# Patient Record
Sex: FEMALE | Race: BLACK OR AFRICAN AMERICAN | Employment: OTHER | URBAN - METROPOLITAN AREA
[De-identification: names, ages, dates, MRNs, and addresses within clinical notes are randomized per-mention and may not be internally consistent; named-entity substitution may affect disease eponyms.]

---

## 2018-09-18 ENCOUNTER — HOSPITAL ENCOUNTER (INPATIENT)
Age: 83
LOS: 8 days | Discharge: NURSING, REHAB OR LONG-TERM CARE | DRG: 064 | End: 2018-09-26
Attending: INTERNAL MEDICINE | Admitting: INTERNAL MEDICINE
Payer: MEDICARE

## 2018-09-18 ENCOUNTER — APPOINTMENT (OUTPATIENT)
Dept: CT IMAGING | Age: 83
DRG: 064 | End: 2018-09-18
Attending: INTERNAL MEDICINE
Payer: MEDICARE

## 2018-09-18 PROBLEM — I61.9 HEMORRHAGIC STROKE (HCC): Status: ACTIVE | Noted: 2018-09-18

## 2018-09-18 PROCEDURE — 2000000000 HC ICU R&B

## 2018-09-18 PROCEDURE — 2060000000 HC ICU INTERMEDIATE R&B

## 2018-09-18 PROCEDURE — 70450 CT HEAD/BRAIN W/O DYE: CPT

## 2018-09-18 PROCEDURE — 2500000003 HC RX 250 WO HCPCS: Performed by: STUDENT IN AN ORGANIZED HEALTH CARE EDUCATION/TRAINING PROGRAM

## 2018-09-18 RX ADMIN — NICARDIPINE HYDROCHLORIDE 5 MG/HR: 0.1 INJECTION, SOLUTION INTRAVENOUS at 22:10

## 2018-09-18 ASSESSMENT — PAIN SCALES - GENERAL
PAINLEVEL_OUTOF10: 0
PAINLEVEL_OUTOF10: 0

## 2018-09-19 ENCOUNTER — APPOINTMENT (OUTPATIENT)
Dept: MRI IMAGING | Age: 83
DRG: 064 | End: 2018-09-19
Attending: INTERNAL MEDICINE
Payer: MEDICARE

## 2018-09-19 LAB
AMMONIA: 73 UMOL/L (ref 11–51)
ANION GAP SERPL CALCULATED.3IONS-SCNC: 12 MMOL/L (ref 3–16)
BASOPHILS ABSOLUTE: 0.1 K/UL (ref 0–0.2)
BASOPHILS RELATIVE PERCENT: 1.3 %
BUN BLDV-MCNC: 9 MG/DL (ref 7–20)
CALCIUM SERPL-MCNC: 8.4 MG/DL (ref 8.3–10.6)
CHLORIDE BLD-SCNC: 101 MMOL/L (ref 99–110)
CO2: 24 MMOL/L (ref 21–32)
CREAT SERPL-MCNC: 0.7 MG/DL (ref 0.6–1.2)
EOSINOPHILS ABSOLUTE: 0.1 K/UL (ref 0–0.6)
EOSINOPHILS RELATIVE PERCENT: 0.9 %
GFR AFRICAN AMERICAN: >60
GFR NON-AFRICAN AMERICAN: >60
GLUCOSE BLD-MCNC: 104 MG/DL (ref 70–99)
GLUCOSE BLD-MCNC: 105 MG/DL (ref 70–99)
HCT VFR BLD CALC: 43.7 % (ref 36–48)
HEMOGLOBIN: 14.8 G/DL (ref 12–16)
INR BLD: 1.32 (ref 0.86–1.14)
LV EF: 58 %
LVEF MODALITY: NORMAL
LYMPHOCYTES ABSOLUTE: 1.1 K/UL (ref 1–5.1)
LYMPHOCYTES RELATIVE PERCENT: 10.3 %
MCH RBC QN AUTO: 31.4 PG (ref 26–34)
MCHC RBC AUTO-ENTMCNC: 33.8 G/DL (ref 31–36)
MCV RBC AUTO: 93.1 FL (ref 80–100)
MONOCYTES ABSOLUTE: 0.4 K/UL (ref 0–1.3)
MONOCYTES RELATIVE PERCENT: 3.6 %
NEUTROPHILS ABSOLUTE: 8.7 K/UL (ref 1.7–7.7)
NEUTROPHILS RELATIVE PERCENT: 83.9 %
PDW BLD-RTO: 13 % (ref 12.4–15.4)
PERFORMED ON: ABNORMAL
PLATELET # BLD: 192 K/UL (ref 135–450)
PMV BLD AUTO: 7.3 FL (ref 5–10.5)
POTASSIUM SERPL-SCNC: 3.5 MMOL/L (ref 3.5–5.1)
PROTHROMBIN TIME: 15.1 SEC (ref 9.8–13)
RBC # BLD: 4.69 M/UL (ref 4–5.2)
REASON FOR REJECTION: NORMAL
REJECTED TEST: NORMAL
SODIUM BLD-SCNC: 137 MMOL/L (ref 136–145)
WBC # BLD: 10.4 K/UL (ref 4–11)

## 2018-09-19 PROCEDURE — 2060000000 HC ICU INTERMEDIATE R&B

## 2018-09-19 PROCEDURE — 2500000003 HC RX 250 WO HCPCS: Performed by: STUDENT IN AN ORGANIZED HEALTH CARE EDUCATION/TRAINING PROGRAM

## 2018-09-19 PROCEDURE — 36415 COLL VENOUS BLD VENIPUNCTURE: CPT

## 2018-09-19 PROCEDURE — 85025 COMPLETE CBC W/AUTO DIFF WBC: CPT

## 2018-09-19 PROCEDURE — 70553 MRI BRAIN STEM W/O & W/DYE: CPT

## 2018-09-19 PROCEDURE — 1200000000 HC SEMI PRIVATE

## 2018-09-19 PROCEDURE — 94761 N-INVAS EAR/PLS OXIMETRY MLT: CPT

## 2018-09-19 PROCEDURE — 2580000003 HC RX 258: Performed by: STUDENT IN AN ORGANIZED HEALTH CARE EDUCATION/TRAINING PROGRAM

## 2018-09-19 PROCEDURE — 2700000000 HC OXYGEN THERAPY PER DAY

## 2018-09-19 PROCEDURE — 92610 EVALUATE SWALLOWING FUNCTION: CPT

## 2018-09-19 PROCEDURE — 82140 ASSAY OF AMMONIA: CPT

## 2018-09-19 PROCEDURE — 6370000000 HC RX 637 (ALT 250 FOR IP): Performed by: STUDENT IN AN ORGANIZED HEALTH CARE EDUCATION/TRAINING PROGRAM

## 2018-09-19 PROCEDURE — 6360000002 HC RX W HCPCS: Performed by: STUDENT IN AN ORGANIZED HEALTH CARE EDUCATION/TRAINING PROGRAM

## 2018-09-19 PROCEDURE — 85610 PROTHROMBIN TIME: CPT

## 2018-09-19 PROCEDURE — C8929 TTE W OR WO FOL WCON,DOPPLER: HCPCS

## 2018-09-19 PROCEDURE — 93880 EXTRACRANIAL BILAT STUDY: CPT

## 2018-09-19 PROCEDURE — 80048 BASIC METABOLIC PNL TOTAL CA: CPT

## 2018-09-19 PROCEDURE — S0028 INJECTION, FAMOTIDINE, 20 MG: HCPCS | Performed by: STUDENT IN AN ORGANIZED HEALTH CARE EDUCATION/TRAINING PROGRAM

## 2018-09-19 PROCEDURE — G8997 SWALLOW GOAL STATUS: HCPCS

## 2018-09-19 PROCEDURE — G8996 SWALLOW CURRENT STATUS: HCPCS

## 2018-09-19 RX ORDER — SODIUM CHLORIDE 0.9 % (FLUSH) 0.9 %
10 SYRINGE (ML) INJECTION PRN
Status: DISCONTINUED | OUTPATIENT
Start: 2018-09-19 | End: 2018-09-26 | Stop reason: HOSPADM

## 2018-09-19 RX ORDER — ONDANSETRON 2 MG/ML
4 INJECTION INTRAMUSCULAR; INTRAVENOUS EVERY 6 HOURS PRN
Status: DISCONTINUED | OUTPATIENT
Start: 2018-09-19 | End: 2018-09-26 | Stop reason: HOSPADM

## 2018-09-19 RX ORDER — ACETAMINOPHEN 650 MG/1
650 SUPPOSITORY RECTAL EVERY 4 HOURS PRN
Status: DISCONTINUED | OUTPATIENT
Start: 2018-09-19 | End: 2018-09-26 | Stop reason: HOSPADM

## 2018-09-19 RX ORDER — DEXAMETHASONE SODIUM PHOSPHATE 4 MG/ML
4 INJECTION, SOLUTION INTRA-ARTICULAR; INTRALESIONAL; INTRAMUSCULAR; INTRAVENOUS; SOFT TISSUE EVERY 6 HOURS
Status: DISCONTINUED | OUTPATIENT
Start: 2018-09-19 | End: 2018-09-19

## 2018-09-19 RX ORDER — SODIUM CHLORIDE 0.9 % (FLUSH) 0.9 %
10 SYRINGE (ML) INJECTION EVERY 12 HOURS SCHEDULED
Status: DISCONTINUED | OUTPATIENT
Start: 2018-09-19 | End: 2018-09-26 | Stop reason: HOSPADM

## 2018-09-19 RX ORDER — SODIUM CHLORIDE 9 MG/ML
INJECTION, SOLUTION INTRAVENOUS CONTINUOUS
Status: DISCONTINUED | OUTPATIENT
Start: 2018-09-19 | End: 2018-09-26 | Stop reason: HOSPADM

## 2018-09-19 RX ADMIN — FAMOTIDINE 20 MG: 10 INJECTION, SOLUTION INTRAVENOUS at 08:02

## 2018-09-19 RX ADMIN — FAMOTIDINE 20 MG: 10 INJECTION, SOLUTION INTRAVENOUS at 01:43

## 2018-09-19 RX ADMIN — Medication 10 ML: at 22:05

## 2018-09-19 RX ADMIN — NICARDIPINE HYDROCHLORIDE 5 MG/HR: 0.1 INJECTION, SOLUTION INTRAVENOUS at 05:46

## 2018-09-19 RX ADMIN — ACETAMINOPHEN 650 MG: 650 SUPPOSITORY RECTAL at 06:52

## 2018-09-19 RX ADMIN — PIPERACILLIN SODIUM AND TAZOBACTAM SODIUM 3.38 G: 3; .375 INJECTION, POWDER, LYOPHILIZED, FOR SOLUTION INTRAVENOUS at 08:03

## 2018-09-19 RX ADMIN — PIPERACILLIN SODIUM AND TAZOBACTAM SODIUM 3.38 G: 3; .375 INJECTION, POWDER, LYOPHILIZED, FOR SOLUTION INTRAVENOUS at 01:29

## 2018-09-19 RX ADMIN — LEVETIRACETAM 500 MG: 100 INJECTION, SOLUTION INTRAVENOUS at 12:34

## 2018-09-19 RX ADMIN — LEVETIRACETAM 500 MG: 100 INJECTION, SOLUTION INTRAVENOUS at 01:28

## 2018-09-19 RX ADMIN — Medication 10 ML: at 08:02

## 2018-09-19 RX ADMIN — SODIUM CHLORIDE: 9 INJECTION, SOLUTION INTRAVENOUS at 01:29

## 2018-09-19 RX ADMIN — FAMOTIDINE 20 MG: 10 INJECTION, SOLUTION INTRAVENOUS at 22:04

## 2018-09-19 ASSESSMENT — PAIN SCALES - PAIN ASSESSMENT IN ADVANCED DEMENTIA (PAINAD)
CONSOLABILITY: 0
FACIALEXPRESSION: 0
CONSOLABILITY: 0
BODYLANGUAGE: 0
BREATHING: 0
BREATHING: 0
NEGVOCALIZATION: 0
FACIALEXPRESSION: 0
TOTALSCORE: 0
BODYLANGUAGE: 0
TOTALSCORE: 0
NEGVOCALIZATION: 0

## 2018-09-19 ASSESSMENT — PAIN SCALES - GENERAL
PAINLEVEL_OUTOF10: 0
PAINLEVEL_OUTOF10: 0

## 2018-09-19 NOTE — PROGRESS NOTES
Speech Language Pathology    Order received for BSE. Will need neurosurgery clearance prior to PO. Called and spoke with RN, Lucía Georges, who reports patient not appropriate for swallow eval right now due to lethargy. Please page SLP if pt appropriate for BSE.      Rosa Perkins M.A., Yair  Speech-Language Pathologist  Pager [phone]

## 2018-09-19 NOTE — PROGRESS NOTES
mild right sided facial droop. Decreased strength 4/5 of RUE, 5/5 in LUE. Strength grossly intact of lower extremities. Unable to follow instructions for further neuro exam.   · Psychiatric:  Alert and oriented x 1  · Capillary Refill: Brisk,< 3 seconds   · Peripheral Pulses: +2 palpable, equal bilaterally       LABS    CBC: Recent Labs      09/18/18 1645 09/19/18   0648   WBC  7.9  10.4   HGB  15.2  14.8   HCT  45.0  43.7   PLT  242  192   MCV  95.3  93.1     Renal: Recent Labs      09/18/18 1645   NA  142   K  5.0   CL  105   CO2  24   BUN  10   CREATININE  0.8   GLUCOSE  125*   CALCIUM  9.4   ANIONGAP  13     Hepatic: Recent Labs      09/18/18 1645   AST  49*   ALT  33   BILITOT  1.1*   PROT  7.9   LABALBU  4.2   ALKPHOS  92     Troponin:   Recent Labs      09/18/18 1645   TROPONINI  <0.01     BNP: No results for input(s): BNP in the last 72 hours. Lipids: No results for input(s): CHOL, HDL in the last 72 hours. Invalid input(s): LDLCALCU, TRIGLYCERIDE  ABGs:  No results for input(s): PHART, TIT4QZI, PO2ART, WCY6MOH, BEART, THGBART, D3HTWURM, SCX3OHV in the last 72 hours. INR:   Recent Labs      09/18/18 1645   INR  1.40*     Lactate: No results for input(s): LACTATE in the last 72 hours. Cultures:  none  -----------------------------------------------------------------  Imaging:  CT Head WO Contrast   Final Result   Addendum 1 of 1   CR   Patient: MILLA WADSWORTH  Time Out: 02:09   Exam(s): CT HEAD Without Contrast        ADDENDUM - Added by Shu Yoder M.D. on 9/19/2018 2:09 AM (-04:00)   Comparison from outside institution on nonenhanced head CT from 09/18/18    1650 hour and CTA from 1708 hour now available after second request.       Amount of hemorrhage in left caudate and basal ganglia and associated    mass effect and midline shift are not substantially changed. Surrounding vasogenic edema is slightly increased. ----------------------------------------------------------------------       EXAM:     CT Head Without Intravenous Contrast       CLINICAL HISTORY:      Reason for exam: re-assess hemorrhage. Has a \"code stroke\" or \"stroke    alert\" been called?->No.       TECHNIQUE:     Axial computed tomography images of the head/brain without intravenous    contrast.  CTDI is 67.82 mGy and DLP is 1279.2 mGy-cm. All CT scans at    this facility use dose modulation, iterative reconstruction, and/or    weight based dosing when appropriate to reduce radiation dose to as low    as reasonably achievable. Coronal and sagittal reconstructions are    performed       COMPARISON:     No relevant prior studies available. FINDINGS:     Brain:  2 foci of adjacent small intraparenchymal hemorrhage centered    in the region of left caudate and basal ganglia, surrounded by moderate    amount of vasogenic edema causing mass effect, effacement of left lateral    ventricle and 2 mm midline shift to the right. Small hypodensities of    left frontal lobe may suggest subacute versus chronic infarct. Ventricles:  Unremarkable. No ventriculomegaly. Bones/joints:  Unremarkable. No acute fracture. Soft tissues:  Unremarkable. Sinuses:  Unremarkable as visualized. No acute sinusitis. Mastoid air cells:  Unremarkable as visualized. No mastoid effusion. IMPRESSION:        1.  2 foci of adjacent small intraparenchymal hemorrhage centered in the    region of left caudate and basal ganglia, surrounded by moderate amount    of vasogenic edema causing mass effect, effacement of left lateral    ventricle and 2 mm midline shift to the right. 2.  Small hypodensities of left frontal lobe may suggest subacute versus    chronic infarct. Please correlate with focal neurological symptoms.   If    clinically indicated, MRI may help to further evaluate               [employer]       09/19/18 01:08 Call Doctor

## 2018-09-19 NOTE — PROGRESS NOTES
likely require instrumental evaluation of swallow prior to initiation of diet consistency given high risk of dysphagia. Dysphagia Outcome Severity Scale: Level 1: Severe dysphagia- NPO. Unable to tolerate any PO safely     Treatment Plan  Requires SLP Intervention: Yes  Duration/Frequency of Treatment: 3-5x/wk  D/C Recommendations: To be determined  Referral To: Speech Evaluation (when more alert)     Recommended Diet and Intervention  Diet Solids Recommendation: NPO  Liquid Consistency Recommendation: NPO (ice chips for comfort when alert)  Recommended Form of Meds: Via alternative means of nutrition  Therapeutic Interventions: Patient/Family education; Therapeutic PO trials with SLP    Compensatory Swallowing Strategies  Compensatory Swallowing Strategies: Upright as possible for all oral intake (aggressive oral care)    Treatment/Goals  Dysphagia Goals: The patient will tolerate repeat BSE when able. 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 Pt reported to have a fall per family 9/13/18 but no significant change in mental status per family (who spoke with pt over phone) until yesterday. No neurosurgical intervention warranted at this time - pt cleared to participate in swallow evaluation. PMHx significant for CHF and HTN. Subjective  Subjective: Grandson-in-law present for session. RN present at beginning of session and reported pt still lethargic but more alert than previous this date. Behavior/Cognition: Confused; Lethargic;Cooperative  Temperature Spikes Noted: Yes  Respiratory Status: O2 via nasual cannula  Breath Sounds: Clear;Diminished (per RN documentation)  O2 Device: Nasal cannula  Communication Observation: Aphasia; Dysarthria (speech incomprehensible at times; poor comprehension of questions)  Follows Directions: Simple (inconsistent; verbal and visual cues SLP Total Treatment Time  Timed Code Treatment Minutes: 0 Minutes  Total Treatment Time: EMILY Murillo CCC-SLP; [id number]  Speech-Language Pathologist  Pager # [phone]    If patient is discharged prior to next session, this note will serve as discharge summary.

## 2018-09-19 NOTE — PROGRESS NOTES
Attempted calling grand daughter to complete MRI safety questionnaire.  No answer, voicemail left to return this RN call

## 2018-09-19 NOTE — H&P
I certify that Sophy Worthy is expected to be hospitalized for 2 midnights based on the following assessment and plan:      Patient seen and examined, plan of care discussed with residents. Please see full H&P for details by Dr. Henry Garza. Agree with their assessment and plan with following addendum:  81 y/o female , with h/o a fib, on coumadin, presented as a transfer with hemorrhagic CVA. She was given FFP and vit K before transfer. Admit to ICU, neurochecks every hour, SBP below 160-150, repeating head CT and INR. NPO. Zosyn for possible aspiration PNA.        Yohan Finely

## 2018-09-19 NOTE — H&P
facility-administered medications on file prior to encounter. Current Outpatient Prescriptions on File Prior to Encounter   Medication Sig Dispense Refill    digoxin (LANOXIN) 125 MCG tablet Take 125 mcg by mouth daily      potassium chloride (KLOR-CON M) 20 MEQ extended release tablet Take 20 mEq by mouth 2 times daily      warfarin (COUMADIN) 5 MG tablet Take 5 mg by mouth           Scheduled Meds:   sodium chloride flush  10 mL Intravenous 2 times per day    famotidine (PEPCID) injection  20 mg Intravenous BID    piperacillin-tazobactam  3.375 g Intravenous Q8H    levetiracetam  500 mg Intravenous Q12H      Continuous Infusions:   sodium chloride      niCARdipine 5 mg/hr (09/18/18 2210)     PRN Meds:sodium chloride flush, magnesium hydroxide, ondansetron    Allergies: No Known Allergies    REVIEW OF SYSTEMS:         ROS    PHYSICAL EXAM:       Vitals: BP (!) 118/98   Pulse 86   Temp 100.8 °F (38.2 °C) (Oral)   Resp 21     I/O:  No intake or output data in the 24 hours ending 09/19/18 0021  No intake/output data recorded. No intake/output data recorded. Physical Examination:     Physical Exam   Constitutional: She is oriented to person, place, and time. She appears well-nourished. No distress. She is not intubated. HENT:   Head: Normocephalic and atraumatic. Eyes: Pupils are equal, round, and reactive to light. Conjunctivae and EOM are normal.   Neck: Neck supple. No JVD present. No tracheal deviation present. Cardiovascular: Normal rate and intact distal pulses. Irregular   Pulmonary/Chest: Effort normal. No accessory muscle usage. Tachypnea noted. She is not intubated. No respiratory distress. She has decreased breath sounds. Abdominal: Soft. Normal appearance and bowel sounds are normal.   Neurological: She is alert and oriented to person, place, and time. She displays atrophy. She displays no tremor. A sensory deficit is present. GCS eye subscore is 4. GCS verbal subscore is 5. disease and pulmonary vascular congestion with concerns for consolidation and possible Pneumonia   - Follow up x-ray  - Zosyn for anaerobic coverage    History of AFIB  - Rate controlled with diltiazem   - Hold AC    Code Status:Full Code  FEN: NPO  PPX:  SCD  DISPO: ICU    This patient has been staffed and discussed with Dr. Yesica Franklin  -----------------------------  Froilan Sweet, PGY-1  Pager; [phone]  12:21 AM

## 2018-09-19 NOTE — PLAN OF CARE
Problem: Nutrition  Intervention: Swallowing evaluation  SLP evaluation completed. Please refer to EMR.

## 2018-09-19 NOTE — CONSULTS
Clinical Pharmacy Progress Note  Medication History     Admit Date: 9/18/2018    List of of current medications patient is taking is pending. Home Medication list in EPIC updated to reflect changes noted below. Source of information: Care Everywhere encounter review    Patient's home pharmacy:     Home Medication List:  Warfarin 5 mg daily  Digoxin 125 mcg daily  KCL 20 mEq BID    Other notes: Patient has no surescripts history and is currently not awake. Will follow-up with patient/family to clarify warfarin dose. Meds have been verified from cardiology notes from 8/2018 at [facility]    Please call with any questions.   Ruma Zhang, PharmD, MPH  PGY-1 Pharmacy Resident  Office: [phone]  Wireless: [phone]  9/19/2018 11:39 AM

## 2018-09-19 NOTE — CONSULTS
Basic Metabolic Profile     Recent Labs      09/18/18   1645   NA  142   CL  105   CO2  24   BUN  10   CREATININE  0.8   GLUCOSE  125*       Complete Blood Count      Recent Labs      09/18/18   1645  09/19/18   0648   WBC  7.9  10.4   RBC  4.73  4.69       Coagulation Studies     Recent Labs      09/18/18   1645   INR  1.40*          MEDICATIONS   Inpatient Medications     sodium chloride flush, 10 mL, Intravenous, 2 times per day    famotidine (PEPCID) injection, 20 mg, Intravenous, BID    piperacillin-tazobactam, 3.375 g, Intravenous, Q8H    levetiracetam, 500 mg, Intravenous, Q12H    [START ON 9/20/2018] influenza virus vaccine, 0.5 mL, Intramuscular, Once   Infusions    sodium chloride 50 mL/hr at 09/19/18 0129    niCARdipine 4 mg/hr (09/19/18 0749)       PRN      sodium chloride flush 10 mL Intravenous PRN   magnesium hydroxide 30 mL Oral Daily PRN   ondansetron 4 mg Intravenous Q6H PRN   acetaminophen 650 mg Rectal Q4H PRN       Antibiotics       Recent Abx Admin                      piperacillin-tazobactam (ZOSYN) 3.375 g in dextrose 5 % 100 mL IVPB extended infusion (mini-bag) (g) 3.375 g New Bag 09/19/18 0803       3.375 g New Bag   0129                        PHYSICAL EXAMINATION      Patient seen and examined   General: Well developed. sleepy, confused and cooperative in no acute distress.     HENT: atraumatic, neck supple  Eyes: Optic discs: Not tested  Pulmonary: unlabored respiratory effort  Cardiovascular:  Warm well perfused. No peripheral edema  Gastrointestinal: abdomen soft, ND     Neurologic Exam:  Neurological:  Mental Status: Sleepy, have to re-orient to stay awake, confused to date & location, knows first name. Garbled speech at times incomprehensible.   Attention: Have to reorient during exam  Language: some expressive aphasia   Sensation: Intact to all extremities to light touch  DTRs:     Right  Left    Patella   0 1   ankle clonus  neg neg      Cranial Nerves:  Cranial

## 2018-09-20 ENCOUNTER — APPOINTMENT (OUTPATIENT)
Dept: GENERAL RADIOLOGY | Age: 83
DRG: 064 | End: 2018-09-20
Attending: INTERNAL MEDICINE
Payer: MEDICARE

## 2018-09-20 LAB
ANION GAP SERPL CALCULATED.3IONS-SCNC: 15 MMOL/L (ref 3–16)
BUN BLDV-MCNC: 10 MG/DL (ref 7–20)
CALCIUM SERPL-MCNC: 8.9 MG/DL (ref 8.3–10.6)
CHLORIDE BLD-SCNC: 99 MMOL/L (ref 99–110)
CO2: 22 MMOL/L (ref 21–32)
CREAT SERPL-MCNC: 0.7 MG/DL (ref 0.6–1.2)
GFR AFRICAN AMERICAN: >60
GFR NON-AFRICAN AMERICAN: >60
GLUCOSE BLD-MCNC: 87 MG/DL (ref 70–99)
POTASSIUM SERPL-SCNC: 3.7 MMOL/L (ref 3.5–5.1)
SODIUM BLD-SCNC: 136 MMOL/L (ref 136–145)

## 2018-09-20 PROCEDURE — 97163 PT EVAL HIGH COMPLEX 45 MIN: CPT

## 2018-09-20 PROCEDURE — 97167 OT EVAL HIGH COMPLEX 60 MIN: CPT

## 2018-09-20 PROCEDURE — G8979 MOBILITY GOAL STATUS: HCPCS

## 2018-09-20 PROCEDURE — 1200000000 HC SEMI PRIVATE

## 2018-09-20 PROCEDURE — G8987 SELF CARE CURRENT STATUS: HCPCS

## 2018-09-20 PROCEDURE — 92526 ORAL FUNCTION THERAPY: CPT

## 2018-09-20 PROCEDURE — 36415 COLL VENOUS BLD VENIPUNCTURE: CPT

## 2018-09-20 PROCEDURE — 80048 BASIC METABOLIC PNL TOTAL CA: CPT

## 2018-09-20 PROCEDURE — 2580000003 HC RX 258: Performed by: STUDENT IN AN ORGANIZED HEALTH CARE EDUCATION/TRAINING PROGRAM

## 2018-09-20 PROCEDURE — G8978 MOBILITY CURRENT STATUS: HCPCS

## 2018-09-20 PROCEDURE — 2500000003 HC RX 250 WO HCPCS: Performed by: STUDENT IN AN ORGANIZED HEALTH CARE EDUCATION/TRAINING PROGRAM

## 2018-09-20 PROCEDURE — 6360000002 HC RX W HCPCS: Performed by: INTERNAL MEDICINE

## 2018-09-20 PROCEDURE — 97530 THERAPEUTIC ACTIVITIES: CPT

## 2018-09-20 PROCEDURE — 6360000002 HC RX W HCPCS: Performed by: STUDENT IN AN ORGANIZED HEALTH CARE EDUCATION/TRAINING PROGRAM

## 2018-09-20 PROCEDURE — G8988 SELF CARE GOAL STATUS: HCPCS

## 2018-09-20 PROCEDURE — 97535 SELF CARE MNGMENT TRAINING: CPT

## 2018-09-20 PROCEDURE — 94760 N-INVAS EAR/PLS OXIMETRY 1: CPT

## 2018-09-20 PROCEDURE — S0028 INJECTION, FAMOTIDINE, 20 MG: HCPCS | Performed by: STUDENT IN AN ORGANIZED HEALTH CARE EDUCATION/TRAINING PROGRAM

## 2018-09-20 RX ORDER — HYDRALAZINE HYDROCHLORIDE 20 MG/ML
10 INJECTION INTRAMUSCULAR; INTRAVENOUS EVERY 6 HOURS PRN
Status: DISCONTINUED | OUTPATIENT
Start: 2018-09-20 | End: 2018-09-24

## 2018-09-20 RX ORDER — LABETALOL HYDROCHLORIDE 5 MG/ML
10 INJECTION, SOLUTION INTRAVENOUS ONCE
Status: COMPLETED | OUTPATIENT
Start: 2018-09-20 | End: 2018-09-20

## 2018-09-20 RX ORDER — AMLODIPINE BESYLATE 10 MG/1
10 TABLET ORAL DAILY
Status: DISCONTINUED | OUTPATIENT
Start: 2018-09-20 | End: 2018-09-20

## 2018-09-20 RX ORDER — LABETALOL HYDROCHLORIDE 5 MG/ML
10 INJECTION, SOLUTION INTRAVENOUS EVERY 6 HOURS PRN
Status: DISCONTINUED | OUTPATIENT
Start: 2018-09-20 | End: 2018-09-24

## 2018-09-20 RX ORDER — HYDRALAZINE HYDROCHLORIDE 20 MG/ML
5 INJECTION INTRAMUSCULAR; INTRAVENOUS EVERY 6 HOURS
Status: DISCONTINUED | OUTPATIENT
Start: 2018-09-20 | End: 2018-09-20

## 2018-09-20 RX ORDER — HYDRALAZINE HYDROCHLORIDE 20 MG/ML
5 INJECTION INTRAMUSCULAR; INTRAVENOUS EVERY 6 HOURS PRN
Status: DISCONTINUED | OUTPATIENT
Start: 2018-09-20 | End: 2018-09-20

## 2018-09-20 RX ORDER — HYDRALAZINE HYDROCHLORIDE 20 MG/ML
10 INJECTION INTRAMUSCULAR; INTRAVENOUS ONCE
Status: DISCONTINUED | OUTPATIENT
Start: 2018-09-20 | End: 2018-09-24

## 2018-09-20 RX ADMIN — SODIUM CHLORIDE: 9 INJECTION, SOLUTION INTRAVENOUS at 04:09

## 2018-09-20 RX ADMIN — Medication 10 ML: at 20:43

## 2018-09-20 RX ADMIN — FAMOTIDINE 20 MG: 10 INJECTION, SOLUTION INTRAVENOUS at 10:19

## 2018-09-20 RX ADMIN — LEVETIRACETAM 500 MG: 100 INJECTION, SOLUTION INTRAVENOUS at 00:04

## 2018-09-20 RX ADMIN — LABETALOL HYDROCHLORIDE 10 MG: 5 INJECTION, SOLUTION INTRAVENOUS at 15:05

## 2018-09-20 RX ADMIN — HYDRALAZINE HYDROCHLORIDE 5 MG: 20 INJECTION INTRAMUSCULAR; INTRAVENOUS at 13:06

## 2018-09-20 RX ADMIN — Medication 10 ML: at 10:23

## 2018-09-20 RX ADMIN — HYDRALAZINE HYDROCHLORIDE 10 MG: 20 INJECTION INTRAMUSCULAR; INTRAVENOUS at 11:48

## 2018-09-20 RX ADMIN — HYDRALAZINE HYDROCHLORIDE 10 MG: 20 INJECTION INTRAMUSCULAR; INTRAVENOUS at 17:39

## 2018-09-20 RX ADMIN — FAMOTIDINE 20 MG: 10 INJECTION, SOLUTION INTRAVENOUS at 20:42

## 2018-09-20 RX ADMIN — LEVETIRACETAM 500 MG: 100 INJECTION, SOLUTION INTRAVENOUS at 11:35

## 2018-09-20 ASSESSMENT — PAIN SCALES - PAIN ASSESSMENT IN ADVANCED DEMENTIA (PAINAD)
BREATHING: 0
CONSOLABILITY: 0
FACIALEXPRESSION: 0
CONSOLABILITY: 0
CONSOLABILITY: 0
BODYLANGUAGE: 0
BODYLANGUAGE: 0
CONSOLABILITY: 0
TOTALSCORE: 0
TOTALSCORE: 0
FACIALEXPRESSION: 0
FACIALEXPRESSION: 0
BODYLANGUAGE: 0
NEGVOCALIZATION: 0
FACIALEXPRESSION: 0
BODYLANGUAGE: 0
BREATHING: 0
TOTALSCORE: 0
NEGVOCALIZATION: 0
CONSOLABILITY: 0
CONSOLABILITY: 0
BODYLANGUAGE: 0
BREATHING: 0
NEGVOCALIZATION: 0
NEGVOCALIZATION: 0
BREATHING: 0
TOTALSCORE: 0
FACIALEXPRESSION: 0
BREATHING: 0
BREATHING: 0
NEGVOCALIZATION: 0
NEGVOCALIZATION: 0
BODYLANGUAGE: 0
FACIALEXPRESSION: 0
BODYLANGUAGE: 0
TOTALSCORE: 0
TOTALSCORE: 0
BODYLANGUAGE: 0
FACIALEXPRESSION: 0
CONSOLABILITY: 0
TOTALSCORE: 0
FACIALEXPRESSION: 0
BODYLANGUAGE: 0
FACIALEXPRESSION: 0
CONSOLABILITY: 0
TOTALSCORE: 0
BREATHING: 0
FACIALEXPRESSION: 0
CONSOLABILITY: 0
NEGVOCALIZATION: 0
TOTALSCORE: 0
NEGVOCALIZATION: 0
CONSOLABILITY: 0
BREATHING: 0
BREATHING: 0
BODYLANGUAGE: 0
TOTALSCORE: 0
BREATHING: 0

## 2018-09-20 NOTE — PROGRESS NOTES
PROGRESS NOTE    9/20/2018 1:22 PM                               Madelin Scott                      LOS: 2 days               Subjective:  No acute events overnight. Patient has no specific complaints this am.         Physical Exam:    Vitals:    09/20/18 1303   BP: (!) 182/144   Pulse: 92   Resp: 22   Temp:    SpO2:      Patient seen and examined   General: Well developed. sleepy, confused and cooperative in no acute distress.     HENT: atraumatic, neck supple  Eyes: Optic discs: Not tested  Pulmonary: unlabored respiratory effort  Cardiovascular:  Warm well perfused. No peripheral edema  Gastrointestinal: abdomen soft, ND     Neurological:  GCS:  4 - Opens eyes on own  4 - Seems confused, disoriented  6 - Follows simple motor commands    Mental Status: Sleepy, have to re-orient to stay awake, confused to date & location, knows first name. Garbled speech at times incomprehensible. Attention: Have to reorient during exam  Language: some expressive aphasia   Sensation: Intact to all extremities to light touch  DTRs:     Right  Left    Patella   0 1   ankle clonus  neg neg      Cranial Nerves:  Cranial Nerves:  II: Visual acuity unable to be tested  III, IV, VI: PERRL, 2 mm bilaterally, does not follow EOM exam, no nystagmus noted, no forced gaze with head turn  V: Facial sensation intact bilaterally to touch  VII: Face symmetric  VIII: Hearing intact bilaterally to spoken voice  IX: Palate movement equal bilaterally  XI: Shoulder shrug equal bilaterally  XII: Tongue midline     Musculoskeletal:   Gait: Not tested   Assist devices: None   Tone: normal  Motor strength:     Right  Left      Right  Left    Deltoid  2 3   Hip Flex   1  3   Biceps  2 3   Knee Extensors   1   3    Triceps  2 3   Knee Flexors   1  3   Wrist Ext  2 4   Ankle Dorsiflex. 1  3   Wrist Flex  2 4   Ankle Plantarflex.    1  3   Handgrip  1 4   Ext Uziel Longus        Thumb Ext  3 4              Radiological Findings:  Ct Head Wo

## 2018-09-20 NOTE — PROGRESS NOTES
currently requiring assist of 2 for all mobility and Max A for ADLs. Pt with decreased speech ability and decreased safety awareness. Recommend continued inpt OT at d/c. Will follow as inpt. Treatment Diagnosis: Impaired functional mobility, ADLs, cognition, coordination, and ROM 2/2 hemorrhagic CVA  Prognosis: Fair  Decision Making: High Complexity  Patient Education: Role of OT - no evidence of learning, needs reinforcement  REQUIRES OT FOLLOW UP: Yes  Activity Tolerance  Activity Tolerance: Patient Tolerated treatment well;Treatment limited secondary to decreased cognition  Activity Tolerance: Decreased ability to follow instructions, difficulties with speech  Safety Devices  Safety Devices in place: Yes  Type of devices: Left in chair;Nurse notified;Call light within reach; Chair alarm in place         Plan  This note will serve as a discharge summary if patient is discharged from hospital before next treatment session. Plan  Times per week: 5-7x  Times per day: Daily  Current Treatment Recommendations: Strengthening, Patient/Caregiver Education & Training, Functional Mobility Training, Cognitive Reorientation, Endurance Training, Safety Education & Training, Self-Care / ADL    G-Code  OT G-codes  Functional Limitation: Self care  Self Care Current Status (): At least 80 percent but less than 100 percent impaired, limited or restricted  Self Care Goal Status ():  At least 60 percent but less than 80 percent impaired, limited or restricted    AM-PAC Score  AM-PAC Inpatient Daily Activity Raw Score: 7  AM-PAC Inpatient ADL T-Scale Score : 20.13  ADL Inpatient CMS 0-100% Score: 92.44  ADL Inpatient CMS G-Code Modifier : CM    Goals  Short term goals  Time Frame for Short term goals: Discharge  Short term goal 1: Follow 1-step command on 3/4 attempts  Short term goal 2: Functional transfers with Mod A x1  Short term goal 3: Complete simple grooming task with set up and min verbal cues  Short term goal 4:

## 2018-09-20 NOTE — PROGRESS NOTES
a \"code stroke\" or \"stroke    alert\" been called?->No.       TECHNIQUE:     Axial computed tomography images of the head/brain without intravenous    contrast.  CTDI is 67.82 mGy and DLP is 1279.2 mGy-cm. All CT scans at    this facility use dose modulation, iterative reconstruction, and/or    weight based dosing when appropriate to reduce radiation dose to as low    as reasonably achievable. Coronal and sagittal reconstructions are    performed       COMPARISON:     No relevant prior studies available. FINDINGS:     Brain:  2 foci of adjacent small intraparenchymal hemorrhage centered    in the region of left caudate and basal ganglia, surrounded by moderate    amount of vasogenic edema causing mass effect, effacement of left lateral    ventricle and 2 mm midline shift to the right. Small hypodensities of    left frontal lobe may suggest subacute versus chronic infarct. Ventricles:  Unremarkable. No ventriculomegaly. Bones/joints:  Unremarkable. No acute fracture. Soft tissues:  Unremarkable. Sinuses:  Unremarkable as visualized. No acute sinusitis. Mastoid air cells:  Unremarkable as visualized. No mastoid effusion. IMPRESSION:        1.  2 foci of adjacent small intraparenchymal hemorrhage centered in the    region of left caudate and basal ganglia, surrounded by moderate amount    of vasogenic edema causing mass effect, effacement of left lateral    ventricle and 2 mm midline shift to the right. 2.  Small hypodensities of left frontal lobe may suggest subacute versus    chronic infarct. Please correlate with focal neurological symptoms.   If    clinically indicated, MRI may help to further evaluate               [employer]       09/19/18 01:08 Call Doctor Regarding Stroke, called Dr. Casie Pandey on 09/19    01:08 (-04:00)       09/19/18 01:09 Call Doctor Regarding Intracranial Hemorrhage, called Dr. Casie Pandey on 09/19 01:08 (-04:00)      Final   1.  2

## 2018-09-20 NOTE — PROGRESS NOTES
side  Distance: 3 steps to chair    Balance  Sitting - Dynamic: Poor (posterior loss of balance)  Standing - Static: Poor (Mod A at walker with pt pushing to R side)  Standing - Dynamic: Poor (Max A x2 for transfers to chair with walker)    Treatment included gait and transfer training with patient education     Assessment   Assessment: Pt admitted from home with hemorrhagic stroke. Pt lethargic and confused. Pt needing 2 person assist for all mobility transfers. Pt unable to advance LLE for ambulation. Noted decreased coordination with right UE. Pt unable to provide PLOF, however feel pt will need ongoing skilled PT at d/c. Treatment Diagnosis: Decreased mobility associated with hemorrhagic stroke. Decision Making: High Complexity  Patient Education: Role of PT. Pt will need re-education.    REQUIRES PT FOLLOW UP: Yes         Plan   Times per week: 5-7  Current Treatment Recommendations: Functional Mobility Training, Gait Training, Strengthening    Safety Devices  Type of devices: Left in chair, Chair alarm in place, Call light within reach, Nurse notified      AM-PAC Score  AM-PAC Inpatient Mobility Raw Score : 6  AM-PAC Inpatient T-Scale Score : 23.55  Mobility Inpatient CMS 0-100% Score: 100  Mobility Inpatient CMS G-Code Modifier : CN    Goals  Short term goals  Time Frame for Short term goals: Discharge  Short term goal 1: bed mobility mod A   Short term goal 2: sit <> stand mod A   Short term goal 3: bed <> chair mod A x2  Short term goal 4: ambulate 10ft with walker mod A x2  Patient Goals   Patient goals : Unable to state       Therapy Time   Individual Concurrent Group Co-treatment   Time In 0830         Time Out 0924         Minutes 54         Timed Code Treatment Minutes:  40  Total Treatment Minutes:  DOLORES Lowe

## 2018-09-20 NOTE — CARE COORDINATION
2018  [facility])  Clinical Case Management Department    Patient: Clarissa Hernández  MRN: [id number] / : 1935  ACCT: [de-identified]    Emergency Contacts  Contact 1: Name: Tyrone Berger   Contact 1: Number: [phone]  Contact 1: Relationship: [facility]    Admission Documentation  Attending Provider: Neftali Chowdhury MD  Admit date/time: 2018 10:03 PM  Status: Inpatient  Diagnosis: Hemorrhagic stroke ([address]     Readmission within last 30 days:  no     Living Situation   Patient is from home with spouse. Service Assessment   Case Management following patient for discharge planning and needs, spoke with patients Grand Daughter- Rockney Cowden this am at bedside. Per bhupinder Carranzaden, states prior to admission patient lived in home alone, no stairs to enter, and elevator to get to apartment. Rockney Cowden reports that patient was able to perform all ADL's independently, and was still cooking and Cleaning her own home. [address] Daughter did state that the patient uses a Walker, sometimes, and has COA services with a C 2 times per week to assist with cleaning and cooking. Case Management explained to granddaughter PT/OT evaluation and meaning. Granddaughter agreeable, okay with having a referral sent to [facility], and plans to visit facility today around 4/5PM.  Patient may be able to be placed without Precert, as this facility does have a Floor to SNF program that can bypass the precert and accept patients before it has been authorized. Case Management will follow up with patients granddaughter later today, please contact Case Management with any questions or concerns.     Values / Beliefs  Do you have any ethnic, cultural, sacramental, or spiritual Faith needs you would like us to be aware of while you are in the hospital?: No    Advance Directives (For Healthcare)  Pre-existing DNR Comfort Care/DNR Arrest/DNI Order: No  Healthcare Directive: No, patient does not have an advance directive for healthcare treatment  Information on Healthcare Directives Requested: No  Patient Requests Assistance: No  Advance Directives: Pt. not interested at this time    Destination  Patient needing placement for short rehab stay. Durable Medical Equipment   Walker and [address] [place] [address]/Skilled Nursing  Home care at home? No     Therapy Consults  PT evaluation needed?: No  OT Evalulation Needed?: No  SLP evaluation needed?: No    Home Medical Care  None prior to admission     Pharmacy: [facility]  Potential Assistance Purchasing Medications:     Does patient want to participate in local refill/meds to beds program?:      Goals of Care    Patient plans for SNF: Possibly [facility]    Mode of transport from hospital: Patient will need Transportation arranged once medically stable to discharge.       Barriers to discharge: None     Ava Dubin, RN  [facility]  Case Management Department  Ph: [phone]  Fax: [phone]

## 2018-09-21 ENCOUNTER — APPOINTMENT (OUTPATIENT)
Dept: GENERAL RADIOLOGY | Age: 83
DRG: 064 | End: 2018-09-21
Attending: INTERNAL MEDICINE
Payer: MEDICARE

## 2018-09-21 LAB
ALBUMIN SERPL-MCNC: 4 G/DL (ref 3.4–5)
ANION GAP SERPL CALCULATED.3IONS-SCNC: 15 MMOL/L (ref 3–16)
BASOPHILS ABSOLUTE: 0 K/UL (ref 0–0.2)
BASOPHILS RELATIVE PERCENT: 0.5 %
BUN BLDV-MCNC: 11 MG/DL (ref 7–20)
CALCIUM SERPL-MCNC: 9.1 MG/DL (ref 8.3–10.6)
CHLORIDE BLD-SCNC: 99 MMOL/L (ref 99–110)
CO2: 21 MMOL/L (ref 21–32)
CREAT SERPL-MCNC: 0.6 MG/DL (ref 0.6–1.2)
EOSINOPHILS ABSOLUTE: 0.1 K/UL (ref 0–0.6)
EOSINOPHILS RELATIVE PERCENT: 0.9 %
GFR AFRICAN AMERICAN: >60
GFR NON-AFRICAN AMERICAN: >60
GLUCOSE BLD-MCNC: 107 MG/DL (ref 70–99)
HCT VFR BLD CALC: 40.8 % (ref 36–48)
HEMOGLOBIN: 13.9 G/DL (ref 12–16)
LYMPHOCYTES ABSOLUTE: 1.4 K/UL (ref 1–5.1)
LYMPHOCYTES RELATIVE PERCENT: 13.8 %
MCH RBC QN AUTO: 31.7 PG (ref 26–34)
MCHC RBC AUTO-ENTMCNC: 34 G/DL (ref 31–36)
MCV RBC AUTO: 93.3 FL (ref 80–100)
MONOCYTES ABSOLUTE: 0.8 K/UL (ref 0–1.3)
MONOCYTES RELATIVE PERCENT: 8.5 %
NEUTROPHILS ABSOLUTE: 7.5 K/UL (ref 1.7–7.7)
NEUTROPHILS RELATIVE PERCENT: 76.3 %
PDW BLD-RTO: 12.6 % (ref 12.4–15.4)
PHOSPHORUS: 2 MG/DL (ref 2.5–4.9)
PLATELET # BLD: 215 K/UL (ref 135–450)
PMV BLD AUTO: 7.6 FL (ref 5–10.5)
POTASSIUM SERPL-SCNC: 3.6 MMOL/L (ref 3.5–5.1)
RBC # BLD: 4.38 M/UL (ref 4–5.2)
SODIUM BLD-SCNC: 135 MMOL/L (ref 136–145)
WBC # BLD: 9.9 K/UL (ref 4–11)

## 2018-09-21 PROCEDURE — 1200000000 HC SEMI PRIVATE

## 2018-09-21 PROCEDURE — 2500000003 HC RX 250 WO HCPCS: Performed by: STUDENT IN AN ORGANIZED HEALTH CARE EDUCATION/TRAINING PROGRAM

## 2018-09-21 PROCEDURE — 74230 X-RAY XM SWLNG FUNCJ C+: CPT

## 2018-09-21 PROCEDURE — 6360000002 HC RX W HCPCS: Performed by: STUDENT IN AN ORGANIZED HEALTH CARE EDUCATION/TRAINING PROGRAM

## 2018-09-21 PROCEDURE — 90686 IIV4 VACC NO PRSV 0.5 ML IM: CPT | Performed by: INTERNAL MEDICINE

## 2018-09-21 PROCEDURE — 36415 COLL VENOUS BLD VENIPUNCTURE: CPT

## 2018-09-21 PROCEDURE — S0028 INJECTION, FAMOTIDINE, 20 MG: HCPCS | Performed by: STUDENT IN AN ORGANIZED HEALTH CARE EDUCATION/TRAINING PROGRAM

## 2018-09-21 PROCEDURE — G9165 ATTEN CURRENT STATUS: HCPCS

## 2018-09-21 PROCEDURE — G0008 ADMIN INFLUENZA VIRUS VAC: HCPCS | Performed by: INTERNAL MEDICINE

## 2018-09-21 PROCEDURE — 6370000000 HC RX 637 (ALT 250 FOR IP): Performed by: INTERNAL MEDICINE

## 2018-09-21 PROCEDURE — 85025 COMPLETE CBC W/AUTO DIFF WBC: CPT

## 2018-09-21 PROCEDURE — 92611 MOTION FLUOROSCOPY/SWALLOW: CPT

## 2018-09-21 PROCEDURE — G9166 ATTEN GOAL STATUS: HCPCS

## 2018-09-21 PROCEDURE — 80069 RENAL FUNCTION PANEL: CPT

## 2018-09-21 PROCEDURE — G8996 SWALLOW CURRENT STATUS: HCPCS

## 2018-09-21 PROCEDURE — 6370000000 HC RX 637 (ALT 250 FOR IP): Performed by: STUDENT IN AN ORGANIZED HEALTH CARE EDUCATION/TRAINING PROGRAM

## 2018-09-21 PROCEDURE — G8997 SWALLOW GOAL STATUS: HCPCS

## 2018-09-21 PROCEDURE — 2580000003 HC RX 258: Performed by: STUDENT IN AN ORGANIZED HEALTH CARE EDUCATION/TRAINING PROGRAM

## 2018-09-21 PROCEDURE — 92523 SPEECH SOUND LANG COMPREHEN: CPT

## 2018-09-21 PROCEDURE — 6360000002 HC RX W HCPCS: Performed by: INTERNAL MEDICINE

## 2018-09-21 RX ORDER — DIGOXIN 125 MCG
125 TABLET ORAL DAILY
Status: DISCONTINUED | OUTPATIENT
Start: 2018-09-21 | End: 2018-09-26 | Stop reason: HOSPADM

## 2018-09-21 RX ORDER — ACETAMINOPHEN 325 MG/1
650 TABLET ORAL EVERY 4 HOURS PRN
Status: DISCONTINUED | OUTPATIENT
Start: 2018-09-21 | End: 2018-09-26 | Stop reason: HOSPADM

## 2018-09-21 RX ORDER — LEVETIRACETAM 100 MG/ML
500 SOLUTION ORAL 2 TIMES DAILY
Status: DISCONTINUED | OUTPATIENT
Start: 2018-09-21 | End: 2018-09-26 | Stop reason: HOSPADM

## 2018-09-21 RX ORDER — AMLODIPINE BESYLATE 10 MG/1
10 TABLET ORAL DAILY
Status: DISCONTINUED | OUTPATIENT
Start: 2018-09-21 | End: 2018-09-24

## 2018-09-21 RX ADMIN — INFLUENZA A VIRUS A/MICHIGAN/45/2015 X-275 (H1N1) ANTIGEN (FORMALDEHYDE INACTIVATED), INFLUENZA A VIRUS A/SINGAPORE/INFIMH-16-0019/2016 IVR-186 (H3N2) ANTIGEN (FORMALDEHYDE INACTIVATED), INFLUENZA B VIRUS B/PHUKET/3073/2013 ANTIGEN (FORMALDEHYDE INACTIVATED), AND INFLUENZA B VIRUS B/MARYLAND/15/2016 BX-69A ANTIGEN (FORMALDEHYDE INACTIVATED) 0.5 ML: 15; 15; 15; 15 INJECTION, SUSPENSION INTRAMUSCULAR at 10:05

## 2018-09-21 RX ADMIN — AMLODIPINE BESYLATE 10 MG: 10 TABLET ORAL at 10:40

## 2018-09-21 RX ADMIN — Medication 10 ML: at 20:19

## 2018-09-21 RX ADMIN — LEVETIRACETAM 500 MG: 100 SOLUTION ORAL at 20:19

## 2018-09-21 RX ADMIN — LABETALOL HYDROCHLORIDE 10 MG: 5 INJECTION, SOLUTION INTRAVENOUS at 00:18

## 2018-09-21 RX ADMIN — LEVETIRACETAM 500 MG: 100 INJECTION, SOLUTION INTRAVENOUS at 00:19

## 2018-09-21 RX ADMIN — DIGOXIN 125 MCG: 125 TABLET ORAL at 14:00

## 2018-09-21 RX ADMIN — LEVETIRACETAM 500 MG: 100 SOLUTION ORAL at 14:37

## 2018-09-21 RX ADMIN — ACETAMINOPHEN 650 MG: 325 TABLET ORAL at 23:58

## 2018-09-21 RX ADMIN — FAMOTIDINE 20 MG: 10 INJECTION, SOLUTION INTRAVENOUS at 10:02

## 2018-09-21 RX ADMIN — FAMOTIDINE 20 MG: 10 INJECTION, SOLUTION INTRAVENOUS at 20:19

## 2018-09-21 ASSESSMENT — PAIN SCALES - PAIN ASSESSMENT IN ADVANCED DEMENTIA (PAINAD)
FACIALEXPRESSION: 0
CONSOLABILITY: 0
BREATHING: 0
NEGVOCALIZATION: 0
TOTALSCORE: 0
BODYLANGUAGE: 0

## 2018-09-21 ASSESSMENT — PAIN SCALES - GENERAL
PAINLEVEL_OUTOF10: 1
PAINLEVEL_OUTOF10: 0

## 2018-09-21 NOTE — PLAN OF CARE
Problem: Risk for Impaired Skin Integrity  Goal: Tissue integrity - skin and mucous membranes  Structural intactness and normal physiological function of skin and  mucous membranes. Pt at risk for skin break down d/t decreased mobility, pressure, and moisture. Pt has a pure wick external catheter to prevent recurrent moisture. Pt skin was checked and cleansed upon arrival to floor. Protective mepilex put in place. Redness noted on bilateral heels. Heels elevated with pillows    Problem: Falls - Risk of:  Goal: Will remain free from falls  Will remain free from falls   Pt at risk for falls d/t weakness in lower and upper extremities. Pt not getting out of bed. Bed alarm on, call light within reach, bed in lowest locked position. Will cont to monitor.

## 2018-09-21 NOTE — CARE COORDINATION
Patient is approved to got to [facility] Saturday 9/22/18 if discharge orders are written on Saturday for beds to snf. Patient's daughter is going this Friday evening to see/tour SNF and make final approval for [facility] and will let CM on weekend know their decision. If agreeable to daughter, patient can go to [facility] SNF if doctor approves.     Electronically signed by Immanuel Taylor, BATOOL, JENY on 9/21/2018 at 3:58 PM

## 2018-09-21 NOTE — PLAN OF CARE
Problem: Risk for Impaired Skin Integrity  Goal: Tissue integrity - skin and mucous membranes  Structural intactness and normal physiological function of skin and  mucous membranes. Outcome: Met This Shift  No evidence of redness/ skin breakdown. Will continue to monitor. Problem: Falls - Risk of:  Goal: Will remain free from falls  Will remain free from falls   Outcome: Met This Shift  Patient free of falls/injury during duration of shift. Patient assessed as a high fall risk. Bed in lowest position, wheels locked, call light w/in reach, 2/4 side rails up, bed alarm on, fall risk visual posted outside door & fall risk ID on. Patient instructed to call for assistance prior to getting out of bed. Will continue to monitor.

## 2018-09-21 NOTE — PLAN OF CARE
Problem: Neurological  Intervention: Speech Evaluation/treatment  SLP evaluation completed. Please refer to EMR.

## 2018-09-21 NOTE — PROGRESS NOTES
Internal Medicine PGY-1 Resident Progress Note        PCP: No primary care provider on file. Date of Admission: 9/18/2018    Chief Complaint: AMS    Subjective: No adverse events overnight. Patient was alert and oriented x3. Following commands and able to respond appropriately to questions. Denies any headache, chest pain, SOB or abdominal pain. Decreased strength and motor function in R arm and R leg. Has placement at SNF. Medications:  Reviewed    Infusion Medications    sodium chloride Stopped (09/21/18 1000)     Scheduled Medications    amLODIPine  10 mg Oral Daily    levETIRAcetam  500 mg Oral BID    digoxin  125 mcg Oral Daily    hydrALAZINE  10 mg Intravenous Once    sodium chloride flush  10 mL Intravenous 2 times per day    famotidine (PEPCID) injection  20 mg Intravenous BID     PRN Meds: hydrALAZINE, labetalol, sodium chloride flush, magnesium hydroxide, ondansetron, acetaminophen      Intake/Output Summary (Last 24 hours) at 09/21/18 1753  Last data filed at 09/21/18 1442   Gross per 24 hour   Intake                0 ml   Output              700 ml   Net             -700 ml       Physical Exam Performed:    BP (!) 158/89   Pulse 91   Temp 99.3 °F (37.4 °C)   Resp 18   Ht 5' 4.96\" (1.65 m)   Wt 235 lb 14.3 oz (107 kg)   SpO2 92%   BMI 39.30 kg/m²     Physical Exam   Constitutional: She is oriented to person. No distress. HENT:   Head: Normocephalic and atraumatic. Eyes: Pupils are equal, round, and reactive to light. Conjunctivae and EOM are normal.   Neck: Neck supple. No JVD present. No tracheal deviation present. Cardiovascular: Normal rate and intact distal pulses.    Irregular   Pulmonary/Chest: Effort normal. No accessory muscle usage. She is not intubated. No respiratory distress. She has decreased breath sounds. Abdominal: Soft. Normal appearance and bowel sounds are normal.   Neurological: She is alert and oriented to person, place, and time. She displays atrophy. Head Without Intravenous Contrast       CLINICAL HISTORY:      Reason for exam: re-assess hemorrhage. Has a \"code stroke\" or \"stroke    alert\" been called?->No.       TECHNIQUE:     Axial computed tomography images of the head/brain without intravenous    contrast.  CTDI is 67.82 mGy and DLP is 1279.2 mGy-cm. All CT scans at    this facility use dose modulation, iterative reconstruction, and/or    weight based dosing when appropriate to reduce radiation dose to as low    as reasonably achievable. Coronal and sagittal reconstructions are    performed       COMPARISON:     No relevant prior studies available. FINDINGS:     Brain:  2 foci of adjacent small intraparenchymal hemorrhage centered    in the region of left caudate and basal ganglia, surrounded by moderate    amount of vasogenic edema causing mass effect, effacement of left lateral    ventricle and 2 mm midline shift to the right. Small hypodensities of    left frontal lobe may suggest subacute versus chronic infarct. Ventricles:  Unremarkable. No ventriculomegaly. Bones/joints:  Unremarkable. No acute fracture. Soft tissues:  Unremarkable. Sinuses:  Unremarkable as visualized. No acute sinusitis. Mastoid air cells:  Unremarkable as visualized. No mastoid effusion. IMPRESSION:        1.  2 foci of adjacent small intraparenchymal hemorrhage centered in the    region of left caudate and basal ganglia, surrounded by moderate amount    of vasogenic edema causing mass effect, effacement of left lateral    ventricle and 2 mm midline shift to the right. 2.  Small hypodensities of left frontal lobe may suggest subacute versus    chronic infarct. Please correlate with focal neurological symptoms.   If    clinically indicated, MRI may help to further evaluate               Critical Value Communications       09/19/18 01:08 Call Doctor Regarding Stroke, called Dr. Ligia Buitrago on 09/19    01:08 (-04:00)       09/19/18 01:09 Call Doctor Regarding Intracranial Hemorrhage, called Dr. Ti Lin on 09/19 01:08 (-04:00)      Final   1.  2 foci of adjacent small intraparenchymal hemorrhage centered in the    region of left caudate and basal ganglia, surrounded by moderate amount    of vasogenic edema causing mass effect, effacement of left lateral    ventricle and 2 mm midline shift to the right. 2.  Small hypodensities of left frontal lobe may suggest subacute versus    chronic infarct. Please correlate with focal neurological symptoms. If    clinically indicated, MRI may help to further evaluate                  Assessment/Plan:    Twyla Dhaliwal, 82 y.o. female w/ PMH of A-fib on warfarin, HTN, HFpEF (diastolic heart failure) who presented with confusion and right sided facial droop, right sided weakness. 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 Active Hospital Problems    Diagnosis Date Noted    Hemorrhagic stroke ([facility] [address] [I61.9] 09/18/2018     Hemorrhagic stroke    CT scan on 9/17 showed hemorrhage within the left caudate and putamen, likely subacute. Repeat CT head on 9/18 showed no gross changes. MRI on 9/19 showed L basal ganglia infarct with 2 areas of acute hemorrhagic transformation similar to prior CT.    - Neurosurgery consulted - no intervention at this time  - Head of Bed Elevated  - Barium swallow study - within functional limits  - Speech consulted - diet advanced  - Keppra 500 mg BID for seizure prophylaxis  - VL duplex carotic bilateral - R carotid artery normal; L carotid artery <50% patent  - Goal BP <160 - started amlodipine 10 mg  - Hold anti-coagulation and antiplatelets   - neuro checks q4h      Acute neurogenic pulmonary edema   Previous suspicion for aspiration pneumonia due to low grade fever, chance of aspiration following stroke and CXR showing bilateral airspace disease and pulmonary vascular congestion with concerns for consolidation and possible pneumonia.   - No peripherial edema  - Likely neurogenic pulmonary edema with hemorrhagic CVA  - D/c zosyn     AFIB - rate controlled  CHADSVASC of 5  - restarted home digoxin   - Hold AC  - ECHO - EF 55-60%, severe tricuspid regurgitation, pulmonary HTN    DVT Prophylaxis: SCD  Diet: DIET DYSPHAGIA I PUREED;  Code Status: Full Code    PT/OT Eval Status: 7/24 Chris Bautista - [facility]    Anjel Buckner MD    I will discuss the patient with the senior resident and MD Anjel Syed MD  PGY-1 Internal Medicine  [phone]

## 2018-09-21 NOTE — CARE COORDINATION
Karlie from [address] is calling to inform us the pt has services with them, she also wanted to know what the update is on the pt. She can be reached at [phone].

## 2018-09-21 NOTE — PROGRESS NOTES
Pt a/o only to self. NIH score 3. VSS. No reports of nausea/ vomiting. Tolerating dysphagia diet w/ thin liquids. No complaints of pain. Urinary output adequate. Will continue to monitor.

## 2018-09-21 NOTE — PLAN OF CARE
Problem: Falls - Risk of: Intervention: Assess risk factors for falls  Patient is at high risk for falls. See BEJARANO score. Bed in lowest position, side rails up x 4, bed alarm on. Patient remains free from injury. Will continue fall prevention strategies.

## 2018-09-21 NOTE — PROCEDURES
INSTRUMENTAL SWALLOW REPORT  MODIFIED BARIUM SWALLOW - Late Entry    NAME: Gissell Hernandez   : 1935  MRN: [id number]       Date of Eval: 2018     Ordering Physician: Vicki Fu  Radiologist: Damien Hamm     Referring Diagnosis(es): Referring Diagnosis: oropharyngeal dysphagia s/p CVA    Past Medical History:  has a past medical history of CHF (congestive heart failure) ([address] Hyperlipidemia; and Hypertension. Past Surgical History:  has a past surgical history that includes hip surgery. Current Diet Solid Consistency: NPO  Current Diet Liquid Consistency: NPO    Date of Prior Study: None  Type of Study: Initial MBS  Results of Prior Study: N/A    Recent CXR (18)  Patchy bilateral airspace disease and pulmonary vascular congestion. Pulmonary edema would be considered.  Multifocal pneumonia or atelectasis is   also possible.  These changes should be followed to resolution. Patient Complaints/Reason for Referral:  Gissell Hernandez was referred for a MBS to assess the efficiency of her swallow function, assess for aspiration, and to make recommendations regarding safe dietary consistencies, effective compensatory strategies, and safe eating environment. Patient complaints: \"I need to eat now, don't I?\"    Onset of problem:   Date of Onset: 18    General Comment  Comments: Pt admitted 18 s/p ICH. Pt with significant lethargy and limited intake at bedside - MBS recommended for full assessment of swallow given acute CVA and pt mental status. No h/o dysphagia per chart review; pt unreliable historian. Behavior/Cognition/Vision/Hearing:  Behavior/Cognition: Alert; Cooperative;Pleasant mood;Confused  Vision: Impaired  Vision Exceptions:  (wears glasses)  Hearing: Within functional limits    Impressions:  Oral Phase:  Moderate-severe oral impairments characterized by significant lingual pumping / rocking, bolus holding, slowed A-P transit, posterior spillage of liquids over base of tongue, anterior spillage of liquids on R side of oral cavity, slowed / disorganized mastication of soft solid, and lingual residue with all consistencies. Pt also noted to take large liquid boluses when self-feeding although only minimal posterior spillage occurred. Pharyngeal: Mild pharyngeal deficits characterized by delay in swallow initiation, reduced base of tongue retraction, and reduced epiglottic inversion resulting in trace, flash penetration with thin and nectar thick liquids. Trace-mild pharyngeal residue noted in valleculae and pyriforms with all consistencies but this cleared with additional trials. No aspiration observed with any consistency although pt would be at increased risk with large amounts at a time given delayed swallow initiation. Dysphagia Outcome Severity Scale: Level 3: Moderate dysphagia- Total assisstance, supervision or strategies. Two or more diet consistencies restricted  Penetration-Aspiration Scale (PAS): 2 - Material enters the airway, remains above the vocal folds, and is ejected from the airway    Treatment Dx and ICD 10: oropharyngeal dysphagia R13.12   Patient Position: Lateral and Patient Degrees: 90  Consistencies Administered: Soft solid;Puree; Thin straw; Thin teaspoon; Thin cup;Nectar  teaspoon;Nectar cup      Recommended Diet:  Solid consistency: Dysphagia I Pureed  Liquid consistency: Thin  Liquid administration via: Cup (no straws)    Medication administration: Meds in puree (crushed)    Safe Swallow Protocol:  Supervision: 1:1  Compensatory Swallowing Strategies: Upright as possible for all oral intake; Alternate solids and liquids; No straws;Small bites/sips; Check for pocketing of food on the Right (cues for single, small sips of liquids)      Recommendations/Treatment  Requires SLP Intervention: Yes  Recommendations: F/U MBS  D/C Recommendations: 24 hour supervision/assistance  Postural Changes and/or Swallow Maneuvers: Upright      Recommended Exercises:    Therapeutic

## 2018-09-21 NOTE — PROGRESS NOTES
will maintain attention to graded cognitive tasks with mod cues. Goal 5: Patient will tolerate ongoing cognitive-linguistic assessment. Patient/family involved in developing goals and treatment plan: Yes - attempted to involve pt but pt involvement limited by cognitive status    Pt goal: unable to state d/t cognitive / language deficits    Subjective:   Previous level of function and limitations: unknown  General  Chart Reviewed: Yes  Patient assessed for rehabilitation services?: Yes  Additional Pertinent Hx: Pt admitted 9/18/18 s/p hemorrhagic CVA. Pt reported to have a fall per family 9/13/18 but no significant change in mental status per family (who spoke with pt over phone) until 9/18/18. No neurosurgical intervention warranted at this time as pt neurologically stable. Pt has been unable to participate in full speech-language evaluation until this date d/t extreme lethargy. Family / Caregiver Present: No  Subjective  Subjective: Evaluation completed in radiology dept prior to MBS procedure. Pt alert and cooperative throughout. Social/Functional History  Additional Comments: Unable to obtain full, reliable hx at this time d/t pt mental status and expressive language deficits. Vision  Vision: Impaired  Vision Exceptions:  (wears glasses)  Hearing  Hearing: Within functional limits           Objective:     Oral/Motor  Oral Motor: Exceptions to [facility] (R facial droop)  Labial ROM: Reduced right  Labial Symmetry: Abnormal symmetry right  Lingual ROM: Reduced left; Reduced right  Lingual Coordination: Reduced    Auditory Comprehension  Comprehension: Exceptions  Yes/No Questions: Mild (80% accuracy; increased difficulty with temporal and complex)  Basic Questions:  (WFL for basic Y/N)  One Step Basic Commands:  [facility])  Two Step Basic Commands: Moderate (50% accuracy)  Conversation: Moderate  Interfering Components: Attention - sustained; Working memory  Effective Techniques: Repetition;Stressing words    Reading

## 2018-09-21 NOTE — PROGRESS NOTES
Pt transferred into   from ICU. Pt alert to self and disoriented x3. Blood pressure elevated, medical resident at bedside and agreed for RN to received PRN labetalol. No complaints of pain at this time. No complaints of nausea. Pt using pure wick external catheter to prevent moisture associated problems. Will cont to monitor.

## 2018-09-22 ENCOUNTER — APPOINTMENT (OUTPATIENT)
Dept: CT IMAGING | Age: 83
DRG: 064 | End: 2018-09-22
Attending: INTERNAL MEDICINE
Payer: MEDICARE

## 2018-09-22 ENCOUNTER — APPOINTMENT (OUTPATIENT)
Dept: GENERAL RADIOLOGY | Age: 83
DRG: 064 | End: 2018-09-22
Attending: INTERNAL MEDICINE
Payer: MEDICARE

## 2018-09-22 LAB
ALBUMIN SERPL-MCNC: 3.6 G/DL (ref 3.4–5)
ALBUMIN SERPL-MCNC: 4.1 G/DL (ref 3.4–5)
ALP BLD-CCNC: 80 U/L (ref 40–129)
ALT SERPL-CCNC: 30 U/L (ref 10–40)
AMORPHOUS: ABNORMAL /HPF
ANION GAP SERPL CALCULATED.3IONS-SCNC: 13 MMOL/L (ref 3–16)
AST SERPL-CCNC: 25 U/L (ref 15–37)
BACTERIA: ABNORMAL /HPF
BASE EXCESS ARTERIAL: 0.4 MMOL/L (ref -3–3)
BASOPHILS ABSOLUTE: 0.1 K/UL (ref 0–0.2)
BASOPHILS RELATIVE PERCENT: 0.8 %
BILIRUB SERPL-MCNC: 1.6 MG/DL (ref 0–1)
BILIRUBIN DIRECT: 0.6 MG/DL (ref 0–0.3)
BILIRUBIN URINE: ABNORMAL
BILIRUBIN, INDIRECT: 1 MG/DL (ref 0–1)
BLOOD, URINE: ABNORMAL
BUN BLDV-MCNC: 14 MG/DL (ref 7–20)
CALCIUM SERPL-MCNC: 8.7 MG/DL (ref 8.3–10.6)
CARBOXYHEMOGLOBIN ARTERIAL: 3.2 % (ref 0–1.5)
CHLORIDE BLD-SCNC: 97 MMOL/L (ref 99–110)
CLARITY: CLEAR
CO2: 22 MMOL/L (ref 21–32)
COLOR: YELLOW
CREAT SERPL-MCNC: 0.6 MG/DL (ref 0.6–1.2)
EOSINOPHILS ABSOLUTE: 0.1 K/UL (ref 0–0.6)
EOSINOPHILS RELATIVE PERCENT: 1.3 %
EPITHELIAL CELLS, UA: ABNORMAL /HPF
GFR AFRICAN AMERICAN: >60
GFR NON-AFRICAN AMERICAN: >60
GLUCOSE BLD-MCNC: 105 MG/DL (ref 70–99)
GLUCOSE BLD-MCNC: 121 MG/DL (ref 70–99)
GLUCOSE URINE: NEGATIVE MG/DL
HCO3 ARTERIAL: 23 MMOL/L (ref 21–29)
HCT VFR BLD CALC: 39.8 % (ref 36–48)
HEMOGLOBIN, ART, EXTENDED: 14 G/DL
HEMOGLOBIN: 13.5 G/DL (ref 12–16)
KETONES, URINE: ABNORMAL MG/DL
LEUKOCYTE ESTERASE, URINE: NEGATIVE
LYMPHOCYTES ABSOLUTE: 1.2 K/UL (ref 1–5.1)
LYMPHOCYTES RELATIVE PERCENT: 13 %
MAGNESIUM: 2.1 MG/DL (ref 1.8–2.4)
MCH RBC QN AUTO: 31.4 PG (ref 26–34)
MCHC RBC AUTO-ENTMCNC: 33.9 G/DL (ref 31–36)
MCV RBC AUTO: 92.7 FL (ref 80–100)
METHEMOGLOBIN ARTERIAL: 0 % (ref 0–1.4)
MICROSCOPIC EXAMINATION: YES
MONOCYTES ABSOLUTE: 1 K/UL (ref 0–1.3)
MONOCYTES RELATIVE PERCENT: 9.9 %
MUCUS: ABNORMAL /LPF
NEUTROPHILS ABSOLUTE: 7.2 K/UL (ref 1.7–7.7)
NEUTROPHILS RELATIVE PERCENT: 75 %
NITRITE, URINE: NEGATIVE
O2 SAT, ARTERIAL: 96 % (ref 93–100)
PCO2 ARTERIAL: 33.5 MMHG (ref 35–45)
PDW BLD-RTO: 12.9 % (ref 12.4–15.4)
PERFORMED ON: ABNORMAL
PH ARTERIAL: 7.46 (ref 7.35–7.45)
PH UA: 6
PHOSPHORUS: 2.2 MG/DL (ref 2.5–4.9)
PLATELET # BLD: 190 K/UL (ref 135–450)
PMV BLD AUTO: 8 FL (ref 5–10.5)
PO2 ARTERIAL: 72 MMHG (ref 75–108)
POTASSIUM SERPL-SCNC: 3.4 MMOL/L (ref 3.5–5.1)
PROTEIN UA: 30 MG/DL
RBC # BLD: 4.29 M/UL (ref 4–5.2)
RBC UA: ABNORMAL /HPF (ref 0–2)
SODIUM BLD-SCNC: 132 MMOL/L (ref 136–145)
SPECIFIC GRAVITY UA: 1.02
TCO2 ARTERIAL: 24 MMOL/L
TOTAL PROTEIN: 7.7 G/DL (ref 6.4–8.2)
URINE REFLEX TO CULTURE: ABNORMAL
URINE TYPE: ABNORMAL
UROBILINOGEN, URINE: >=8 E.U./DL
WBC # BLD: 9.6 K/UL (ref 4–11)
WBC UA: ABNORMAL /HPF (ref 0–5)

## 2018-09-22 PROCEDURE — 93005 ELECTROCARDIOGRAM TRACING: CPT | Performed by: STUDENT IN AN ORGANIZED HEALTH CARE EDUCATION/TRAINING PROGRAM

## 2018-09-22 PROCEDURE — 97530 THERAPEUTIC ACTIVITIES: CPT

## 2018-09-22 PROCEDURE — 97127 HC SP THER IVNTJ W/FOCUS COG FUNCJ: CPT

## 2018-09-22 PROCEDURE — 83735 ASSAY OF MAGNESIUM: CPT

## 2018-09-22 PROCEDURE — 36600 WITHDRAWAL OF ARTERIAL BLOOD: CPT

## 2018-09-22 PROCEDURE — 70450 CT HEAD/BRAIN W/O DYE: CPT

## 2018-09-22 PROCEDURE — 71045 X-RAY EXAM CHEST 1 VIEW: CPT

## 2018-09-22 PROCEDURE — 1200000000 HC SEMI PRIVATE

## 2018-09-22 PROCEDURE — 6370000000 HC RX 637 (ALT 250 FOR IP): Performed by: STUDENT IN AN ORGANIZED HEALTH CARE EDUCATION/TRAINING PROGRAM

## 2018-09-22 PROCEDURE — 80069 RENAL FUNCTION PANEL: CPT

## 2018-09-22 PROCEDURE — 92526 ORAL FUNCTION THERAPY: CPT

## 2018-09-22 PROCEDURE — 92507 TX SP LANG VOICE COMM INDIV: CPT

## 2018-09-22 PROCEDURE — 36415 COLL VENOUS BLD VENIPUNCTURE: CPT

## 2018-09-22 PROCEDURE — 82803 BLOOD GASES ANY COMBINATION: CPT

## 2018-09-22 PROCEDURE — 85025 COMPLETE CBC W/AUTO DIFF WBC: CPT

## 2018-09-22 PROCEDURE — 2500000003 HC RX 250 WO HCPCS: Performed by: STUDENT IN AN ORGANIZED HEALTH CARE EDUCATION/TRAINING PROGRAM

## 2018-09-22 PROCEDURE — 87040 BLOOD CULTURE FOR BACTERIA: CPT

## 2018-09-22 PROCEDURE — 97535 SELF CARE MNGMENT TRAINING: CPT

## 2018-09-22 PROCEDURE — 80076 HEPATIC FUNCTION PANEL: CPT

## 2018-09-22 PROCEDURE — 6370000000 HC RX 637 (ALT 250 FOR IP): Performed by: INTERNAL MEDICINE

## 2018-09-22 PROCEDURE — 81001 URINALYSIS AUTO W/SCOPE: CPT

## 2018-09-22 RX ORDER — FAMOTIDINE 20 MG/1
20 TABLET, FILM COATED ORAL 2 TIMES DAILY
Status: DISCONTINUED | OUTPATIENT
Start: 2018-09-22 | End: 2018-09-26 | Stop reason: HOSPADM

## 2018-09-22 RX ORDER — POTASSIUM CHLORIDE 20 MEQ/1
40 TABLET, EXTENDED RELEASE ORAL ONCE
Status: COMPLETED | OUTPATIENT
Start: 2018-09-22 | End: 2018-09-22

## 2018-09-22 RX ADMIN — METOPROLOL TARTRATE 25 MG: 25 TABLET ORAL at 00:00

## 2018-09-22 RX ADMIN — DIGOXIN 125 MCG: 125 TABLET ORAL at 09:17

## 2018-09-22 RX ADMIN — LEVETIRACETAM 500 MG: 100 SOLUTION ORAL at 09:18

## 2018-09-22 RX ADMIN — FAMOTIDINE 20 MG: 20 TABLET ORAL at 09:32

## 2018-09-22 RX ADMIN — AMLODIPINE BESYLATE 10 MG: 10 TABLET ORAL at 08:28

## 2018-09-22 RX ADMIN — LABETALOL HYDROCHLORIDE 10 MG: 5 INJECTION, SOLUTION INTRAVENOUS at 23:55

## 2018-09-22 RX ADMIN — LEVETIRACETAM 500 MG: 100 SOLUTION ORAL at 21:49

## 2018-09-22 RX ADMIN — FAMOTIDINE 20 MG: 20 TABLET ORAL at 21:44

## 2018-09-22 RX ADMIN — POTASSIUM CHLORIDE 40 MEQ: 20 TABLET, EXTENDED RELEASE ORAL at 06:23

## 2018-09-22 NOTE — PROGRESS NOTES
Resident Progress Note    Admit Date: 2018    PCP: No primary care provider on file. : 1935  MRN: [id number]    Subjective:   CC: AMS    Interval History: Patient is conversational. Denies chest pain, SOB, cough, abdominal pain, joint pain, or dysuria. Data:   Scheduled Meds:   famotidine  20 mg Oral BID    amLODIPine  10 mg Oral Daily    levETIRAcetam  500 mg Oral BID    digoxin  125 mcg Oral Daily    hydrALAZINE  10 mg Intravenous Once    sodium chloride flush  10 mL Intravenous 2 times per day     Continuous Infusions:   sodium chloride Stopped (18 1000)     PRN Meds:acetaminophen, hydrALAZINE, labetalol, sodium chloride flush, magnesium hydroxide, ondansetron, acetaminophen  I/O last 3 completed shifts: In: 230 [P.O.:230]  Out: -   No intake/output data recorded. Intake/Output Summary (Last 24 hours) at 18 1607  Last data filed at 18 1246   Gross per 24 hour   Intake              230 ml   Output                0 ml   Net              230 ml           Objective:     Vitals: /78   Pulse 83   Temp 97.4 °F (36.3 °C) (Oral)   Resp 18   Ht 5' 4.96\" (1.65 m)   Wt 243 lb 2.7 oz (110.3 kg)   SpO2 94%   BMI 40.51 kg/m²     Physical Exam  Constitutional: No distress. HENT:   Head: Normocephalic and atraumatic. Eyes: Pupils are equal, round, and reactive to light. Conjunctivae and EOM are normal.   Neck: Neck supple. No JVD present. No tracheal deviation present. Cardiovascular: Normal rate and intact distal pulses.    Irregular   Pulmonary/Chest: Effort normal. No accessory muscle usage. She is not intubated. No respiratory distress. Abdominal: Soft. Normal appearance and bowel sounds are normal.   Neurological: She is alert and oriented to person, and place. She displays no tremor. Decreased strength and motor function in R arm and R leg compared to left. Mild R facial droop. Skin: She is not diaphoretic.       LABS:    CBC:   Recent Labs      09/21/18   0530  09/22/18   0243   WBC  9.9  9.6   HGB  13.9  13.5   HCT  40.8  39.8   MCV  93.3  92.7   PLT  215  190                                                                BMP:  Recent Labs      09/20/18   0424  09/21/18   0530  09/22/18   0242   NA  136  135*  132*   K  3.7  3.6  3.4*   CL  99  99  97*   CO2  22  21  22   BUN  10  11  14   CREATININE  0.7  0.6  0.6   GLUCOSE  87  107*  121*       LFT's: Recent Labs      09/22/18   0703   AST  25   ALT  30   BILITOT  1.6*   ALKPHOS  80       Troponin: No results for input(s): TROPONINI in the last 72 hours. BNP: No results for input(s): BNP in the last 72 hours. Lipids: No results for input(s): CHOL, HDL in the last 72 hours. Invalid input(s): LDLCALCU    ABGs: No results found for: PHART, MET2BMH, PO2ART    INR:   Recent Labs      09/19/18   1648   INR  1.32*       U/A:  Recent Labs      09/22/18   0200   COLORU  Yellow   PHUR  6.0   WBCUA  0-2   RBCUA  3-5*   MUCUS  1+*   BACTERIA  1+*   CLARITYU  Clear   SPECGRAV  1.025   LEUKOCYTESUR  Negative   UROBILINOGEN  >=8.0*   BILIRUBINUR  MODERATE*   BLOODU  TRACE-INTACT*   GLUCOSEU  Negative   AMORPHOUS  1+*      -----------------------------------------------------------------  RAD:   XR CHEST PORTABLE   Final Result      No acute pulmonary pathology                  FL MODIFIED BARIUM SWALLOW W VIDEO   Final Result   1. Tongue pumping   2. Intermittent penetration. 3. Otherwise within functional limits      VL DUP CAROTID BILATERAL   Final Result      MRI BRAIN W WO CONTRAST   Final Result      1. Left basal ganglia infarct with 2 areas of acute hemorrhagic transformation, similar in appearance compared to prior CT.       CT Head WO Contrast   Final Result   Addendum 1 of 1   CR   Patient: MILLA WADSWORTH  Time Out: 02:09   Exam(s): CT HEAD Without Contrast        ADDENDUM - Added by Giuseppe Howard M.D. on 9/19/2018 2:09 AM (-04:00)   Comparison from outside institution on nonenhanced head CT

## 2018-09-22 NOTE — SIGNIFICANT EVENT
Pt w/ residual R sided weakness and R sided facial droop after recent stroke. CODE STROKE called on pt by nurse taking care of her. ICU team and Floor team along w/ nursing and additional auxiliary staff present. Nurse who called the code stated pt was somnolent and difficult to wake. Previous episodes only required up to 2min to wake pt, however during this episode pt required significantly longer time to awaken. On awakening on this episode resident physicians did not appreciate any new deficits other than mild confusion. She was moving all extremities to command. She could not remember her granddaughter and was not responding appropriately. Pt improved with additional time, but pt remained sleepy. Head CT w/o contrast was ordered to r/o any progression or worsening of her previous hemorrhagic stroke. ABG and 12lead EKG also ordered. Per nurse pt was oriented to person and place. Blood glucose was 105. Vitals remained stable throughout w/ BP in the 786F systolic, HR in the 20R-06Q. On transport to CT, pt was alert and would follow commands and respond appropriately to questions. Awaiting CT results at this time.     Diego Weston M.D.  PGY-1  [phone]

## 2018-09-22 NOTE — PROGRESS NOTES
Occupational Therapy  Facility/Department: [facility] 5T ORTHO/NEURO  Daily Treatment Note  NAME: Ina Phan  : 1935  MRN: [id number]    Date of Service: 2018    Discharge Recommendations:       Ina Phan scored a 6/24 on the AM-PAC ADL Inpatient form. Current research shows that an AM-PAC score of 17 or less is typically not associated with a discharge to the patient's home setting. Based on the patients AM-PAC score and their current ADL deficits, it is recommended that the patient have 3-5 sessions per week of Occupational Therapy at d/c to increase the patients independence. Patient Diagnosis(es): There were no encounter diagnoses. has a past medical history of CHF (congestive heart failure) ([facility] [address] Hyperlipidemia; and Hypertension. has a past surgical history that includes hip surgery. Restrictions  Position Activity Restriction  Other position/activity restrictions: Up with assist  Subjective   General  Chart Reviewed: Yes  Patient assessed for rehabilitation services?: Yes  Additional Pertinent Hx: Admit  to [facility] ED with hemorrhagic stroke, transferred to [facility] on ; present with R side weakness and R facial droop;      CT head - L side hemorrhage;    CXR - bilateral airspace disease;    MRI brain - left basal ganglia infarct with 2 areas of acute hemorrhage;     Carotid doppler - L side stenosis;    PMHx - CHF, hyperlipidemia, HTN, hip surgery  Response to previous treatment: Patient with no complaints from previous session  Family / Caregiver Present: No  Diagnosis: hemorrhagic CVA  Subjective  Subjective: Pt supine in bed upon entry. Pt very pleasant and agreeable to therapy session. Pt O x 1 (person). Pt requesting BSC transfer for toileting. General Comment  Comments: Nursing present at beginning of session and pt BP taken 182/117, 67 Pulse, O2 96%. Nursing stated, \"Put the pt back in bed after she toilets. Pt supien to sit Dependent (Mod x 2). Pt sit EOB SBA.

## 2018-09-22 NOTE — PROGRESS NOTES
Speech Language Pathology  Facility/Department: [facility] 5T ORTHO/NEURO  Daily Treatment Note  NAME: Van Good  : 1935  MRN: [id number]    Date of Eval: 2018  Evaluating Therapist: Brad Dey    Patient Diagnosis(es): has Hemorrhagic stroke [facility]) on her problem list.         RECENT RESULTS  MRI BRAIN 18  1. Left basal ganglia infarct with 2 areas of acute hemorrhagic transformation, similar in appearance compared to prior CT. Speech/language/cognitive assessment 18  Assessment:  Cognitive Diagnosis: moderate-severe cognitive-linguistic impairment characterized by disorientation, poor attention, recall deficits, and limited insight / understanding of current situation and deficits  Aphasia Diagnosis: moderate receptive / expressive aphasia characterized by jargon and neologisms, difficulty with comprehension of complex / multistep items, and simplistic and vague verbal responses  Speech Diagnosis: mild-moderate dysarthria characterized by imprecise articulation and reduced volume of voice; pt breakdowns noted to be irregular at times   Diagnosis: Cognitive-linguistic impairment; aphasia; dysarthria. Pt ability to demonstrate comprehension of items also appeared negatively impacted by working memory / attention. Difficult to know pt's baseline as pt unreliable historian and no family present during assessment. Chart reviewed. Pain: denied pain     Medical diagnosis:hemorrhagic CVA  Treatment diagnosis:cognitive linguistic impairment     Treatment:  Pt seen to address the following goals:  Goal 1: Patient will answer complex Y/N questions with 90% accuracy or min cues. -  Not formally addressed this session as pt became easily fatigued- pt had difficulty responding appropriately to basic questions. con't goal    Goal 2: Patient will complete graded verbal description tasks with min-mod cues. -  Pt unable to correctly name common objects.  Once told pt the name of the object then asked pt what to do with the object, response was not appropriate. For instance when asked pt what you do with a straw, pt responded \"you sleep with it\" with had no error awareness when questioned about her response. con't goal    Goal 3: Patient will be oriented to location and situation with mod cues. 9/22-  Pt with no recall of place or situation. When asked where we were, pt was able to [facility], but not \"hospital\". When told pt she was in the hospital and asked if she knew which one, pt stated \"[facility] hospital\" pt con't to perseverate on cereal.  Pt not oriented to reason for hospitalization- so educated pt to place and situation several times through out the session, but each time questioned pt, she was unable to provide the correct information whether it be immediate or delayed recall. con't goal    Goal 4: Patient will maintain attention to graded cognitive tasks with mod cues. 9/22-  Pt was very easily distracted though out the session and required max verbal, tactile and visual cues to maintain attention. con't goal     Goal 5: Patient will tolerate ongoing cognitive-linguistic assessment. 9/22-  Not addressed this session due to pt becoming fatigued, con't goal    Education:  Pt and family (2 granddaughters) were educated to the purpose of the session. Pt with questionable comprehension. Family stated comprehension and had no questions. Plan:  Continue speech/language therapy to address above goals, 3-5 x/week x LOS  DC recommendations:ongoing Speech Therapy after discharged from acute care   D/W nursingJfefry  Needs met prior to leaving room, call button in reach.   Treatment time:10 speech tx, 11 cog tx        Hayden Bottom, [address], [facility] [id number]  Speech-Language Pathologist  Pager [phone]    If patient is discharged prior to next treatment, this note will serve as the discharge summary

## 2018-09-22 NOTE — PLAN OF CARE
Problem: Risk for Impaired Skin Integrity  Goal: Tissue integrity - skin and mucous membranes  Structural intactness and normal physiological function of skin and  mucous membranes. Outcome: Ongoing  Patient turned and repositioned every two hours. Barrier cream used on bottom. Sacral heart on buttocks. Heels elevated off of bed. Skin thoroughly assessed. Will continue to monitor. Problem: Falls - Risk of:  Goal: Will remain free from falls  Will remain free from falls   Outcome: Ongoing  Patient has remained free of falls. 2/4 bed rails up, bed locked and in lowest position, call light within reach. Patient instructed on use of call light and uses appropriately. Bed alarm on. Non-skid footwear and fall band on. Will continue to monitor.

## 2018-09-22 NOTE — PROGRESS NOTES
Functions, Activity limitations: Decreased functional mobility ; Decreased ROM; Decreased strength;Decreased safe awareness;Decreased cognition;Decreased endurance;Decreased balance  Assessment: Pt requiring Max Ax2 for all functional mobility this session. Pt heavily limited by cognition and exhibiting high BP (182/117 and 230/150) during session; RN aware and provided BP meds. Pt will benefit from continued skilled PT at d/c. Will continue to follow. Treatment Diagnosis: Decreased mobility associated with hemorrhagic stroke. Patient Education: Role of PT. Pt will need re-education. REQUIRES PT FOLLOW UP: Yes  Activity Tolerance  Activity Tolerance: Patient limited by cognitive status;Treatment limited secondary to medical complications (free text)  Activity Tolerance: RN in room upon arrival checking vitals: BP: 182/117- RN ok with PT/OT  transferring pt to/from [facility] and re-check BP. BP rechecked on BSC: 230/150.  RN present providing BP meds and contacting MD.      G-Code     OutComes Score    AM-PAC Score  AM-PAC Inpatient Mobility Raw Score : 6  AM-PAC Inpatient T-Scale Score : 23.55  Mobility Inpatient CMS 0-100% Score: 100  Mobility Inpatient CMS G-Code Modifier : CN          Goals  Short term goals  Time Frame for Short term goals: Discharge  Short term goal 1: bed mobility mod A ONGOING  Short term goal 2: sit <> stand mod A ONGOING  Short term goal 3: bed <> chair mod A x2 ONGOING  Short term goal 4: ambulate 10ft with walker mod A x2 ONGOING  Patient Goals   Patient goals : Unable to state    Plan    Plan  Times per week: 5-7  Current Treatment Recommendations: Functional Mobility Training, Gait Training, Strengthening  Safety Devices  Type of devices: Bed alarm in place, Call light within reach, Nurse notified, Gait belt, Left in bed     Therapy Time   Individual Concurrent Group Co-treatment   Time In 0805         Time Out 0843         Minutes 38           Timed Code Treatment Minutes: 38      Total

## 2018-09-22 NOTE — PROGRESS NOTES
Speech Language Pathology  Facility/Department: [facility]  Dysphagia Daily Treatment Note    NAME: Yas Kenny  : 1935  MRN: [id number]    Patient Diagnosis(es):   Patient Active Problem List    Diagnosis Date Noted    Hemorrhagic stroke ([facility] [address] 2018     Allergies: No Known Allergies        CXR (18)  Patchy bilateral airspace disease and pulmonary vascular congestion. Pulmonary edema would be considered.  Multifocal pneumonia or atelectasis is   also possible.  These changes should be followed to resolution.           Previous MBS  none    Chart reviewed. Medical Diagnosis: ICH  Treatment Diagnosis: dysphagia    BSE Impression (18)  Pt admitted s/p hemorrhagic CVA; pt with lethargy, confusion, and difficulty consistently following directions during exam. Assessment limited d/t mental status. Pt accepted trials of ice chips, thins via cup edge, thins via straw, and puree. Pt noted to have bolus holding for all and inconsistent initiation of swallow - at times, pt required max cues to complete A-P transit and initiate swallow. No overt signs of aspiration exhibited with any consistency although exam limited - pt unable to follow directions for 3 oz water test. As session progressed, pt with increasing fatigue and declining initiation to take PO - trials d/c'd out of concern for pt safety at that time. Recommend NPO with exception of ice chips ONLY when pt fully alert. Will re-assess bedside tomorrow; pt will most likely require instrumental evaluation of swallow prior to initiation of diet consistency given high risk of dysphagia. MBS results  18  Oral Phase:  Moderate-severe oral impairments characterized by significant lingual pumping / rocking, bolus holding, slowed A-P transit, posterior spillage of liquids over base of tongue, anterior spillage of liquids on R side of oral cavity, slowed / disorganized mastication of soft solid, and lingual residue with all

## 2018-09-22 NOTE — PROGRESS NOTES
VSS, patient oriented to self and place but not time or situation. Negrito checks WNL, no change in neuro status. On call MD was contacted over patients BP being high and running a fever. Lopressor and Tylenol given. BP returned to normal and fever was reduced shortly after. Patient was also straight cathed per order to obtain urine specimen for culture. Tolerated procedure well and specimen was sent to lab. Patient turned every two hours and fall precautions in place. Will continue to monitor.

## 2018-09-23 LAB
ALBUMIN SERPL-MCNC: 3.6 G/DL (ref 3.4–5)
ANION GAP SERPL CALCULATED.3IONS-SCNC: 17 MMOL/L (ref 3–16)
BASOPHILS ABSOLUTE: 0 K/UL (ref 0–0.2)
BASOPHILS RELATIVE PERCENT: 0.4 %
BUN BLDV-MCNC: 15 MG/DL (ref 7–20)
CALCIUM SERPL-MCNC: 9.1 MG/DL (ref 8.3–10.6)
CHLORIDE BLD-SCNC: 101 MMOL/L (ref 99–110)
CO2: 17 MMOL/L (ref 21–32)
CREAT SERPL-MCNC: 0.6 MG/DL (ref 0.6–1.2)
EOSINOPHILS ABSOLUTE: 0.2 K/UL (ref 0–0.6)
EOSINOPHILS RELATIVE PERCENT: 2.1 %
GFR AFRICAN AMERICAN: >60
GFR NON-AFRICAN AMERICAN: >60
GLUCOSE BLD-MCNC: 115 MG/DL (ref 70–99)
HCT VFR BLD CALC: 42.9 % (ref 36–48)
HEMOGLOBIN: 14.5 G/DL (ref 12–16)
LYMPHOCYTES ABSOLUTE: 1.2 K/UL (ref 1–5.1)
LYMPHOCYTES RELATIVE PERCENT: 13.9 %
MAGNESIUM: 2.1 MG/DL (ref 1.8–2.4)
MCH RBC QN AUTO: 31.4 PG (ref 26–34)
MCHC RBC AUTO-ENTMCNC: 33.8 G/DL (ref 31–36)
MCV RBC AUTO: 93.1 FL (ref 80–100)
MONOCYTES ABSOLUTE: 0.7 K/UL (ref 0–1.3)
MONOCYTES RELATIVE PERCENT: 8 %
NEUTROPHILS ABSOLUTE: 6.8 K/UL (ref 1.7–7.7)
NEUTROPHILS RELATIVE PERCENT: 75.6 %
PDW BLD-RTO: 13 % (ref 12.4–15.4)
PHOSPHORUS: 2.7 MG/DL (ref 2.5–4.9)
PLATELET # BLD: 207 K/UL (ref 135–450)
PMV BLD AUTO: 7.7 FL (ref 5–10.5)
POTASSIUM SERPL-SCNC: 4.5 MMOL/L (ref 3.5–5.1)
RBC # BLD: 4.61 M/UL (ref 4–5.2)
SODIUM BLD-SCNC: 135 MMOL/L (ref 136–145)
WBC # BLD: 9 K/UL (ref 4–11)

## 2018-09-23 PROCEDURE — 94760 N-INVAS EAR/PLS OXIMETRY 1: CPT

## 2018-09-23 PROCEDURE — 80069 RENAL FUNCTION PANEL: CPT

## 2018-09-23 PROCEDURE — 85025 COMPLETE CBC W/AUTO DIFF WBC: CPT

## 2018-09-23 PROCEDURE — 36415 COLL VENOUS BLD VENIPUNCTURE: CPT

## 2018-09-23 PROCEDURE — 1200000000 HC SEMI PRIVATE

## 2018-09-23 PROCEDURE — 6370000000 HC RX 637 (ALT 250 FOR IP): Performed by: STUDENT IN AN ORGANIZED HEALTH CARE EDUCATION/TRAINING PROGRAM

## 2018-09-23 PROCEDURE — 6370000000 HC RX 637 (ALT 250 FOR IP): Performed by: INTERNAL MEDICINE

## 2018-09-23 PROCEDURE — 93010 ELECTROCARDIOGRAM REPORT: CPT | Performed by: INTERNAL MEDICINE

## 2018-09-23 PROCEDURE — 83735 ASSAY OF MAGNESIUM: CPT

## 2018-09-23 PROCEDURE — 2580000003 HC RX 258: Performed by: STUDENT IN AN ORGANIZED HEALTH CARE EDUCATION/TRAINING PROGRAM

## 2018-09-23 RX ADMIN — Medication 10 ML: at 20:54

## 2018-09-23 RX ADMIN — FAMOTIDINE 20 MG: 20 TABLET ORAL at 08:30

## 2018-09-23 RX ADMIN — AMLODIPINE BESYLATE 10 MG: 10 TABLET ORAL at 08:30

## 2018-09-23 RX ADMIN — LEVETIRACETAM 500 MG: 100 SOLUTION ORAL at 08:30

## 2018-09-23 RX ADMIN — DIGOXIN 125 MCG: 125 TABLET ORAL at 08:30

## 2018-09-23 RX ADMIN — SODIUM CHLORIDE: 9 INJECTION, SOLUTION INTRAVENOUS at 06:25

## 2018-09-23 NOTE — PROGRESS NOTES
Hospitalist Progress Note      PCP: No primary care provider on file. Date of Admission: 9/18/2018    Chief Complaint: Altered mental status    Hospital Course:  Admitted for management of left basal ganglia hemorrhagic infarction. Code stroke called on 09/22 as patient with some confusion yesterday afternoon, and CT head was repeated which was negative for acute changes. Subjective:   Patient seen and examined in am 09/23/18   She is alert, oriented  Denies any new complains      Medications:  Reviewed    Infusion Medications    sodium chloride 50 mL/hr at 09/23/18 7209     Scheduled Medications    famotidine  20 mg Oral BID    amLODIPine  10 mg Oral Daily    levETIRAcetam  500 mg Oral BID    digoxin  125 mcg Oral Daily    hydrALAZINE  10 mg Intravenous Once    sodium chloride flush  10 mL Intravenous 2 times per day     PRN Meds: acetaminophen, hydrALAZINE, labetalol, sodium chloride flush, magnesium hydroxide, ondansetron, acetaminophen      Intake/Output Summary (Last 24 hours) at 09/23/18 1800  Last data filed at 09/23/18 1425   Gross per 24 hour   Intake             1070 ml   Output                1 ml   Net             1069 ml       Physical Exam Performed:    /86   Pulse 86   Temp 99.2 °F (37.3 °C) (Oral)   Resp 18   Ht 5' 4.96\" (1.65 m)   Wt 243 lb 2.7 oz (110.3 kg)   SpO2 94%   BMI 40.51 kg/m²     General appearance: No apparent distress, appears stated age and cooperative. HEENT: Pupils equal, round, and reactive to light. Conjunctivae/corneas clear. Neck: Supple, with full range of motion. No jugular venous distention. Trachea midline. Respiratory:  Normal respiratory effort. Clear to auscultation, bilaterally without Rales/Wheezes/Rhonchi. Cardiovascular: Regular rate and rhythm with normal S1/S2 without murmurs, rubs or gallops. Abdomen: Soft, non-tender, non-distended with normal bowel sounds. Musculoskeletal: No clubbing, cyanosis or edema bilaterally.   Full range of motion without deformity. Skin: Skin color, texture, turgor normal.  No rashes or lesions. Neurologic:  Neurovascularly stable. Right upper extremity with 3/5 strength. Left 5/5. Lisy Osmani Cranial nerves: II-XII intact, grossly non-focal.  Psychiatric: Alert and oriented x 3  Capillary Refill: Brisk,< 3 seconds   Peripheral Pulses: +2 palpable, equal bilaterally       Labs:   Recent Labs      09/21/18   0530  09/22/18   0243  09/23/18   0525   WBC  9.9  9.6  9.0   HGB  13.9  13.5  14.5   HCT  40.8  39.8  42.9   PLT  215  190  207     Recent Labs      09/21/18   0530  09/22/18   0242  09/23/18   0525   NA  135*  132*  135*   K  3.6  3.4*  4.5   CL  99  97*  101   CO2  21  22  17*   BUN  11  14  15   CREATININE  0.6  0.6  0.6   CALCIUM  9.1  8.7  9.1   PHOS  2.0*  2.2*  2.7     Recent Labs      09/22/18   0703   AST  25   ALT  30   BILIDIR  0.6*   BILITOT  1.6*   ALKPHOS  80     No results for input(s): INR in the last 72 hours. No results for input(s): Ariadne Bibber in the last 72 hours. Urinalysis:      Lab Results   Component Value Date    NITRU Negative 09/22/2018    WBCUA 0-2 09/22/2018    BACTERIA 1+ 09/22/2018    RBCUA 3-5 09/22/2018    BLOODU TRACE-INTACT 09/22/2018    SPECGRAV 1.025 09/22/2018    GLUCOSEU Negative 09/22/2018       Radiology:  CT HEAD WO CONTRAST   Final Result      1. Evolving hemorrhagic infarct in the left basal ganglia. Unchanged 2 mm rightward midline shift. XR CHEST PORTABLE   Final Result      No acute pulmonary pathology                  FL MODIFIED BARIUM SWALLOW W VIDEO   Final Result   1. Tongue pumping   2. Intermittent penetration. 3. Otherwise within functional limits      VL DUP CAROTID BILATERAL   Final Result      MRI BRAIN W WO CONTRAST   Final Result      1. Left basal ganglia infarct with 2 areas of acute hemorrhagic transformation, similar in appearance compared to prior CT.       CT Head WO Contrast   Final Result   Addendum 1 of 1   CR   Patient: MILLA WADSWORTH  Time Out: 02:09   Exam(s): CT HEAD Without Contrast        ADDENDUM - Added by Jonathon Yang M.D. on 9/19/2018 2:09 AM (-04:00)   Comparison from outside institution on nonenhanced head CT from 09/18/18    1650 hour and CTA from 1708 hour now available after second request.       Amount of hemorrhage in left caudate and basal ganglia and associated    mass effect and midline shift are not substantially changed. Surrounding vasogenic edema is slightly increased. ----------------------------------------------------------------------       EXAM:     CT Head Without Intravenous Contrast       CLINICAL HISTORY:      Reason for exam: re-assess hemorrhage. Has a \"code stroke\" or \"stroke    alert\" been called?->No.       TECHNIQUE:     Axial computed tomography images of the head/brain without intravenous    contrast.  CTDI is 67.82 mGy and DLP is 1279.2 mGy-cm. All CT scans at    this facility use dose modulation, iterative reconstruction, and/or    weight based dosing when appropriate to reduce radiation dose to as low    as reasonably achievable. Coronal and sagittal reconstructions are    performed       COMPARISON:     No relevant prior studies available. FINDINGS:     Brain:  2 foci of adjacent small intraparenchymal hemorrhage centered    in the region of left caudate and basal ganglia, surrounded by moderate    amount of vasogenic edema causing mass effect, effacement of left lateral    ventricle and 2 mm midline shift to the right. Small hypodensities of    left frontal lobe may suggest subacute versus chronic infarct. Ventricles:  Unremarkable. No ventriculomegaly. Bones/joints:  Unremarkable. No acute fracture. Soft tissues:  Unremarkable. Sinuses:  Unremarkable as visualized. No acute sinusitis. Mastoid air cells:  Unremarkable as visualized. No mastoid effusion.        IMPRESSION:        1.  2 foci of adjacent small intraparenchymal hemorrhage centered in

## 2018-09-23 NOTE — PROGRESS NOTES
Internal Medicine PGY-1 Resident Progress Note        PCP: No primary care provider on file. Date of Admission: 9/18/2018    Chief Complaint: AMS    Subjective: No adverse events overnight. Patient was alert and oriented only to self today. Patient was able to respond appropriately to questions and denied any SOB, headache, chest pain or abdominal pain. Paged by nurse in the afternoon who said patient may have aspirated PO diet so patient was made NPO. Will re-evaluated PO status tomorrow. Medications:  Reviewed    Infusion Medications    sodium chloride 50 mL/hr at 09/23/18 6941     Scheduled Medications    famotidine  20 mg Oral BID    amLODIPine  10 mg Oral Daily    levETIRAcetam  500 mg Oral BID    digoxin  125 mcg Oral Daily    hydrALAZINE  10 mg Intravenous Once    sodium chloride flush  10 mL Intravenous 2 times per day     PRN Meds: acetaminophen, hydrALAZINE, labetalol, sodium chloride flush, magnesium hydroxide, ondansetron, acetaminophen      Intake/Output Summary (Last 24 hours) at 09/23/18 1822  Last data filed at 09/23/18 1425   Gross per 24 hour   Intake             1070 ml   Output                1 ml   Net             1069 ml       Physical Exam Performed:    /86   Pulse 86   Temp 99.2 °F (37.3 °C) (Oral)   Resp 18   Ht 5' 4.96\" (1.65 m)   Wt 243 lb 2.7 oz (110.3 kg)   SpO2 94%   BMI 40.51 kg/m²     Physical Exam  Constitutional: No distress. HENT:   Head: Normocephalic and atraumatic. Eyes: Pupils are equal, round, and reactive to light. Conjunctivae and EOM are normal.   Neck: Neck supple. No JVD present. No tracheal deviation present. Cardiovascular: Normal rate and intact distal pulses.    Irregular   Pulmonary/Chest: Effort normal. No accessory muscle usage. She is not intubated. No respiratory distress. Abdominal: Soft. Normal appearance and bowel sounds are normal.   Neurological: She is alert and oriented to person, and place. She displays no tremor. lobe may suggest subacute versus    chronic infarct. Please correlate with focal neurological symptoms. If    clinically indicated, MRI may help to further evaluate               Critical Value Communications       09/19/18 01:08 Call Doctor Regarding Stroke, called Dr. Salbador Adams on 09/19    01:08 (-04:00)       09/19/18 01:09 Call Doctor Regarding Intracranial Hemorrhage, called Dr. Salbador Adams on 09/19 01:08 (-04:00)      Final   1.  2 foci of adjacent small intraparenchymal hemorrhage centered in the    region of left caudate and basal ganglia, surrounded by moderate amount    of vasogenic edema causing mass effect, effacement of left lateral    ventricle and 2 mm midline shift to the right. 2.  Small hypodensities of left frontal lobe may suggest subacute versus    chronic infarct. Please correlate with focal neurological symptoms. If    clinically indicated, MRI may help to further evaluate                  Assessment/Plan:    Yas Kenny, 82 y.o. female w/  PMH of A-fib on warfarin, HTN, HFpEF (diastolic heart failure) who presented with confusion and right sided facial droop, right sided weakness. 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      Active Hospital Problems    Diagnosis Date Noted    Hemorrhagic stroke ([facility] [address] [I61.9] 09/18/2018     Hemorrhagic stroke    CT scan on 9/17 showed hemorrhage within the left caudate and putamen, likely subacute. Repeat CT head on 9/18 showed no gross changes. MRI on 9/19 showed L basal ganglia infarct with 2 areas of acute hemorrhagic transformation similar to prior CT.    - Neurosurgery consulted - no intervention at this time  - Head of Bed Elevated  - Barium swallow study - within functional limits  - Speech consulted - diet advanced   - Patient made NPO overnight and will reassess in AM   - Keppra 500 mg BID for seizure prophylaxis  - VL duplex carotic bilateral - R carotid artery normal; L carotid artery <50% patent  - Goal BP <160   - amlodipine 10 mg   - clonidine patch    - hydralazine 10 mg PRN  - Hold anti-coagulation and antiplatelets   - neuro checks q4h      Acute neurogenic pulmonary edema   Previous suspicion for aspiration pneumonia due to low grade fever, chance of aspiration following stroke and CXR showing bilateral airspace disease and pulmonary vascular congestion with concerns for consolidation and possible pneumonia.   - No peripherial edema  - Likely neurogenic pulmonary edema with hemorrhagic CVA  - repeat CXR negative for acute abnormalities     AFIB - rate controlled  CHADSVASC of 5  - restarted home digoxin   - Hold AC  - ECHO - EF 55-60%, severe tricuspid regurgitation, pulmonary HTN    DVT Prophylaxis: SCD  Diet: Diet NPO Effective Now  Code Status: Full Code    PT/OT Eval Status: 6/24 Yann Mews - [facility]    Antolin Ren MD    I will discuss the patient with the senior resident and MD Antolin Lisa MD  PGY-1 Internal Medicine  [phone]

## 2018-09-23 NOTE — PROGRESS NOTES
According to Dr. Nathaniel Fleming patient is stable after repeat CT to work with therapy. Patient was alert and awake this morning when assessing patient. Knew she was in hospital and name. Hand grasp was moderate and pedal push/pull moderate.

## 2018-09-23 NOTE — CARE COORDINATION
Pt's daughter was here yesterday but a code stroke was called on the patient. Her daughter does not want the patient to go to [facility] upon discharge and wanted other options.      Electronically signed by Rebecca Rayo RN on 9/23/2018 at 5:44 PM

## 2018-09-24 LAB
ALBUMIN SERPL-MCNC: 3.6 G/DL (ref 3.4–5)
ANION GAP SERPL CALCULATED.3IONS-SCNC: 10 MMOL/L (ref 3–16)
BASOPHILS ABSOLUTE: 0.1 K/UL (ref 0–0.2)
BASOPHILS RELATIVE PERCENT: 0.6 %
BUN BLDV-MCNC: 16 MG/DL (ref 7–20)
CALCIUM SERPL-MCNC: 9.1 MG/DL (ref 8.3–10.6)
CHLORIDE BLD-SCNC: 100 MMOL/L (ref 99–110)
CO2: 25 MMOL/L (ref 21–32)
CREAT SERPL-MCNC: 0.6 MG/DL (ref 0.6–1.2)
EOSINOPHILS ABSOLUTE: 0.2 K/UL (ref 0–0.6)
EOSINOPHILS RELATIVE PERCENT: 2.1 %
GFR AFRICAN AMERICAN: >60
GFR NON-AFRICAN AMERICAN: >60
GLUCOSE BLD-MCNC: 113 MG/DL (ref 70–99)
HCT VFR BLD CALC: 40.6 % (ref 36–48)
HEMOGLOBIN: 13.8 G/DL (ref 12–16)
LYMPHOCYTES ABSOLUTE: 1.2 K/UL (ref 1–5.1)
LYMPHOCYTES RELATIVE PERCENT: 15.2 %
MAGNESIUM: 2.1 MG/DL (ref 1.8–2.4)
MCH RBC QN AUTO: 31.7 PG (ref 26–34)
MCHC RBC AUTO-ENTMCNC: 34.1 G/DL (ref 31–36)
MCV RBC AUTO: 93.1 FL (ref 80–100)
MONOCYTES ABSOLUTE: 0.7 K/UL (ref 0–1.3)
MONOCYTES RELATIVE PERCENT: 8.5 %
NEUTROPHILS ABSOLUTE: 6 K/UL (ref 1.7–7.7)
NEUTROPHILS RELATIVE PERCENT: 73.6 %
PDW BLD-RTO: 12.8 % (ref 12.4–15.4)
PHOSPHORUS: 2.8 MG/DL (ref 2.5–4.9)
PLATELET # BLD: 240 K/UL (ref 135–450)
PMV BLD AUTO: 7.7 FL (ref 5–10.5)
POTASSIUM SERPL-SCNC: 4.4 MMOL/L (ref 3.5–5.1)
RBC # BLD: 4.36 M/UL (ref 4–5.2)
SODIUM BLD-SCNC: 135 MMOL/L (ref 136–145)
WBC # BLD: 8.2 K/UL (ref 4–11)

## 2018-09-24 PROCEDURE — 92507 TX SP LANG VOICE COMM INDIV: CPT

## 2018-09-24 PROCEDURE — 92526 ORAL FUNCTION THERAPY: CPT

## 2018-09-24 PROCEDURE — 80069 RENAL FUNCTION PANEL: CPT

## 2018-09-24 PROCEDURE — 6360000002 HC RX W HCPCS: Performed by: INTERNAL MEDICINE

## 2018-09-24 PROCEDURE — 6370000000 HC RX 637 (ALT 250 FOR IP): Performed by: STUDENT IN AN ORGANIZED HEALTH CARE EDUCATION/TRAINING PROGRAM

## 2018-09-24 PROCEDURE — 97127 HC SP THER IVNTJ W/FOCUS COG FUNCJ: CPT

## 2018-09-24 PROCEDURE — 97535 SELF CARE MNGMENT TRAINING: CPT

## 2018-09-24 PROCEDURE — 6370000000 HC RX 637 (ALT 250 FOR IP): Performed by: INTERNAL MEDICINE

## 2018-09-24 PROCEDURE — 2580000003 HC RX 258: Performed by: STUDENT IN AN ORGANIZED HEALTH CARE EDUCATION/TRAINING PROGRAM

## 2018-09-24 PROCEDURE — 97530 THERAPEUTIC ACTIVITIES: CPT

## 2018-09-24 PROCEDURE — 1200000000 HC SEMI PRIVATE

## 2018-09-24 PROCEDURE — 85025 COMPLETE CBC W/AUTO DIFF WBC: CPT

## 2018-09-24 PROCEDURE — 83735 ASSAY OF MAGNESIUM: CPT

## 2018-09-24 PROCEDURE — 36415 COLL VENOUS BLD VENIPUNCTURE: CPT

## 2018-09-24 RX ORDER — HYDRALAZINE HYDROCHLORIDE 10 MG/1
10 TABLET, FILM COATED ORAL EVERY 8 HOURS SCHEDULED
Status: DISCONTINUED | OUTPATIENT
Start: 2018-09-24 | End: 2018-09-26 | Stop reason: HOSPADM

## 2018-09-24 RX ORDER — AMLODIPINE BESYLATE 5 MG/1
5 TABLET ORAL DAILY
Status: DISCONTINUED | OUTPATIENT
Start: 2018-09-25 | End: 2018-09-26 | Stop reason: HOSPADM

## 2018-09-24 RX ADMIN — DIGOXIN 125 MCG: 125 TABLET ORAL at 09:17

## 2018-09-24 RX ADMIN — AMLODIPINE BESYLATE 10 MG: 10 TABLET ORAL at 09:16

## 2018-09-24 RX ADMIN — HYDRALAZINE HYDROCHLORIDE 10 MG: 10 TABLET, FILM COATED ORAL at 13:50

## 2018-09-24 RX ADMIN — SODIUM CHLORIDE: 9 INJECTION, SOLUTION INTRAVENOUS at 01:48

## 2018-09-24 RX ADMIN — LEVETIRACETAM 500 MG: 100 SOLUTION ORAL at 09:17

## 2018-09-24 RX ADMIN — FAMOTIDINE 20 MG: 20 TABLET ORAL at 21:23

## 2018-09-24 RX ADMIN — LEVETIRACETAM 500 MG: 100 SOLUTION ORAL at 21:24

## 2018-09-24 RX ADMIN — FAMOTIDINE 20 MG: 20 TABLET ORAL at 09:17

## 2018-09-24 RX ADMIN — SODIUM CHLORIDE: 9 INJECTION, SOLUTION INTRAVENOUS at 21:30

## 2018-09-24 RX ADMIN — HYDRALAZINE HYDROCHLORIDE 10 MG: 10 TABLET, FILM COATED ORAL at 21:23

## 2018-09-24 RX ADMIN — Medication 10 ML: at 09:17

## 2018-09-24 RX ADMIN — HYDRALAZINE HYDROCHLORIDE 10 MG: 20 INJECTION INTRAMUSCULAR; INTRAVENOUS at 08:07

## 2018-09-24 ASSESSMENT — PAIN SCALES - PAIN ASSESSMENT IN ADVANCED DEMENTIA (PAINAD)
BREATHING: 0
NEGVOCALIZATION: 0
TOTALSCORE: 0
FACIALEXPRESSION: 0
CONSOLABILITY: 0
FACIALEXPRESSION: 0
TOTALSCORE: 0
NEGVOCALIZATION: 0
CONSOLABILITY: 0
BODYLANGUAGE: 0
BREATHING: 0
BODYLANGUAGE: 0

## 2018-09-24 ASSESSMENT — PAIN SCALES - GENERAL: PAINLEVEL_OUTOF10: 0

## 2018-09-24 NOTE — PROGRESS NOTES
Internal Medicine PGY-1 Resident Progress Note        PCP: No primary care provider on file. Date of Admission: 9/18/2018    Chief Complaint: AMS    Subjective: Patient was made NPO overnight to due concerns for aspiration. This morning patient is doing well and diet was re-started. Patient was alert and oriented to self. Patient was able to respond to questions and denied she was in any pain and stated that she felt well. Awaiting placement. Medications:  Reviewed    Infusion Medications    sodium chloride 50 mL/hr at 09/24/18 0148     Scheduled Medications    hydrALAZINE  10 mg Oral 3 times per day    famotidine  20 mg Oral BID    amLODIPine  10 mg Oral Daily    levETIRAcetam  500 mg Oral BID    digoxin  125 mcg Oral Daily    sodium chloride flush  10 mL Intravenous 2 times per day     PRN Meds: acetaminophen, sodium chloride flush, magnesium hydroxide, ondansetron, acetaminophen      Intake/Output Summary (Last 24 hours) at 09/24/18 1331  Last data filed at 09/24/18 1306   Gross per 24 hour   Intake             1300 ml   Output                0 ml   Net             1300 ml       Physical Exam Performed:    /82   Pulse 88   Temp 99 °F (37.2 °C) (Oral)   Resp 16   Ht 5' 4.96\" (1.65 m)   Wt 253 lb 12 oz (115.1 kg)   SpO2 92%   BMI 42.28 kg/m²     Physical Exam   Constitutional: She is oriented to person, place, and time. No distress. Eyes: Conjunctivae and EOM are normal.   Neck: Normal range of motion. Neck supple. Cardiovascular: Normal rate and normal heart sounds. Pulmonary/Chest: Effort normal and breath sounds normal.   Abdominal: Soft. She exhibits no distension. There is no tenderness. Musculoskeletal: She exhibits no edema or deformity. Decreased strength and motor function in RUE and RLE   Neurological: She is alert and oriented to person, place, and time. Skin: Skin is warm and dry. She is not diaphoretic.            Labs:   Recent Labs      09/22/18   0243 09/23/18   0525  09/24/18   0518   WBC  9.6  9.0  8.2   HGB  13.5  14.5  13.8   HCT  39.8  42.9  40.6   PLT  190  207  240     Recent Labs      09/22/18   0242  09/23/18   0525  09/24/18   0519   NA  132*  135*  135*   K  3.4*  4.5  4.4   CL  97*  101  100   CO2  22  17*  25   BUN  14  15  16   CREATININE  0.6  0.6  0.6   CALCIUM  8.7  9.1  9.1   PHOS  2.2*  2.7  2.8     Recent Labs      09/22/18   0703   AST  25   ALT  30   BILIDIR  0.6*   BILITOT  1.6*   ALKPHOS  80     No results for input(s): INR in the last 72 hours. No results for input(s): Ariadne Bibber in the last 72 hours. Urinalysis:    Lab Results   Component Value Date    NITRU Negative 09/22/2018    WBCUA 0-2 09/22/2018    BACTERIA 1+ 09/22/2018    RBCUA 3-5 09/22/2018    BLOODU TRACE-INTACT 09/22/2018    SPECGRAV 1.025 09/22/2018    GLUCOSEU Negative 09/22/2018       Radiology:  CT HEAD WO CONTRAST   Final Result      1. Evolving hemorrhagic infarct in the left basal ganglia. Unchanged 2 mm rightward midline shift. XR CHEST PORTABLE   Final Result      No acute pulmonary pathology                  FL MODIFIED BARIUM SWALLOW W VIDEO   Final Result   1. Tongue pumping   2. Intermittent penetration. 3. Otherwise within functional limits      VL DUP CAROTID BILATERAL   Final Result      MRI BRAIN W WO CONTRAST   Final Result      1. Left basal ganglia infarct with 2 areas of acute hemorrhagic transformation, similar in appearance compared to prior CT.       CT Head WO Contrast   Final Result   Addendum 1 of 1   CR   Patient: MILLA WADSWORTH  Time Out: 02:09   Exam(s): CT HEAD Without Contrast        ADDENDUM - Added by Bernadine Pierce M.D. on 9/19/2018 2:09 AM (-04:00)   Comparison from outside institution on nonenhanced head CT from 09/18/18    1650 hour and CTA from 1708 hour now available after second request.       Amount of hemorrhage in left caudate and basal ganglia and associated    mass effect and midline shift are not substantially Critical Value Communications       09/19/18 01:08 Call Doctor Regarding Stroke, called Dr. Belkys Lira on 09/19    01:08 (-04:00)       09/19/18 01:09 Call Doctor Regarding Intracranial Hemorrhage, called Dr. Belkys Lira on 09/19 01:08 (-04:00)      Final   1.  2 foci of adjacent small intraparenchymal hemorrhage centered in the    region of left caudate and basal ganglia, surrounded by moderate amount    of vasogenic edema causing mass effect, effacement of left lateral    ventricle and 2 mm midline shift to the right. 2.  Small hypodensities of left frontal lobe may suggest subacute versus    chronic infarct. Please correlate with focal neurological symptoms. If    clinically indicated, MRI may help to further evaluate                  Assessment/Plan:    Alexys Grajeda, 82 y.o. female w/ PMH of A-fib on warfarin, HTN, HFpEF (diastolic heart failure) who presented with confusion and right sided facial droop, right sided weakness. 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 Active Hospital Problems    Diagnosis Date Noted    Hemorrhagic stroke ([facility] [address] [I61.9] 09/18/2018     Hemorrhagic stroke    CT scan on 9/17 showed hemorrhage within the left caudate and putamen, likely subacute. Repeat CT head on 9/18 showed no gross changes. MRI on 9/19 showed L basal ganglia infarct with 2 areas of acute hemorrhagic transformation similar to prior CT.    - Neurosurgery consulted - no intervention at this time  - Head of Bed Elevated  - Barium swallow study - within functional limits  - Speech consulted - diet advanced  - Keppra 500 mg BID for seizure prophylaxis  - VL duplex carotic bilateral - R carotid artery normal; L carotid artery <50% patent  - Goal BP <160            - amlodipine 10 mg            - hydralazine 10 mg TID  - Hold anti-coagulation and antiplatelets   - neuro checks q4h      Acute neurogenic pulmonary edema   Previous suspicion for aspiration pneumonia due to low grade fever,

## 2018-09-24 NOTE — PROGRESS NOTES
to recall name. Pt inconsistently oriented to situation but after spaced retrieval technique implemented, pt recalled CVA after 10 minute delay. Continue goal.  9/24 (second session): Disoriented to location and situation via yes/no questions and multiple choice options. Patient able to recall location following short delay. Continue goal.     Goal 4: Patient will maintain attention to graded cognitive tasks with mod cues. 9/22-  Pt was very easily distracted though out the session and required max verbal, tactile and visual cues to maintain attention. con't goal   9/24: Pt with declining attention to task as session progressed - pt initially required min cues to sustain attention, increasing to mod cues to respond to prompts / directions. Continue goal.  9/24 (second session): Required TV to be turned off in order to facilitate sustained / selective attention. No cues required following this. Patient did not even appear distracted when MD came in to assess the patient. Continue goal.     Goal 5: Patient will tolerate ongoing cognitive-linguistic assessment. 9/22-  Not addressed this session due to pt becoming fatigued, con't goal  9/24: Goal not directly targeted. Continue goal.  9/24 (second session): Command Following (object manipulation-field of 2): Moderately impaired to mildly complex 1 step commands. Assessed for ability to utilize call light functionally - pt dependent. Completed after short delay, patient without recall or unable to demonstrate comprehension/recall. Assessed single word reading - note paraphasic errors. Continue goal.      Education:  Education provided regarding aphasia and nature of impairments. No evidence of learning by patient. Susan Dewey, present and indicated understanding.        Plan:  Continue speech/language therapy to address above goals, 3-5 x/week x LOS  DC recommendations:ongoing Speech Therapy after discharged from acute care   Needs met prior to leaving room, call button in reach.   Treatment time: 15 speech, 8 cog (23 total treatment minutes / no cog unit charged)    Ange Smart M.A., Yair  Speech-Language Pathologist  If patient is discharged prior to next treatment, this note will serve as the discharge summary

## 2018-09-24 NOTE — PROGRESS NOTES
Speech Language Pathology  Facility/Department: [facility]  Dysphagia Daily Treatment Note    NAME: Jeremi Valiente  : 1935  MRN: [id number]    Patient Diagnosis(es):   Patient Active Problem List    Diagnosis Date Noted    Hemorrhagic stroke ([facility] [address] 2018     Allergies: No Known Allergies        CXR (18)  Patchy bilateral airspace disease and pulmonary vascular congestion. Pulmonary edema would be considered.  Multifocal pneumonia or atelectasis is   also possible.  These changes should be followed to resolution.           Previous MBS  none    Chart reviewed. MD notes indicate concern for aspiration event on PO diet. Pt also with code stroke called over weekend. Repeat CT indicated ICH stable on comparison to previous imaging. Medical Diagnosis: ICH  Treatment Diagnosis: dysphagia    BSE Impression (18)  Pt admitted s/p hemorrhagic CVA; pt with lethargy, confusion, and difficulty consistently following directions during exam. Assessment limited d/t mental status. Pt accepted trials of ice chips, thins via cup edge, thins via straw, and puree. Pt noted to have bolus holding for all and inconsistent initiation of swallow - at times, pt required max cues to complete A-P transit and initiate swallow. No overt signs of aspiration exhibited with any consistency although exam limited - pt unable to follow directions for 3 oz water test. As session progressed, pt with increasing fatigue and declining initiation to take PO - trials d/c'd out of concern for pt safety at that time. Recommend NPO with exception of ice chips ONLY when pt fully alert. Will re-assess bedside tomorrow; pt will most likely require instrumental evaluation of swallow prior to initiation of diet consistency given high risk of dysphagia. MBS results  18  Oral Phase:  Moderate-severe oral impairments characterized by significant lingual pumping / rocking, bolus holding, slowed A-P transit, posterior spillage of liquids

## 2018-09-24 NOTE — PLAN OF CARE
Problem: Risk for Impaired Skin Integrity  Goal: Tissue integrity - skin and mucous membranes  Structural intactness and normal physiological function of skin and  mucous membranes. Intervention: SKIN ASSESSMENT  Patient incont. Frequent checks. Patient turned and repositioned with pillow support. Skin clean, dry and intact. Problem: Falls - Risk of:  Goal: Will remain free from falls  Will remain free from falls   Outcome: Ongoing  Fall risk precautions in place. Bed is locked and in lowest position. 2/4 side rails up. Call light within reach. Fall risk Bracelet in place. Frequent checks on patient. Free from falls at this time. Will continue to monitor.

## 2018-09-24 NOTE — PROGRESS NOTES
Level: Oriented to person;Disoriented to situation;Disoriented to time;Disoriented to place (able to state birth month with heavy cues but unable to state date or year)  Objective    ADL  LE Dressing: Dependent/Total (don clean brief in bed; don socks)  Toileting: Dependent/Total (noted incontinent of urine in brief- during pericare, pt again incontinent and unaware; hygiene completed in bed)        Balance  Sitting Balance: Stand by assistance (seated eob x5 min)  Standing Balance: Dependent/Total (max A of 2 at SW)  Standing Balance  Time: 45 sec  Activity: functional transfers    Functional Mobility  Functional - Mobility Device: Standard Walker  Activity: Other (transfer bed>chair)  Assist Level: Dependent/Total (max A of 2)  Functional Mobility Comments: maximal cues to sequence steps; difficulty initiating steps of R LE with weightshifting assist provided; pt SAENZ and fatigued quickly; O2 sat 97% on RA following transfer     Bed mobility  Rolling to Left: Maximum assistance  Rolling to Right: Maximum assistance  Supine to Sit: Moderate assistance (HOB elevated)  Scooting: Moderate assistance (to EOB)    Transfers  Sit to stand: Dependent/Total (mod of 2 from eob; cues for safe hand placement)  Stand to sit: Dependent/Total (max of 2 to chair, poor control of descent, limited by fatigue)        Cognition  Overall Cognitive Status: Exceptions  Following Commands: Follows one step commands with repetition; Follows one step commands with increased time  Attention Span: Attends with cues to redirect  Memory: Decreased recall of biographical Information;Decreased recall of recent events;Decreased short term memory;Decreased long term memory  Safety Judgement: Decreased awareness of need for safety;Decreased awareness of need for assistance  Problem Solving: Decreased awareness of errors  Insights: Not aware of deficits  Initiation: Requires cues for all  Sequencing: Requires cues for all        Type of ROM/Therapeutic met  Short term goal 3: Complete simple grooming task with set up and min verbal cues - not met  Short term goal 4: Stance x3 mins with Mod A x1 in prep for ADLs not met  Patient Goals   Patient goals : Not stated       Therapy Time   Individual Concurrent Group Co-treatment   Time In 1048         Time Out 1130         Minutes 42         Timed Code Treatment Minutes:   42  Total Treatment Minutes:  43     If patient is d/c prior to next treatment session, this note will serve as the discharge summary    Charanjit Kee OTR/L, [id number]

## 2018-09-24 NOTE — PROGRESS NOTES
Patient alert and oriented to self. Moderate neuro checks. Patient tolerated diet, meds crushed in apple sauce. Patient incontinent of bowel and bladder. IV fluids infusing through left AC. Patient sat in chair for a few hours today. Will continue to monitor.

## 2018-09-24 NOTE — PROGRESS NOTES
Physical Therapy  Facility/Department: [facility] 5T ORTHO/NEURO  Daily Treatment Note  NAME: Martha Flores  : 1935  MRN: [id number]    Date of Service: 2018    Discharge Recommendations:    Martha Flores scored a  on the AM-PAC short mobility form. Current research shows that an AM-PAC score of 17 or less is typically not associated with a discharge to the patient's home setting. Based on the patients AM-PAC score and their current functional mobility deficits, it is recommended that the patient have 3-5 sessions per week of Physical Therapy at d/c to increase the patients independence. PT Equipment Recommendations  Other: defer to next level of care    Patient Diagnosis(es): There were no encounter diagnoses. has a past medical history of CHF (congestive heart failure) ([facility] [address] Hyperlipidemia; and Hypertension. has a past surgical history that includes hip surgery. Restrictions  Position Activity Restriction  Other position/activity restrictions: Up with assist  Subjective   General  Chart Reviewed: Yes  Additional Pertinent Hx: Pt admitted  from [facility] with AMS and Head CT (+) for Hemorrhage within the left caudate and putamen. Neurosurgery consult. PMH:  CHF, HTN, Hip fx  Family / Caregiver Present: No  Subjective  Subjective: Patient supine in bed, agreeable to PT. Pleasantly confused  Pain Screening  Patient Currently in Pain: Denies  Vital Signs  Patient Currently in Pain: Denies       Orientation  Orientation  Overall Orientation Status: Impaired (Oriented to name, cues for birthdate, RN aware)  Objective   Bed mobility  Rolling to Left: Maximum assistance  Rolling to Right: Maximum assistance  Supine to Sit: Moderate assistance (HOB elevated)  Scooting:  Moderate assistance (to EOB)  Comment: effortful, rolling for pericare, verbal cues for sequencing  Transfers  Sit to Stand: Dependent/Total (modA x 2 persons)  Stand to sit: Dependent/Total (maxA x 2 persons)  Bed to Chair: Dependent/Total (maxA x2 persons stand step transfer)  Comment: Effortful, slow to rise, verbal cues for sequencing and safety. Fatigues quickly with increased respiration rate noted, patient's SpO2 >95% t/o session on room air. Required multiple rest breaks. Ambulation  Ambulation?: Yes  Ambulation 1  Surface: level tile  Device: Standard Walker  Assistance: Dependent/Total (maxA x 2 persons)  Quality of Gait: 3 very short shuffled steps, unsteady, fatigues quickly  Distance: 3 steps to chair  Comments: Verbal cues for safety, required assist to move walker  Stairs/Curb  Stairs?: No     Balance  Sitting - Static: Fair  Sitting - Dynamic: Fair  Standing - Static: Poor  Standing - Dynamic: Poor  Exercises  Ankle Pumps: x10 reps BLE                        Assessment   Body structures, Functions, Activity limitations: Decreased functional mobility ; Decreased ROM; Decreased strength;Decreased safe awareness;Decreased cognition;Decreased endurance;Decreased balance  Assessment: Patient continues to require assista x2 persons for OOB mobility, tolerated 3 very short shuffled steps to chair. Patient faitgues very quickly during session and required multiple rest breaks. Would benefit from continued inpatient therapy at discharge. Will continue to assess and follow inpatient. Treatment Diagnosis: Decreased mobility associated with hemorrhagic stroke. Patient Education: Role of PT. Pt will need re-education. REQUIRES PT FOLLOW UP: Yes  Activity Tolerance  Activity Tolerance: Patient limited by cognitive status; Patient limited by fatigue;Patient limited by endurance; Patient Tolerated treatment well                AM-PAC Score  AM-PAC Inpatient Mobility Raw Score : 8  AM-PAC Inpatient T-Scale Score : 28.52  Mobility Inpatient CMS 0-100% Score: 86.62  Mobility Inpatient CMS G-Code Modifier : CM          Goals  Short term goals  Time Frame for Short term goals: Discharge  Short term goal 1: bed mobility mod A ONGOING  Short term goal 2: sit <> stand mod A ONGOING  Short term goal 3: bed <> chair mod A x2 ONGOING  Short term goal 4: ambulate 10ft with walker mod A x2 ONGOING  Patient Goals   Patient goals : Unable to state    Plan    Plan  Times per week: 5-7  Current Treatment Recommendations: Functional Mobility Training, Gait Training, Strengthening, ADL/Self-care Training, ROM, IADL Training, Balance Training, Transfer Training, Endurance Training, Stair training, Safety Education & Training, Equipment Evaluation, Education, & procurement, Patient/Caregiver Education & Training  Safety Devices  Type of devices: Call light within reach, Nurse notified, Gait belt, Chair alarm in place, Left in chair (RN notified of patient's mobility level, may require use of lift or smiley stedy to return to bed)     Therapy Time   Individual Concurrent Group Co-treatment   Time In 1049         Time Out 1128         Minutes 39            Timed Code Treatment Minutes:  39  Total Treatment Minutes:  39    This note will serve as a discharge summary if patient is discharged prior to next treatment session.   Davian Sullivan PT, DPT, [id number]

## 2018-09-24 NOTE — PROGRESS NOTES
Speech Language Pathology  Facility/Department: [facility]  Dysphagia Daily Treatment Note    NAME: Vanessa Degroot  : 1935  MRN: [id number]    Patient Diagnosis(es):   Patient Active Problem List    Diagnosis Date Noted    Hemorrhagic stroke ([facility] [address] 2018     Allergies: No Known Allergies        CXR (18)  Patchy bilateral airspace disease and pulmonary vascular congestion. Pulmonary edema would be considered.  Multifocal pneumonia or atelectasis is   also possible.  These changes should be followed to resolution.           Previous MBS  none    Chart reviewed. Medical Diagnosis: ICH  Treatment Diagnosis: dysphagia    BSE Impression (18)  Pt admitted s/p hemorrhagic CVA; pt with lethargy, confusion, and difficulty consistently following directions during exam. Assessment limited d/t mental status. Pt accepted trials of ice chips, thins via cup edge, thins via straw, and puree. Pt noted to have bolus holding for all and inconsistent initiation of swallow - at times, pt required max cues to complete A-P transit and initiate swallow. No overt signs of aspiration exhibited with any consistency although exam limited - pt unable to follow directions for 3 oz water test. As session progressed, pt with increasing fatigue and declining initiation to take PO - trials d/c'd out of concern for pt safety at that time. Recommend NPO with exception of ice chips ONLY when pt fully alert. Will re-assess bedside tomorrow; pt will most likely require instrumental evaluation of swallow prior to initiation of diet consistency given high risk of dysphagia. MBS results  18  Oral Phase: Moderate-severe oral impairments characterized by significant lingual pumping / rocking, bolus holding, slowed A-P transit, posterior spillage of liquids over base of tongue, anterior spillage of liquids on R side of oral cavity, slowed / disorganized mastication of soft solid, and lingual residue with all consistencies.  Pt note 9/21-Pulmonary/Chest: Effort normal. No accessory muscle usage. She is not intubated. No respiratory distress. She has decreased breath sounds. \"  Attempted to place partial denture in place, but pt moving tongue about- unable to secure it to her lower gums so removed it due to concern for safety. Pt analyzed with sips of water by cup and 2 very small bites of a cracker. Pt with no coughing with water. With cracker, mastication was unorganized- pt distracted- stopped mastication to take a breath in then proceeded to cough. Did not attempt further trials with solids due to concern for pt safety. con't goal   9/24: MD notes indicate concern for aspiration event yesterday; pt currently NPO. Pt alert and cooperative with tx session. Pt noted to be dyspneic with conversation and after swallowing (even dry swallows) - concern for pt's ability to coordinate breath / swallow. Analyzed pt with trials of ice chips, thin liquids via cup edge, puree, and soft solid cracker. No overt signs of aspiration exhibited - no cough or throat clear, no wet vocal quality - although pt continued with dyspnea. Pt required mod-max cues to take only single, small sips of thins via cup edge. A-P transit of puree slowed but pt consistent with initiation of swallow. Pt continues to be increased aspiration risk d/t cognitive status, SOB, and delay in swallow initiation - recommend restart dysphagia I pureed / thin liquid diet with CLOSE 1:1 supervision during ALL PO intake. Pt will require cues for slow rate and small, single sips. Continue goal.  9/24 (second session) - Patient's grandson, Janna Huang, present for session. Reported that she did not appear to have any difficulty with lunch this date and he was able to help her eat. Educated on recommended diet consistency, compensatory strategies, risk of pneumonia. Oral care was provided at start of session. Patient tolerated thins via cup, puree and jello without overt reactive s/s of aspiration. Education:  Patient's grandson present, Keiry Abebe. Education regarding dysphagia therapy, recommendations, diet level and as documented above. Compensatory Strategies:  Supervision 1:1 with all PO intake  Upright as possible for all oral intake; Alternate solids and liquids; No straws;  Small bites/sips; Check for pocketing of food on the Right   Cues for single, small sips of liquids  Rest breaks during meals if pt becomes fatigued / SOB    Plan:  Continued daily Dysphagia treatment with goals per  plan of care. Diet recommendations: dysphagia I pureed with thin liquids - NO straws with compensatory strategies outlined above  Recommend full feed assist d/t cognitive status  DC recommendation: ongoing tx warranted at this time  Treatment: 15  D/W nursing prior to session only  Needs met prior to leaving room, call button in reach. Ange Smart M.A., [id number] [facility].[id number]  Speech-Language Pathologist  Pg.  # [phone]    If patient is discharged prior to next treatment, this note will serve as the discharge summary

## 2018-09-24 NOTE — PROGRESS NOTES
Speech Language Pathology  Facility/Department: [facility] 5T ORTHO/NEURO  Speech / Language / Cognitive Daily Treatment Note  NAME: Derrek Rodriguez  : 1935  MRN: [id number]    Date of Eval: 2018  Evaluating Therapist: Filemon Lopez    Patient Diagnosis(es): has Hemorrhagic stroke [facility]) on her problem list.         RECENT RESULTS  MRI BRAIN 18  1. Left basal ganglia infarct with 2 areas of acute hemorrhagic transformation, similar in appearance compared to prior CT. Speech/language/cognitive assessment 18  Assessment:  Cognitive Diagnosis: moderate-severe cognitive-linguistic impairment characterized by disorientation, poor attention, recall deficits, and limited insight / understanding of current situation and deficits  Aphasia Diagnosis: moderate receptive / expressive aphasia characterized by jargon and neologisms, difficulty with comprehension of complex / multistep items, and simplistic and vague verbal responses  Speech Diagnosis: mild-moderate dysarthria characterized by imprecise articulation and reduced volume of voice; pt breakdowns noted to be irregular at times   Diagnosis: Cognitive-linguistic impairment; aphasia; dysarthria. Pt ability to demonstrate comprehension of items also appeared negatively impacted by working memory / attention. Difficult to know pt's baseline as pt unreliable historian and no family present during assessment. Chart reviewed. Pain: denied pain     Medical diagnosis: hemorrhagic CVA  Treatment diagnosis: cognitive linguistic impairment, aphasia    Treatment:  Pt seen to address the following goals:  Goal 1: Patient will answer complex Y/N questions with 90% accuracy or min cues. -  Not formally addressed this session as pt became easily fatigued- pt had difficulty responding appropriately to basic questions. con't goal  : 60% accuracy independently.  Continue goal.    Goal 2: Patient will complete graded verbal description tasks with min-mod cues. 9/22-  Pt unable to correctly name common objects. Once told pt the name of the object then asked pt what to do with the object, response was not appropriate. For instance when asked pt what you do with a straw, pt responded \"you sleep with it\" with had no error awareness when questioned about her response. con't goal  9/24: Confrontation naming - 89% accuracy with min cues. Pt with neologisms / jargon at time and demonstrated no awareness of errors despite cues. Pt named object function with 100% accuracy independently. Naming items to function via \"wh\" questions - 50% accuracy independently, increasing to 80% accuracy with mod cues. Intermittent perseverations noted. Continue goal.    Goal 3: Patient will be oriented to location and situation with mod cues. 9/22-  Pt with no recall of place or situation. When asked where we were, pt was able to [place] [facility], but not \"hospital\". When told pt she was in the hospital and asked if she knew which one, pt stated \"[facility]\" pt con't to perseverate on cereal.  Pt not oriented to reason for hospitalization- so educated pt to place and situation several times through out the session, but each time questioned pt, she was unable to provide the correct information whether it be immediate or delayed recall. con't goal  9/24: Pt oriented to self; disoriented to location, situation, and time independently. Once reoriented and spaced retrieval technique implemented, pt consistently oriented to location as \"hospital\" (max delay of 10 minutes) but mod-max cues to recall name. Pt inconsistently oriented to situation but after spaced retrieval technique implemented, pt recalled CVA after 10 minute delay. Continue goal.    Goal 4: Patient will maintain attention to graded cognitive tasks with mod cues. 9/22-  Pt was very easily distracted though out the session and required max verbal, tactile and visual cues to maintain attention.  con't goal   9/24: Pt with declining attention to task as session progressed - pt initially required min cues to sustain attention, increasing to mod cues to respond to prompts / directions. Continue goal.    Goal 5: Patient will tolerate ongoing cognitive-linguistic assessment. 9/22-  Not addressed this session due to pt becoming fatigued, con't goal  9/24: Goal not directly targeted. Continue goal.    Education:  Attempted to educate pt to role of SLP, purpose of visit, impact CVA can have on cognition / language, and POC. Pt demonstrates no comprehension of education provided. Plan:  Continue speech/language therapy to address above goals, 3-5 x/week x LOS  DC recommendations:ongoing Speech Therapy after discharged from acute care   D/W nursing, [place]en  Needs met prior to leaving room, call button in reach.   Treatment time: 15 speech, 10 cog (25 min total tx time)    Geeta Gonsalez MA CCC-SLP; [id number]  Speech-Language Pathologist    Pager [phone]    If patient is discharged prior to next treatment, this note will serve as the discharge summary

## 2018-09-25 LAB
ALBUMIN SERPL-MCNC: 3.4 G/DL (ref 3.4–5)
ANION GAP SERPL CALCULATED.3IONS-SCNC: 12 MMOL/L (ref 3–16)
BASOPHILS ABSOLUTE: 0.1 K/UL (ref 0–0.2)
BASOPHILS RELATIVE PERCENT: 0.8 %
BUN BLDV-MCNC: 13 MG/DL (ref 7–20)
CALCIUM SERPL-MCNC: 9.1 MG/DL (ref 8.3–10.6)
CHLORIDE BLD-SCNC: 100 MMOL/L (ref 99–110)
CO2: 21 MMOL/L (ref 21–32)
CREAT SERPL-MCNC: 0.5 MG/DL (ref 0.6–1.2)
EOSINOPHILS ABSOLUTE: 0.3 K/UL (ref 0–0.6)
EOSINOPHILS RELATIVE PERCENT: 2.8 %
GFR AFRICAN AMERICAN: >60
GFR NON-AFRICAN AMERICAN: >60
GLUCOSE BLD-MCNC: 102 MG/DL (ref 70–99)
HCT VFR BLD CALC: 40.9 % (ref 36–48)
HEMOGLOBIN: 13.8 G/DL (ref 12–16)
LYMPHOCYTES ABSOLUTE: 1.3 K/UL (ref 1–5.1)
LYMPHOCYTES RELATIVE PERCENT: 13.7 %
MAGNESIUM: 1.9 MG/DL (ref 1.8–2.4)
MCH RBC QN AUTO: 31.4 PG (ref 26–34)
MCHC RBC AUTO-ENTMCNC: 33.7 G/DL (ref 31–36)
MCV RBC AUTO: 93.3 FL (ref 80–100)
MONOCYTES ABSOLUTE: 0.8 K/UL (ref 0–1.3)
MONOCYTES RELATIVE PERCENT: 7.8 %
NEUTROPHILS ABSOLUTE: 7.3 K/UL (ref 1.7–7.7)
NEUTROPHILS RELATIVE PERCENT: 74.9 %
PDW BLD-RTO: 13 % (ref 12.4–15.4)
PHOSPHORUS: 2.5 MG/DL (ref 2.5–4.9)
PLATELET # BLD: 195 K/UL (ref 135–450)
PMV BLD AUTO: 8.6 FL (ref 5–10.5)
POTASSIUM SERPL-SCNC: 4 MMOL/L (ref 3.5–5.1)
RBC # BLD: 4.39 M/UL (ref 4–5.2)
SODIUM BLD-SCNC: 133 MMOL/L (ref 136–145)
WBC # BLD: 9.7 K/UL (ref 4–11)

## 2018-09-25 PROCEDURE — 97127 HC SP THER IVNTJ W/FOCUS COG FUNCJ: CPT

## 2018-09-25 PROCEDURE — 83735 ASSAY OF MAGNESIUM: CPT

## 2018-09-25 PROCEDURE — 6370000000 HC RX 637 (ALT 250 FOR IP): Performed by: STUDENT IN AN ORGANIZED HEALTH CARE EDUCATION/TRAINING PROGRAM

## 2018-09-25 PROCEDURE — 36415 COLL VENOUS BLD VENIPUNCTURE: CPT

## 2018-09-25 PROCEDURE — 97535 SELF CARE MNGMENT TRAINING: CPT

## 2018-09-25 PROCEDURE — 97530 THERAPEUTIC ACTIVITIES: CPT

## 2018-09-25 PROCEDURE — 92507 TX SP LANG VOICE COMM INDIV: CPT

## 2018-09-25 PROCEDURE — 80069 RENAL FUNCTION PANEL: CPT

## 2018-09-25 PROCEDURE — 6370000000 HC RX 637 (ALT 250 FOR IP): Performed by: INTERNAL MEDICINE

## 2018-09-25 PROCEDURE — 92526 ORAL FUNCTION THERAPY: CPT

## 2018-09-25 PROCEDURE — 85025 COMPLETE CBC W/AUTO DIFF WBC: CPT

## 2018-09-25 PROCEDURE — 2580000003 HC RX 258: Performed by: STUDENT IN AN ORGANIZED HEALTH CARE EDUCATION/TRAINING PROGRAM

## 2018-09-25 PROCEDURE — 1200000000 HC SEMI PRIVATE

## 2018-09-25 RX ADMIN — LEVETIRACETAM 500 MG: 100 SOLUTION ORAL at 10:25

## 2018-09-25 RX ADMIN — SODIUM CHLORIDE: 9 INJECTION, SOLUTION INTRAVENOUS at 17:53

## 2018-09-25 RX ADMIN — HYDRALAZINE HYDROCHLORIDE 10 MG: 10 TABLET, FILM COATED ORAL at 14:44

## 2018-09-25 RX ADMIN — DIGOXIN 125 MCG: 125 TABLET ORAL at 10:12

## 2018-09-25 RX ADMIN — AMLODIPINE BESYLATE 5 MG: 5 TABLET ORAL at 10:11

## 2018-09-25 RX ADMIN — FAMOTIDINE 20 MG: 20 TABLET ORAL at 20:47

## 2018-09-25 RX ADMIN — HYDRALAZINE HYDROCHLORIDE 10 MG: 10 TABLET, FILM COATED ORAL at 06:06

## 2018-09-25 RX ADMIN — HYDRALAZINE HYDROCHLORIDE 10 MG: 10 TABLET, FILM COATED ORAL at 22:38

## 2018-09-25 RX ADMIN — LEVETIRACETAM 500 MG: 100 SOLUTION ORAL at 20:47

## 2018-09-25 RX ADMIN — FAMOTIDINE 20 MG: 20 TABLET ORAL at 10:12

## 2018-09-25 ASSESSMENT — PAIN SCALES - GENERAL: PAINLEVEL_OUTOF10: 0

## 2018-09-25 ASSESSMENT — PAIN SCALES - PAIN ASSESSMENT IN ADVANCED DEMENTIA (PAINAD)
CONSOLABILITY: 0
NEGVOCALIZATION: 0
FACIALEXPRESSION: 0
FACIALEXPRESSION: 0
NEGVOCALIZATION: 0
NEGVOCALIZATION: 0
BREATHING: 0
CONSOLABILITY: 0
BREATHING: 0
TOTALSCORE: 0
BODYLANGUAGE: 0
BODYLANGUAGE: 0
CONSOLABILITY: 0
BREATHING: 0
CONSOLABILITY: 0
TOTALSCORE: 0
BODYLANGUAGE: 0
BREATHING: 0
FACIALEXPRESSION: 0
TOTALSCORE: 0
FACIALEXPRESSION: 0
BODYLANGUAGE: 0
TOTALSCORE: 0
NEGVOCALIZATION: 0

## 2018-09-25 NOTE — PLAN OF CARE
Problem: Risk for Impaired Skin Integrity  Goal: Tissue integrity - skin and mucous membranes  Structural intactness and normal physiological function of skin and  mucous membranes. Outcome: Ongoing  Turn patient q2h. Hourly rounding to check for bathroom needs. Heels floated. Will continue to monitor/assess. Problem: Falls - Risk of:  Goal: Will remain free from falls  Will remain free from falls   Outcome: Ongoing  Hourly rounding on patient for needs. Non-skid socks on, bed in lowest position and locked. Bedside table and personal belongs within reach. Nurse call light within reach. Instructed patient to use call light for assistance. Floor clear of clutter. Bed/chair alarm in use.

## 2018-09-25 NOTE — PROGRESS NOTES
Internal Medicine PGY-1 Resident Progress Note        PCP: No primary care provider on file. Date of Admission: 9/18/2018    Chief Complaint: AMS    Subjective: No adverse events overnight. Patient was alert and oriented to self only. Patient denied any chest pain, SOB, abdominal pain. Diet will be changed to dysphagia II (no straw). Awaiting placement to SNF. Medications:  Reviewed    Infusion Medications    sodium chloride 50 mL/hr at 09/24/18 2130     Scheduled Medications    hydrALAZINE  10 mg Oral 3 times per day    amLODIPine  5 mg Oral Daily    famotidine  20 mg Oral BID    levETIRAcetam  500 mg Oral BID    digoxin  125 mcg Oral Daily    sodium chloride flush  10 mL Intravenous 2 times per day     PRN Meds: acetaminophen, sodium chloride flush, magnesium hydroxide, ondansetron, acetaminophen      Intake/Output Summary (Last 24 hours) at 09/25/18 1230  Last data filed at 09/24/18 1306   Gross per 24 hour   Intake              240 ml   Output                0 ml   Net              240 ml       Physical Exam Performed:    /72   Pulse 64   Temp 99.1 °F (37.3 °C) (Oral)   Resp 18   Ht 5' 4.96\" (1.65 m)   Wt 253 lb 12 oz (115.1 kg)   SpO2 94%   BMI 42.28 kg/m²     Physical Exam   Constitutional: She is oriented to person, place, and time. No distress. Eyes: Conjunctivae and EOM are normal.   Neck: Normal range of motion. Neck supple. Cardiovascular: Normal rate and normal heart sounds. Pulmonary/Chest: Effort normal and breath sounds normal.   Abdominal: Soft. She exhibits no distension. There is no tenderness. Musculoskeletal: She exhibits no edema or deformity. Decreased strength and motor function in RUE and RLE   Neurological: She is alert and oriented to person, place, and time. Skin: Skin is warm and dry. She is not diaphoretic.        Labs:   Recent Labs      09/23/18   0525  09/24/18   0518  09/25/18   0704   WBC  9.0  8.2  9.7   HGB  14.5  13.8  13.8   HCT  42.9 ----------------------------------------------------------------------       EXAM:     CT Head Without Intravenous Contrast       CLINICAL HISTORY:      Reason for exam: re-assess hemorrhage. Has a \"code stroke\" or \"stroke    alert\" been called?->No.       TECHNIQUE:     Axial computed tomography images of the head/brain without intravenous    contrast.  CTDI is 67.82 mGy and DLP is 1279.2 mGy-cm. All CT scans at    this facility use dose modulation, iterative reconstruction, and/or    weight based dosing when appropriate to reduce radiation dose to as low    as reasonably achievable. Coronal and sagittal reconstructions are    performed       COMPARISON:     No relevant prior studies available. FINDINGS:     Brain:  2 foci of adjacent small intraparenchymal hemorrhage centered    in the region of left caudate and basal ganglia, surrounded by moderate    amount of vasogenic edema causing mass effect, effacement of left lateral    ventricle and 2 mm midline shift to the right. Small hypodensities of    left frontal lobe may suggest subacute versus chronic infarct. Ventricles:  Unremarkable. No ventriculomegaly. Bones/joints:  Unremarkable. No acute fracture. Soft tissues:  Unremarkable. Sinuses:  Unremarkable as visualized. No acute sinusitis. Mastoid air cells:  Unremarkable as visualized. No mastoid effusion. IMPRESSION:        1.  2 foci of adjacent small intraparenchymal hemorrhage centered in the    region of left caudate and basal ganglia, surrounded by moderate amount    of vasogenic edema causing mass effect, effacement of left lateral    ventricle and 2 mm midline shift to the right. 2.  Small hypodensities of left frontal lobe may suggest subacute versus    chronic infarct. Please correlate with focal neurological symptoms.   If    clinically indicated, MRI may help to further evaluate               [employer]       09/19/18 01:08 Call Doctor airspace disease and pulmonary vascular congestion with concerns for consolidation and possible pneumonia.   - No peripherial edema  - Likely neurogenic pulmonary edema with hemorrhagic CVA  - repeat CXR negative for acute abnormalities     AFIB - rate controlled  CHADSVASC of 5  - restarted home digoxin   - Hold AC  - ECHO - EF 55-60%, severe tricuspid regurgitation, pulmonary HTN      DVT Prophylaxis: SCD  Diet: DIET DYSPHAGIA I PUREED;  Code Status: Full Code    PT/OT Eval Status: 8/24 [facility] - [facility]    Travis Barrios MD    I will discuss the patient with the senior resident and MD Travis Valdez MD  PGY-1 Internal Medicine  [phone]

## 2018-09-25 NOTE — PROGRESS NOTES
also noted to take large liquid boluses when self-feeding although only minimal posterior spillage occurred. Pharyngeal: Mild pharyngeal deficits characterized by delay in swallow initiation, reduced base of tongue retraction, and reduced epiglottic inversion resulting in trace, flash penetration with thin and nectar thick liquids. Trace-mild pharyngeal residue noted in valleculae and pyriforms with all consistencies but this cleared with additional trials. No aspiration observed with any consistency although pt would be at increased risk with large amounts at a time given delayed swallow initiation. Pain: denied when questioned; none indicated via any means    Current Diet :  Dysphagia I pureed / thin liquids (recommend advance to trial of dysphagia II mechanically altered / thin liquids)    Treatment:  Pt seen bedside to address the following goals:  1) The patient will tolerate repeat BSE when able. 9/20: Pt in bed, eyes closed but awakened readily to verbal stimuli. Pt more alert than previous day. Analyzed pt with ice chips, thins via cup edge, thins via straw, and puree. Pt with slowed A-P transit at times but consistent swallow initiation when cued or when presented with alternating textures. No overt coughing after sequential sips at bedside but pt with increased SOB and throat clearing x 1. Pt still not following directions to complete 3 oz water test, limiting assessment. Given pt's acute CVA and inability to fully assess at bedside d/t pt's mental status, recommend MBS prior to initiation of diet for full assessment of swallow function. As pt consistently initiating swallow this date and following basic commands, recommend proceed with MBS as soon as able to scheduled. GOAL MET. 2) The patient will tolerate instrumental swallowing procedure  9/20: To be completed this date.   9/21- goal met    New goals following MBS-  Goal 1: Patient will tolerate recommended diet consistency w/o overt signs of aspiration or respiratory decline. 9/22Karyn Quintero MD note 9/21-Pulmonary/Chest: Effort normal. No accessory muscle usage. She is not intubated. No respiratory distress. She has decreased breath sounds. \"  Attempted to place partial denture in place, but pt moving tongue about- unable to secure it to her lower gums so removed it due to concern for safety. Pt analyzed with sips of water by cup and 2 very small bites of a cracker. Pt with no coughing with water. With cracker, mastication was unorganized- pt distracted- stopped mastication to take a breath in then proceeded to cough. Did not attempt further trials with solids due to concern for pt safety. con't goal   9/24: MD notes indicate concern for aspiration event yesterday; pt currently NPO. Pt alert and cooperative with tx session. Pt noted to be dyspneic with conversation and after swallowing (even dry swallows) - concern for pt's ability to coordinate breath / swallow. Analyzed pt with trials of ice chips, thin liquids via cup edge, puree, and soft solid cracker. No overt signs of aspiration exhibited - no cough or throat clear, no wet vocal quality - although pt continued with dyspnea. Pt required mod-max cues to take only single, small sips of thins via cup edge. A-P transit of puree slowed but pt consistent with initiation of swallow. Pt continues to be increased aspiration risk d/t cognitive status, SOB, and delay in swallow initiation - recommend restart dysphagia I pureed / thin liquid diet with CLOSE 1:1 supervision during ALL PO intake. Pt will require cues for slow rate and small, single sips. Continue goal.  9/24 (second session) - Patient's grandson, Britney Bernard, present for session. Reported that she did not appear to have any difficulty with lunch this date and he was able to help her eat. Educated on recommended diet consistency, compensatory strategies, risk of pneumonia. Oral care was provided at start of session.  Patient tolerated thins via cup, puree distracted, while chewing, requiring cues to con't with mastication. Pt not appropriate for diet upgrade at this time. con't goal  9/24: Analyzed pt with single trial of soft cracker - pt w/o efforts to masticate cracker and required verbal cues and alternation of consistencies to complete A-P transit. No further solid trials presented during session. Continue goal.  9/24 (second session) -  Patient assessed with leodan this session and did demonstrate adequate mastication and propulsion with no pocketing/residue. Recommend ongoing assessment due to inconsistency. Continue goal.   9/25: Analyzed pt with soft, mechanically altered solid (pieces of francesco cracker softened in yogurt). Improved initiation of mastication noted. Pt with mildly prolonged but functional mastication; A-P transit / clearance mildly slowed and piecemeal swallowing noted but pt aware of remaining bolus in oral cavity and worked to clear. Minimal oral residue noted that cleared with liquid rinse. Recommend trial advanced diet consistency given pt's improved oral manipulation of soft solids this date and continue ongoing assessment. Continue goal.   9/25 (second session): Completed trial of soft regular solid with patient. Patient masticated and then pocketed ~75% of bolus on right side of mouth. Unable to benefit from verbal, tactile cues and liquid rinses to remove. Had dry coughing episode followed by additional water bolus which eventually cleared. Also noted a similar dry coughing episode later in the absence of PO. Would not recommend soft solid advancement at this time. Continue goal.     Goal 3: Patient will demonstrate adequate bolus control as evidenced by no anterior spillage or pocketing during PO trials. 9/22-  Pt with no anterior spillage this date with water by cup and small bites of cracker.   Since only gave pt 2 small trials with cracker, this was not an adequate amount to determine if pt is having issues with  Pocketing.  9/24: Pt with no anterior spillage of thins via cup edge, puree, or 1 trial of soft cracker. Pt with pocketing of cracker trial and required alternation of consistencies to complete A-P transit. Continue goal.  9/24 (second session) -  Anterior spillage of thins via cup edge x1. No pocketing or anterior spillage with puree or solids. Attempted bolus control with use of toothette but patient unable to propel bolus despite verbal cues, physical assist and model. Sustained mastication was noted throughout. Patient closes mouth around toothette when attempting to remove from oral cavity. Continue goal.   9/25: Oral care provided prior to and after PO trials - pt with disorganized lingual movement against toothette and required cues to open oral cavity. Pt exhibited anterior spillage of puree on R side x 1 and pocketing of puree on R side x 1 throughout all trials. Continue goal.  9/25 (second session): Patient with overt pocketing of solid solids as documented above. Maximal strategies for removal. Also noted minimal oral residue throughout oral cavity after soft solid. No anterior spillage of any textures. Continue goal.     Patient/Family/Caregiver Education:  Educated on diet advancement this date, diet level, recommendations and indication for oral care. Needs reinforcement. Compensatory Strategies:  Supervision 1:1 with all PO intake  Upright as possible for all oral intake; Alternate solids and liquids; No straws;  Small bites/sips; Check for pocketing of food on the Right   Cues for single, small sips of liquids  Rest breaks during meals if pt becomes fatigued / SOB    Plan:  Continued daily Dysphagia treatment with goals per  plan of care.   Diet recommendations: Dysphagia II (mechanically altered) / thins via cup  Recommend full feed assist d/t cognitive status  If pt exhibits difficulty with more solid consistencies, recommend downgrade back to puree  DC recommendation: Ongoing dysphagia treatment  Treatment:

## 2018-09-25 NOTE — CARE COORDINATION
[facility] [phone] said that they can take patient if precert for [facility] ARU is denied. [facility] is a floor to SNF. HENS completed.   Electronically signed by BATOOL Mason, SABINEW on 9/25/2018 at 2:43 PM

## 2018-09-25 NOTE — PROGRESS NOTES
delayed recall. con't goal  9/24: Pt oriented to self; disoriented to location, situation, and time independently. Once reoriented and spaced retrieval technique implemented, pt consistently oriented to location as \"hospital\" (max delay of 10 minutes) but mod-max cues to recall name. Pt inconsistently oriented to situation but after spaced retrieval technique implemented, pt recalled CVA after 10 minute delay. Continue goal.  9/24 (second session): Disoriented to location and situation via yes/no questions and multiple choice options. Patient able to recall location following short delay. Continue goal.   9/25: Pt disoriented to location and situation via Y/N questions. When spaced retrieval implemented, pt recalled location but not situation for up to 2 minutes. Given written cue, pt independently implemented to orient to location; min cues required to orient to situation. After 5 minute delay, pt required min cues to refer to written cue to reorient self. Continue goal.    Goal 4: Patient will maintain attention to graded cognitive tasks with mod cues. 9/22-  Pt was very easily distracted though out the session and required max verbal, tactile and visual cues to maintain attention. con't goal   9/24: Pt with declining attention to task as session progressed - pt initially required min cues to sustain attention, increasing to mod cues to respond to prompts / directions. Continue goal.  9/24 (second session): Required TV to be turned off in order to facilitate sustained / selective attention. No cues required following this. Patient did not even appear distracted when MD came in to assess the patient. Continue goal.   9/25: Pt with significant difficulty maintaining attention to task - pt frequently looking out window and not responding to verbal prompts / cues. Pt appeared unaware of when she was not turn taking appropriately.  Pt required mod-max verbal and tactile cues to attend to tasks at hand and conversational partner. Continue goal.    Goal 5: Patient will tolerate ongoing cognitive-linguistic assessment. 9/22-  Not addressed this session due to pt becoming fatigued, con't goal  9/24: Goal not directly targeted. Continue goal.  9/24 (second session): Command Following (object manipulation-field of 2): Moderately impaired to mildly complex 1 step commands. Assessed for ability to utilize call light functionally - pt dependent. Completed after short delay, patient without recall or unable to demonstrate comprehension/recall. Assessed single word reading - note paraphasic errors. Continue goal.    9/25: Pt [facility] and [facility]. Visual scanning of room appropriate to identify larger print items; pt scanned to whiteboard appropriately when directed but reduced visual acuity limited usage. Insight into deficits: pt independently identified reduced finger dexterity as deficit s/p CVA; mod cues to identify cognitive and physical deficits. After delay, pt inconsistent with answering Y/N questions r/t deficits. Continue goal.    Education:  Educated to role of SLP, rationale for tx tasks, impact CVA can have on cognition / language, aphasia, skills targeted with tx, and recommended POC. Pt endorsed understanding but full comprehension questionable given cognitive status. Plan:  Continue speech/language therapy to address above goals, 3-5 x/week x LOS  DC recommendations:ongoing Speech Therapy after discharged from acute care   Needs met prior to leaving room, call button in reach.   Treatment time: 15 language tx; 15 cognitive tx    Taye Baeza MA CCC-SLP; [id number]  Speech-Language Pathologist    If patient is discharged prior to next treatment, this note will serve as the discharge summary

## 2018-09-25 NOTE — PROGRESS NOTES
Physical Therapy  Facility/Department: [facility] 5T ORTHO/NEURO  Daily Treatment Note  NAME: Charmayne Collins  : 1935  MRN: [id number]    Date of Service: 2018    Discharge Recommendations:    Charmayne Collins scored a 8/24 on the AM-PAC short mobility form. Current research shows that an AM-PAC score of 17 or less is typically not associated with a discharge to the patient's home setting. Based on the patients AM-PAC score and their current functional mobility deficits, it is recommended that the patient have 3-5 sessions per week of Physical Therapy at d/c to increase the patients independence. PT Equipment Recommendations  Other: defer to next level of care    Patient Diagnosis(es): There were no encounter diagnoses. has a past medical history of CHF (congestive heart failure) ([address] Hyperlipidemia; and Hypertension. has a past surgical history that includes hip surgery. Restrictions  Position Activity Restriction  Other position/activity restrictions: Up with assist  Subjective   General  Chart Reviewed: Yes  Additional Pertinent Hx: Pt admitted  from [facility] with AMS and Head CT (+) for Hemorrhage within the left caudate and putamen. Neurosurgery consult. PMH:  CHF, HTN, Hip fx  Family / Caregiver Present: No  Subjective  Subjective: Patient supine in bed, agreeable to PT  Pain Screening  Patient Currently in Pain: Denies  Vital Signs  Patient Currently in Pain: Denies       Orientation  Orientation  Overall Orientation Status: Impaired (oriented to self)  Objective   Bed mobility  Rolling to Left: Maximum assistance  Rolling to Right: Maximum assistance  Supine to Sit: Maximum assistance (HOB elevated)  Scooting: Maximal assistance (to EOB)  Comment: Patient noted to be incontinent of urine, rolling for pericare.  RN aware  Transfers  Sit to Stand: Dependent/Total (maxA x 2 persons)  Stand to sit: Dependent/Total (maxA x 2 persons)  Bed to Chair: Dependent/Total (via smiley darnell bed to chair maxA x 2 persons)  Comment: Effortful, slow to rise, patient required Mary for static stand at [address] stedy. Noted to be incontinent of urine upon standing and required 2nd sit<>stand from chair for additional pericare. Required assist x2 for safety. OT assisting with pericare. Ambulation  Ambulation?: No  Stairs/Curb  Stairs?: No     Balance  Sitting - Static: Fair  Sitting - Dynamic: Fair  Standing - Static: Poor  Standing - Dynamic: Poor  Comments: Patient required Mary x1 persons for static standing with BUE holds on smiley stedy, verbal cues for safety and sequencing. Tolerated ~3 min total.                            Assessment   Body structures, Functions, Activity limitations: Decreased functional mobility ; Decreased ROM; Decreased strength;Decreased safe awareness;Decreased cognition;Decreased endurance;Decreased balance  Assessment: Patient required assist x2 therapist for transfers and pericare, noted to be incontinent of urine throughout session. RN aware. Patient fatigues quickly standing and requires seated rest breaks. Patient would benefit from continued skilled therapy to increase functional independence. Will continue to follow inpatient. Treatment Diagnosis: Decreased mobility associated with hemorrhagic stroke. Patient Education: Role of PT. Pt will need re-education. REQUIRES PT FOLLOW UP: Yes  Activity Tolerance  Activity Tolerance: Patient limited by cognitive status; Patient limited by fatigue;Patient limited by endurance; Patient Tolerated treatment well       AM-PAC Score  AM-PAC Inpatient Mobility Raw Score : 8  AM-PAC Inpatient T-Scale Score : 28.52  Mobility Inpatient CMS 0-100% Score: 86.62  Mobility Inpatient CMS G-Code Modifier : CM          Goals  Short term goals  Time Frame for Short term goals: Discharge  Short term goal 1: bed mobility mod A ONGOING  Short term goal 2: sit <> stand mod A ONGOING  Short term goal 3: bed <> chair mod A x2 ONGOING  Short term goal 4:

## 2018-09-25 NOTE — CARE COORDINATION
Patient's daughter called and is more interested in [facility]. Called left message with admissions for bed availability 10:53 am 9/25/18      Spoke with patient's daughter who stated that family is interested in [facility] as first choice and [place]. [address]  as second choice. SW called to check be availability for both SNF's. [facility] network, called granddaughter and informed her.     Electronically signed by BATOOL Hirsch, SABINEW on 9/25/2018 at 9:17 AM

## 2018-09-25 NOTE — PROGRESS NOTES
Speech Language Pathology  Facility/Department: [facility] 5T ORTHO/NEURO  Speech / Language / Cognitive Daily Treatment Note  NAME: Omaira Mcknight  : 1935  MRN: [id number]    Date of Eval: 2018  Evaluating Therapist: Erlin Corado    Patient Diagnosis(es): has Hemorrhagic stroke [facility]) on her problem list.       RECENT RESULTS  MRI BRAIN 18  1. Left basal ganglia infarct with 2 areas of acute hemorrhagic transformation, similar in appearance compared to prior CT. Speech/language/cognitive assessment 18  Assessment:  Cognitive Diagnosis: moderate-severe cognitive-linguistic impairment characterized by disorientation, poor attention, recall deficits, and limited insight / understanding of current situation and deficits  Aphasia Diagnosis: moderate receptive / expressive aphasia characterized by jargon and neologisms, difficulty with comprehension of complex / multistep items, and simplistic and vague verbal responses  Speech Diagnosis: mild-moderate dysarthria characterized by imprecise articulation and reduced volume of voice; pt breakdowns noted to be irregular at times   Diagnosis: Cognitive-linguistic impairment; aphasia; dysarthria. Pt ability to demonstrate comprehension of items also appeared negatively impacted by working memory / attention. Difficult to know pt's baseline as pt unreliable historian and no family present during assessment. Chart reviewed. Pain: None indicated    Medical diagnosis: hemorrhagic CVA  Treatment diagnosis: cognitive linguistic impairment, aphasia    Treatment:  Pt seen to address the following goals:  Goal 1: Patient will answer complex Y/N questions with 90% accuracy or min cues. -  Not formally addressed this session as pt became easily fatigued- pt had difficulty responding appropriately to basic questions. con't goal  : 60% accuracy independently.  Continue goal.   (second session): 80% accuracy with basic yes/no questions; perseverations suspected. Continue goal.   9/25: 82% accuracy with basic and mildly complex Y/N. Continue goal.  9/25 (second session): Basic yes/no questions - 70% accuracy. Complex yes/no questions - 20% accuracy. Continue goal.     Goal 2: Patient will complete graded verbal description tasks with min-mod cues. 9/22-  Pt unable to correctly name common objects. Once told pt the name of the object then asked pt what to do with the object, response was not appropriate. For instance when asked pt what you do with a straw, pt responded \"you sleep with it\" with had no error awareness when questioned about her response. con't goal  9/24: Confrontation naming - 89% accuracy with min cues. Pt with neologisms / jargon at time and demonstrated no awareness of errors despite cues. Pt named object function with 100% accuracy independently. Naming items to function via \"wh\" questions - 50% accuracy independently, increasing to 80% accuracy with mod cues. Intermittent perseverations noted. Continue goal.  9/24 (second session): Confrontational naming - 33% accuracy independently, increased to 78% accuracy with verbal cues including sentence completion and initial phoneme or syllable. Patient very perseverative with this task without error awareness. Multiple paraphasic errors during task. Continue goal.   9/25: Naming items on breakfast tray - 75% accuracy with mod cues. Pt benefited from semantic cues and sentence completion intermittently. Continue goal.  9/25 (second session): Confrontational naming - 50% accuracy; pt did not benefit from semantic cues or sentence completion this date but was able to repeat. Patient with severely impaired basic verbal expression this date with limited content in connected speech; grammatical sentence structures with appropriate prosody and multiple paraphasias (i.e. \"We been working on my standing and on my business. I guess that's my appointment. We got back to what my (unintelligible jargon)\".  Patient without error awareness despite cueing. Continue goal.     Goal 3: Patient will be oriented to location and situation with mod cues. 9/22-  Pt with no recall of place or situation. When asked where we were, pt was able to [facility], but not \"hospital\". When told pt she was in the hospital and asked if she knew which one, pt stated \"[facility] hospital\" pt con't to perseverate on cereal.  Pt not oriented to reason for hospitalization- so educated pt to place and situation several times through out the session, but each time questioned pt, she was unable to provide the correct information whether it be immediate or delayed recall. con't goal  9/24: Pt oriented to self; disoriented to location, situation, and time independently. Once reoriented and spaced retrieval technique implemented, pt consistently oriented to location as \"hospital\" (max delay of 10 minutes) but mod-max cues to recall name. Pt inconsistently oriented to situation but after spaced retrieval technique implemented, pt recalled CVA after 10 minute delay. Continue goal.  9/24 (second session): Disoriented to location and situation via yes/no questions and multiple choice options. Patient able to recall location following short delay. Continue goal.   9/25: Pt disoriented to location and situation via Y/N questions. When spaced retrieval implemented, pt recalled location but not situation for up to 2 minutes. Given written cue, pt independently implemented to orient to location; min cues required to orient to situation. After 5 minute delay, pt required min cues to refer to written cue to reorient self. Continue goal.  9/25 (second session): SLP placed orientation aid in front of patient, no verbal cues and asked orientation questions. The patient was unable to utilize functionally.  Instructed patient to read - Patient now required mod cues for reading (as compared to AM session) and then asked orientation questions - patient answered correctly without cues. Completed repetition of questions with delayed recall x3 additional times, patient required repetition of task described above. Continue goal.     Goal 4: Patient will maintain attention to graded cognitive tasks with mod cues. 9/22-  Pt was very easily distracted though out the session and required max verbal, tactile and visual cues to maintain attention. con't goal   9/24: Pt with declining attention to task as session progressed - pt initially required min cues to sustain attention, increasing to mod cues to respond to prompts / directions. Continue goal.  9/24 (second session): Required TV to be turned off in order to facilitate sustained / selective attention. No cues required following this. Patient did not even appear distracted when MD came in to assess the patient. Continue goal.   9/25: Pt with significant difficulty maintaining attention to task - pt frequently looking out window and not responding to verbal prompts / cues. Pt appeared unaware of when she was not turn taking appropriately. Pt required mod-max verbal and tactile cues to attend to tasks at hand and conversational partner. Continue goal.   9/25 (second session): Mod cues throughout session to maintain attention to task. Television left on and was a distraction throughout. Continue goal.     Goal 5: Patient will tolerate ongoing cognitive-linguistic assessment. 9/22-  Not addressed this session due to pt becoming fatigued, con't goal  9/24: Goal not directly targeted. Continue goal.  9/24 (second session): Command Following (object manipulation-field of 2): Moderately impaired to mildly complex 1 step commands. Assessed for ability to utilize call light functionally - pt dependent. Completed after short delay, patient without recall or unable to demonstrate comprehension/recall. Assessed single word reading - note paraphasic errors.  Continue goal.    9/25: Pt visual scanning and reading [facility] for sentence level material.

## 2018-09-25 NOTE — CARE COORDINATION
Received a call from Eleuterio [address] that they got precert for this patient to go to [facility]. Will discuss with her tomorrow.      Electronically signed by Mike Crisostomo RN on 9/25/2018 at 5:14 PM

## 2018-09-25 NOTE — PROGRESS NOTES
begins talking about '[employer].' Not able to recall place she is in when questioned later on in session     Assessment   Performance deficits / Impairments: Decreased functional mobility ; Decreased endurance;Decreased coordination;Decreased ADL status; Decreased ROM; Decreased strength;Decreased safe awareness;Decreased cognition  Assessment: Pt continues to require heavy assist x2 ppl for functional transfers/mobility via smiley stedy. Follows 1 step commands inconsistently, and will often repeat questions asked back to her. Pt will need ongoing inpt OT at d/c. Treatment Diagnosis: Impaired functional mobility, ADLs, cognition, coordination, and ROM 2/2 hemorrhagic CVA  Patient Education: transfers, orientation, activity promotion- needs reinforcement  REQUIRES OT FOLLOW UP: Yes  Activity Tolerance  Activity Tolerance: Patient limited by fatigue;Treatment limited secondary to decreased cognition  Safety Devices  Safety Devices in place: Yes (whiteboard updated- smiley stedy x2 assist, gait belt- RN aware)  Type of devices: Left in chair;Nurse notified;Call light within reach; Chair alarm in place          Plan   Plan  Times per week: 5-7x  Times per day: Daily  Current Treatment Recommendations: Strengthening, Patient/Caregiver Education & Training, Functional Mobility Training, Cognitive Reorientation, Endurance Training, Safety Education & Training, Self-Care / ADL  If patient discharges prior to next treatment, this note will serve as discharge summary. Will continue per POC if patient does not discharge.     AM-PAC Score        AM-PAC Inpatient Daily Activity Raw Score: 7  AM-PAC Inpatient ADL T-Scale Score : 20.13  ADL Inpatient CMS 0-100% Score: 92.44  ADL Inpatient CMS G-Code Modifier : CM    Goals  Short term goals  Time Frame for Short term goals: Discharge  Short term goal 1: Follow 1-step command on 3/4 attempts - ongoing  Short term goal 2: Functional transfers with Mod A x1 - not met  Short term goal 3: Complete simple grooming task with set up and min verbal cues - not met  Short term goal 4: Stance x3 mins with Mod A x1 in prep for ADLs- goal met.   Updated: stance x5 mins total w/ min A x1 in prep for additional ADLs   Patient Goals   Patient goals : Not stated       Therapy Time   Individual Concurrent Group Co-treatment   Time In 1325         Time Out 1535         Minutes 40         Timed Code Treatment Minutes: [address] Fiordaliza Desai, OT

## 2018-09-25 NOTE — PROGRESS NOTES
Pt is alert and oriented to self only. VSS with exception to BP's being elevated in the 140's. Pt is incontinent of urine and is being checked and changed every 2 hours. All fall precautions in place will continue to monitor.

## 2018-09-25 NOTE — PROGRESS NOTES
aspiration or respiratory decline. 9/22Gavin Patton MD note 9/21-Pulmonary/Chest: Effort normal. No accessory muscle usage. She is not intubated. No respiratory distress. She has decreased breath sounds. \"  Attempted to place partial denture in place, but pt moving tongue about- unable to secure it to her lower gums so removed it due to concern for safety. Pt analyzed with sips of water by cup and 2 very small bites of a cracker. Pt with no coughing with water. With cracker, mastication was unorganized- pt distracted- stopped mastication to take a breath in then proceeded to cough. Did not attempt further trials with solids due to concern for pt safety. con't goal   9/24: MD notes indicate concern for aspiration event yesterday; pt currently NPO. Pt alert and cooperative with tx session. Pt noted to be dyspneic with conversation and after swallowing (even dry swallows) - concern for pt's ability to coordinate breath / swallow. Analyzed pt with trials of ice chips, thin liquids via cup edge, puree, and soft solid cracker. No overt signs of aspiration exhibited - no cough or throat clear, no wet vocal quality - although pt continued with dyspnea. Pt required mod-max cues to take only single, small sips of thins via cup edge. A-P transit of puree slowed but pt consistent with initiation of swallow. Pt continues to be increased aspiration risk d/t cognitive status, SOB, and delay in swallow initiation - recommend restart dysphagia I pureed / thin liquid diet with CLOSE 1:1 supervision during ALL PO intake. Pt will require cues for slow rate and small, single sips. Continue goal.  9/24 (second session) - Patient's grandson, Andrew Alexander, present for session. Reported that she did not appear to have any difficulty with lunch this date and he was able to help her eat. Educated on recommended diet consistency, compensatory strategies, risk of pneumonia. Oral care was provided at start of session.  Patient tolerated thins via cup, puree and jello without overt reactive s/s of aspiration. Patient does demonstrate SOB with PO trials but also with all cognitive-linguistic tasks as well. Recommend continue on current diet level. Continue goal.   9/25: Pt tolerating diet consistency per RN documentation; pt temperature remains stable (pt consistently fluctuates between 97-99* per RN flowsheets) and lungs with expiratory wheezes. Analyzed pt with puree consistency and thins via cup edge - pt still with SOB throughout session but tolerated all w/o overt signs of aspiration at bedside. Trialed thins via straw but pt still with significant impulsivity and required max cues to control bolus amount. Continue to recommend no straws. Based on trials of soft solids detailed below, recommend advance diet to dysphagia II mechanically altered / thin liquids. Goal met - continue with advanced diet consistency. Goal 2: Patient will tolerate advanced texture trials with adequate mastication and timely A-P transit. 9/22- mastication was timely and unorganized with 2 small bites of cracker. Pt became easily distracted, while chewing, requiring cues to con't with mastication. Pt not appropriate for diet upgrade at this time. con't goal  9/24: Analyzed pt with single trial of soft cracker - pt w/o efforts to masticate cracker and required verbal cues and alternation of consistencies to complete A-P transit. No further solid trials presented during session. Continue goal.  9/24 (second session) -  Patient assessed with jello this session and did demonstrate adequate mastication and propulsion with no pocketing/residue. Recommend ongoing assessment due to inconsistency. Continue goal.   9/25: Analyzed pt with soft, mechanically altered solid (pieces of francesco cracker softened in yogurt). Improved initiation of mastication noted.  Pt with mildly prolonged but functional mastication; A-P transit / clearance mildly slowed and piecemeal swallowing noted but pt aware of for pocketing of food on the Right   Cues for single, small sips of liquids  Rest breaks during meals if pt becomes fatigued / SOB    Plan:  Continued daily Dysphagia treatment with goals per  plan of care. Diet recommendations: advance to trial of dysphagia II mechanically altered with thin liquids - NO straws with compensatory strategies outlined above  Recommend full feed assist d/t cognitive status  If pt exhibits difficulty with more solid consistencies, recommend downgrade back to puree  DC recommendation: ongoing tx warranted at this time  Treatment: 15 min  D/W nursing, Guera Dozier  Needs met prior to leaving room, call button in reach. Heather Barclay MA CCC-SLP; [id number]  Speech-Language Pathologist    Pg.  # [phone]    If patient is discharged prior to next treatment, this note will serve as the discharge summary

## 2018-09-26 ENCOUNTER — HOSPITAL ENCOUNTER (INPATIENT)
Age: 83
LOS: 29 days | Discharge: NURSING, REHAB OR LONG-TERM CARE | DRG: 057 | End: 2018-10-25
Attending: PHYSICAL MEDICINE & REHABILITATION | Admitting: PHYSICAL MEDICINE & REHABILITATION
Payer: MEDICARE

## 2018-09-26 VITALS
WEIGHT: 249.12 LBS | SYSTOLIC BLOOD PRESSURE: 134 MMHG | DIASTOLIC BLOOD PRESSURE: 84 MMHG | RESPIRATION RATE: 16 BRPM | OXYGEN SATURATION: 94 % | BODY MASS INDEX: 41.51 KG/M2 | HEART RATE: 67 BPM | HEIGHT: 65 IN | TEMPERATURE: 98.8 F

## 2018-09-26 PROBLEM — I63.9 ACUTE ISCHEMIC STROKE (HCC): Status: ACTIVE | Noted: 2018-09-26

## 2018-09-26 LAB
ALBUMIN SERPL-MCNC: 3.6 G/DL (ref 3.4–5)
ANION GAP SERPL CALCULATED.3IONS-SCNC: 13 MMOL/L (ref 3–16)
BASOPHILS ABSOLUTE: 0 K/UL (ref 0–0.2)
BASOPHILS RELATIVE PERCENT: 0.4 %
BUN BLDV-MCNC: 12 MG/DL (ref 7–20)
CALCIUM SERPL-MCNC: 9.2 MG/DL (ref 8.3–10.6)
CHLORIDE BLD-SCNC: 102 MMOL/L (ref 99–110)
CO2: 22 MMOL/L (ref 21–32)
CREAT SERPL-MCNC: <0.5 MG/DL (ref 0.6–1.2)
EOSINOPHILS ABSOLUTE: 0.3 K/UL (ref 0–0.6)
EOSINOPHILS RELATIVE PERCENT: 3.2 %
GFR AFRICAN AMERICAN: >60
GFR NON-AFRICAN AMERICAN: >60
GLUCOSE BLD-MCNC: 104 MG/DL (ref 70–99)
HCT VFR BLD CALC: 43 % (ref 36–48)
HEMOGLOBIN: 14.2 G/DL (ref 12–16)
LYMPHOCYTES ABSOLUTE: 1.1 K/UL (ref 1–5.1)
LYMPHOCYTES RELATIVE PERCENT: 13.3 %
MAGNESIUM: 1.9 MG/DL (ref 1.8–2.4)
MCH RBC QN AUTO: 31.4 PG (ref 26–34)
MCHC RBC AUTO-ENTMCNC: 33.1 G/DL (ref 31–36)
MCV RBC AUTO: 94.7 FL (ref 80–100)
MONOCYTES ABSOLUTE: 0.6 K/UL (ref 0–1.3)
MONOCYTES RELATIVE PERCENT: 7.4 %
NEUTROPHILS ABSOLUTE: 6.4 K/UL (ref 1.7–7.7)
NEUTROPHILS RELATIVE PERCENT: 75.7 %
PDW BLD-RTO: 12.7 % (ref 12.4–15.4)
PHOSPHORUS: 2.7 MG/DL (ref 2.5–4.9)
PLATELET # BLD: 254 K/UL (ref 135–450)
PMV BLD AUTO: 7.5 FL (ref 5–10.5)
POTASSIUM SERPL-SCNC: 4 MMOL/L (ref 3.5–5.1)
RBC # BLD: 4.54 M/UL (ref 4–5.2)
SODIUM BLD-SCNC: 137 MMOL/L (ref 136–145)
WBC # BLD: 8.5 K/UL (ref 4–11)

## 2018-09-26 PROCEDURE — 6370000000 HC RX 637 (ALT 250 FOR IP): Performed by: STUDENT IN AN ORGANIZED HEALTH CARE EDUCATION/TRAINING PROGRAM

## 2018-09-26 PROCEDURE — 92526 ORAL FUNCTION THERAPY: CPT

## 2018-09-26 PROCEDURE — 6370000000 HC RX 637 (ALT 250 FOR IP): Performed by: PHYSICAL MEDICINE & REHABILITATION

## 2018-09-26 PROCEDURE — 92507 TX SP LANG VOICE COMM INDIV: CPT

## 2018-09-26 PROCEDURE — 83735 ASSAY OF MAGNESIUM: CPT

## 2018-09-26 PROCEDURE — 80069 RENAL FUNCTION PANEL: CPT

## 2018-09-26 PROCEDURE — 1280000000 HC REHAB R&B

## 2018-09-26 PROCEDURE — 85025 COMPLETE CBC W/AUTO DIFF WBC: CPT

## 2018-09-26 PROCEDURE — 36415 COLL VENOUS BLD VENIPUNCTURE: CPT

## 2018-09-26 PROCEDURE — 2580000003 HC RX 258: Performed by: STUDENT IN AN ORGANIZED HEALTH CARE EDUCATION/TRAINING PROGRAM

## 2018-09-26 PROCEDURE — 6370000000 HC RX 637 (ALT 250 FOR IP): Performed by: INTERNAL MEDICINE

## 2018-09-26 RX ORDER — AMLODIPINE BESYLATE 5 MG/1
5 TABLET ORAL DAILY
Status: DISCONTINUED | OUTPATIENT
Start: 2018-09-27 | End: 2018-10-02

## 2018-09-26 RX ORDER — HYDRALAZINE HYDROCHLORIDE 10 MG/1
10 TABLET, FILM COATED ORAL EVERY 8 HOURS SCHEDULED
Status: DISCONTINUED | OUTPATIENT
Start: 2018-09-26 | End: 2018-10-25 | Stop reason: HOSPADM

## 2018-09-26 RX ORDER — LEVETIRACETAM 100 MG/ML
500 SOLUTION ORAL 2 TIMES DAILY
Status: CANCELLED | OUTPATIENT
Start: 2018-09-26

## 2018-09-26 RX ORDER — HYDRALAZINE HYDROCHLORIDE 10 MG/1
10 TABLET, FILM COATED ORAL EVERY 8 HOURS SCHEDULED
Qty: 90 TABLET | Refills: 3 | Status: ON HOLD | OUTPATIENT
Start: 2018-09-26 | End: 2018-10-24 | Stop reason: HOSPADM

## 2018-09-26 RX ORDER — DIPHENHYDRAMINE HCL 25 MG
25 TABLET ORAL EVERY 6 HOURS PRN
Status: DISCONTINUED | OUTPATIENT
Start: 2018-09-26 | End: 2018-10-25 | Stop reason: HOSPADM

## 2018-09-26 RX ORDER — HYDRALAZINE HYDROCHLORIDE 50 MG/1
50 TABLET, FILM COATED ORAL EVERY 8 HOURS PRN
Status: DISCONTINUED | OUTPATIENT
Start: 2018-09-26 | End: 2018-10-25 | Stop reason: HOSPADM

## 2018-09-26 RX ORDER — ACETAMINOPHEN 325 MG/1
650 TABLET ORAL EVERY 4 HOURS PRN
Status: DISCONTINUED | OUTPATIENT
Start: 2018-09-26 | End: 2018-10-25 | Stop reason: HOSPADM

## 2018-09-26 RX ORDER — ONDANSETRON 4 MG/1
4 TABLET, ORALLY DISINTEGRATING ORAL EVERY 8 HOURS PRN
Status: CANCELLED | OUTPATIENT
Start: 2018-09-26

## 2018-09-26 RX ORDER — ACETAMINOPHEN 325 MG/1
650 TABLET ORAL EVERY 4 HOURS PRN
Status: CANCELLED | OUTPATIENT
Start: 2018-09-26

## 2018-09-26 RX ORDER — FAMOTIDINE 20 MG/1
20 TABLET, FILM COATED ORAL 2 TIMES DAILY
Status: CANCELLED | OUTPATIENT
Start: 2018-09-26

## 2018-09-26 RX ORDER — FAMOTIDINE 20 MG/1
20 TABLET, FILM COATED ORAL 2 TIMES DAILY
Status: DISCONTINUED | OUTPATIENT
Start: 2018-09-26 | End: 2018-10-25 | Stop reason: HOSPADM

## 2018-09-26 RX ORDER — LEVETIRACETAM 100 MG/ML
500 SOLUTION ORAL 2 TIMES DAILY
Qty: 1 BOTTLE | Refills: 3 | Status: ON HOLD | OUTPATIENT
Start: 2018-09-26 | End: 2018-10-24

## 2018-09-26 RX ORDER — DIGOXIN 125 MCG
125 TABLET ORAL DAILY
Status: DISCONTINUED | OUTPATIENT
Start: 2018-09-27 | End: 2018-10-25 | Stop reason: HOSPADM

## 2018-09-26 RX ORDER — DIGOXIN 125 MCG
125 TABLET ORAL DAILY
Status: CANCELLED | OUTPATIENT
Start: 2018-09-27

## 2018-09-26 RX ORDER — TRAZODONE HYDROCHLORIDE 50 MG/1
50 TABLET ORAL NIGHTLY PRN
Status: DISCONTINUED | OUTPATIENT
Start: 2018-09-26 | End: 2018-10-25 | Stop reason: HOSPADM

## 2018-09-26 RX ORDER — TRAZODONE HYDROCHLORIDE 50 MG/1
50 TABLET ORAL NIGHTLY PRN
Status: CANCELLED | OUTPATIENT
Start: 2018-09-26

## 2018-09-26 RX ORDER — HYDRALAZINE HYDROCHLORIDE 50 MG/1
50 TABLET, FILM COATED ORAL EVERY 8 HOURS PRN
Status: CANCELLED | OUTPATIENT
Start: 2018-09-26

## 2018-09-26 RX ORDER — DIPHENHYDRAMINE HCL 25 MG
25 TABLET ORAL EVERY 6 HOURS PRN
Status: CANCELLED | OUTPATIENT
Start: 2018-09-26

## 2018-09-26 RX ORDER — HYDRALAZINE HYDROCHLORIDE 10 MG/1
10 TABLET, FILM COATED ORAL EVERY 8 HOURS SCHEDULED
Status: CANCELLED | OUTPATIENT
Start: 2018-09-26

## 2018-09-26 RX ORDER — ONDANSETRON 4 MG/1
4 TABLET, ORALLY DISINTEGRATING ORAL EVERY 8 HOURS PRN
Status: DISCONTINUED | OUTPATIENT
Start: 2018-09-26 | End: 2018-10-25 | Stop reason: HOSPADM

## 2018-09-26 RX ORDER — AMLODIPINE BESYLATE 5 MG/1
5 TABLET ORAL DAILY
Status: CANCELLED | OUTPATIENT
Start: 2018-09-27

## 2018-09-26 RX ORDER — LEVETIRACETAM 100 MG/ML
500 SOLUTION ORAL 2 TIMES DAILY
Status: DISCONTINUED | OUTPATIENT
Start: 2018-09-26 | End: 2018-10-25 | Stop reason: HOSPADM

## 2018-09-26 RX ORDER — AMLODIPINE BESYLATE 5 MG/1
5 TABLET ORAL DAILY
Qty: 30 TABLET | Refills: 3 | Status: ON HOLD | OUTPATIENT
Start: 2018-09-27 | End: 2018-10-24 | Stop reason: HOSPADM

## 2018-09-26 RX ADMIN — LEVETIRACETAM 500 MG: 100 SOLUTION ORAL at 09:53

## 2018-09-26 RX ADMIN — LEVETIRACETAM 500 MG: 100 SOLUTION ORAL at 23:40

## 2018-09-26 RX ADMIN — Medication 10 ML: at 09:56

## 2018-09-26 RX ADMIN — AMLODIPINE BESYLATE 5 MG: 5 TABLET ORAL at 09:53

## 2018-09-26 RX ADMIN — FAMOTIDINE 20 MG: 20 TABLET ORAL at 23:40

## 2018-09-26 RX ADMIN — FAMOTIDINE 20 MG: 20 TABLET ORAL at 09:53

## 2018-09-26 RX ADMIN — HYDRALAZINE HYDROCHLORIDE 10 MG: 10 TABLET, FILM COATED ORAL at 06:07

## 2018-09-26 RX ADMIN — DIGOXIN 125 MCG: 125 TABLET ORAL at 09:53

## 2018-09-26 RX ADMIN — HYDRALAZINE HYDROCHLORIDE 10 MG: 10 TABLET, FILM COATED ORAL at 13:31

## 2018-09-26 ASSESSMENT — PAIN SCALES - PAIN ASSESSMENT IN ADVANCED DEMENTIA (PAINAD)
TOTALSCORE: 0
TOTALSCORE: 0
CONSOLABILITY: 0
FACIALEXPRESSION: 0
FACIALEXPRESSION: 0
BODYLANGUAGE: 0
TOTALSCORE: 0
NEGVOCALIZATION: 0
BODYLANGUAGE: 0
FACIALEXPRESSION: 0
TOTALSCORE: 0
NEGVOCALIZATION: 0
BREATHING: 0
BODYLANGUAGE: 0
BREATHING: 0
FACIALEXPRESSION: 0
CONSOLABILITY: 0
NEGVOCALIZATION: 0
BREATHING: 0
CONSOLABILITY: 0
TOTALSCORE: 0
TOTALSCORE: 0
FACIALEXPRESSION: 0
BODYLANGUAGE: 0
NEGVOCALIZATION: 0
TOTALSCORE: 0
CONSOLABILITY: 0
CONSOLABILITY: 0
FACIALEXPRESSION: 0
CONSOLABILITY: 0
CONSOLABILITY: 0
TOTALSCORE: 0
BODYLANGUAGE: 0
BREATHING: 0
CONSOLABILITY: 0
BODYLANGUAGE: 0
NEGVOCALIZATION: 0
CONSOLABILITY: 0
FACIALEXPRESSION: 0
BREATHING: 0
CONSOLABILITY: 0
NEGVOCALIZATION: 0
BODYLANGUAGE: 0
FACIALEXPRESSION: 0
FACIALEXPRESSION: 0
NEGVOCALIZATION: 0
BODYLANGUAGE: 0
TOTALSCORE: 0
NEGVOCALIZATION: 0
TOTALSCORE: 0
BREATHING: 0
FACIALEXPRESSION: 0
BODYLANGUAGE: 0
BODYLANGUAGE: 0
BREATHING: 0

## 2018-09-26 ASSESSMENT — PAIN SCALES - GENERAL
PAINLEVEL_OUTOF10: 0

## 2018-09-26 NOTE — PROGRESS NOTES
Patient is oriented to self, disoriented to time, situation, and place. Stroke assessment performed, patient scored a 2. Patient is assist with gait belt and smiley stedy. Patient does not exhibit any objective s/s of pain per advanced dementia scale. Will continue to monitor patient.

## 2018-09-26 NOTE — PROGRESS NOTES
MET.    Goal 2: Patient will tolerate advanced texture trials with adequate mastication and timely A-P transit. 9/22- mastication was timely and unorganized with 2 small bites of cracker. Pt became easily distracted, while chewing, requiring cues to con't with mastication. Pt not appropriate for diet upgrade at this time. con't goal  9/24: Analyzed pt with single trial of soft cracker - pt w/o efforts to masticate cracker and required verbal cues and alternation of consistencies to complete A-P transit. No further solid trials presented during session. Continue goal.  9/24 (second session) -  Patient assessed with jello this session and did demonstrate adequate mastication and propulsion with no pocketing/residue. Recommend ongoing assessment due to inconsistency. Continue goal.   9/25: Analyzed pt with soft, mechanically altered solid (pieces of francesco cracker softened in yogurt). Improved initiation of mastication noted. Pt with mildly prolonged but functional mastication; A-P transit / clearance mildly slowed and piecemeal swallowing noted but pt aware of remaining bolus in oral cavity and worked to clear. Minimal oral residue noted that cleared with liquid rinse. Recommend trial advanced diet consistency given pt's improved oral manipulation of soft solids this date and continue ongoing assessment. Continue goal.   9/25 (second session): Completed trial of soft regular solid with patient. Patient masticated and then pocketed ~75% of bolus on right side of mouth. Unable to benefit from verbal, tactile cues and liquid rinses to remove. Had dry coughing episode followed by additional water bolus which eventually cleared. Also noted a similar dry coughing episode later in the absence of PO. Would not recommend soft solid advancement at this time. Continue goal.   9/26: Analyzed pt with soft cracker trials. Pt required cueing and multiple attempts to bite bolus off larger piece.  Mastication prolonged with munching has on swallowing, rationale for tx tasks, recommendations, and POC. Pt endorsed understanding but questionable comprehension d/t cognitive status. Compensatory Strategies:  Supervision 1:1 with all PO intake  Upright as possible for all oral intake; Alternate solids and liquids; No straws;  Small bites/sips; Check for pocketing of food on the Right   Cues for single, small sips of liquids  Rest breaks during meals if pt becomes fatigued / SOB    Plan:  Continued daily Dysphagia treatment with goals per  plan of care. Diet recommendations: Dysphagia II (mechanically altered) / thins via cup (NO STRAWS)  Recommend full feed assist d/t cognitive status  If pt exhibits difficulty with more solid consistencies, recommend downgrade back to puree  DC recommendation: pt with planned d/c to ARU today - recommend ongoing dysphagia tx  Treatment: 14 min  D/W nursing, Ethelene Clamp  Needs met prior to leaving room, call button in reach. Ludmila Salgado MA CCC-SLP; [id number]  Speech-Language Pathologist    Pg.  # [phone]    If patient is discharged prior to next treatment, this note will serve as the discharge summary

## 2018-09-26 NOTE — PLAN OF CARE
Problem: Falls - Risk of:  Goal: Will remain free from falls  Will remain free from falls   Outcome: Ongoing  Remains free from falls and accidental injury. Assessed as high fall risk, appropriate precautions in place, utilizing call light appropriately for needs. Will monitor. For patient safety, Visual Surveillance is in place, reviewed with patient/family, verbalized understanding. Problem: Risk for Impaired Skin Integrity  Goal: Tissue integrity - skin and mucous membranes  Structural intactness and normal physiological function of skin and  mucous membranes. Outcome: Ongoing  No new skin breakdown noted during this shift. Pt ashanti assessed q shift, will continue to monitor skin checks q4 and prn and check bony prominences for breakdown.  Pt turn q2hr, incontinent of bowel and bladder,  skin remains clean and dry, will continue to monitor      Problem: ACTIVITY INTOLERANCE/IMPAIRED MOBILITY  Goal: Mobility/activity is maintained at optimum level for patient  Outcome: Ongoing

## 2018-09-26 NOTE — PROGRESS NOTES
(second session): SLP placed orientation aid in front of patient, no verbal cues and asked orientation questions. The patient was unable to utilize functionally. Instructed patient to read - Patient now required mod cues for reading (as compared to AM session) and then asked orientation questions - patient answered correctly without cues. Completed repetition of questions with delayed recall x3 additional times, patient required repetition of task described above. Continue goal.   9/26: Pt disoriented to location and situation via Y/N questions. When cued to read orientation aid, pt immediately answered orientation questions appropriately but no carryover noted after delay. GOAL NOT MET. Goal 4: Patient will maintain attention to graded cognitive tasks with mod cues. 9/22-  Pt was very easily distracted though out the session and required max verbal, tactile and visual cues to maintain attention. con't goal   9/24: Pt with declining attention to task as session progressed - pt initially required min cues to sustain attention, increasing to mod cues to respond to prompts / directions. Continue goal.  9/24 (second session): Required TV to be turned off in order to facilitate sustained / selective attention. No cues required following this. Patient did not even appear distracted when MD came in to assess the patient. Continue goal.   9/25: Pt with significant difficulty maintaining attention to task - pt frequently looking out window and not responding to verbal prompts / cues. Pt appeared unaware of when she was not turn taking appropriately. Pt required mod-max verbal and tactile cues to attend to tasks at hand and conversational partner. Continue goal.   9/25 (second session): Mod cues throughout session to maintain attention to task. Television left on and was a distraction throughout. Continue goal.   9/26: Goal not directly targeted. GOAL MET x 2 during stay.     Goal 5: Patient will tolerate ongoing cognitive-linguistic assessment. 9/22-  Not addressed this session due to pt becoming fatigued, con't goal  9/24: Goal not directly targeted. Continue goal.  9/24 (second session): Command Following (object manipulation-field of 2): Moderately impaired to mildly complex 1 step commands. Assessed for ability to utilize call light functionally - pt dependent. Completed after short delay, patient without recall or unable to demonstrate comprehension/recall. Assessed single word reading - note paraphasic errors. Continue goal.    9/25: Pt visual scanning and reading [facility] for sentence level material. Visual scanning of room appropriate to identify larger print items; pt scanned to whiteboard appropriately when directed but reduced visual acuity limited usage. Insight into deficits: pt independently identified reduced finger dexterity as deficit s/p CVA; mod cues to identify cognitive and physical deficits. After delay, pt inconsistent with answering Y/N questions r/t deficits. Continue goal.  9/25 (second session): Patient with limited insight into deficits but able to identify inability to stand independently and continues with poor recall (unsure of baseline memory abilities as family not present this date) as described above. Patient did demo how to use call light appropriately at end of session demonstrating some recall. Reduced functional reasoning for use of call light. Continue goal  9/26: Pt with paraphasia during oral reading x 1/3 trials. GOAL MET. Education:  Granddtr present for session. Educated both to role of SLP, purpose of visit, aphasia, fluctuations in performance, and recommended POC. Pt endorsed comprehension but full understanding questionable d/t cognitive status.     Plan:  Continue speech/language therapy to address above goals, 3-5 x/week x LOS  DC recommendations: planned d/c to ARU this date - recommend ongoing ST to address cognition / language   Needs met prior to leaving room, call

## 2018-09-26 NOTE — PROGRESS NOTES
VSS. Pt is alert and oriented to person. Pt is pleasantly confused. Pt was moved from the chair to the bed with the [address] x2 staff assist and a gait belt. Pt tolerated well but needed a lot of direction and constant supervision. Pt has remained free of falls. Pt denies pain at this time. Pt is kept clean and dry and incontinent brief checked often and changed when soiled. Lungs clear, BS active. Pt denies N/V. Bed alarm on, wheels locked, bed in lowest position, side rails up 2/4, nonskid socks on, call light and bedside table in reach. Will continue to monitor.

## 2018-09-26 NOTE — CARE COORDINATION
Spoke with [facility] in [place] [address] and they are able to accept patient today.  Was notified that the patient will be discharged today by MD.     Electronically signed by Shayla De Jesus RN on 9/26/2018 at 12:17 PM

## 2018-09-26 NOTE — H&P
noninjected, no icterus noted; pupils equal, round. HENT: Atraumatic, normocephalic; Oral mucosa moist  Neck: Trachea midline, neck supple. No thyromegaly noted. CV: Regular rate and rhythm, no murmur rub or gallop noted  Resp: Lungs clear to auscultation bilaterally, no rales wheezes or ronchi, no retractions. Respirations unlabored. GI: Soft, nontender, nondistended. Normal bowel sounds. No palpable masses. Neuro: Alert, oriented to person and hospital setting only, appropriate. No cranial nerve deficits appreciated. Sensation intact to light touch. Motor examination reveals: LUE 4+/5 RUE 4/5 RLE 4/5 LLE 4/5. Reflexes normal and symmetric. Skin: Normal temperature and turgor  MSK: No joint abnormalities noted. Ext: No significant edema appreciated. No varicosities. Lab Results   Component Value Date    WBC 8.5 09/26/2018    HGB 14.2 09/26/2018    HCT 43.0 09/26/2018    MCV 94.7 09/26/2018     09/26/2018     Lab Results   Component Value Date    INR 1.32 (H) 09/19/2018    INR 1.40 (H) 09/18/2018    PROTIME 15.1 (H) 09/19/2018    PROTIME 16.0 (H) 09/18/2018     Lab Results   Component Value Date    CREATININE <0.5 (L) 09/26/2018    BUN 12 09/26/2018     09/26/2018    K 4.0 09/26/2018     09/26/2018    CO2 22 09/26/2018     Lab Results   Component Value Date    ALT 30 09/22/2018    AST 25 09/22/2018    ALKPHOS 80 09/22/2018    BILITOT 1.6 (H) 09/22/2018       Most recent echocardiogram revealed   Procedure    Type of Study      TTE procedure:ECHOCARDIOGRAM COMPLETE 2D W DOPPLER W COLOR.     Procedure Date  Date: 09/19/2018 Start: 09:16 AM    Study Location: [address] Echo Lab  Technical Quality: Limited visualization due to patient immobility. Indications:CVA. Patient Status: Routine    Contrast Medium: Bubble Study.  Amount - 10 ml    Height: 65 inches Weight: 250.01 pounds BSA: 2.17 m2 BMI: 41.6 kg/m2    BP: 148/99 mmHg     Conclusions      Summary   Normal left brain was performed without contrast according to standard protocol.       FINDINGS:       There is a 4.0 x 2.3 cm area of diffusion restriction and matching cytotoxic edema representing an acute infarct in the left basal ganglia including two areas of T1 isointense and T2 hypointense acute intraparenchymal hemorrhage representing hemorrhagic    conversion centered in the left caudate head and putamen. There is local mass effect and 2 mm rightward midline shift. There is mild cerebral volume loss with associated ventricular dilatation. A chronic left frontal lobe infarct is noted. Mild    periventricular white matter FLAIR hyperintensities likely represent sequelae of chronic small vessel ischemic disease. The corpus callosum and sella appear normal. The posterior fossa, brainstem, and craniocervical junction appear normal.       There is mild mucosal thickening in the frontal and ethmoid sinuses. The orbits and mastoids appear normal. Normal flow voids are demonstrated in the carotid arteries and basilar artery. The calvarium and visualized cervical spine appear normal.           Impression       1. Left basal ganglia infarct with 2 areas of acute hemorrhagic transformation, similar in appearance compared to prior CT.            The above laboratory data have been reviewed.      The above imaging data have been reviewed.      The above medical testing have been reviewed. There is no height or weight on file to calculate BMI. Barriers to Discharge: decreased cognition, ADLs, safety, hemiparesis    POST ADMISSION PHYSICIAN EVALUATION  The patient has agreed to being admitted to our comprehensive inpatient  rehabilitation facility consisting of at least 180 minutes of therapy a day,  5 out of 7 days a week. The patient/family has a good understanding of our discharge process.  The  patient has potential to make improvement and is in need of at least two of  the following multidisciplinary therapies including

## 2018-09-27 LAB
ANION GAP SERPL CALCULATED.3IONS-SCNC: 12 MMOL/L (ref 3–16)
BLOOD CULTURE, ROUTINE: NORMAL
BUN BLDV-MCNC: 11 MG/DL (ref 7–20)
CALCIUM SERPL-MCNC: 9.1 MG/DL (ref 8.3–10.6)
CHLORIDE BLD-SCNC: 100 MMOL/L (ref 99–110)
CO2: 24 MMOL/L (ref 21–32)
CREAT SERPL-MCNC: 0.6 MG/DL (ref 0.6–1.2)
CULTURE, BLOOD 2: NORMAL
EKG ATRIAL RATE: 78 BPM
EKG DIAGNOSIS: NORMAL
EKG Q-T INTERVAL: 496 MS
EKG QRS DURATION: 100 MS
EKG QTC CALCULATION (BAZETT): 572 MS
EKG R AXIS: -4 DEGREES
EKG T AXIS: 34 DEGREES
EKG VENTRICULAR RATE: 80 BPM
GFR AFRICAN AMERICAN: >60
GFR NON-AFRICAN AMERICAN: >60
GLUCOSE BLD-MCNC: 99 MG/DL (ref 70–99)
HCT VFR BLD CALC: 39.7 % (ref 36–48)
HEMOGLOBIN: 13.6 G/DL (ref 12–16)
MCH RBC QN AUTO: 32.1 PG (ref 26–34)
MCHC RBC AUTO-ENTMCNC: 34.2 G/DL (ref 31–36)
MCV RBC AUTO: 93.7 FL (ref 80–100)
PDW BLD-RTO: 13 % (ref 12.4–15.4)
PLATELET # BLD: 277 K/UL (ref 135–450)
PMV BLD AUTO: 7.6 FL (ref 5–10.5)
POTASSIUM SERPL-SCNC: 4.6 MMOL/L (ref 3.5–5.1)
RBC # BLD: 4.24 M/UL (ref 4–5.2)
SODIUM BLD-SCNC: 136 MMOL/L (ref 136–145)
WBC # BLD: 9.3 K/UL (ref 4–11)

## 2018-09-27 PROCEDURE — 1280000000 HC REHAB R&B

## 2018-09-27 PROCEDURE — 97535 SELF CARE MNGMENT TRAINING: CPT

## 2018-09-27 PROCEDURE — 85027 COMPLETE CBC AUTOMATED: CPT

## 2018-09-27 PROCEDURE — 97166 OT EVAL MOD COMPLEX 45 MIN: CPT

## 2018-09-27 PROCEDURE — 6370000000 HC RX 637 (ALT 250 FOR IP): Performed by: PHYSICAL MEDICINE & REHABILITATION

## 2018-09-27 PROCEDURE — 80048 BASIC METABOLIC PNL TOTAL CA: CPT

## 2018-09-27 PROCEDURE — 92523 SPEECH SOUND LANG COMPREHEN: CPT

## 2018-09-27 PROCEDURE — 97162 PT EVAL MOD COMPLEX 30 MIN: CPT

## 2018-09-27 PROCEDURE — 36415 COLL VENOUS BLD VENIPUNCTURE: CPT

## 2018-09-27 PROCEDURE — 92610 EVALUATE SWALLOWING FUNCTION: CPT

## 2018-09-27 PROCEDURE — 97530 THERAPEUTIC ACTIVITIES: CPT

## 2018-09-27 RX ADMIN — LEVETIRACETAM 500 MG: 100 SOLUTION ORAL at 09:21

## 2018-09-27 RX ADMIN — AMLODIPINE BESYLATE 5 MG: 5 TABLET ORAL at 09:21

## 2018-09-27 RX ADMIN — HYDRALAZINE HYDROCHLORIDE 10 MG: 10 TABLET, FILM COATED ORAL at 14:34

## 2018-09-27 RX ADMIN — LEVETIRACETAM 500 MG: 100 SOLUTION ORAL at 21:23

## 2018-09-27 RX ADMIN — FAMOTIDINE 20 MG: 20 TABLET ORAL at 21:23

## 2018-09-27 RX ADMIN — HYDRALAZINE HYDROCHLORIDE 10 MG: 10 TABLET, FILM COATED ORAL at 23:07

## 2018-09-27 RX ADMIN — FAMOTIDINE 20 MG: 20 TABLET ORAL at 09:22

## 2018-09-27 RX ADMIN — DIGOXIN 125 MCG: 125 TABLET ORAL at 09:21

## 2018-09-27 ASSESSMENT — PAIN SCALES - PAIN ASSESSMENT IN ADVANCED DEMENTIA (PAINAD)
BREATHING: 0
CONSOLABILITY: 0
NEGVOCALIZATION: 0
NEGVOCALIZATION: 0
BODYLANGUAGE: 0
NEGVOCALIZATION: 0
TOTALSCORE: 0
CONSOLABILITY: 0
BREATHING: 0
TOTALSCORE: 0
BODYLANGUAGE: 0
NEGVOCALIZATION: 0
CONSOLABILITY: 0
FACIALEXPRESSION: 0
TOTALSCORE: 0
BODYLANGUAGE: 0
BODYLANGUAGE: 0
TOTALSCORE: 0
FACIALEXPRESSION: 0
BREATHING: 0
CONSOLABILITY: 0
BREATHING: 0
FACIALEXPRESSION: 0
FACIALEXPRESSION: 0

## 2018-09-27 ASSESSMENT — PAIN SCALES - GENERAL
PAINLEVEL_OUTOF10: 0

## 2018-09-27 NOTE — PROGRESS NOTES
level, Bed/Bath upstairs  Home Access:  (\"probably about 12\" with rail)  Bathroom Shower/Tub: Walk-in shower, Shower chair with back  Bathroom Toilet: Standard  Home Equipment:  (denies equipment)  ADL Assistance:  (pt denied receiving assistance; chart review reveals family reported pt \"very independent\" prior to admission)  Homemaking Assistance:  (pt denied receiving assistance; chart review reveals family reported pt \"very independent\" prior to admission)  Homemaking Responsibilities: Yes  Bill Paying/Finance Responsibility:  (denied managing money / paying bills - pt endorsed family completes)  Health Care Management: Primary  Ambulation Assistance: Needs assistance (pt reports previously walking with a walker, although previously denied owning one)  Transfer Assistance: Needs assistance  Active : Yes (pt endorsed driving car at home)  Education: 10th grade  Occupation: Retired  Type of occupation: unable to state - empty, fluent responses despite various methods of questioning  Leisure & Hobbies: \"I don't know\"  Additional Comments: Pt unreliable as historian given cognitive deficits and aphasia. Objective     Observation/Palpation  Observation: Female external catheter in place upon arrival    AROM RLE (degrees)  RLE AROM: WFL  AROM LLE (degrees)  LLE AROM : WFL  Strength RLE  Strength RLE: WFL  Comment: grossly >3+/5 based on functional mobility  Strength LLE  Strength LLE: WFL  Comment: grossly >3+/5 based on functional mobility        Bed mobility  Rolling to Left: Moderate assistance  Rolling to Right: Moderate assistance  Supine to Sit: Moderate assistance;Stand by assistance (1st trial ModA for trunk and RLE placement, 2nd trial SBA)  Sit to Supine: Moderate assistance (BLE placement)  Scooting:  Moderate assistance (to scoot EOB in sitting)  Comment: HOB flat, no use of bedrails, heavy verbal cues for sequencing, occasional HOHA required for UEs, pt wincing and grabbing R shoulder during most bed mobility, however pt denies she is having pain  Transfers  Sit to Stand: Dependent/Total;Minimal Assistance (x1 trial ModAx1 + MinAx1 from EOB to RW, x1 trial Gris from EOB to RW, x3 trials Gris from shower chair to grab bar, verbal cues for hand placement and upright posture once in stance)  Stand to sit: Minimal Assistance (verbal cues for hand placement)  Bed to Chair: Dependent/Total (ModAx2 from EOB to shower chair, heavy verbal cues and tactile cues for sequencing, pt with difficulty placing weight onto LLE in order to move RLE, pt becoming fatigued and requiring ModAx2 to guide hips to surface, ModA to move RLE)  Stand Pivot Transfers: Dependent/Total (ModAx2 from shower chair to w/c, heavy verbal cues and tactile cues for sequencing, pt with difficulty placing weight onto LLE in order to move RLE, pt becoming fatigued and requiring ModAx2 to guide hips to surface, ModA to move RLE)  Comment: pt becoming short of breath after final stand pivot shower chair to w/c, pulse oximeter unable to read SpO2  Ambulation  Ambulation?: No (pt standing from EOB, verbal cues for sequencing provided, pt unable to shift weight to LLE in order to step with RLE, would be totalA for ambulation at this time)  Stairs/Curb  Stairs?: No (unsafe to attempt, pt would be totalA at this time)  Wheelchair Activities  Wheelchair Size: 22\"  Wheelchair Type: Standard  Wheelchair Cushion:  (waffle)  Wheelchair Parts Management: Yes  Left Brakes Level of Assistance: Stand by assistance (tacticle cues to locate)  Right Brakes Level of Assistance: Stand by Assist (tactile cues to locate)  Propulsion: Yes  Propulsion 1  Propulsion: Manual  Level: Level Tile  Method: RUE;LUE  Level of Assistance: Minimal assistance  Description/ Details: pt propelling on straight pathway, Gris for pathway correction as pt drifts to R side of hallway, verbal cues for improved push with RUE, occasional verbal cues to use wheel  instead of pushing on

## 2018-09-27 NOTE — PROGRESS NOTES
level  Long term goal 2: Patient will complete LB dressing MOD I  Long term goal 3: Patient will complete toilet transfer and toileting tasks with MOD I  Long term goal 4: Patient will complete bathing and shower transfer with spvn  Patient Goals   Patient goals : \"I'll know it when it happens. \" - unable to state clear goal       Therapy Time   Individual Concurrent Group Co-treatment   Time In 0730     0740   Time Out 0740     0836   Minutes [address],[address], OTR/L #[id number]

## 2018-09-27 NOTE — PROGRESS NOTES
Patient assisted to bed with stedy x 2. Denies c/o. Fall precautions are in place. Call light and bedside table are within reach.

## 2018-09-28 PROCEDURE — 92526 ORAL FUNCTION THERAPY: CPT

## 2018-09-28 PROCEDURE — 97530 THERAPEUTIC ACTIVITIES: CPT

## 2018-09-28 PROCEDURE — 1280000000 HC REHAB R&B

## 2018-09-28 PROCEDURE — 97127 HC SP THER IVNTJ W/FOCUS COG FUNCJ: CPT

## 2018-09-28 PROCEDURE — 6370000000 HC RX 637 (ALT 250 FOR IP): Performed by: PHYSICAL MEDICINE & REHABILITATION

## 2018-09-28 PROCEDURE — 97116 GAIT TRAINING THERAPY: CPT

## 2018-09-28 PROCEDURE — 97535 SELF CARE MNGMENT TRAINING: CPT

## 2018-09-28 PROCEDURE — 92507 TX SP LANG VOICE COMM INDIV: CPT

## 2018-09-28 RX ADMIN — HYDRALAZINE HYDROCHLORIDE 10 MG: 10 TABLET, FILM COATED ORAL at 14:20

## 2018-09-28 RX ADMIN — LEVETIRACETAM 500 MG: 100 SOLUTION ORAL at 22:05

## 2018-09-28 RX ADMIN — LEVETIRACETAM 500 MG: 100 SOLUTION ORAL at 07:50

## 2018-09-28 RX ADMIN — AMLODIPINE BESYLATE 5 MG: 5 TABLET ORAL at 07:50

## 2018-09-28 RX ADMIN — FAMOTIDINE 20 MG: 20 TABLET ORAL at 22:05

## 2018-09-28 RX ADMIN — HYDRALAZINE HYDROCHLORIDE 10 MG: 10 TABLET, FILM COATED ORAL at 22:07

## 2018-09-28 RX ADMIN — HYDRALAZINE HYDROCHLORIDE 10 MG: 10 TABLET, FILM COATED ORAL at 06:22

## 2018-09-28 RX ADMIN — FAMOTIDINE 20 MG: 20 TABLET ORAL at 07:50

## 2018-09-28 RX ADMIN — DIGOXIN 125 MCG: 125 TABLET ORAL at 07:51

## 2018-09-28 ASSESSMENT — PAIN SCALES - PAIN ASSESSMENT IN ADVANCED DEMENTIA (PAINAD)
FACIALEXPRESSION: 0
BREATHING: 0
BREATHING: 0
CONSOLABILITY: 0
BODYLANGUAGE: 0
CONSOLABILITY: 0
TOTALSCORE: 0
NEGVOCALIZATION: 0
FACIALEXPRESSION: 0
NEGVOCALIZATION: 0
BODYLANGUAGE: 0
TOTALSCORE: 0

## 2018-09-28 ASSESSMENT — PAIN SCALES - GENERAL
PAINLEVEL_OUTOF10: 0

## 2018-09-28 NOTE — PLAN OF CARE
Problem: Falls - Risk of:  Goal: Will remain free from falls  Will remain free from falls   Outcome: Ongoing  Pt with history of falls. Up with max assistance, gait belt and smiley stedy. Fall precautions maintained. Fall risk armband on. Non skid footwear on. Bed in lowest position. Bed alarm in use. Reminded pt to call for assistance before getting up or for any needs. Call light within reach. No falls thus far during shift. Problem: COMMUNICATION IMPAIRMENT  Goal: Ability to express needs and understand communication  Outcome: Ongoing  Pt alert & oriented to person only. Pt at times answers questions appropriately. Will continue to assess pt's ability to communicate needs and understand communication.

## 2018-09-28 NOTE — PROGRESS NOTES
ACUTE REHAB UNIT  SPEECH/LANGUAGE PATHOLOGY      [x] Daily  [] Weekly Care Conference Note  [] Discharge    Patient:Madelin Lizarraga      :1935  TLY:[id number]  Rehab Dx/Hx: Acute ischemic stroke ([facility] [address] [I63.9]    Precautions: [x] Aspiration  [x] Fall risk  [] Sternal  [] Seizure [] Hip  [] Weight Bearing [] Other  Lives With: Alone  ST Dx: [x] Aphasia  [] Dysarthria  [] Apraxia   [x] Oropharyngeal dysphagia [x] Cognitive Impairment  [] Other:   Date of Admit: 2018  Room #: 3107/3107-01  Date: 2018          Current Diet Order:DIET DYSPHAGIA II MECHANICALLY ALTERED;   Recommended Form of Meds: Crushed in puree as able  Compensatory Swallowing Strategies: Upright as possible for all oral intake, Alternate solids and liquids, No straws, Small bites/sips, Check for pocketing of food on the Right (supervision during PO intake with cues for small bites / slow rate)     Dentition: Some missing teeth, Dentures bottom, Dentures top (upper and lower partials)  Vision  Vision: Impaired  Vision Exceptions:  (wears glasses)  Hearing  Hearing: Within functional limits  Barriers toward progress: cognitive status, severity of deficits      Date: 2018      Tx session 1 Tx session 2   Total Timed Code Min 15 15   Total Treatment Minutes 45 30   Individual Treatment Minutes 45 30   Group Treatment Minutes 0 0   Co-Treat Minutes 0 0   Brief Exception: N/A N/A   Pain Denied when questioned Denied when questioned   Pain Intervention: [] RN notified  [] Repositioned  [] Intervention offered and patient declined  [x] N/A  [] Other: [] RN notified  [] Repositioned  [] Intervention offered and patient declined  [x] N/A  [] Other:   Subjective:     Pt up in chair with breakfast tray in front of her. Pt appeared in good spirits and easily engaged in tx tasks. Pt asleep in chair upon entry but roused to verbal stimuli. Pt endorsed fatigue when questioned but participated well in session despite lethargy.      Objective / orientation aid to recall. Max cues required to implement orientation aid to orient to location and situation. Paraphasia during oral reading noted x 1. With implementation of spaced retrieval, pt spontaneously identified name of hospital. Pt remained oriented for up to 5 minutes with spaced retrieval technique. Patient will maintain attention to task and conversational partner with mod cues. Min cues to maintain attention to conversational partner; min-mod cues to sustain attention to self-feeding tasks. Mod cues required to sustain attention throughout session. Patient will tolerate ongoing cognitive linguistic assessment. Basic problem solving: pt required max cues to problem solve functional self feeding tasks (ie opening of packages, appropriate use of items). Pt attempted to place moist towelette in oatmeal despite max cues - pt indicated understanding of appropriate use of each item and endorsed plan to consume oatmeal but demonstrated no comprehension of problem with this scenario despite max cues. Repetition of words and phrases: overall intact; pt with single error on 8 syllable phrase but completed task with >90% accuracy. Mod-max cues to comprehend directions and initiate task. Other areas targeted: Pt completed oral care at end of session with setup and min cues from SLP. Moderate amount of food particles noted in liquid rinse expectorated from oral cavity. Goal not directly targeted. Education:   Educated pt to role of SLP, purpose of visit, aspiration precautions, dysphagia, aphasia, cognitive impairments, importance of oral care, and strategies to assist with clearing pocketed material.    Ongoing re: role of SLP, rationale for tx tasks, deficits noted, and POC.    Safety Devices: [x] Call light within reach  [x] Chair alarm activated and connected to nurse call light system  [] Bed alarm activated   [x] Other: reminded pt of call light [x] Call light within reach  [x] Chair alarm

## 2018-09-28 NOTE — PROGRESS NOTES
Occupational Therapy  Facility/Department: [facility] ACUTE REHAB UNIT  Daily Treatment Note  NAME: Roxy Arreaga  : 1935  MRN: [id number]    Date of Service: 2018    Discharge Recommendations:  Home with assist PRN, Home with Home health OT       Patient Diagnosis(es): There were no encounter diagnoses. has a past medical history of CHF (congestive heart failure) ([address] Hyperlipidemia; and Hypertension. has a past surgical history that includes hip surgery. Restrictions  Position Activity Restriction  Other position/activity restrictions: Up with assist  Subjective   General  Chart Reviewed: Yes  Patient assessed for rehabilitation services?: Yes  Additional Pertinent Hx: 82 y.o. female admit to ARU . Hospital course:  Admit  to [facility] ED with hemorrhagic stroke, present with R side weakness and R facial droop. Transferred to [facility] on . CT head - L side hemorrhage involving caudate, basal ganglia and hyperdensities in L front lobe;  CXR - bilateral airspace disease;  MRI brain - left basal ganglia infarct with 2 areas of acute hemorrhage. Carotid doppler - L side stenosis;    PMHx - CHF, hyperlipidemia, HTN, hip surgery  Family / Caregiver Present: No  Referring Practitioner: Kp  Diagnosis: CVA  Subjective  Subjective: Patient seated in w/c in gym with PT, agreeable to therapy. General Comment  Comments: D/t significant improvements with functional transfers, patient no longer requires skilled cotx and would benefit from seperated therapy times. Pain Assessment  Patient Currently in Pain: No  Vital Signs  Patient Currently in Pain: No   Orientation  Orientation  Orientation Level: Oriented to person;Disoriented to situation;Disoriented to place; Disoriented to time  Objective    ADL  Additional Comments: Able to initiate doffing socks, pulling off over heel but requiring assistance to completely remove from toe. Attempted to don with forward reach but unsuccesful.  Assist session:  Patient seated in recliner upon arrival, agreeable to therapy. Sit<>stand MOD assist. Stand step transfer to w/c with use of RW and min assist. Completed grooming tasks seated at sink with set up assist to open toothpaste. Completed color sorting activity with MILE cards while seated with 70% accuracy. Assist required to identify error, but patient able to identify correct pile upon second attempt on 3/3 trials. Increased difficulty with translation and separation of cards with use of R hand suggesting some fine motor coordination deficits as well. Stand step transfer to bed with min/mod assist. Sit<>sup max assist. Total assist (assist x2) to straighten and realign patient positioning within bed. Left in bed, bed alarm activated, needs met and in reach, RN aware      Assessment   Performance deficits / Impairments: Decreased functional mobility ; Decreased endurance;Decreased coordination;Decreased ADL status; Decreased ROM; Decreased strength;Decreased safe awareness;Decreased cognition;Decreased balance  Assessment: Patient demonstrating improved ability to complete functional transfers and mobility at RW level this date and no longer requires assist x2 to complete. Able to initiate problem solving of dressing tasks such as tying shoe, but unable to successfully complete without assistance. Would continue to benefit from OT services, cont POC. Treatment Diagnosis: Impaired functional mobility, ADLs, cognition, coordination, and ROM d/t CVA  Prognosis: Fair  Patient Education: grooming, donning shoes, safe technique for functional transfers - needs reinforcement  REQUIRES OT FOLLOW UP: Yes  Activity Tolerance  Activity Tolerance: Patient Tolerated treatment well  Safety Devices  Safety Devices in place: Yes  Type of devices: Call light within reach; Chair alarm in place; Left in chair          Plan   Plan  Times per week: 5x/wk x60 min  Times per day: Daily  Current Treatment Recommendations: Strengthening, Patient/Caregiver Education & Training, Functional Mobility Training, Cognitive Reorientation, Endurance Training, Safety Education & Training, Self-Care / ADL, Neuromuscular Re-education, Equipment Evaluation, Education, & procurement, Cognitive/Perceptual Training, Balance Training    Goals  Short term goals  Time Frame for Short term goals: 2 weeks  Short term goal 1: Patient will complete UB dressing with set up - not met, ongoing  Short term goal 2: Patient will complete LB dressing with mod assist and use of AE prn - not met, ongoing  Short term goal 3: Patient will complete functional transfer, including toilet transfer and shower transfer, with mod assist - not met, ongoing  Short term goal 4: Patient will complete bathing with min assist and use of AE prn - not met, ongoing  Long term goals  Time Frame for Long term goals : 3-4 weeks  Long term goal 1: Patient will complete UB dressing at independent level  Long term goal 2: Patient will complete LB dressing MOD I  Long term goal 3: Patient will complete toilet transfer and toileting tasks with MOD I  Long term goal 4: Patient will complete bathing and shower transfer with spvn  Patient Goals   Patient goals : \"I'll know it when it happens. \" - unable to state clear goal       Therapy Time   Individual Concurrent Group Co-treatment   Time In       0930   Time Out       1000   Minutes       30        Second Session Therapy Time:   Individual Concurrent Group Co-treatment   Time In 1245        Time Out 1315        Minutes 30          Timed Code Treatment Minutes:  30+30    Total Treatment Minutes:  Dre Elaine OTR/L #[id number]

## 2018-09-28 NOTE — PROGRESS NOTES
short rest break. Pt able to progress to performing transfers, ambulation, and stairs with assist of one therapist this session due to increased LE strength and motor planning. Continue POC. Treatment Diagnosis: decreased functional mobility due to acute ischemic stroke  Patient Education: Stair and ambulation training  REQUIRES PT FOLLOW UP: Yes  Activity Tolerance  Activity Tolerance: Patient limited by fatigue;Patient limited by endurance     G-Code     OutComes Score                                                    AM-PAC Score             Goals  Short term goals  Time Frame for Short term goals: 2 weeks  Short term goal 1: Pt will perform all bed mobility with CGA -ongoing  Short term goal 2: Pt will perform sit to/from stand with RW and CGA -ongoing  Short term goal 3: Pt will perform stand pivot with or without RW and ModA -met 9/28  Short term goal 4: Pt will ambulate 10' with RW and ModA -ongoing  Short term goal 5: Pt will ascend/descend 4 stairs with BHR and ModA -ongoing  Short term goal 6: Pt will propel w/c 100' with CGA -ongoing  Long term goals  Time Frame for Long term goals : 3-4 weeks -ongoing  Long term goal 1: Pt will perform all bed mobility with Nilda  Long term goal 2: Pt will perform sit to/from stand with RW and Nilda  Long term goal 3: Pt will perform stand pivot with or without RW and Nilda  Long term goal 4: Pt will ambulate 48' with RW and supervision  Long term goal 5: Pt will ascend/descend 12 stairs with BHR and Gris  Long term goal 6: Pt will propel w/c 150' with Nilda  Patient Goals   Patient goals : \"I don't know. I guess I will know when it's right. \"  Pt unable to state a goal.    Plan    Plan  Times per week: 5x/week, 60 minutes a day  Current Treatment Recommendations: Strengthening, ROM, Balance Training, Functional Mobility Training, Transfer Training, Endurance Training, Wheelchair Mobility Training, Gait Training, Stair training, Neuromuscular Re-education, Home Exercise

## 2018-09-29 PROCEDURE — 97535 SELF CARE MNGMENT TRAINING: CPT

## 2018-09-29 PROCEDURE — 1280000000 HC REHAB R&B

## 2018-09-29 PROCEDURE — 6370000000 HC RX 637 (ALT 250 FOR IP): Performed by: PHYSICAL MEDICINE & REHABILITATION

## 2018-09-29 PROCEDURE — 97530 THERAPEUTIC ACTIVITIES: CPT

## 2018-09-29 PROCEDURE — 97127 HC SP THER IVNTJ W/FOCUS COG FUNCJ: CPT

## 2018-09-29 PROCEDURE — 92526 ORAL FUNCTION THERAPY: CPT

## 2018-09-29 PROCEDURE — 97116 GAIT TRAINING THERAPY: CPT

## 2018-09-29 PROCEDURE — 92507 TX SP LANG VOICE COMM INDIV: CPT

## 2018-09-29 RX ADMIN — LEVETIRACETAM 500 MG: 100 SOLUTION ORAL at 09:07

## 2018-09-29 RX ADMIN — FAMOTIDINE 20 MG: 20 TABLET ORAL at 21:49

## 2018-09-29 RX ADMIN — LEVETIRACETAM 500 MG: 100 SOLUTION ORAL at 21:49

## 2018-09-29 RX ADMIN — HYDRALAZINE HYDROCHLORIDE 10 MG: 10 TABLET, FILM COATED ORAL at 05:59

## 2018-09-29 RX ADMIN — HYDRALAZINE HYDROCHLORIDE 10 MG: 10 TABLET, FILM COATED ORAL at 21:49

## 2018-09-29 RX ADMIN — HYDRALAZINE HYDROCHLORIDE 10 MG: 10 TABLET, FILM COATED ORAL at 14:28

## 2018-09-29 RX ADMIN — DIGOXIN 125 MCG: 125 TABLET ORAL at 09:05

## 2018-09-29 RX ADMIN — FAMOTIDINE 20 MG: 20 TABLET ORAL at 09:05

## 2018-09-29 RX ADMIN — AMLODIPINE BESYLATE 5 MG: 5 TABLET ORAL at 09:06

## 2018-09-29 ASSESSMENT — PAIN SCALES - PAIN ASSESSMENT IN ADVANCED DEMENTIA (PAINAD)
BREATHING: 0
FACIALEXPRESSION: 0
NEGVOCALIZATION: 0
FACIALEXPRESSION: 0
BREATHING: 0
TOTALSCORE: 0
BREATHING: 0
NEGVOCALIZATION: 0
NEGVOCALIZATION: 0
FACIALEXPRESSION: 0
CONSOLABILITY: 0
CONSOLABILITY: 0
TOTALSCORE: 0
BREATHING: 0
CONSOLABILITY: 0
FACIALEXPRESSION: 0
CONSOLABILITY: 0
BODYLANGUAGE: 0
BODYLANGUAGE: 0
TOTALSCORE: 0
NEGVOCALIZATION: 0
BODYLANGUAGE: 0
TOTALSCORE: 0
BODYLANGUAGE: 0

## 2018-09-29 ASSESSMENT — PAIN SCALES - GENERAL
PAINLEVEL_OUTOF10: 0

## 2018-09-29 NOTE — PROGRESS NOTES
Physical Therapy  Facility/Department: [facility] ACUTE REHAB UNIT  Daily Treatment Note  NAME: Yennifer Shi  : 1935  MRN: [id number]    Date of Service: 2018    Discharge Recommendations:  Home with assist PRN, Home with Home health PT   PT Equipment Recommendations  Other: continue to assess, pt reports that she has no DME    Patient Diagnosis(es): There were no encounter diagnoses. has a past medical history of CHF (congestive heart failure) ([address] Hyperlipidemia; and Hypertension. has a past surgical history that includes hip surgery. Restrictions  Position Activity Restriction  Other position/activity restrictions: Up with assist  Subjective   General  Chart Reviewed: Yes  Additional Pertinent Hx: Pt is an 82 y.o. female admitted to ARU on 18 from [facility]. Pt initially admitted to [facility] from [facility] with complaints of altered mental status. CT Head: positive for hemorrhage in L caudate and putamen. Conservative management per neurosurgery. PMH: CHF, hyperlipidemia, HTN. Family / Caregiver Present: No  Referring Practitioner: Dr. Holley Mcintyre  Subjective  Subjective: Pt states that she has not been walking much, but states that she would love to walk  General Comment  Comments: Pt found seated in w/c upon PT arrival.  Pt agreeable to therapy session. Pain Screening  Patient Currently in Pain: Denies  Vital Signs  Patient Currently in Pain: Denies       Orientation     Objective      Transfers  Sit to Stand: Moderate Assistance (pt fluctuating between CGA-ModA throughout session, from w/c and recliner chair to RW; Pt completed multiple reps from w/c to practice improved sequencing during transfers. Pt consistently needing cues to slide forward in chair prior to standing to improve positioning. Pt needing increased time to complete each transfer.  VC for hand placement, facilitation for anterior weight shift and improved erect posture once in standing)  Stand to sit: Minimal Assistance (verbal cues for hand placement and eccentric control)  Ambulation  Ambulation?: Yes  Ambulation 1  Surface: level tile  Device: Rolling Walker  Assistance: Minimal assistance  Quality of Gait: step to pattern, forward trunk flexion, decreased B step length and foot clearance. Significantly decreased louise. Poor walker positioning consistently attempting to place too far anterior. Distance: 20', 30', 2 x15'  Comments: VC for upright posture with fwd gaze, positioning of RW and in RW, increased B step length and foot clearance, and safety. Facilitation through the trunk and pelvis for increased upright posture and positioning of RW to promote increased safety. Stairs/Curb  Stairs?: No  Wheelchair Activities  Wheelchair Size: 22\"  Wheelchair Type: Standard  Wheelchair Cushion:  (Waffle)  Wheelchair Parts Management: No  Propulsion: Yes  Propulsion 1  Propulsion: Manual  Level: Level Tile  Method: RUE;LUE;RLE;LLE  Level of Assistance: Moderate assistance  Description/ Details: Pt with difficulty propelling w/c and consistently going to the R when not given assistance from therapist. Pt with very slow louise during. Distance: 61'                  Comment: Pt completed 2 reps of 1 minute of static standing without the use of UEs with CGA-MIN A with facilitation for improved erect posture and to strengthen righting reactions. Assessment   Body structures, Functions, Activity limitations: Decreased functional mobility ; Decreased strength;Decreased cognition;Decreased endurance;Decreased balance;Decreased coordination  Assessment: Pt with significantly improved ability this session with her ambulation and transfers. Pt able to increase her top ambulation distance tenfold and increased the number of trials she was able to complete. Pt still demonstrating decreased activity tolerance. Cont with PT POC.    Treatment Diagnosis: decreased functional mobility due to acute ischemic stroke  Prognosis: Good  Patient Education: Ambulation mechanics  REQUIRES PT FOLLOW UP: Yes  Activity Tolerance  Activity Tolerance: Patient limited by fatigue;Patient limited by endurance     G-Code     OutComes Score                                                    AM-PAC Score             Goals  Short term goals  Time Frame for Short term goals: 2 weeks  Short term goal 1: Pt will perform all bed mobility with CGA -ongoing  Short term goal 2: Pt will perform sit to/from stand with RW and CGA -ongoing  Short term goal 3: Pt will perform stand pivot with or without RW and ModA -met 9/28  Short term goal 4: Pt will ambulate 10' with RW and ModA - Goal met 9/29  Short term goal 5: Pt will ascend/descend 4 stairs with BHR and ModA -ongoing  Short term goal 6: Pt will propel w/c 100' with CGA -ongoing  Long term goals  Time Frame for Long term goals : 3-4 weeks -ongoing  Long term goal 1: Pt will perform all bed mobility with Nilda  Long term goal 2: Pt will perform sit to/from stand with RW and Nilda  Long term goal 3: Pt will perform stand pivot with or without RW and Nilda  Long term goal 4: Pt will ambulate 48' with RW and supervision  Long term goal 5: Pt will ascend/descend 12 stairs with BHR and Gris  Long term goal 6: Pt will propel w/c 150' with Nilda  Patient Goals   Patient goals : \"I don't know. I guess I will know when it's right. \"  Pt unable to state a goal.    Plan    Plan  Times per week: 5x/week, 60 minutes a day  Current Treatment Recommendations: Strengthening, ROM, Balance Training, Functional Mobility Training, Transfer Training, Endurance Training, Wheelchair Mobility Training, Gait Training, Stair training, Neuromuscular Re-education, Home Exercise Program, Safety Education & Training, Patient/Caregiver Education & Training, Equipment Evaluation, Education, & procurement  Safety Devices  Type of devices: Call light within reach, Chair alarm in place, Gait belt, Left in chair     Therapy Time   Individual Concurrent

## 2018-09-30 PROCEDURE — 1280000000 HC REHAB R&B

## 2018-09-30 PROCEDURE — 6370000000 HC RX 637 (ALT 250 FOR IP): Performed by: PHYSICAL MEDICINE & REHABILITATION

## 2018-09-30 RX ADMIN — DIGOXIN 125 MCG: 125 TABLET ORAL at 08:57

## 2018-09-30 RX ADMIN — HYDRALAZINE HYDROCHLORIDE 10 MG: 10 TABLET, FILM COATED ORAL at 14:30

## 2018-09-30 RX ADMIN — HYDRALAZINE HYDROCHLORIDE 10 MG: 10 TABLET, FILM COATED ORAL at 06:27

## 2018-09-30 RX ADMIN — LEVETIRACETAM 500 MG: 100 SOLUTION ORAL at 23:00

## 2018-09-30 RX ADMIN — AMLODIPINE BESYLATE 5 MG: 5 TABLET ORAL at 08:57

## 2018-09-30 RX ADMIN — FAMOTIDINE 20 MG: 20 TABLET ORAL at 08:57

## 2018-09-30 RX ADMIN — LEVETIRACETAM 500 MG: 100 SOLUTION ORAL at 08:57

## 2018-09-30 RX ADMIN — HYDRALAZINE HYDROCHLORIDE 10 MG: 10 TABLET, FILM COATED ORAL at 23:00

## 2018-09-30 RX ADMIN — FAMOTIDINE 20 MG: 20 TABLET ORAL at 23:00

## 2018-09-30 ASSESSMENT — PAIN SCALES - GENERAL
PAINLEVEL_OUTOF10: 0

## 2018-09-30 NOTE — PLAN OF CARE
Problem: Falls - Risk of:  Goal: Will remain free from falls  Will remain free from falls   Outcome: Ongoing  Pt remains free of falls thus far this shift. All fall precautions in place and functioning properly. For patient safety, Visual Surveillance is in place, reviewed with patient/family, verbalized understanding.

## 2018-10-01 LAB
ANION GAP SERPL CALCULATED.3IONS-SCNC: 10 MMOL/L (ref 3–16)
BUN BLDV-MCNC: 9 MG/DL (ref 7–20)
CALCIUM SERPL-MCNC: 9.3 MG/DL (ref 8.3–10.6)
CHLORIDE BLD-SCNC: 103 MMOL/L (ref 99–110)
CO2: 25 MMOL/L (ref 21–32)
CREAT SERPL-MCNC: 0.6 MG/DL (ref 0.6–1.2)
GFR AFRICAN AMERICAN: >60
GFR NON-AFRICAN AMERICAN: >60
GLUCOSE BLD-MCNC: 107 MG/DL (ref 70–99)
HCT VFR BLD CALC: 41.5 % (ref 36–48)
HEMOGLOBIN: 13.6 G/DL (ref 12–16)
MCH RBC QN AUTO: 31.4 PG (ref 26–34)
MCHC RBC AUTO-ENTMCNC: 32.8 G/DL (ref 31–36)
MCV RBC AUTO: 95.7 FL (ref 80–100)
PDW BLD-RTO: 13.3 % (ref 12.4–15.4)
PLATELET # BLD: 321 K/UL (ref 135–450)
PMV BLD AUTO: 6.6 FL (ref 5–10.5)
POTASSIUM SERPL-SCNC: 4.4 MMOL/L (ref 3.5–5.1)
RBC # BLD: 4.33 M/UL (ref 4–5.2)
SODIUM BLD-SCNC: 138 MMOL/L (ref 136–145)
WBC # BLD: 7.1 K/UL (ref 4–11)

## 2018-10-01 PROCEDURE — 97116 GAIT TRAINING THERAPY: CPT

## 2018-10-01 PROCEDURE — 92526 ORAL FUNCTION THERAPY: CPT

## 2018-10-01 PROCEDURE — 97535 SELF CARE MNGMENT TRAINING: CPT

## 2018-10-01 PROCEDURE — 97542 WHEELCHAIR MNGMENT TRAINING: CPT

## 2018-10-01 PROCEDURE — 1280000000 HC REHAB R&B

## 2018-10-01 PROCEDURE — 97530 THERAPEUTIC ACTIVITIES: CPT

## 2018-10-01 PROCEDURE — 97127 HC SP THER IVNTJ W/FOCUS COG FUNCJ: CPT

## 2018-10-01 PROCEDURE — 92507 TX SP LANG VOICE COMM INDIV: CPT

## 2018-10-01 PROCEDURE — 85027 COMPLETE CBC AUTOMATED: CPT

## 2018-10-01 PROCEDURE — 80048 BASIC METABOLIC PNL TOTAL CA: CPT

## 2018-10-01 PROCEDURE — 36415 COLL VENOUS BLD VENIPUNCTURE: CPT

## 2018-10-01 PROCEDURE — 6370000000 HC RX 637 (ALT 250 FOR IP): Performed by: PHYSICAL MEDICINE & REHABILITATION

## 2018-10-01 RX ADMIN — LEVETIRACETAM 500 MG: 100 SOLUTION ORAL at 20:56

## 2018-10-01 RX ADMIN — FAMOTIDINE 20 MG: 20 TABLET ORAL at 20:56

## 2018-10-01 RX ADMIN — HYDRALAZINE HYDROCHLORIDE 10 MG: 10 TABLET, FILM COATED ORAL at 15:16

## 2018-10-01 RX ADMIN — DIGOXIN 125 MCG: 125 TABLET ORAL at 09:16

## 2018-10-01 RX ADMIN — LEVETIRACETAM 500 MG: 100 SOLUTION ORAL at 09:16

## 2018-10-01 RX ADMIN — HYDRALAZINE HYDROCHLORIDE 10 MG: 10 TABLET, FILM COATED ORAL at 06:14

## 2018-10-01 RX ADMIN — FAMOTIDINE 20 MG: 20 TABLET ORAL at 09:16

## 2018-10-01 RX ADMIN — HYDRALAZINE HYDROCHLORIDE 10 MG: 10 TABLET, FILM COATED ORAL at 21:05

## 2018-10-01 RX ADMIN — AMLODIPINE BESYLATE 5 MG: 5 TABLET ORAL at 09:16

## 2018-10-01 ASSESSMENT — PAIN SCALES - GENERAL
PAINLEVEL_OUTOF10: 0

## 2018-10-01 NOTE — PROGRESS NOTES
Assessed at meal time with thins via cup, puree and mechanically altered solids. Patient with x1 delayed coughing episode after solids. No other s/s of aspiration with any textures. Patient poured her coffee on eggs towards end of meal. Suspect this is not baseline. When asked why she stated \"I'm just putting some cream in my coffee\". Unclear if this was motor planning (meant to open and pour cream into coffee) or verbalization was paraphasic and unrelated to difficulty with self feeding. Goal not targeted this session. Patient will tolerate advanced texture trials with adequate mastication and timely A-P transit. Patient continues with min-mod pocketing on right / bolus holding bilaterally. Required maximal cueing to remove pocketed material (verbal, visual + tactile). Decreased functional communication is noted for use of strategy. For example, when asking patient to stop and take a smaller bolus on utensil she stated \"yes\" but made no attempt to modify and then when verbal prompt for a drink of liquid to clear oral cavity, patient stated \"no\" but then completes. Declines solids at this time. Patient will answer Y/N and basic \"wh\" questions with 90% accuracy or min cues. Basic yes/no questions (no context): 60% accuracy. Appeared to be at level of chance as patient stated \"yes\" for all answers. Wh-questions: 40% accuracy with mod cues. Patient very echolalic with this tas,  Goal not targeted this session. Patient will complete graded verbal description tasks with min-mod cues. Confrontational naming of objects:50% accuracy; minimal benefit from mod-max cues. Patient required mod cues for extended verbal expression tasks / question answering. Many utterances were grammatical but consisting of many paraphasic errors which prevented clear delivery of message despite cues (i.e. \"I played Mother's Day\", \"It 'murizes' in your mouth\", \"It's aries roomy this time\".  ) Confrontational naming of

## 2018-10-01 NOTE — PROGRESS NOTES
Physical Therapy  Facility/Department: [facility] ACUTE REHAB UNIT  Daily Treatment Note  NAME: Evonne Amaya  : 1935  MRN: [id number]    Date of Service: 10/1/2018    Discharge Recommendations:  Home with assist PRN, Home with Home health PT   PT Equipment Recommendations  Equipment Needed: No  Other: continue to assess, pt reports that she has no DME    Patient Diagnosis(es): There were no encounter diagnoses. has a past medical history of CHF (congestive heart failure) ([address] Hyperlipidemia; and Hypertension. has a past surgical history that includes hip surgery. Restrictions  Position Activity Restriction  Other position/activity restrictions: Up with assist  Subjective   General  Chart Reviewed: Yes  Additional Pertinent Hx: Pt is an 82 y.o. female admitted to ARU on 18 from [facility]. Pt initially admitted to [facility] from [facility] with complaints of altered mental status. CT Head: positive for hemorrhage in L caudate and putamen. Conservative management per neurosurgery. PMH: CHF, hyperlipidemia, HTN. Family / Caregiver Present: No  Referring Practitioner: Dr. Maye Rosales  Comments: Pt found seated in w/c upon PT arrival.  Pt agreeable to therapy session. Pain Screening  Patient Currently in Pain: Denies  Vital Signs  Patient Currently in Pain: Denies       Orientation  Orientation  Overall Orientation Status: Impaired  Orientation Level: Oriented to person;Disoriented to situation;Disoriented to place; Disoriented to time  Objective   Bed mobility  Supine to Sit: Stand by assistance  Sit to Supine: Moderate assistance (BLE management )  Scooting: Minimal assistance (Assist required to get R hip forward at [facility]. )  Comment: All above mobility performed on mat table requiring increased time/effort/VC's/TC's. Transfers  Sit to Stand: Moderate Assistance;Maximum Assistance (From w/c x32 trials, from armed chair x2 trials;  Max A req from last transfer of session from EOM 2/ to lower will require further skilled PT to maximize independence and safety with functional mobility. Pt continues to exhibit decreased activity tolerance requiring several rest breaks throughout session. Anticipate good progress with further practice. Cont PT POC. Treatment Diagnosis: decreased functional mobility due to acute ischemic stroke  Prognosis: Good  Patient Education: Ambulation mechanics; w/c mobility: rec reinforcement   REQUIRES PT FOLLOW UP: Yes  Activity Tolerance  Activity Tolerance: Patient limited by fatigue;Patient limited by endurance  Activity Tolerance: pt fatigued with each activity requiring multiple rest breaks throughout session      G-Code     OutComes Score    AM-PAC Score       Goals  Short term goals  Time Frame for Short term goals: 2 weeks  Short term goal 1: Pt will perform all bed mobility with CGA -ongoing  Short term goal 2: Pt will perform sit to/from stand with RW and CGA -ongoing  Short term goal 3: Pt will perform stand pivot with or without RW and ModA -met 9/28  Short term goal 4: Pt will ambulate 10' with RW and ModA - Goal met 9/29  Short term goal 5: Pt will ascend/descend 4 stairs with BHR and ModA -ongoing  Short term goal 6: Pt will propel w/c 100' with CGA -ongoing  Long term goals  Time Frame for Long term goals : 3-4 weeks -ongoing  Long term goal 1: Pt will perform all bed mobility with Nilda  Long term goal 2: Pt will perform sit to/from stand with RW and Nilda  Long term goal 3: Pt will perform stand pivot with or without RW and Nilda  Long term goal 4: Pt will ambulate 48' with RW and supervision  Long term goal 5: Pt will ascend/descend 12 stairs with BHR and Gris  Long term goal 6: Pt will propel w/c 150' with Nilda  Patient Goals   Patient goals : \"I don't know. I guess I will know when it's right. \"  Pt unable to state a goal.    Plan    Plan  Times per week: 5x/week, 60 minutes a day  Current Treatment Recommendations: Strengthening, ROM, Balance Training,

## 2018-10-01 NOTE — PROGRESS NOTES
Pt up in chair at this time. Denies any pain. All meds taken without diffiiculty. Call light within reach and chair alarm engaged.

## 2018-10-01 NOTE — PROGRESS NOTES
options, inaccurate with both provision and year, repeating \"2018\" as response to all further questions)  Objective    ADL  Grooming: Contact guard assistance (set up for oral care, v/c to progress, steadying assist in stance)  Additional Comments: attempt to don socks but unable to reach over toe; unable to motor plan figure 4 or supported position on EOB despite visual demo. Assist to don socks and don shoe. Able to tie bow on both shoes this date with multiple attempts on R sh and increased time to complete. Standing Balance  Sit to stand: Minimal assistance (with increased time, tactile cues to advance hands up to RW on 1/4 trials)  Stand to sit: Minimal assistance  Functional Mobility  Assist Level: Contact guard assistance  Functional Mobility Comments: approx 5 ft from w/c <> recliner  Transfers  Stand Step Transfers: Minimal assistance  Sit to stand: Minimal assistance (with increased time, tactile cues to advance hands up to RW on 1/4 trials)  Stand to sit: Minimal assistance  Transfer Comments: first transfer noted increased difficulty with motor planning for advancement of BLE, requiring increased time and movement of surface closer for increased safety for seated. Completed additional x3 transfers during session with no further issues with advancing BLE for small, functional distance. Motor planning: Patient provided with simple structures made of wooden blocks (3-4 pieces only) and asked to replicate structure in order to improve motor planning and problem solving abilities as needed for ADL tasks. On first trial, patient required assistance to identify and correct 1 minor spatial error made. On trials 2 and 3, patient needed v/c to identify errors (3 in total) but was able to correct all 3 errors following identification.  Patient demonstrated increased difficulty on 4th trial (question decreased attention to task as additional people had just entered gym and attention had been sustained on one task coordination, and ROM d/t CVA  Prognosis: Fair  Patient Education: fasten shoes, motor planning, safe technique for functional transfers - needs reinforcement  REQUIRES OT FOLLOW UP: Yes  Activity Tolerance  Activity Tolerance: Patient Tolerated treatment well  Safety Devices  Safety Devices in place: Yes  Type of devices: Call light within reach; Chair alarm in place; Left in chair;Nurse notified          Plan   Plan  Times per week: 5x/wk x60 min  Times per day: Daily  Current Treatment Recommendations: Strengthening, Patient/Caregiver Education & Training, Functional Mobility Training, Cognitive Reorientation, Endurance Training, Safety Education & Training, Self-Care / ADL, Neuromuscular Re-education, Equipment Evaluation, Education, & procurement, Cognitive/Perceptual Training, Balance Training    Goals  Short term goals  Time Frame for Short term goals: 2 weeks  Short term goal 1: Patient will complete UB dressing with set up - not met, ongoing  Short term goal 2: Patient will complete LB dressing with mod assist and use of AE prn - not met, ongoing  Short term goal 3: Patient will complete functional transfer, including toilet transfer and shower transfer, with mod assist - not met, ongoing  Short term goal 4: Patient will complete bathing with min assist and use of AE prn - not met, ongoing  Long term goals  Time Frame for Long term goals : 3-4 weeks  Long term goal 1: Patient will complete UB dressing at independent level  Long term goal 2: Patient will complete LB dressing MOD I  Long term goal 3: Patient will complete toilet transfer and toileting tasks with MOD I  Long term goal 4: Patient will complete bathing and shower transfer with spvn  Patient Goals   Patient goals : \"I'll know it when it happens. \" - unable to state clear goal       Therapy Time   Individual Concurrent Group Co-treatment   Time In 1015         Time Out 1115         Minutes [address], OTR/L #[id number]

## 2018-10-01 NOTE — PROGRESS NOTES
Pt in bed, eyes closed. No complaints. Assessment completed. Vital signs stable. Nighttime medications given. External catheter in place. Pt repositioned. Reminded pt to call for assistance with any needs. Call light within reach. Safety measures in place. No needs at this time.

## 2018-10-01 NOTE — PATIENT CARE CONFERENCE
The [facility]  Weekly Team Conference Note    Patient Name: Richard Quach        MRN: [id number]    : 1935  (82 y.o.)  Gender: female   Referring Practitioner: Dr. Wes Russ  Diagnosis: Acute ischemic stroke    The team conference for this patient was held on 10/2/2018 at 1:30pm by:  Daisy Lazcano. Tk Smart MD.    PHYSICAL THERAPY:  Bed Mobility: Scooting: Minimal assistance (Assist required to get R hip forward at [facility]. )    Transfers:  Sit to Stand: Moderate Assistance (Initially MOD A from the chair, but CGA-MIN A from chair on subsequent tries. Also CGA from elevated commode seat. VC for sequencing and ant wt shift. Facilitation for ant wt shift.)  Stand to sit: Contact guard assistance (VC for hand placement and improved eccentric control)  Bed to Chair: Dependent/Total (ModAx2 from EOB to shower chair, heavy verbal cues and tactile cues for sequencing, pt with difficulty placing weight onto LLE in order to move RLE, pt becoming fatigued and requiring ModAx2 to guide hips to surface, ModA to move RLE)  Stand Pivot Transfers: Minimal Assistance (w/c to low mat with RW, w/c to recliner with RW)  Comment: Pt completed seated and standing dynamic reaching tasks with 0-1 UE A with CGA-MIN A from therapist during completion of pericare. Ambulation 1  Surface: level tile  Device: Rolling Walker  Other Apparatus: Wheelchair follow  Assistance: Contact guard assistance  Quality of Gait: step to gait pattern leading with RLE, forward trunk flexion, decreased LLE step length and foot clearance (B). Significantly decreased louise. Poor walker positioning consistently attempting to place too far anterior. Distance: 2 x 25'  Comments: VC for upright posture with fwd gaze, positioning of RW and in RW, increased B step length and foot clearance, and safety. Facilitation for increased upright posture and walker positioning.      Stairs  # Steps : 3  Stairs Height: 6\"  Rails: absorbant pads, helper cares for and empties atkins catheter  Bladder Frequency of Accidents:  (0)  Bowel Level of Assistance: 1- requires 75%+ assistance for bowel management tasks, staff changes soiled linens, clothes, absorbant pads, helper provides enema or provides digital stimulation to patient, staff empties colostomy  Bowel Frequency of Accidents:  (0)    Laird Fall Risk Score: 95  Wounds/Incisions/Ulcers: None  Medication Review: Medication reviewed with patient and family  Pain: No complaints of pain   Consultations/Labs/X-rays: BMP & CBC every Mon & Thurs / consult to Social Work    Patient/Family Education provided by team: Patient educated on sequencing of LB dressing tasks - fasten shoes, motor planning, safe technique for functional transfers - needs reinforcement. Ambulation mechanics; w/c mobility: rec reinforcement     CASE MANAGEMENT:  Assessment:    Destination  From home ([facility])   [facility]  Home care at home? No Provider: n/a  Provider Phone: n/a      Therapy Consults  PT evaluation needed?: Yes (Comment)  OT Evalulation Needed?: Yes (Comment)  SLP evaluation needed?: Yes (Comment)     Home Medical Care  Patient receiving aid services through [place] [facility]  Pharmacy: UNKNOWN   Potential Assistance Purchasing Medications:  Yes  Does patient want to participate in local refill/meds to beds program?: Yes     Goals of Care  Patient expects to be discharged to[de-identified] continued therapy at other facility; [address]  Patient plans for SNF: Yes   Mode of transport from hospital: Possibly Granddaughter Rossana Huynh      Barriers to discharge: Patient is not a reliable historian. Coordination with family will be one barrier to completing a plan for discharge.     CM Granddaughter Rossana Huynh [phone].  Rossana Huynh verified patient currently lives alone at senior apartments, has aid services and does not DPT    Komal Thurman, OTR/L  ADITHYA ALEJANDRE [facility], OTR/L    Lashaun Bird MA/CCC-SLP    Nutrition:  Michael Gomes RD LD    I approve the established interdisciplinary plan of care as documented within the medical record of [facility]. MD Kelsey Leyva MD 10/2/2018, 2:52 PM

## 2018-10-02 PROCEDURE — 97530 THERAPEUTIC ACTIVITIES: CPT

## 2018-10-02 PROCEDURE — 6370000000 HC RX 637 (ALT 250 FOR IP): Performed by: PHYSICAL MEDICINE & REHABILITATION

## 2018-10-02 PROCEDURE — 1280000000 HC REHAB R&B

## 2018-10-02 PROCEDURE — 97110 THERAPEUTIC EXERCISES: CPT

## 2018-10-02 PROCEDURE — 97116 GAIT TRAINING THERAPY: CPT

## 2018-10-02 PROCEDURE — 92526 ORAL FUNCTION THERAPY: CPT

## 2018-10-02 PROCEDURE — 92507 TX SP LANG VOICE COMM INDIV: CPT

## 2018-10-02 RX ORDER — AMLODIPINE BESYLATE 10 MG/1
10 TABLET ORAL DAILY
Status: DISCONTINUED | OUTPATIENT
Start: 2018-10-03 | End: 2018-10-25 | Stop reason: HOSPADM

## 2018-10-02 RX ADMIN — ACETAMINOPHEN 650 MG: 325 TABLET ORAL at 20:13

## 2018-10-02 RX ADMIN — LEVETIRACETAM 500 MG: 100 SOLUTION ORAL at 20:12

## 2018-10-02 RX ADMIN — HYDRALAZINE HYDROCHLORIDE 10 MG: 10 TABLET, FILM COATED ORAL at 14:40

## 2018-10-02 RX ADMIN — HYDRALAZINE HYDROCHLORIDE 50 MG: 50 TABLET, FILM COATED ORAL at 07:39

## 2018-10-02 RX ADMIN — FAMOTIDINE 20 MG: 20 TABLET ORAL at 07:39

## 2018-10-02 RX ADMIN — LEVETIRACETAM 500 MG: 100 SOLUTION ORAL at 07:40

## 2018-10-02 RX ADMIN — FAMOTIDINE 20 MG: 20 TABLET ORAL at 20:13

## 2018-10-02 RX ADMIN — HYDRALAZINE HYDROCHLORIDE 50 MG: 50 TABLET, FILM COATED ORAL at 20:13

## 2018-10-02 RX ADMIN — DIGOXIN 125 MCG: 125 TABLET ORAL at 07:39

## 2018-10-02 RX ADMIN — AMLODIPINE BESYLATE 5 MG: 5 TABLET ORAL at 07:39

## 2018-10-02 RX ADMIN — HYDRALAZINE HYDROCHLORIDE 10 MG: 10 TABLET, FILM COATED ORAL at 06:00

## 2018-10-02 RX ADMIN — HYDRALAZINE HYDROCHLORIDE 10 MG: 10 TABLET, FILM COATED ORAL at 22:09

## 2018-10-02 ASSESSMENT — PAIN SCALES - GENERAL
PAINLEVEL_OUTOF10: 0
PAINLEVEL_OUTOF10: 3
PAINLEVEL_OUTOF10: 0

## 2018-10-02 ASSESSMENT — PAIN DESCRIPTION - LOCATION
LOCATION: HEAD
LOCATION: HEAD

## 2018-10-02 ASSESSMENT — 9 HOLE PEG TEST
TESTTIME_SECONDS: 47.2
TESTTIME_SECONDS: 64.1

## 2018-10-02 NOTE — PROGRESS NOTES
Roxy Arreaga  10/2/2018  [id number]    Chief Complaint: right sided weakness    Subjective:   No issues overnight. No pain. Resting after therapy. ROS: No CP, SOB, dyspnea    Objective:  Patient Vitals for the past 24 hrs:   BP Temp Temp src Pulse Resp SpO2 Weight   10/02/18 1440 (!) 160/98 - - - - - -   10/02/18 1015 122/84 - - 75 - - -   10/02/18 0730 (!) 161/116 99 °F (37.2 °C) Oral 89 16 92 % -   10/02/18 0600 (!) 157/73 - - - - - 233 lb 7.5 oz (105.9 kg)   10/01/18 2033 (!) 142/73 98.5 °F (36.9 °C) Oral 64 16 93 % -   10/01/18 1515 (!) 145/73 - - - - - -     Gen: No distress, pleasant. Resting in bed  HEENT: Normocephalic, atraumatic. CV: Regular rate and irregular rhythm. No MRG  Resp: No respiratory distress. CTAB  Abd: Soft, nontender nondistended  Ext: No edema. Neuro: Alert, oriented, appropriately interactive. LUE 4+/5 RUE 4/5 RLE 4/5 LLE 4/5    Laboratory data: Available via EMR. Therapy progress:  PT  Position Activity Restriction  Other position/activity restrictions: Up with assist  Objective     Sit to Stand: Moderate Assistance (Initially MOD A from the chair, but CGA-MIN A from chair on subsequent tries. Also CGA from elevated commode seat. VC for sequencing and ant wt shift.  Facilitation for ant wt shift.)  Stand to sit: Contact guard assistance (VC for hand placement and improved eccentric control)  Bed to Chair: Dependent/Total (ModAx2 from EOB to shower chair, heavy verbal cues and tactile cues for sequencing, pt with difficulty placing weight onto LLE in order to move RLE, pt becoming fatigued and requiring ModAx2 to guide hips to surface, ModA to move RLE)  Stand Pivot Transfers: Minimal Assistance (w/c to low mat with RW, w/c to recliner with RW)  Device: Rolling Walker  Other Apparatus: Wheelchair follow  Assistance: Contact guard assistance  Distance: 2 x 25'  OT  PT Equipment Recommendations  Equipment Needed: No  Other: continue to assess  Toilet - Technique: Ambulating (with

## 2018-10-02 NOTE — PLAN OF CARE
Problem: Falls - Risk of:  Goal: Will remain free from falls  Will remain free from falls   Outcome: Ongoing  Pt with history of falls. Up with assistance, gait belt and smiley stedy. Pt with left sided weakness. Fall precautions maintained. Non skid footwear on. Bed in lowest position. Bed alarm in use. Reminded pt to call for assistance before getting up. Call light within reach. No falls thus far during shift.

## 2018-10-02 NOTE — PROGRESS NOTES
decreased LLE step length and foot clearance (B). Significantly decreased louise. Poor walker positioning consistently attempting to place too far anterior. Distance: 2 x 25'  Comments: VC for upright posture with fwd gaze, positioning of RW and in RW, increased B step length and foot clearance, and safety. Facilitation for increased upright posture and walker positioning. Stairs/Curb  Stairs?: No        Comment: Pt completed seated and standing dynamic reaching tasks with 0-1 UE A with CGA-MIN A from therapist during completion of pericare. Second session: Pt seated in chair on arrival, agreeable to PT. Pt ambulated to gym 43 ft with step to pattern leading with LLE, but able to complete reciprocal pattern when provided with visual cue of therapist's foot, and pt steps over therapists foot with RLE. Forward flexed posture, slow louise. Pt ambulated 10 ft to stairs, and completed alternating stair taps on 4 inch step x 7 each LE, slow to complete. Pt completed sit to stand x 3, in addition to 2 other sit to stands completed earlier in session. Pt with variable assist level for STS throughout session. Mod A first stand, CGA second stand, and min A to mod A last 3 stands. Pt ambulated back to room 30 ft. Seated in w/c at end of session, alarm in place, granddaughter present throughout session. Assessment   Body structures, Functions, Activity limitations: Decreased functional mobility ; Decreased strength;Decreased cognition;Decreased endurance;Decreased balance;Decreased coordination  Assessment: PT demonstrating improved activity tolerance with ambulation, but continues to have multiple gait deficits that put her at an increased risk of falling. Pt also continues to have fluctuating levels of assistance required during transfers. Pt would benefit from continued therapy to increase her independence.    Treatment Diagnosis: decreased functional mobility due to acute ischemic stroke  Prognosis: [facility]            Second Session Therapy Time:   Individual Concurrent Group Co-treatment   Time In 3552         Time Out 1208         Minutes 33           Timed Code Treatment Minutes:  34    Total Treatment Minutes:  Nancy [id number], PT     Second session: Adrián Harper, PT DPT [id number]

## 2018-10-02 NOTE — PROGRESS NOTES
Assessment completed, medications given. Fall precautions are in place and patient is educated to call for assistance to transfer. Will need reminders. Bp elevated within parameters for PRN hydralazine, administered.

## 2018-10-02 NOTE — PROGRESS NOTES
decline. Chart review does not reveal any documented difficulty on current recommended diet level. Patient assessed with meal tray of mechanical soft solids, puree and thins. Patient required mod cues for propelling bolus posteriorly to swallow mechanically altered textures. Puree were tolerated without difficulty. Patient demonstrated atypical self feeding behavior this date with consumption of only butter x1; suspect this is not her baseline. Patient tolerated NMES via VitalStim placement 4a (targeting orbicularis oris, buccinator and superior pharyngeal constrictor) @ 4.5 mA on left and 5.5 mA on right x 20 minutes (contraction on right achieved). GOAL MET  Continue goal through resolution of dysphagia or discharge. Patient will tolerate advanced texture trials with adequate mastication and timely A-P transit. Patient consumed x2 trials of soft regular solid (cracker) without overt s/s of aspiration. Required verbal cue each time to propel to pharynx and swallow. Patient holds bolus in right side of oral cavity without apparent awareness each time. Patient continually reported that she would not eat anymore and when SLP offered cracker (to verify comprehension of question) patient shook head and said no. Thins via cup tolerated in combination (used as liquid rinse to propel) - no overt s/s of aspiration. Patient tolerated NMES via VitalStim placement 4a (targeting orbicularis oris, buccinator and superior pharyngeal constrictor) @ 4.0 mA on left and 6.0 mA on right x 20 minutes (contraction on right achieved) Goal not targeted this session. GOAL NOT MET  Patient continues to demonstrate significantly reduced anterior to posterior propulsion of bolus. Patient will answer Y/N and basic \"wh\" questions with 90% accuracy or min cues. Basic yes/no questions: 60% accuracy. Wh- questions (presented verbally and visually, multiple choice options field of 3): 0/5 opportunities.  Attempted to

## 2018-10-03 LAB
BACTERIA: ABNORMAL /HPF
BILIRUBIN URINE: NEGATIVE
BLOOD, URINE: NEGATIVE
CLARITY: CLEAR
COLOR: YELLOW
EPITHELIAL CELLS, UA: ABNORMAL /HPF
GLUCOSE URINE: NEGATIVE MG/DL
KETONES, URINE: NEGATIVE MG/DL
LEUKOCYTE ESTERASE, URINE: NEGATIVE
MICROSCOPIC EXAMINATION: YES
NITRITE, URINE: NEGATIVE
PH UA: 6.5
PROTEIN UA: ABNORMAL MG/DL
RBC UA: ABNORMAL /HPF (ref 0–2)
SPECIFIC GRAVITY UA: 1.01
URINE REFLEX TO CULTURE: ABNORMAL
URINE TYPE: ABNORMAL
UROBILINOGEN, URINE: >=8 E.U./DL
WBC UA: ABNORMAL /HPF (ref 0–5)

## 2018-10-03 PROCEDURE — 97530 THERAPEUTIC ACTIVITIES: CPT

## 2018-10-03 PROCEDURE — 6370000000 HC RX 637 (ALT 250 FOR IP): Performed by: PHYSICAL MEDICINE & REHABILITATION

## 2018-10-03 PROCEDURE — 97535 SELF CARE MNGMENT TRAINING: CPT

## 2018-10-03 PROCEDURE — 97127 HC SP THER IVNTJ W/FOCUS COG FUNCJ: CPT

## 2018-10-03 PROCEDURE — 97110 THERAPEUTIC EXERCISES: CPT

## 2018-10-03 PROCEDURE — 1280000000 HC REHAB R&B

## 2018-10-03 PROCEDURE — 92507 TX SP LANG VOICE COMM INDIV: CPT

## 2018-10-03 PROCEDURE — 81001 URINALYSIS AUTO W/SCOPE: CPT

## 2018-10-03 PROCEDURE — 97116 GAIT TRAINING THERAPY: CPT

## 2018-10-03 RX ADMIN — AMLODIPINE BESYLATE 10 MG: 10 TABLET ORAL at 08:34

## 2018-10-03 RX ADMIN — FAMOTIDINE 20 MG: 20 TABLET ORAL at 21:39

## 2018-10-03 RX ADMIN — LEVETIRACETAM 500 MG: 100 SOLUTION ORAL at 08:35

## 2018-10-03 RX ADMIN — HYDRALAZINE HYDROCHLORIDE 10 MG: 10 TABLET, FILM COATED ORAL at 06:40

## 2018-10-03 RX ADMIN — DIGOXIN 125 MCG: 125 TABLET ORAL at 08:34

## 2018-10-03 RX ADMIN — HYDRALAZINE HYDROCHLORIDE 10 MG: 10 TABLET, FILM COATED ORAL at 21:39

## 2018-10-03 RX ADMIN — FAMOTIDINE 20 MG: 20 TABLET ORAL at 08:34

## 2018-10-03 RX ADMIN — LEVETIRACETAM 500 MG: 100 SOLUTION ORAL at 21:39

## 2018-10-03 RX ADMIN — HYDRALAZINE HYDROCHLORIDE 10 MG: 10 TABLET, FILM COATED ORAL at 14:39

## 2018-10-03 ASSESSMENT — PAIN SCALES - GENERAL
PAINLEVEL_OUTOF10: 0
PAINLEVEL_OUTOF10: 0

## 2018-10-03 NOTE — PROGRESS NOTES
technique due to pt laying back onto bed despite cues to lay on her side for improved positioning. Increased time and verbal cues for improved positioning once in supine. Pt left supine in bed at end of session with bed alarm set and call light/needs within reach. Assessment   Body structures, Functions, Activity limitations: Decreased functional mobility ; Decreased strength;Decreased cognition;Decreased endurance;Decreased balance;Decreased coordination  Assessment: Pt showing improved transfers at times, with more assist required at other times. VC's and TC's required throughout session for safety and sequencing. Pt amb with CGA with continued gait deficits. Pt remains a risk for falls. Pt is still below her reported baseline and would benefit from further skilled PT improve her independence. Cont with PT POC. Treatment Diagnosis: decreased functional mobility due to acute ischemic stroke  Prognosis: Good  Patient Education: Ambulation mechanics for improved safety.  Continued education needed  REQUIRES PT FOLLOW UP: Yes  Activity Tolerance  Activity Tolerance: Patient limited by fatigue;Patient limited by endurance  Activity Tolerance: pt fatigued with each activity requiring multiple rest breaks throughout session      G-Code     OutComes Score    AM-PAC Score    Goals  Short term goals  Time Frame for Short term goals: 2 weeks  Short term goal 1: Pt will perform all bed mobility with CGA -ongoing  Short term goal 2: Pt will perform sit to/from stand with RW and CGA -ongoing  Short term goal 3: Pt will perform stand pivot with or without RW and ModA -met 9/28  Short term goal 4: Pt will ambulate 10' with RW and ModA - Goal met 9/29  Short term goal 5: Pt will ascend/descend 4 stairs with BHR and ModA -ongoing  Short term goal 6: Pt will propel w/c 100' with CGA -ongoing  Long term goals  Time Frame for Long term goals : 3-4 weeks -ongoing  Long term goal 1: Pt will perform all bed mobility with Nilda  Long

## 2018-10-03 NOTE — PROGRESS NOTES
Nutrition Assessment    Type and Reason for Visit: Initial, Consult    Nutrition Recommendations:   1. Continue Dysphagia 2 diet, safest texture per SLP  2. Encourage adequate PO intake, will send Ensure HP once daily   3. Record all PO intake to assess nutrition needs/adequacy  4. Monitor nutrition adequacy, weight changes, bowel habits, skin integrity, pertinent labs, and clinical progress    Malnutrition Assessment:  · Malnutrition Status: No malnutrition    Nutrition Diagnosis:   · Problem: Swallowing difficulty  · Etiology: related to Cognitive or neurological impairment     Signs and symptoms:  as evidenced by Swallow study results    Nutrition Assessment:  · Subjective Assessment: RD reassessment. Per MD notes, decreased cognition. Patient sitting up in chair with family present in room. Patient slow to answer questions, seemed confused with answers to interview questions. Noted breakfast tray with bites of cream of wheat and 100% of applesauce consumed. Encouraged PO intake. Will send Ensure high protein to promote PO intake. · Nutrition-Focused Physical Findings: +BM 10/2; BLE trace edema  · Wound Type: None  · Current Nutrition Therapies:  · Oral Diet Orders: Dysphagia 2, No Straws   · Oral Diet intake: 51-75%, % (last 7 meals recorded)  · Oral Nutrition Supplement (ONS) Orders: None  · Anthropometric Measures:  · Ht: 5' 4.96\" (165 cm)   · Current Body Wt: 253 lb 12 oz (115.1 kg)  · Admission Body Wt: 250 lb (113.4 kg)  · Usual Body Wt: 210 lb (95.3 kg)  · Ideal Body Wt: 125 lb (56.7 kg), % Ideal Body    · BMI Classification: BMI > or equal to 40.0 Obese Class III  · Comparative Standards (Estimated Nutrition Needs):  · Estimated Daily Total Kcal: 1266 - 1611  · Estimated Daily Protein (g): 68 - 113    Estimated Intake vs Estimated Needs: Intake Improving    Nutrition Risk Level:  Moderate    Nutrition Interventions:   Continue current diet, Start ONS  Continued Inpatient

## 2018-10-03 NOTE — PROGRESS NOTES
will maintain attention to task and conversational partner with mod cues. Min cues required to maintain attention to conversational partner and tasks presented. Mod cues overall to sustain attention and provide responses at appropriate times during session. Patient will tolerate ongoing cognitive linguistic assessment. Goal not targeted this session. Oral reading at paragraph level: mild increasing to moderate paraphasias w/o awareness of errors. Comprehension questions: 2/5 with min-mod cues. Recall grandchildren's names: 2/4 with min cues; MAX cues required for 4/4. Difficult to determine if recall deficits vs language deficits. Other areas targeted:     Education:   Educated pt and grandson to role of SLP, purpose of visit, aphasia, skills targeted, and use of environmental stimuli to assist with orientation. Ongoing re: rationale for tx tasks, skills targeted, and POC. Safety Devices: [x] Call light within reach  [x] Chair alarm activated and connected to nurse call light system  [] Bed alarm activated   [x] Other: grandson present, reminded pt of call light    [] Call light within reach  [] Chair alarm activated and connected to nurse call light system  [] Bed alarm activated   [x] Other: pt left in w/c in dining room with grandson at side in preparation for lunch; RN manager notified    Assessment: Progressing towards goals. Plan:  Continue per plan of care.        Interventions used this date:  [x] Speech/Language Treatment  [] Instruction in HEP  [] Dysphagia Treatment [x] Cognitive Treatment   [] Other:    Discharge recommendations:  [] Home independently  [] Home with assistance [x]  24 hour supervision  [] ECF [] Other  Continued Tx Upon Discharge: ? [x] Yes    [] No    [] TBD based on progress while on ARU     [] Vital Stim indicated     [] Other:   Estimated discharge date: 10/20/18    Electronically signed by  Ruiz Elks, [address] [facility]-SLP; [id number]  Speech-Language

## 2018-10-04 LAB
ANION GAP SERPL CALCULATED.3IONS-SCNC: 14 MMOL/L (ref 3–16)
BUN BLDV-MCNC: 8 MG/DL (ref 7–20)
CALCIUM SERPL-MCNC: 9.7 MG/DL (ref 8.3–10.6)
CHLORIDE BLD-SCNC: 99 MMOL/L (ref 99–110)
CO2: 23 MMOL/L (ref 21–32)
CREAT SERPL-MCNC: 0.5 MG/DL (ref 0.6–1.2)
GFR AFRICAN AMERICAN: >60
GFR NON-AFRICAN AMERICAN: >60
GLUCOSE BLD-MCNC: 99 MG/DL (ref 70–99)
HCT VFR BLD CALC: 45.5 % (ref 36–48)
HEMOGLOBIN: 15 G/DL (ref 12–16)
MCH RBC QN AUTO: 31.7 PG (ref 26–34)
MCHC RBC AUTO-ENTMCNC: 33 G/DL (ref 31–36)
MCV RBC AUTO: 96 FL (ref 80–100)
PDW BLD-RTO: 13.5 % (ref 12.4–15.4)
PLATELET # BLD: 313 K/UL (ref 135–450)
PMV BLD AUTO: 7.1 FL (ref 5–10.5)
POTASSIUM SERPL-SCNC: 4.2 MMOL/L (ref 3.5–5.1)
RBC # BLD: 4.74 M/UL (ref 4–5.2)
SODIUM BLD-SCNC: 136 MMOL/L (ref 136–145)
WBC # BLD: 8.8 K/UL (ref 4–11)

## 2018-10-04 PROCEDURE — 1280000000 HC REHAB R&B

## 2018-10-04 PROCEDURE — 92507 TX SP LANG VOICE COMM INDIV: CPT

## 2018-10-04 PROCEDURE — 6370000000 HC RX 637 (ALT 250 FOR IP): Performed by: PHYSICAL MEDICINE & REHABILITATION

## 2018-10-04 PROCEDURE — 97530 THERAPEUTIC ACTIVITIES: CPT

## 2018-10-04 PROCEDURE — 36415 COLL VENOUS BLD VENIPUNCTURE: CPT

## 2018-10-04 PROCEDURE — 97116 GAIT TRAINING THERAPY: CPT

## 2018-10-04 PROCEDURE — 97535 SELF CARE MNGMENT TRAINING: CPT

## 2018-10-04 PROCEDURE — 85027 COMPLETE CBC AUTOMATED: CPT

## 2018-10-04 PROCEDURE — 92526 ORAL FUNCTION THERAPY: CPT

## 2018-10-04 PROCEDURE — 97127 HC SP THER IVNTJ W/FOCUS COG FUNCJ: CPT

## 2018-10-04 PROCEDURE — 80048 BASIC METABOLIC PNL TOTAL CA: CPT

## 2018-10-04 RX ADMIN — HYDRALAZINE HYDROCHLORIDE 10 MG: 10 TABLET, FILM COATED ORAL at 06:04

## 2018-10-04 RX ADMIN — HYDRALAZINE HYDROCHLORIDE 10 MG: 10 TABLET, FILM COATED ORAL at 23:18

## 2018-10-04 RX ADMIN — AMLODIPINE BESYLATE 10 MG: 10 TABLET ORAL at 09:03

## 2018-10-04 RX ADMIN — FAMOTIDINE 20 MG: 20 TABLET ORAL at 23:17

## 2018-10-04 RX ADMIN — FAMOTIDINE 20 MG: 20 TABLET ORAL at 09:03

## 2018-10-04 RX ADMIN — LEVETIRACETAM 500 MG: 100 SOLUTION ORAL at 09:03

## 2018-10-04 RX ADMIN — DIGOXIN 125 MCG: 125 TABLET ORAL at 09:03

## 2018-10-04 RX ADMIN — LEVETIRACETAM 500 MG: 100 SOLUTION ORAL at 23:18

## 2018-10-04 RX ADMIN — ACETAMINOPHEN 650 MG: 325 TABLET ORAL at 11:40

## 2018-10-04 RX ADMIN — HYDRALAZINE HYDROCHLORIDE 10 MG: 10 TABLET, FILM COATED ORAL at 14:38

## 2018-10-04 ASSESSMENT — PAIN SCALES - GENERAL
PAINLEVEL_OUTOF10: 3
PAINLEVEL_OUTOF10: 0

## 2018-10-04 ASSESSMENT — PAIN DESCRIPTION - ONSET: ONSET: ON-GOING

## 2018-10-04 ASSESSMENT — PAIN DESCRIPTION - DESCRIPTORS: DESCRIPTORS: HEADACHE

## 2018-10-04 ASSESSMENT — PAIN DESCRIPTION - LOCATION: LOCATION: HEAD

## 2018-10-04 ASSESSMENT — PAIN DESCRIPTION - FREQUENCY: FREQUENCY: INTERMITTENT

## 2018-10-04 NOTE — PROGRESS NOTES
ACUTE REHAB UNIT  SPEECH/LANGUAGE PATHOLOGY      [x] Daily  [] Weekly Care Conference Note  [] Discharge    Patient:Madelin Segura      :1935  RENATE:[id number]  Rehab Dx/Hx: Acute ischemic stroke ([facility] [address] [I63.9]    Precautions: [x] Aspiration  [x] Fall risk  [] Sternal  [] Seizure [] Hip  [] Weight Bearing [] Other  Lives With: Alone  ST Dx: [x] Aphasia  [] Dysarthria  [] Apraxia   [x] Oropharyngeal dysphagia [x] Cognitive Impairment  [] Other:   Date of Admit: 2018  Room #: 3107/3107-01  Date: 10/4/2018          Current Diet Order:DIET DYSPHAGIA II MECHANICALLY ALTERED;  Dietary Nutrition Supplements: Low Calorie High Protein Supplement   Recommended Form of Meds: Crushed in puree as able  Compensatory Swallowing Strategies: Upright as possible for all oral intake, Alternate solids and liquids, No straws, Small bites/sips, Check for pocketing of food on the Right (supervision during PO intake with cues for small bites / slow rate)     Dentition: Some missing teeth, Dentures bottom, Dentures top (upper and lower partials)  Vision  Vision: Impaired  Vision Exceptions:  (wears glasses)  Hearing  Hearing: Within functional limits  Barriers toward progress: cognitive status, severity of deficits    Date: 10/4/2018      Tx session 1 Tx session 2   Total Timed Code Min 15 5   Total Treatment Minutes 30 30   Individual Treatment Minutes 30 30   Group Treatment Minutes 0 0   Co-Treat Minutes 0 0   Brief Exception: N/A N/A   Pain Denied when questioned Denied at beginning of session but pt with increased grimacing and appeared uncomfortable during session - when questioned, pt endorsed headache and pain \"around my heart\"   Pain Intervention: [] RN notified  [] Repositioned  [] Intervention offered and patient declined  [x] N/A  [] Other: [x] RN notified  [] Repositioned  [] Intervention offered and patient declined  [] N/A  [x] Other: RN arrived and provided intervention   Subjective:     Pt up in chair with portions oral prep phase and reduced awareness of pocketed material.    Plan:  Continue per plan of care.        Interventions used this date:  [x] Speech/Language Treatment  [] Instruction in HEP  [x] Dysphagia Treatment [x] Cognitive Treatment   [] Other:    Discharge recommendations:  [] Home independently  [] Home with assistance [x]  24 hour supervision  [] ECF [] Other  Continued Tx Upon Discharge: ? [x] Yes    [] No    [] TBD based on progress while on ARU     [] Vital Stim indicated     [] Other:   Estimated discharge date: 10/20/18    Electronically signed by  Wilber Negron MA CCC-SLP; [id number]  Speech-Language Pathologist

## 2018-10-04 NOTE — PROGRESS NOTES
transfer stand pivot  at [address] an dpt toileted at Mod A. Pt then UB dress (setup) and LB dress (Mod A). Pt served breakfast, call light in reach and chair alarm on. Pain Assessment  Patient Currently in Pain: Denies  Vital Signs  Patient Currently in Pain: Denies   Orientation     Objective    ADL  Grooming: Independent  UE Dressing: Setup  LE Dressing: Moderate assistance  Toileting: Moderate assistance        Standing Balance  Time: ~2 min total  Activity: stand pivot transfers, LB clothing mgmt  Sit to stand: Minimal assistance  Stand to sit: Contact guard assistance  Toilet Transfers  Toilet - Technique: Stand pivot  Equipment Used: Raised toilet seat with rails  Toilet Transfer: Contact guard assistance  Bed mobility  Supine to Sit: Stand by assistance  Transfers  Sit to stand: Minimal assistance  Stand to sit: Contact guard assistance            Assessment   Performance deficits / Impairments: Decreased functional mobility ; Decreased endurance;Decreased coordination;Decreased ADL status; Decreased ROM; Decreased strength;Decreased safe awareness;Decreased cognition;Decreased balance  Assessment: Pt stand pivot toilet transfer CGA witih use of grab bars. Pt UB dressing (setup) and pt requires Mod A for lower body dressing. Would continue to benefit from OT services, cont POC. Treatment Diagnosis: Impaired functional mobility, ADLs, cognition, coordination, and ROM d/t CVA  Prognosis: Fair  Patient Education: safe shower transfer  REQUIRES OT FOLLOW UP: Yes  Activity Tolerance  Activity Tolerance: Patient Tolerated treatment well  Safety Devices  Safety Devices in place: Yes  Type of devices: Call light within reach; Chair alarm in place; Left in chair;Nurse notified; All fall risk precautions in place; Patient at risk for falls          Plan   Plan  Times per week: 5x/wk x60 min  Times per day: Daily  Current Treatment Recommendations: Strengthening, Patient/Caregiver Education & Training, Functional Mobility

## 2018-10-05 PROCEDURE — 92507 TX SP LANG VOICE COMM INDIV: CPT

## 2018-10-05 PROCEDURE — 1280000000 HC REHAB R&B

## 2018-10-05 PROCEDURE — 97530 THERAPEUTIC ACTIVITIES: CPT

## 2018-10-05 PROCEDURE — 97110 THERAPEUTIC EXERCISES: CPT

## 2018-10-05 PROCEDURE — 92526 ORAL FUNCTION THERAPY: CPT

## 2018-10-05 PROCEDURE — 97116 GAIT TRAINING THERAPY: CPT

## 2018-10-05 PROCEDURE — 97127 HC SP THER IVNTJ W/FOCUS COG FUNCJ: CPT

## 2018-10-05 PROCEDURE — 6370000000 HC RX 637 (ALT 250 FOR IP): Performed by: PHYSICAL MEDICINE & REHABILITATION

## 2018-10-05 RX ADMIN — HYDRALAZINE HYDROCHLORIDE 10 MG: 10 TABLET, FILM COATED ORAL at 06:32

## 2018-10-05 RX ADMIN — AMLODIPINE BESYLATE 10 MG: 10 TABLET ORAL at 09:33

## 2018-10-05 RX ADMIN — FAMOTIDINE 20 MG: 20 TABLET ORAL at 20:20

## 2018-10-05 RX ADMIN — HYDRALAZINE HYDROCHLORIDE 10 MG: 10 TABLET, FILM COATED ORAL at 20:20

## 2018-10-05 RX ADMIN — FAMOTIDINE 20 MG: 20 TABLET ORAL at 09:33

## 2018-10-05 RX ADMIN — HYDRALAZINE HYDROCHLORIDE 10 MG: 10 TABLET, FILM COATED ORAL at 14:56

## 2018-10-05 RX ADMIN — DIGOXIN 125 MCG: 125 TABLET ORAL at 09:33

## 2018-10-05 RX ADMIN — LEVETIRACETAM 500 MG: 100 SOLUTION ORAL at 20:21

## 2018-10-05 RX ADMIN — LEVETIRACETAM 500 MG: 100 SOLUTION ORAL at 09:33

## 2018-10-05 ASSESSMENT — PAIN SCALES - GENERAL: PAINLEVEL_OUTOF10: 0

## 2018-10-05 NOTE — PROGRESS NOTES
/ ADL, Neuromuscular Re-education, Equipment Evaluation, Education, & procurement, Cognitive/Perceptual Training, Balance Training  G-Code     OutComes Score                                           AM-PAC Score             Goals  Short term goals  Time Frame for Short term goals: 2 weeks  Short term goal 1: Patient will complete UB dressing with set up - goal met 10/3  Short term goal 2: Patient will complete LB dressing with mod assist and use of AE prn - goal met 10/4  Short term goal 3: Patient will complete functional transfer, including toilet transfer and shower transfer, with mod assist - goal met shower transfer 10/3, goal met toilet transfer 10/4  Short term goal 4: Patient will complete bathing with min assist and use of AE prn - goal met UB, not met LB 10/3  Long term goals  Time Frame for Long term goals : 3-4 weeks  Long term goal 1: Patient will complete UB dressing at independent level  Long term goal 2: Patient will complete LB dressing MOD I  Long term goal 3: Patient will complete toilet transfer and toileting tasks with MOD I  Long term goal 4: Patient will complete bathing and shower transfer with spvn  Patient Goals   Patient goals : \"I'll know it when it happens. \" - unable to state clear goal       Therapy Time   Individual Concurrent Group Co-treatment   Time In 1315         Time Out 1415         Minutes 60         Timed Code Treatment Minutes: Mine [facility], [facility]

## 2018-10-05 NOTE — PROGRESS NOTES
Erna Alford  10/5/2018  [id number]    Chief Complaint: right sided weakness    Subjective:   No issues overnight. Patient seen this am sitting up and eating breakfast. She denies any specific concerns. ROS: No CP, SOB, dyspnea    Objective:  Patient Vitals for the past 24 hrs:   BP Temp Temp src Pulse Resp SpO2 Weight   10/05/18 0931 127/81 98.5 °F (36.9 °C) Oral 79 16 96 % -   10/05/18 0930 - - - - 16 - -   10/05/18 0653 - - - - - - 223 lb 8.7 oz (101.4 kg)   10/05/18 0615 134/74 - - 60 - - -   10/04/18 2310 (!) 160/90 99.4 °F (37.4 °C) Oral 69 16 92 % -   10/04/18 1430 (!) 142/85 - - 72 - - -     Gen: No distress, pleasant. Resting in bed  HEENT: Normocephalic, atraumatic. CV: Regular rate and irregular rhythm. No MRG  Resp: No respiratory distress. CTAB  Abd: Soft, nontender nondistended  Ext: No edema. Neuro: Alert, oriented, appropriately interactive. LUE 4+/5 RUE 4/5 RLE 4/5 LLE 4/5    Laboratory data: Available via EMR. Therapy progress:  PT  Position Activity Restriction  Other position/activity restrictions: Up with assist  Objective     Sit to Stand:  Moderate Assistance ([address] [facility] from w/c to Wendy Grim from elevated toilet to RW, verbal cues for hand placement, increased time to perform due to decreased initiation requiring verbal encouragement)  Stand to sit: Contact guard assistance (verbal cues for hand placement and eccentric control)  Bed to Chair: Dependent/Total (ModAx2 from EOB to shower chair, heavy verbal cues and tactile cues for sequencing, pt with difficulty placing weight onto LLE in order to move RLE, pt becoming fatigued and requiring ModAx2 to guide hips to surface, ModA to move RLE)  Stand Pivot Transfers: Minimal Assistance (w/c to low mat with RW, w/c to recliner with RW)  Device: Rolling Walker  Other Apparatus: Wheelchair follow  Assistance: Contact guard assistance  Distance: 13' (pt initially stating that she was going to walk out into hallway, however in middle of bout

## 2018-10-05 NOTE — PROGRESS NOTES
ACUTE REHAB UNIT  SPEECH/LANGUAGE PATHOLOGY      [x] Daily  [] Weekly Care Conference Note  [] Discharge    Patient:Madelin Lizarraga      :1935  MWV:[id number]  Rehab Dx/Hx: Acute ischemic stroke ([facility] [address] [I63.9]    Precautions: [x] Aspiration  [x] Fall risk  [] Sternal  [] Seizure [] Hip  [] Weight Bearing [] Other  Lives With: Alone  ST Dx: [x] Aphasia  [] Dysarthria  [] Apraxia   [x] Oropharyngeal dysphagia [x] Cognitive Impairment  [] Other:   Date of Admit: 2018  Room #: 3107/3107-01  Date: 10/5/2018          Current Diet Order:DIET DYSPHAGIA II MECHANICALLY ALTERED;  Dietary Nutrition Supplements: Low Calorie High Protein Supplement   Recommended Form of Meds: Crushed in puree as able  Compensatory Swallowing Strategies: Upright as possible for all oral intake, Alternate solids and liquids, No straws, Small bites/sips, Check for pocketing of food on the Right (supervision during PO intake with cues for small bites / slow rate)     Dentition: Some missing teeth, Dentures bottom, Dentures top (upper and lower partials)  Vision  Vision: Impaired  Vision Exceptions:  (wears glasses)  Hearing  Hearing: Within functional limits  Barriers toward progress: cognitive status, severity of deficits    Date: 10/5/2018     Tx session 1   Total Timed Code Min 15   Total Treatment Minutes 65   Individual Treatment Minutes 65   Group Treatment Minutes 0   Co-Treat Minutes 0   Brief Exception: N/A   Pain Denied   Pain Intervention: [] RN notified  [] Repositioned  [] Intervention offered and patient declined  [x] N/A  [] Other:   Subjective:     Pt asleep in w/c upon entry to room but alerted readily to verbal stimuli. Lethargic initially, yawning frequently, but alertness increased as session progressed.  Transported pt to dining room via w/c and pt participated in breakfast preparation activity prior to morning meal.     Objective / Goals:    Patient will tolerate recommended diet consistency w/o overt signs of aspiration or respiratory decline. Analyzed pt with dysphagia II mechanically altered potatoes and scrambled eggs, pureed oatmeal, regular consistency johnson, soft solid muffin, and thins via cup edge. No overt signs of aspiration exhibited with any consistency. Pt with appropriate rate of intake and bolus size - no significant impulsivity noted. Patient will tolerate advanced texture trials with adequate mastication and timely A-P transit. Soft solid muffin trials: adequate mastication with timely A-P transit. Mild-mod oral residue that cleared with liquid rinse or alternation with puree consistency. Regular consistency johnson trials: slowed, disorganized mastication noted but adequate A-P transit. No pocketing or significant oral residue noted. Patient will answer Y/N and basic \"wh\" questions with 90% accuracy or min cues. Targeted via direction following. Min cues to follow verbal / written directions for functional baking task. Patient will complete graded verbal description tasks with min-mod cues. Divergent naming of breakfast items: pt generated 5 food items typically consumed for breakfast within 1 minute and min cues. Confrontation naming of breakfast items: 80% accuracy with min-mod cues. Pt perseverative on \"coconut\" - unaware of errors despite cues. Patient will be oriented to location and situation with mod cues. Oriented to location independently via Y/N questioning. Disoriented to situation - endorsed falling and breaking her hip. Max cues to implement orientation aid to recall CVA. Patient will maintain attention to task and conversational partner with mod cues. Min cues to sustain attention for conversational and cooking tasks. Patient will tolerate ongoing cognitive linguistic assessment. Sequencing steps for functional baking task: min cues required to sequence and complete steps in appropriate order.      Min cues to problem solve supplies required and

## 2018-10-06 PROCEDURE — 1280000000 HC REHAB R&B

## 2018-10-06 PROCEDURE — 6370000000 HC RX 637 (ALT 250 FOR IP): Performed by: PHYSICAL MEDICINE & REHABILITATION

## 2018-10-06 RX ADMIN — AMLODIPINE BESYLATE 10 MG: 10 TABLET ORAL at 09:11

## 2018-10-06 RX ADMIN — HYDRALAZINE HYDROCHLORIDE 10 MG: 10 TABLET, FILM COATED ORAL at 14:48

## 2018-10-06 RX ADMIN — FAMOTIDINE 20 MG: 20 TABLET ORAL at 09:11

## 2018-10-06 RX ADMIN — HYDRALAZINE HYDROCHLORIDE 10 MG: 10 TABLET, FILM COATED ORAL at 06:36

## 2018-10-06 RX ADMIN — FAMOTIDINE 20 MG: 20 TABLET ORAL at 21:32

## 2018-10-06 RX ADMIN — DIGOXIN 125 MCG: 125 TABLET ORAL at 09:11

## 2018-10-06 RX ADMIN — HYDRALAZINE HYDROCHLORIDE 10 MG: 10 TABLET, FILM COATED ORAL at 21:32

## 2018-10-06 RX ADMIN — LEVETIRACETAM 500 MG: 100 SOLUTION ORAL at 21:33

## 2018-10-06 RX ADMIN — LEVETIRACETAM 500 MG: 100 SOLUTION ORAL at 09:12

## 2018-10-06 ASSESSMENT — PAIN SCALES - GENERAL
PAINLEVEL_OUTOF10: 0

## 2018-10-06 NOTE — PROGRESS NOTES
Pt. Up in chair, supervised while eating breakfast. Assessment complete, VSS, medications taken without difficulty. Pt. Alert and with forgetfulness. Call light and over bed table placed within reach. 1:1 and education on the use of the call light and when to call for assistance. No other care needed at this time. Will continue to monitor.

## 2018-10-07 LAB
BACTERIA: ABNORMAL /HPF
BILIRUBIN URINE: NEGATIVE
BLOOD, URINE: ABNORMAL
CLARITY: CLEAR
COLOR: YELLOW
GLUCOSE URINE: NEGATIVE MG/DL
KETONES, URINE: NEGATIVE MG/DL
LEUKOCYTE ESTERASE, URINE: ABNORMAL
MICROSCOPIC EXAMINATION: YES
NITRITE, URINE: POSITIVE
PH UA: 6.5
PROTEIN UA: 100 MG/DL
RBC UA: ABNORMAL /HPF (ref 0–2)
SPECIFIC GRAVITY UA: 1.02
URINE TYPE: ABNORMAL
UROBILINOGEN, URINE: 2 E.U./DL
WBC UA: >100 /HPF (ref 0–5)

## 2018-10-07 PROCEDURE — 81001 URINALYSIS AUTO W/SCOPE: CPT

## 2018-10-07 PROCEDURE — 6370000000 HC RX 637 (ALT 250 FOR IP): Performed by: PHYSICAL MEDICINE & REHABILITATION

## 2018-10-07 PROCEDURE — 1280000000 HC REHAB R&B

## 2018-10-07 RX ADMIN — FAMOTIDINE 20 MG: 20 TABLET ORAL at 21:02

## 2018-10-07 RX ADMIN — HYDRALAZINE HYDROCHLORIDE 10 MG: 10 TABLET, FILM COATED ORAL at 15:31

## 2018-10-07 RX ADMIN — LEVETIRACETAM 500 MG: 100 SOLUTION ORAL at 08:31

## 2018-10-07 RX ADMIN — LEVETIRACETAM 500 MG: 100 SOLUTION ORAL at 21:02

## 2018-10-07 RX ADMIN — HYDRALAZINE HYDROCHLORIDE 10 MG: 10 TABLET, FILM COATED ORAL at 21:02

## 2018-10-07 RX ADMIN — AMLODIPINE BESYLATE 10 MG: 10 TABLET ORAL at 08:30

## 2018-10-07 RX ADMIN — DIGOXIN 125 MCG: 125 TABLET ORAL at 08:30

## 2018-10-07 RX ADMIN — FAMOTIDINE 20 MG: 20 TABLET ORAL at 08:30

## 2018-10-07 RX ADMIN — HYDRALAZINE HYDROCHLORIDE 10 MG: 10 TABLET, FILM COATED ORAL at 06:24

## 2018-10-07 ASSESSMENT — PAIN SCALES - GENERAL
PAINLEVEL_OUTOF10: 0

## 2018-10-07 NOTE — PLAN OF CARE
Problem: Falls - Risk of:  Goal: Will remain free from falls  Will remain free from falls   Outcome: Met This Shift  Patient remains free from physical injury. Patient in bed lowest position, wheels locked,2/4 side rails up. Bed alarm activated,Call light within reach. Will continue to monitor    Problem: Risk for Impaired Skin Integrity  Goal: Tissue integrity - skin and mucous membranes  Structural intactness and normal physiological function of skin and  mucous membranes. Intervention: SKIN ASSESSMENT  Skin assessment done. Patient changed,protective barrier applied, as needed. Bed pad and night gown changed. External catheter in place. Will continue to monitor      Problem: Pain:  Goal: Pain level will decrease  Pain level will decrease   Outcome: Met This Shift  Patient denies any pain.  Will continue to monitor

## 2018-10-07 NOTE — PROGRESS NOTES
Pt. Up in chair watching TV and eating breakfast. Assessment complete, VSS, medications taken without difficulty. Pt. Denies pain at this time. Alert to self. No s/sx of distress noted. Call light and over bed table within reach. No other care needed at this time. Will continue to monitor.

## 2018-10-08 LAB
ANION GAP SERPL CALCULATED.3IONS-SCNC: 12 MMOL/L (ref 3–16)
BUN BLDV-MCNC: 12 MG/DL (ref 7–20)
CALCIUM SERPL-MCNC: 9.6 MG/DL (ref 8.3–10.6)
CHLORIDE BLD-SCNC: 102 MMOL/L (ref 99–110)
CO2: 26 MMOL/L (ref 21–32)
CREAT SERPL-MCNC: 0.6 MG/DL (ref 0.6–1.2)
GFR AFRICAN AMERICAN: >60
GFR NON-AFRICAN AMERICAN: >60
GLUCOSE BLD-MCNC: 113 MG/DL (ref 70–99)
HCT VFR BLD CALC: 43 % (ref 36–48)
HEMOGLOBIN: 14.3 G/DL (ref 12–16)
MCH RBC QN AUTO: 31.5 PG (ref 26–34)
MCHC RBC AUTO-ENTMCNC: 33.3 G/DL (ref 31–36)
MCV RBC AUTO: 94.6 FL (ref 80–100)
PDW BLD-RTO: 13.2 % (ref 12.4–15.4)
PLATELET # BLD: 309 K/UL (ref 135–450)
PMV BLD AUTO: 7 FL (ref 5–10.5)
POTASSIUM SERPL-SCNC: 4.1 MMOL/L (ref 3.5–5.1)
RBC # BLD: 4.55 M/UL (ref 4–5.2)
SODIUM BLD-SCNC: 140 MMOL/L (ref 136–145)
WBC # BLD: 6.4 K/UL (ref 4–11)

## 2018-10-08 PROCEDURE — 97535 SELF CARE MNGMENT TRAINING: CPT

## 2018-10-08 PROCEDURE — 97127 HC SP THER IVNTJ W/FOCUS COG FUNCJ: CPT

## 2018-10-08 PROCEDURE — 6370000000 HC RX 637 (ALT 250 FOR IP): Performed by: PHYSICAL MEDICINE & REHABILITATION

## 2018-10-08 PROCEDURE — 97530 THERAPEUTIC ACTIVITIES: CPT

## 2018-10-08 PROCEDURE — 1280000000 HC REHAB R&B

## 2018-10-08 PROCEDURE — 36415 COLL VENOUS BLD VENIPUNCTURE: CPT

## 2018-10-08 PROCEDURE — 80048 BASIC METABOLIC PNL TOTAL CA: CPT

## 2018-10-08 PROCEDURE — 92507 TX SP LANG VOICE COMM INDIV: CPT

## 2018-10-08 PROCEDURE — 97116 GAIT TRAINING THERAPY: CPT

## 2018-10-08 PROCEDURE — 92526 ORAL FUNCTION THERAPY: CPT

## 2018-10-08 PROCEDURE — 85027 COMPLETE CBC AUTOMATED: CPT

## 2018-10-08 RX ORDER — CIPROFLOXACIN 500 MG/1
500 TABLET, FILM COATED ORAL EVERY 12 HOURS SCHEDULED
Status: DISCONTINUED | OUTPATIENT
Start: 2018-10-08 | End: 2018-10-18

## 2018-10-08 RX ADMIN — HYDRALAZINE HYDROCHLORIDE 10 MG: 10 TABLET, FILM COATED ORAL at 21:13

## 2018-10-08 RX ADMIN — FAMOTIDINE 20 MG: 20 TABLET ORAL at 20:16

## 2018-10-08 RX ADMIN — HYDRALAZINE HYDROCHLORIDE 10 MG: 10 TABLET, FILM COATED ORAL at 06:26

## 2018-10-08 RX ADMIN — CIPROFLOXACIN HYDROCHLORIDE 500 MG: 500 TABLET, FILM COATED ORAL at 20:15

## 2018-10-08 RX ADMIN — LEVETIRACETAM 500 MG: 100 SOLUTION ORAL at 20:16

## 2018-10-08 RX ADMIN — AMLODIPINE BESYLATE 10 MG: 10 TABLET ORAL at 09:10

## 2018-10-08 RX ADMIN — LEVETIRACETAM 500 MG: 100 SOLUTION ORAL at 09:10

## 2018-10-08 RX ADMIN — DIGOXIN 125 MCG: 125 TABLET ORAL at 09:10

## 2018-10-08 RX ADMIN — HYDRALAZINE HYDROCHLORIDE 10 MG: 10 TABLET, FILM COATED ORAL at 13:32

## 2018-10-08 RX ADMIN — FAMOTIDINE 20 MG: 20 TABLET ORAL at 09:10

## 2018-10-08 ASSESSMENT — PAIN SCALES - GENERAL
PAINLEVEL_OUTOF10: 0

## 2018-10-08 NOTE — PROGRESS NOTES
Gladys Devine  10/8/2018  [id number]    Chief Complaint: right sided weakness    Subjective:   No issues overnight. Patient seen in room this afternoon, somewhat lethargic. ROS: No CP, SOB, dyspnea    Objective:  Patient Vitals for the past 24 hrs:   BP Temp Temp src Pulse Resp SpO2 Weight   10/08/18 1330 137/76 - - - - - -   10/08/18 0900 130/88 98.1 °F (36.7 °C) Oral 84 16 97 % -   10/08/18 0600 133/68 - - - - - 227 lb 8.2 oz (103.2 kg)   10/07/18 2054 123/88 98.2 °F (36.8 °C) Oral 62 16 95 % -     Gen: No distress, pleasant. Resting in bed  HEENT: Normocephalic, atraumatic. CV: Regular rate and irregular rhythm. No MRG  Resp: No respiratory distress. CTAB  Abd: Soft, nontender nondistended  Ext: No edema. Neuro: Slightly lethargic but oriented, appropriately interactive. LUE 4+/5 RUE 4/5 RLE 4/5 LLE 4/5    Laboratory data: Available via EMR. Therapy progress:  PT  Position Activity Restriction  Other position/activity restrictions:  Up with assist  Objective     Sit to Stand: Minimal Assistance (from w/c and elevated toilet to RW or stair handrails, verbal cues for hand placement and upright posture once in stance)  Stand to sit: Contact guard assistance (verbal cues for hand placement and eccentric control)  Bed to Chair: Dependent/Total (ModAx2 from EOB to shower chair, heavy verbal cues and tactile cues for sequencing, pt with difficulty placing weight onto LLE in order to move RLE, pt becoming fatigued and requiring ModAx2 to guide hips to surface, ModA to move RLE)  Stand Pivot Transfers: Minimal Assistance (w/c to low mat with RW, w/c to recliner with RW)  Device: Rolling Walker  Other Apparatus: Wheelchair follow  Assistance: Contact guard assistance, Stand by assistance (pt fluctuating between CGA-SBA throughout bouts)  Distance: 39' + 45' + 79' + 150'  OT  PT Equipment Recommendations  Equipment Needed: No  Other: continue to assess, pt reports that she does not own any DME  Toilet - Technique:

## 2018-10-08 NOTE — PROGRESS NOTES
Pt up in chair at this time. Denies any pain. All meds taken without difficulty. Call light within reach and chair alarm engaged.

## 2018-10-08 NOTE — PROGRESS NOTES
appropriate for topic or present items (eg \"this is a lot of food here\"). Pt up in w/c watching TV upon entry. Increased echolalia and slowed response times noted. Objective / Goals:     Patient will tolerate recommended diet consistency w/o overt signs of aspiration or respiratory decline. Goal not directly targeted. No overt signs of aspiration exhibited with any portion of breakfast tray. Analyzed pt with soft solids, puree, mechanically altered solid, and thins via cup edge. No overt signs of aspiration exhibited. Patient will tolerate advanced texture trials with adequate mastication and timely A-P transit. Goal not directly targeted. Analyzed pt with soft solid cheeseburger with tomato and regular consistency potato chips. Initially trialed cheeseburger with lettuce as well; however, pt unable to bite through lettuce to obtain bolus and this item was removed from sandwich. Trials completed w/o upper or lower partials. Pt with prolonged mastication of soft solid and noted to take multiple large bites prior to setting sandwich down. Suspect pt with baseline difficulty masticating solids d/t limited dentition. Despite overstuffing of oral cavity, pt managed bolus adequately w/o overt anterior spillage or signs of posterior spillage. Minimal pocketing or oral residue noted when pt alternated consistencies (ie sandwich followed by mechanically altered mandarin oranges or sips of liquids). Mastication of potato chips prolonged but functional although pt endorsed consistency to be \"too hard\" and chips were ultimately removed from tray. No overt signs of aspiration exhibited with any consistency trialed. Patient will answer Y/N and basic \"wh\" questions with 90% accuracy or min cues. Y/N to recall events of mornin% accuracy, improving to 86% accuracy with min cues. General \"wh\" questions: 80% accuracy with min cues. Y/N questions r/t object functions: 80% accuracy independently.    Patient will complete graded verbal description tasks with min-mod cues. Naming items on breakfast tray: 71% accuracy with min cues. Pt independently utilized ticket on meal tray to assist with identifying items. Min-mod cues to answer questions consistently to express preferences for PO items. Pt attempted to name show playing on TV but then denied. Given binary choice, pt identified show correctly. Naming items to description: 25% accuracy independently, improving to 63% accuracy given mod-max cues. Perseverations noted. Patient will be oriented to location and situation with mod cues. Pt independently oriented to location via Y/N questions. Pt disoriented to situation (identified reason for hospital admission to be \"broken back\") - with mod-max cues, pt oriented to situation w/o orientation aid (visual cues only). After review of orientation aid, pt independently utilized after 1 minute. Pt recalled situation after max of 4 minutes. Pt disoriented after 10 minute delay. Goal not directly targeted. Pt reviewed orientation aid independently but did not appear to comprehend use; pt perseverative on the word \"stroke\" but not in relation to current situation. Patient will maintain attention to task and conversational partner with mod cues. No cues required to maintain attention self-feeding tasks. No significant impulsivity noted with self-feeding throughout session. Min-mod cues to sustain attention to conversational partner and language tasks. Mod cues to maintain attention to conversational partner and respond to questions / prompts. Patient will tolerate ongoing cognitive linguistic assessment. Insight into deficits: pt denied any deficits s/p CVA despite cues. Goal not directly targeted. Other areas targeted:     Education:   Educated pt to role of SLP, purpose of tx tasks, skills targeted, and use of environmental stimuli to assist with orientation.  Educated pt to aphasia, deficits s/p

## 2018-10-08 NOTE — PROGRESS NOTES
throughout therapy session. Pt given 2 items to locate at destination. At destination pt couldn't recall either item and stated a totally different item. Pt with mod vc for wayfinding back to room. In room pt sit to stand CGA and pt CGA for stand step transfer with rw to EOB. Pt stand to sit CGA. Pt sit to supine Mod A. Call light in reach, lights turned out, pt covered with blanket and bed alarm on. Orientation  Orientation  Overall Orientation Status: Impaired  Orientation Level: Oriented to person;Disoriented to time;Disoriented to situation;Disoriented to place  Objective    ADL  UE Bathing: Verbal cueing  LE Bathing: Minimal assistance  UE Dressing: Minimal assistance;Verbal cueing; Increased time to complete  LE Dressing: Maximum assistance  Toileting: Maximum assistance  Additional Comments: Pt completed toilet transfer from w/c with verbal cueing for safety. Noted odiferous urine, RN aware. Total assist for serenity care s/p BM. Pt completed shower transfer to TTB with verbal cueing for safety during transfer. Seated on TTB with verbal cueing for completeness of task for UB bathing. LB bathing with verbal cueing to complete, serenity area with assist for completeness of task in stance. Assist to pull pants up in stance, increased time and verbal cueing to thread pants. Total A for socks due to body habitus/ reported difficulty bringing LE up in figure four pattern. Increased time to don shoes, assist to tie. Balance  Standing Balance: Contact guard assistance  Standing Balance  Time: 5+2+1 min  Activity: standing for serenity care, standing in shower for LB bathing, standing for clothing management  Sit to stand: Moderate assistance  Stand to sit: Contact guard assistance  Toilet Transfers  Toilet - Technique: Stand pivot  Equipment Used: Raised toilet seat with rails  Toilet Transfer: Moderate assistance  Shower Transfers  Shower - Transfer From: Wheelchair  Shower - Transfer Type:  To and From  Shower - Transfer To: Transfer tub bench  Shower - Technique: Stand pivot  Shower Transfers: Moderate assistance     Transfers  Stand Pivot Transfers: Minimal assistance  Sit to stand: Moderate assistance  Stand to sit: Contact guard assistance  Transfer Comments: verbal cueing to sequence/ motor plan stand pivot transfer from w/c to toilet                       Cognition  Overall Cognitive Status: Exceptions  Arousal/Alertness: Delayed responses to stimuli  Following Commands: Follows one step commands with repetition; Follows one step commands with increased time  Attention Span: Difficulty dividing attention; Attends with cues to redirect  Memory: Decreased recall of recent events;Decreased short term memory;Decreased recall of biographical Information  Safety Judgement: Decreased awareness of need for safety;Decreased awareness of need for assistance  Problem Solving: Assistance required to implement solutions;Assistance required to generate solutions;Assistance required to identify errors made;Assistance required to correct errors made;Decreased awareness of errors  Insights: Not aware of deficits  Initiation: Requires cues for all  Sequencing: Requires cues for all            Assessment   Performance deficits / Impairments: Decreased functional mobility ; Decreased endurance;Decreased coordination;Decreased ADL status; Decreased ROM; Decreased strength;Decreased safe awareness;Decreased cognition;Decreased balance  Assessment: Pt demonstrated decreased balance during transfers and ADL routine. Pt demonstrated decreased problem solving during transfers. Pt demonstrated decreased cognition, RN reporting pt being tested for suspected UTI. Continue OT POC.   Treatment Diagnosis: Impaired functional mobility, ADLs, cognition, coordination, and ROM d/t CVA  Patient Education: safe hand placement during transfers- good understanding verb  REQUIRES OT FOLLOW UP: Yes  Activity Tolerance  Activity Tolerance: Patient Tolerated treatment well;Patient limited by fatigue  Activity Tolerance: Pt reported fatigue upon completion of ADL showering routine. Safety Devices  Safety Devices in place: Yes  Type of devices: Call light within reach;Nurse notified; All fall risk precautions in place; Patient at risk for falls; Left in chair;Chair alarm in place          Plan   Plan  Times per week: 5x/wk x60 min  Times per day: Daily  Current Treatment Recommendations: Strengthening, Patient/Caregiver Education & Training, Functional Mobility Training, Cognitive Reorientation, Endurance Training, Safety Education & Training, Self-Care / ADL, Neuromuscular Re-education, Equipment Evaluation, Education, & procurement, Cognitive/Perceptual Training, Balance Training    Goals  Short term goals  Time Frame for Short term goals: 2 weeks  Short term goal 1: Patient will complete UB dressing with set up - goal met 10/3  Short term goal 2: Patient will complete LB dressing with mod assist and use of AE prn - goal met 10/4  Short term goal 3: Patient will complete functional transfer, including toilet transfer and shower transfer, with mod assist - goal met shower transfer 10/3, goal met toilet transfer 10/4  Short term goal 4: Patient will complete bathing with min assist and use of AE prn - goal met UB, not met LB 10/3  Long term goals  Time Frame for Long term goals : 3-4 weeks  Long term goal 1: Patient will complete UB dressing at independent level  Long term goal 2: Patient will complete LB dressing MOD I  Long term goal 3: Patient will complete toilet transfer and toileting tasks with MOD I  Long term goal 4: Patient will complete bathing and shower transfer with spvn  Patient Goals   Patient goals : \"I'll know it when it happens. \" - unable to state clear goal       Therapy Time   Individual Concurrent Group Co-treatment   Time In 0915         Time Out 1015         Minutes 60         Timed Code Treatment Minutes: [facility].  BHARATI Perez/JOÃO

## 2018-10-08 NOTE — PATIENT CARE CONFERENCE
step to leading with RLE, assist to lift self onto next step and for eccentric control during descent. Number of stairs limited by fatigue. FIMS:  Bed, Chair, Wheel Chair: 2 - Requires 50-74% assistance to transfer  Walk: 2 - Maximal Assistance Requires up to Maximal Assistance AND requires assistance of one person to walk between  feet (Patient performs 25-49% of locomotion effort or goes between  feet)  Distance Walked: 70'  Wheel Chair: 1 - Total Assistance Operates wheelchair < 50 feet OR requires two or more people OR patient performs < 25% of locomotion effort  Distance Traveled in Wheel Chair: 50'   Stairs: 2- Maximal Assistance Performs 25-49% of the effort to go up and down 4 to 6 stairs and requires the assistance of one person only    PT Equipment Recommendations  Equipment Needed: No  Other: continue to assess, pt reports that she does not own any DME    Assessment: Pt able to increase ambulation distance and number of stairs ascend/descend this session due to increased endurance, however pt continues to require verbal cues for upright posture and RW management during bouts. Pt continues to become fatigued after x3 reps of repeated sit to/from stand transfers. Continue POC.     SPEECH THERAPY: (intentionally left blank if not actively being seen by this service)   Diet Level: Dietary Nutrition Supplements: Low Calorie High Protein Supplement  DIET DENTAL SOFT; No Drinking Straw  FIMS:  Comprehension: 3 - Patient understands basic needs 50-74% of the time  Expression: 3 - Expresses basic ideas/needs 50-74% of the time  Social Interaction: 4 - Patient appropriate 75-90%+ of the time  Problem Solving: 3 - Patient solves simple/routine tasks 50%-74%  Memory: 3 - Patient remembers 50%-74% of the time    Assessment: Speech Therapy Diagnosis  Cognitive Diagnosis: moderate-severe cognitive-linguistic impairment characterized by disorientation, poor attention, recall deficits, and reduced insight GINA ALEXIS, LD  Pager:  [phone]  Office:  [phone]    NURSING:  FIMS:  Bladder Level of Assistance: 1- requires 75%+ assistance for bladder management tasks, helper changes soiled linens, clothes, absorbant pads, helper cares for and empties atkins catheter  Bladder Frequency of Accidents:  (0)  Bowel Level of Assistance: 1- requires 75%+ assistance for bowel management tasks, staff changes soiled linens, clothes, absorbant pads, helper provides enema or provides digital stimulation to patient, staff empties colostomy  Bowel Frequency of Accidents:  (0)    Laird Fall Risk Score: 50  Wounds/Incisions/Ulcers: Generalized bruising. Medication Review: Yes, with patient and family. Pain: Being managed with tylenol and non-pharmacological interventions. Consultations/Labs/X-rays: Consults: none. Labs: BMP Mo, TH, CBC, Mo/Th. Urine Culture on Sunday. Patient/Family Education provided by team:  Educated pt on CVA, aphasia, and compensatory swallow strategies. Question patients comprehension d/t cognitive status. Family educated on aphasia and fluctuations with language skills, Standing balance training, afe hand placement during transfers- good understanding verb. CASE MANAGEMENT:  Assessment:  to DC 10/20. From apt alone. [facility] referral to [facility]. DME TBD (no current james)/[facility] as back up plan if patient unable to return home. TEAM SUMMARY:  Pt demonstrates decreased balance during transfers, mobility and ADL routines. Pt demonstrates decreased problem solving at times (during transfers), although improving. Continue with current goals & POC at this time, focus on pt/family education and will reassess at next team conference 10/15. DISCHARGE PLAN:  Risk for Readmission: Moderate (10-19)   Critical Items: If High Risk, consider the following recommendations: Follow-Up with PCP in one week   Estimated Length of Stay: 24 days  Destination: home health and discharge home with

## 2018-10-08 NOTE — PROGRESS NOTES
Physical Therapy  Facility/Department: [facility] ACUTE REHAB UNIT  Daily Treatment Note  NAME: Karen Corey  : 1935  MRN: [id number]    Date of Service: 10/8/2018    Discharge Recommendations:  Home with Home health PT, 24 hour supervision or assist   PT Equipment Recommendations  Other: continue to assess, pt reports that she does not own any DME    Patient Diagnosis(es): There were no encounter diagnoses. has a past medical history of CHF (congestive heart failure) ([address] Hyperlipidemia; and Hypertension. has a past surgical history that includes hip surgery. Restrictions  Position Activity Restriction  Other position/activity restrictions: Up with assist  Subjective   General  Chart Reviewed: Yes  Additional Pertinent Hx: Pt is an 82 y.o. female admitted to ARU on 18 from [facility]. Pt initially admitted to [facility] from [facility] with complaints of altered mental status. CT Head: positive for hemorrhage in L caudate and putamen. Conservative management per neurosurgery. PMH: CHF, hyperlipidemia, HTN. Family / Caregiver Present: No  Referring Practitioner: Dr. Rosanne Raza  Subjective  Subjective: \"I think I'm pretty close. \"  When asked how she is functioning compared to PTA. General Comment  Comments: Pt found seated in w/c upon PT arrival.  Pt agreeable to therapy session.   Pain Screening  Patient Currently in Pain: Denies  Vital Signs  Patient Currently in Pain: Denies       Orientation     Objective      Transfers  Sit to Stand: Minimal Assistance (from w/c and elevated toilet to RW or stair handrails, verbal cues for hand placement and upright posture once in stance)  Stand to sit: Contact guard assistance (verbal cues for hand placement and eccentric control)  Ambulation  Ambulation?: Yes  Ambulation 1  Surface: level tile  Device: Rolling Walker  Assistance: Contact guard assistance;Stand by assistance (pt fluctuating between CGA-SBA throughout bouts)  Quality of Gait: forward trunk flexion, CLT

## 2018-10-09 PROCEDURE — 6370000000 HC RX 637 (ALT 250 FOR IP): Performed by: PHYSICAL MEDICINE & REHABILITATION

## 2018-10-09 PROCEDURE — 1280000000 HC REHAB R&B

## 2018-10-09 PROCEDURE — 97127 HC SP THER IVNTJ W/FOCUS COG FUNCJ: CPT

## 2018-10-09 PROCEDURE — 97116 GAIT TRAINING THERAPY: CPT

## 2018-10-09 PROCEDURE — 97535 SELF CARE MNGMENT TRAINING: CPT

## 2018-10-09 PROCEDURE — 92507 TX SP LANG VOICE COMM INDIV: CPT

## 2018-10-09 PROCEDURE — 97530 THERAPEUTIC ACTIVITIES: CPT

## 2018-10-09 PROCEDURE — 97110 THERAPEUTIC EXERCISES: CPT

## 2018-10-09 RX ORDER — AMANTADINE HYDROCHLORIDE 100 MG/1
100 CAPSULE, GELATIN COATED ORAL
Status: DISCONTINUED | OUTPATIENT
Start: 2018-10-10 | End: 2018-10-11

## 2018-10-09 RX ADMIN — LEVETIRACETAM 500 MG: 100 SOLUTION ORAL at 21:43

## 2018-10-09 RX ADMIN — CIPROFLOXACIN HYDROCHLORIDE 500 MG: 500 TABLET, FILM COATED ORAL at 08:58

## 2018-10-09 RX ADMIN — HYDRALAZINE HYDROCHLORIDE 10 MG: 10 TABLET, FILM COATED ORAL at 21:49

## 2018-10-09 RX ADMIN — LEVETIRACETAM 500 MG: 100 SOLUTION ORAL at 08:58

## 2018-10-09 RX ADMIN — HYDRALAZINE HYDROCHLORIDE 10 MG: 10 TABLET, FILM COATED ORAL at 05:56

## 2018-10-09 RX ADMIN — HYDRALAZINE HYDROCHLORIDE 10 MG: 10 TABLET, FILM COATED ORAL at 13:50

## 2018-10-09 RX ADMIN — FAMOTIDINE 20 MG: 20 TABLET ORAL at 21:40

## 2018-10-09 RX ADMIN — AMLODIPINE BESYLATE 10 MG: 10 TABLET ORAL at 08:58

## 2018-10-09 RX ADMIN — CIPROFLOXACIN HYDROCHLORIDE 500 MG: 500 TABLET, FILM COATED ORAL at 21:40

## 2018-10-09 RX ADMIN — FAMOTIDINE 20 MG: 20 TABLET ORAL at 08:58

## 2018-10-09 RX ADMIN — DIGOXIN 125 MCG: 125 TABLET ORAL at 08:58

## 2018-10-09 ASSESSMENT — PAIN SCALES - GENERAL
PAINLEVEL_OUTOF10: 0

## 2018-10-09 NOTE — PROGRESS NOTES
Physical Therapy  Facility/Department: [facility] ACUTE REHAB UNIT  Daily Treatment Note  NAME: Xochilt Osman  : 1935  MRN: [id number]    Date of Service: 10/9/2018    Discharge Recommendations:  Home with Home health PT, 24 hour supervision or assist   PT Equipment Recommendations  Equipment Needed: Yes  Mobility Devices: Marques Ramos: Rolling    Patient Diagnosis(es): There were no encounter diagnoses. has a past medical history of CHF (congestive heart failure) ([address] Hyperlipidemia; and Hypertension. has a past surgical history that includes hip surgery. Restrictions  Position Activity Restriction  Other position/activity restrictions: Up with assist  Subjective   General  Chart Reviewed: Yes  Additional Pertinent Hx: Pt is an 82 y.o. female admitted to ARU on 18 from [facility]. Pt initially admitted to [facility] from [facility] with complaints of altered mental status. CT Head: positive for hemorrhage in L caudate and putamen. Conservative management per neurosurgery. PMH: CHF, hyperlipidemia, HTN. Family / Caregiver Present: No  Referring Practitioner: Dr. Chelsea Piña  Subjective  Subjective: \"I need to  the pace. \"  General Comment  Comments: Pt found supine in bed upon PT arrival.  Pt agreeable to therapy session.   Pain Screening  Patient Currently in Pain: Denies  Vital Signs  Patient Currently in Pain: Denies       Orientation     Objective   Bed mobility  Supine to Sit: Stand by assistance (HOB elevated, use of bedrails, increased time to perform, verbal cues to align hips with EOB for improved upright positioning)  Transfers  Sit to Stand: Minimal Assistance;Contact guard assistance (pt fluctuating between CGA-Gris throughout session, from EOB, w/c, and elevated toilet to RW, verbal cues for hand placement, occasional facilitation required for anterior weight shift)  Stand to sit: Contact guard assistance;Stand by assistance (CGA progressing to SBA, verbal cues for hand

## 2018-10-09 NOTE — PROGRESS NOTES
Terry Garay  10/9/2018  [id number]    Chief Complaint: right sided weakness    Subjective:   No issues overnight. Yesterday's lethargy seems improved. ROS: No CP, SOB, dyspnea    Objective:  Patient Vitals for the past 24 hrs:   BP Temp Temp src Pulse Resp SpO2 Weight   10/09/18 1350 124/83 - - - - - -   10/09/18 0851 125/67 98.2 °F (36.8 °C) Oral 66 18 95 % -   10/09/18 0600 - - - - - - 228 lb 6.3 oz (103.6 kg)   10/09/18 0553 127/85 - - - - - -   10/08/18 2010 (!) 157/85 98.8 °F (37.1 °C) Oral 83 16 95 % -     Gen: No distress, pleasant. Resting in bed  HEENT: Normocephalic, atraumatic. CV: Regular rate and irregular rhythm. No MRG  Resp: No respiratory distress. CTAB  Abd: Soft, nontender nondistended  Ext: No edema. Neuro: Slightly lethargic but oriented, appropriately interactive. LUE 4+/5 RUE 4/5 RLE 4/5 LLE 4/5    Laboratory data: Available via EMR. Therapy progress:  PT  Position Activity Restriction  Other position/activity restrictions:  Up with assist  Objective     Sit to Stand: Minimal Assistance, Contact guard assistance (pt fluctuating between CGA-Gris throughout session, from EOB, w/c, and elevated toilet to RW, verbal cues for hand placement, occasional facilitation required for anterior weight shift)  Stand to sit: Contact guard assistance, Stand by assistance (CGA progressing to SBA, verbal cues for hand placement)  Bed to Chair: Dependent/Total (ModAx2 from EOB to shower chair, heavy verbal cues and tactile cues for sequencing, pt with difficulty placing weight onto LLE in order to move RLE, pt becoming fatigued and requiring ModAx2 to guide hips to surface, ModA to move RLE)  Stand Pivot Transfers: Minimal Assistance (w/c to low mat with RW, w/c to recliner with RW)  Device: Rolling Walker  Other Apparatus: Wheelchair follow  Assistance: Contact guard assistance, Stand by assistance (CGA progressing to SBA)  Distance: 170' (pt reporting shortness of breath after bout, SpO2 measured 96% on room air, HR 86) + 10' (out of bathroom)  OT  PT Equipment Recommendations  Equipment Needed: Yes  Mobility Devices: Lenice Myah: Rolling  Other: continue to assess, pt reports that she does not own any DME  Toilet - Technique: Stand pivot  Equipment Used: Raised toilet seat with rails  Assessment        SLP  Current Diet : [facility] soft  Current Liquid Diet : Thin  Diet Solids Recommendation: Dysphagia II Mechanically Altered  Liquid Consistency Recommendation: Thin    Body mass index is 38.05 kg/m². Assessment:  Patient Active Problem List   Diagnosis    [address] [address]    Acute ischemic stroke ([facility]       Plan:   Left BG infarct with hemorrhagic transformation: BP control. Coumadin held. Keppra for seizure prophylaxis.  PT/OT/SLP. Start amantadine in AM for initiation.     Dysphagia: SLP, dysphagia diet.      A. Fib: digoxin, coumadin held 2/2 bleed     HTN: norvasc, hydralazine. UTI. Start cipro, await c/s.      GERD: pepcid     Bowels: Per protocol  Bladder: Per protocol   Sleep: Trazodone provided prn. Pain: tylenol PRN   DVT PPx: SCDs, AC contraindicated     Oswaldo Gomez MD 10/9/2018, 3:14 PM

## 2018-10-09 NOTE — PROGRESS NOTES
Occupational Therapy  Facility/Department: [facility] ACUTE REHAB UNIT  Daily Treatment Note  NAME: Janet Hooker  : 1935  MRN: [id number]    Date of Service: 10/9/2018    Discharge Recommendations:  Home with assist PRN, Home with Home health OT  OT Equipment Recommendations  Other: patient reports that she has a shower chair but no other equipment at baseline; recommend confirmation with family/caregiver to determine what equipment is currently used at baseline. Patient Diagnosis(es): There were no encounter diagnoses. has a past medical history of CHF (congestive heart failure) ([facility] [address] Hyperlipidemia; and Hypertension. has a past surgical history that includes hip surgery. Restrictions  Position Activity Restriction  Other position/activity restrictions: Up with assist  Subjective   General  Chart Reviewed: Yes  Patient assessed for rehabilitation services?: Yes  Additional Pertinent Hx: 82 y.o. female admit to ARU . Hospital course:  Admit  to [facility] ED with hemorrhagic stroke, present with R side weakness and R facial droop. Transferred to [facility] on . CT head - L side hemorrhage involving caudate, basal ganglia and hyperdensities in L front lobe;  CXR - bilateral airspace disease;  MRI brain - left basal ganglia infarct with 2 areas of acute hemorrhage. Carotid doppler - L side stenosis;    PMHx - CHF, hyperlipidemia, HTN, hip surgery  Response to previous treatment: Patient with no complaints from previous session  Family / Caregiver Present: No  Referring Practitioner: Kp  Diagnosis: CVA  Subjective  Subjective: Pt seated in wc upon entry, very pleasant and agreeable to therapy session. General Comment  Comments: Pt sit to stand CGA and pt ambualted ~ 12 ft at [facility] with rw to bathroom. Pt toilet transfer CGA and pt toileted Min A (althought pt didn't void she still perfomed all 3 tasks).  Pt stood at sink ~8 min at SBA for grooming (oral care/wash hands/wash face) SBA for grooming ~8 min. Continue OT POC. Treatment Diagnosis: Impaired functional mobility, ADLs, cognition, coordination, and ROM d/t CVA  Prognosis: Fair  Patient Education: safe transfers  REQUIRES OT FOLLOW UP: Yes  Activity Tolerance  Activity Tolerance: Patient Tolerated treatment well  Safety Devices  Safety Devices in place: Yes  Type of devices: Call light within reach;Nurse notified; All fall risk precautions in place; Patient at risk for falls; Left in chair;Chair alarm in place          Plan   Plan  Times per week: 5x/wk x60 min  Times per day: Daily  Current Treatment Recommendations: Strengthening, Patient/Caregiver Education & Training, Functional Mobility Training, Cognitive Reorientation, Endurance Training, Safety Education & Training, Self-Care / ADL, Neuromuscular Re-education, Equipment Evaluation, Education, & procurement, Cognitive/Perceptual Training, Balance Training  G-Code     OutComes Score                                           AM-PAC Score             Goals  Short term goals  Time Frame for Short term goals: 2 weeks  Short term goal 1: Patient will complete UB dressing with set up - goal met 10/3  Short term goal 2: Patient will complete LB dressing with mod assist and use of AE prn - goal met 10/4  Short term goal 3: Patient will complete functional transfer, including toilet transfer and shower transfer, with mod assist - goal met shower transfer 10/3, goal met toilet transfer 10/4  Short term goal 4: Patient will complete bathing with min assist and use of AE prn - goal met UB, not met LB 10/3  Long term goals  Time Frame for Long term goals : 3-4 weeks  Long term goal 1: Patient will complete UB dressing at independent level  Long term goal 2: Patient will complete LB dressing MOD I  Long term goal 3: Patient will complete toilet transfer and toileting tasks with MOD I  Long term goal 4: Patient will complete bathing and shower transfer with spvn  Patient Goals   Patient goals :

## 2018-10-09 NOTE — PROGRESS NOTES
via w/c by SLP at end of session. Pt sleeping in bed upon entry but roused to verbal stimuli. Agreeable to tx session but appeared fatigued throughout with slowed response times. Objective / Goals:      Patient will tolerate recommended diet consistency w/o overt signs of aspiration or respiratory decline. Goal not directly targeted. No overt signs of aspiration noted with soft solids or thins via cup edge but minimal trials observed. Goal not targeted. GOAL MET - continue to target as appropriate   Patient will tolerate advanced texture trials with adequate mastication and timely A-P transit. Goal not directly targeted. Mastication adequate for soft solid carrots and banana but minimal trials observed. Goal not targeted. GOAL MET - recommend d/c goal as pt will most likely not advance beyond dental soft consistency d/t missing dentition and cognitive status. Patient will answer Y/N and basic \"wh\" questions with 90% accuracy or min cues. Y/N questions to recall events of day: 100% accuracy independently  Y/N orientation questions: 60% accuracy independently GOAL MET x 1 SESSION - continue to target   Patient will complete graded verbal description tasks with min-mod cues. Pt spontaneously asked SLP appropriate questions r/t topic / items present x 2. Naming objects (confrontation and to verbal description): 82% accuracy with min-mod cues. Pt perseverative on \"lemonade\" and \"banana. \"   Confrontation naming of objects: 80% accuracy with min-mod cues. Perseverations and paraphasias noted with intermittent awareness of errors. Sentence completion: 92% accuracy with min cues  GOAL PARTIALLY MET this date - continue to target   Patient will be oriented to location and situation with mod cues. Pt independently oriented to location and correctly named hospital. Pt disoriented to situation and denied recall of rationale for admission when questioned despite cues.  Max cues to reorient to situation; limited carryover noted beyond 3 minutes. Pt denied recall of location and situation when prompted - max cues required. GOAL NOT MET - continue to target   Patient will maintain attention to task and conversational partner with mod cues. Mod cues to sustain attention during session with environmental distractions present. Min-mod cues to sustain attention to conversational partner. GOAL MET - continue to target to ensure consistency   Patient will tolerate ongoing cognitive linguistic assessment. Functional problem solving - peeling banana: pt with difficulty opening banana from lunch tray. Pt independently attempted use of knife to assist but when this was unsuccessful, required mod cues to attempt alternate methods. Max cues to request assistance when pt experienced limited success with various methods of peeling banana. Insight into deficits: pt denied all deficits s/p CVA. With mod cues, pt identified assistance required for certain ADLs and identified current need for w/c. Functional recall: pt with confabulations when questioned on events of day - max cues required to identify events from earlier. Mod cues to identify illogical items from pt's responses. Pt recalled working with SLP but unable to verbalize tasks / skills targeted. Mod cues to recall location of previous tx session. GOAL MET - d/c goal   Other areas targeted:      Education:   Ongoing re: role of SLP and POC Educated pt to skills targeted, purpose of tx, and recommended POC. Safety Devices: [x] Call light within reach  [x] Chair alarm activated and connected to nurse call light system  [] Bed alarm activated   [] Other:  [x] Call light within reach  [] Chair alarm activated and connected to nurse call light system  [x] Bed alarm activated   [] Other:     Assessment: Progressing towards goals. Pt performance with cognitive tasks appeared negatively impacted by fatigue during second session. Plan:  Continue per plan of care. Interventions used this date:  [x] Speech/Language Treatment  [] Instruction in HEP  [] Dysphagia Treatment [x] Cognitive Treatment   [] Other:    Discharge recommendations:  [] Home independently  [] Home with assistance [x]  24 hour supervision  [] ECF [] Other  Continued Tx Upon Discharge: ? [x] Yes    [] No    [] TBD based on progress while on ARU     [] Vital Stim indicated     [] Other:   Estimated discharge date: 10/20/18    Electronically signed by  Elvis Smith MA CCC-SLP; [id number]  Speech-Language Pathologist

## 2018-10-09 NOTE — PROGRESS NOTES
Nutrition Assessment    Type and Reason for Visit: Reassess    Nutrition Recommendations:   · Continue safest diet per Speech recommendations  · Continue Ensure HP, BID, monitor acceptance  · Monitor weights and diet tolerance     Malnutrition Assessment:  · Malnutrition Status: At risk for malnutrition  · Context: Acute illness or injury  · Findings of the 6 clinical characteristics of malnutrition (Minimum of 2 out of 6 clinical characteristics is required to make the diagnosis of moderate or severe Protein Calorie Malnutrition based on AND/ASPEN Guidelines):  1. Energy Intake-Less than or equal to 75%, greater than 7 days    2. Weight Loss-Unable to assess (related to fluid fluctuations ),    3. Fat Loss-No significant subcutaneous fat loss,    4. Muscle Loss-No significant muscle mass loss,    5. Fluid Accumulation-No significant fluid accumulation,    6.  Strength-Not measured    Nutrition Diagnosis:   · Problem: Inadequate oral intake  · Etiology: related to Insufficient energy/nutrient consumption     Signs and symptoms:  as evidenced by Intake 0-25%, Intake 25-50%    Nutrition Assessment:  · Subjective Assessment: pt w/ varied po intake, per I/o. She is consuming 1-50% of some meals and % of other meals. She states that she is drinking ensures, will increase frequency given varied po. She declines n/v/d/c at this time. CBW of 228lb 6.3oz. Wt trending down this admit; likely fluid related losses.    · Nutrition-Focused Physical Findings: trace edema generalized and BLE; +Bm 10/8  · Wound Type: None  · Current Nutrition Therapies:  · Oral Diet Orders: Dental Soft, No Straws   · Oral Diet intake: 1-25%, 26-50%, %  · Oral Nutrition Supplement (ONS) Orders: Low Calorie, High Protein  · ONS intake: % (per pt report)  · Anthropometric Measures:  · Ht: 5' 4.96\" (165 cm)   · Current Body Wt: 228 lb 6.3 oz (103.6 kg)  · Admission Body Wt: 250 lb (113.4 kg)  · Usual Body Wt: 210 lb (95.3

## 2018-10-10 PROCEDURE — 6370000000 HC RX 637 (ALT 250 FOR IP): Performed by: PHYSICAL MEDICINE & REHABILITATION

## 2018-10-10 PROCEDURE — 97535 SELF CARE MNGMENT TRAINING: CPT

## 2018-10-10 PROCEDURE — 97127 HC SP THER IVNTJ W/FOCUS COG FUNCJ: CPT

## 2018-10-10 PROCEDURE — 92507 TX SP LANG VOICE COMM INDIV: CPT

## 2018-10-10 PROCEDURE — 97116 GAIT TRAINING THERAPY: CPT

## 2018-10-10 PROCEDURE — 1280000000 HC REHAB R&B

## 2018-10-10 PROCEDURE — 97530 THERAPEUTIC ACTIVITIES: CPT

## 2018-10-10 RX ADMIN — FAMOTIDINE 20 MG: 20 TABLET ORAL at 21:27

## 2018-10-10 RX ADMIN — AMANTADINE HYDROCHLORIDE 100 MG: 100 CAPSULE ORAL at 05:52

## 2018-10-10 RX ADMIN — HYDRALAZINE HYDROCHLORIDE 10 MG: 10 TABLET, FILM COATED ORAL at 15:40

## 2018-10-10 RX ADMIN — LEVETIRACETAM 500 MG: 100 SOLUTION ORAL at 21:28

## 2018-10-10 RX ADMIN — CIPROFLOXACIN HYDROCHLORIDE 500 MG: 500 TABLET, FILM COATED ORAL at 08:03

## 2018-10-10 RX ADMIN — DIGOXIN 125 MCG: 125 TABLET ORAL at 08:03

## 2018-10-10 RX ADMIN — HYDRALAZINE HYDROCHLORIDE 10 MG: 10 TABLET, FILM COATED ORAL at 05:52

## 2018-10-10 RX ADMIN — AMLODIPINE BESYLATE 10 MG: 10 TABLET ORAL at 08:03

## 2018-10-10 RX ADMIN — LEVETIRACETAM 500 MG: 100 SOLUTION ORAL at 08:04

## 2018-10-10 RX ADMIN — HYDRALAZINE HYDROCHLORIDE 10 MG: 10 TABLET, FILM COATED ORAL at 21:28

## 2018-10-10 RX ADMIN — CIPROFLOXACIN HYDROCHLORIDE 500 MG: 500 TABLET, FILM COATED ORAL at 21:27

## 2018-10-10 RX ADMIN — FAMOTIDINE 20 MG: 20 TABLET ORAL at 08:03

## 2018-10-10 ASSESSMENT — PAIN SCALES - GENERAL
PAINLEVEL_OUTOF10: 0

## 2018-10-10 NOTE — PROGRESS NOTES
of events. Pt seen in dining room for tx session. Pt pleasant and cooperative as always. Objective / Goals:     Patient will tolerate recommended diet consistency w/o overt signs of aspiration or respiratory decline. GOAL MET GOAL MET   Patient will tolerate advanced texture trials with adequate mastication and timely A-P transit. GOAL MET GOAL MET   Patient will answer Y/N and basic \"wh\" questions with 90% accuracy or min cues. General Y/N questions: 82% accuracy independently    Y/N questions r/t deficits s/p CVA: 40% accuracy independently    Y/N and binary choice questions to recall events of previous evenin% accuracy independently      Min-mod cues to answer questions r/t preferences for CVA support group   Mod cues to answer questions appropriately r/t tx activities. Patient will complete graded verbal description tasks with min-mod cues. Pt correctly stated phone number x 1/4 opps. Perseverations noted. Fluent, empty speech noted in response to open ended questions for more complex topics - minimal meaningful responses elicited despite mod-max cues. Mod cues to provide verbal directions to communication partner for functional food preparation activity. Multiple paraphasias and episodes of anomia noted - pt with intermittent awareness and utilized gestures to facilitate communication at times. Min-mod cues for confrontational naming tasks. Patient will be oriented to location and situation with mod cues. Pt independently oriented to location; disoriented to situation. Pt still disoriented via Y/N questions but oriented via binary choices. Goal not directly targeted. Patient will maintain attention to task and conversational partner with mod cues. Pt with poor eye contact throughout session but consistently responded to prompts / questions. Min cues to direct attention to conversational partner. Min-no cues to sustain attention to task for food preparation activity.  When distractions present at end of session, min cues required for pt to sustain attention to conversational partner. Patient will tolerate ongoing cognitive linguistic assessment. GOAL MET GOAL MET   Other areas targeted: Insight into deficits: mod-max cues required to identify deficits s/p CVA and support required Functional problem solving: mod-max cues to identify issues and generate solutions during food preparation task. Mod cues to recall task completed and items used during tx session. Difficult to determine if d/t expressive language deficits vs recall impairments. Education:   Ongoing re: role of SLP, rationale for tx tasks, aphasia, CVA, and recommendations. Educated pt on purpose of tx tasks and skills targeted. Safety Devices: [x] Call light within reach  [x] Chair alarm activated and connected to nurse call light system  [] Bed alarm activated   [] Other:  [x] Call light within reach  [] Chair alarm activated and connected to nurse call light system  [x] Bed alarm activated   [] Other:    Assessment: Progressing towards goals. Plan:  Continue per plan of care.        Interventions used this date:  [x] Speech/Language Treatment  [] Instruction in HEP  [] Dysphagia Treatment [x] Cognitive Treatment   [] Other:     Discharge recommendations:  [] Home independently  [] Home with assistance [x]  24 hour supervision  [] ECF [] Other  Continued Tx Upon Discharge: ? [x] Yes    [] No    [] TBD based on progress while on ARU     [] Vital Stim indicated     [] Other:   Estimated discharge date: 10/20/18    Electronically signed by  Luz Elena Turpin MA CCC-SLP; [id number]  Speech-Language Pathologist

## 2018-10-11 LAB
ANION GAP SERPL CALCULATED.3IONS-SCNC: 14 MMOL/L (ref 3–16)
BUN BLDV-MCNC: 12 MG/DL (ref 7–20)
CALCIUM SERPL-MCNC: 9.7 MG/DL (ref 8.3–10.6)
CHLORIDE BLD-SCNC: 101 MMOL/L (ref 99–110)
CO2: 23 MMOL/L (ref 21–32)
CREAT SERPL-MCNC: 0.7 MG/DL (ref 0.6–1.2)
GFR AFRICAN AMERICAN: >60
GFR NON-AFRICAN AMERICAN: >60
GLUCOSE BLD-MCNC: 126 MG/DL (ref 70–99)
HCT VFR BLD CALC: 43.4 % (ref 36–48)
HEMOGLOBIN: 14.2 G/DL (ref 12–16)
MCH RBC QN AUTO: 31.1 PG (ref 26–34)
MCHC RBC AUTO-ENTMCNC: 32.7 G/DL (ref 31–36)
MCV RBC AUTO: 95.1 FL (ref 80–100)
PDW BLD-RTO: 13.3 % (ref 12.4–15.4)
PLATELET # BLD: 295 K/UL (ref 135–450)
PMV BLD AUTO: 7.2 FL (ref 5–10.5)
POTASSIUM SERPL-SCNC: 3.8 MMOL/L (ref 3.5–5.1)
RBC # BLD: 4.57 M/UL (ref 4–5.2)
SODIUM BLD-SCNC: 138 MMOL/L (ref 136–145)
WBC # BLD: 6.5 K/UL (ref 4–11)

## 2018-10-11 PROCEDURE — 97116 GAIT TRAINING THERAPY: CPT

## 2018-10-11 PROCEDURE — 97530 THERAPEUTIC ACTIVITIES: CPT

## 2018-10-11 PROCEDURE — 1280000000 HC REHAB R&B

## 2018-10-11 PROCEDURE — 36415 COLL VENOUS BLD VENIPUNCTURE: CPT

## 2018-10-11 PROCEDURE — 92507 TX SP LANG VOICE COMM INDIV: CPT

## 2018-10-11 PROCEDURE — 6370000000 HC RX 637 (ALT 250 FOR IP): Performed by: PHYSICAL MEDICINE & REHABILITATION

## 2018-10-11 PROCEDURE — 85027 COMPLETE CBC AUTOMATED: CPT

## 2018-10-11 PROCEDURE — 80048 BASIC METABOLIC PNL TOTAL CA: CPT

## 2018-10-11 PROCEDURE — 97127 HC SP THER IVNTJ W/FOCUS COG FUNCJ: CPT

## 2018-10-11 PROCEDURE — 97535 SELF CARE MNGMENT TRAINING: CPT

## 2018-10-11 RX ORDER — METHYLPHENIDATE HYDROCHLORIDE 10 MG/1
10 TABLET ORAL
Status: DISCONTINUED | OUTPATIENT
Start: 2018-10-12 | End: 2018-10-25 | Stop reason: HOSPADM

## 2018-10-11 RX ADMIN — CIPROFLOXACIN HYDROCHLORIDE 500 MG: 500 TABLET, FILM COATED ORAL at 20:32

## 2018-10-11 RX ADMIN — DIGOXIN 125 MCG: 125 TABLET ORAL at 09:22

## 2018-10-11 RX ADMIN — HYDRALAZINE HYDROCHLORIDE 10 MG: 10 TABLET, FILM COATED ORAL at 06:12

## 2018-10-11 RX ADMIN — FAMOTIDINE 20 MG: 20 TABLET ORAL at 09:22

## 2018-10-11 RX ADMIN — HYDRALAZINE HYDROCHLORIDE 10 MG: 10 TABLET, FILM COATED ORAL at 22:02

## 2018-10-11 RX ADMIN — LEVETIRACETAM 500 MG: 100 SOLUTION ORAL at 09:24

## 2018-10-11 RX ADMIN — FAMOTIDINE 20 MG: 20 TABLET ORAL at 20:32

## 2018-10-11 RX ADMIN — LEVETIRACETAM 500 MG: 100 SOLUTION ORAL at 20:32

## 2018-10-11 RX ADMIN — CIPROFLOXACIN HYDROCHLORIDE 500 MG: 500 TABLET, FILM COATED ORAL at 09:23

## 2018-10-11 RX ADMIN — AMANTADINE HYDROCHLORIDE 100 MG: 100 CAPSULE ORAL at 06:10

## 2018-10-11 RX ADMIN — ACETAMINOPHEN 650 MG: 325 TABLET ORAL at 09:22

## 2018-10-11 RX ADMIN — AMLODIPINE BESYLATE 10 MG: 10 TABLET ORAL at 09:22

## 2018-10-11 RX ADMIN — HYDRALAZINE HYDROCHLORIDE 10 MG: 10 TABLET, FILM COATED ORAL at 15:20

## 2018-10-11 ASSESSMENT — PAIN DESCRIPTION - ORIENTATION
ORIENTATION: RIGHT
ORIENTATION: RIGHT

## 2018-10-11 ASSESSMENT — PAIN SCALES - GENERAL
PAINLEVEL_OUTOF10: 3
PAINLEVEL_OUTOF10: 2
PAINLEVEL_OUTOF10: 2
PAINLEVEL_OUTOF10: 3
PAINLEVEL_OUTOF10: 0

## 2018-10-11 ASSESSMENT — PAIN DESCRIPTION - PROGRESSION: CLINICAL_PROGRESSION: OTHER (COMMENT)

## 2018-10-11 ASSESSMENT — PAIN DESCRIPTION - DESCRIPTORS: DESCRIPTORS: ACHING

## 2018-10-11 ASSESSMENT — PAIN DESCRIPTION - PAIN TYPE
TYPE: ACUTE PAIN
TYPE: ACUTE PAIN

## 2018-10-11 ASSESSMENT — PAIN DESCRIPTION - FREQUENCY
FREQUENCY: INTERMITTENT
FREQUENCY: INTERMITTENT

## 2018-10-11 ASSESSMENT — PAIN DESCRIPTION - LOCATION
LOCATION: KNEE
LOCATION: KNEE

## 2018-10-11 ASSESSMENT — PAIN DESCRIPTION - ONSET: ONSET: UNABLE TO TELL

## 2018-10-11 NOTE — PROGRESS NOTES
Patient assessment is complete. Patient is A/Ox3, VSS and afebrile. Patient denies any needs at this time. Call light is within reach, bed is in the lowest position with alarm on. Will continue to monitor.

## 2018-10-11 NOTE — PROGRESS NOTES
ACUTE REHAB UNIT  SPEECH/LANGUAGE PATHOLOGY      [x] Daily  [] Weekly Care Conference Note  [] Discharge    Patient:Madelin Orozco      :1935  MARY:[id number]  Rehab Dx/Hx: Acute ischemic stroke ([facility] [address] [I63.9]    Precautions: [x] Aspiration  [x] Fall risk  [] Sternal  [] Seizure [] Hip  [] Weight Bearing [] Other  Lives With: Alone  ST Dx: [x] Aphasia  [] Dysarthria  [] Apraxia   [x] Oropharyngeal dysphagia [x] Cognitive Impairment  [] Other:   Date of Admit: 2018  Room #: 3107/3107-01  Date: 10/11/2018          Current Diet Order:Dietary Nutrition Supplements: Low Calorie High Protein Supplement  DIET DENTAL SOFT; No Drinking Straw   Recommended Form of Meds: Crushed in puree as able  Compensatory Swallowing Strategies: Upright as possible for all oral intake, Alternate solids and liquids, No straws, Small bites/sips, Check for pocketing of food on the Right (supervision during PO intake with cues for small bites / slow rate)     Dentition: Some missing teeth, Dentures bottom, Dentures top (upper and lower partials)  Vision  Vision: Impaired  Vision Exceptions:  (wears glasses)  Hearing  Hearing: Within functional limits  Barriers toward progress: cognitive status, severity of deficits    Date: 10/11/2018      Tx session 1 Tx Session 2   Total Timed Code Min 15 15   Total Treatment Minutes 30 30   Individual Treatment Minutes 30 30   Group Treatment Minutes 0 0   Co-Treat Minutes 0 0   Brief Exception: N/A N/A   Pain Right knee pain  None reported   Pain Intervention: [] RN notified  [] Repositioned  [] Intervention offered and patient declined  [] N/A  [x] Other: RN present, aware and administering medications. [] RN notified  [] Repositioned  [] Intervention offered and patient declined  [x] N/A  [] Other:   Subjective:     Patient pleasant throughout therapy session. Upright in chair and agreeable to therapy. Patient attends therapy session alone.     Objective / Goals:     Patient will tolerate

## 2018-10-12 PROCEDURE — 92507 TX SP LANG VOICE COMM INDIV: CPT

## 2018-10-12 PROCEDURE — 6370000000 HC RX 637 (ALT 250 FOR IP): Performed by: PHYSICAL MEDICINE & REHABILITATION

## 2018-10-12 PROCEDURE — 97116 GAIT TRAINING THERAPY: CPT

## 2018-10-12 PROCEDURE — 97530 THERAPEUTIC ACTIVITIES: CPT

## 2018-10-12 PROCEDURE — 97535 SELF CARE MNGMENT TRAINING: CPT

## 2018-10-12 PROCEDURE — 97127 HC SP THER IVNTJ W/FOCUS COG FUNCJ: CPT

## 2018-10-12 PROCEDURE — 1280000000 HC REHAB R&B

## 2018-10-12 RX ADMIN — CIPROFLOXACIN HYDROCHLORIDE 500 MG: 500 TABLET, FILM COATED ORAL at 21:31

## 2018-10-12 RX ADMIN — LEVETIRACETAM 500 MG: 100 SOLUTION ORAL at 08:28

## 2018-10-12 RX ADMIN — LEVETIRACETAM 500 MG: 100 SOLUTION ORAL at 21:34

## 2018-10-12 RX ADMIN — FAMOTIDINE 20 MG: 20 TABLET ORAL at 08:27

## 2018-10-12 RX ADMIN — ACETAMINOPHEN 650 MG: 325 TABLET ORAL at 11:12

## 2018-10-12 RX ADMIN — HYDRALAZINE HYDROCHLORIDE 10 MG: 10 TABLET, FILM COATED ORAL at 21:30

## 2018-10-12 RX ADMIN — HYDRALAZINE HYDROCHLORIDE 10 MG: 10 TABLET, FILM COATED ORAL at 05:52

## 2018-10-12 RX ADMIN — CIPROFLOXACIN HYDROCHLORIDE 500 MG: 500 TABLET, FILM COATED ORAL at 08:27

## 2018-10-12 RX ADMIN — FAMOTIDINE 20 MG: 20 TABLET ORAL at 21:30

## 2018-10-12 RX ADMIN — METHYLPHENIDATE HYDROCHLORIDE 10 MG: 10 TABLET ORAL at 06:06

## 2018-10-12 RX ADMIN — AMLODIPINE BESYLATE 10 MG: 10 TABLET ORAL at 08:27

## 2018-10-12 RX ADMIN — DIGOXIN 125 MCG: 125 TABLET ORAL at 08:27

## 2018-10-12 RX ADMIN — HYDRALAZINE HYDROCHLORIDE 10 MG: 10 TABLET, FILM COATED ORAL at 14:38

## 2018-10-12 ASSESSMENT — PAIN SCALES - GENERAL
PAINLEVEL_OUTOF10: 0

## 2018-10-12 NOTE — PROGRESS NOTES
Occupational Therapy  Facility/Department: [facility] ACUTE REHAB UNIT  Daily Treatment Note  NAME: Evans Rosa  : 1935  MRN: [id number]    Date of Service: 10/12/2018    Discharge Recommendations:  Home with assist PRN, Home with Home health OT  OT Equipment Recommendations  Equipment Needed: No  Other: patient reports that she has a shower chair but no other equipment at baseline; recommend confirmation with family/caregiver to determine what equipment is currently used at baseline. Patient Diagnosis(es): There were no encounter diagnoses. has a past medical history of CHF (congestive heart failure) ([address] Hyperlipidemia; and Hypertension. has a past surgical history that includes hip surgery. Restrictions  Position Activity Restriction  Other position/activity restrictions: Up with assist  Subjective   General  Chart Reviewed: Yes  Patient assessed for rehabilitation services?: Yes  Additional Pertinent Hx: 82 y.o. female admit to ARU . Hospital course:  Admit  to [facility] ED with hemorrhagic stroke, present with R side weakness and R facial droop. Transferred to [facility] on . CT head - L side hemorrhage involving caudate, basal ganglia and hyperdensities in L front lobe;  CXR - bilateral airspace disease;  MRI brain - left basal ganglia infarct with 2 areas of acute hemorrhage. Carotid doppler - L side stenosis;    PMHx - CHF, hyperlipidemia, HTN, hip surgery  Response to previous treatment: Patient with no complaints from previous session  Family / Caregiver Present: No  Referring Practitioner: Kp  Diagnosis: CVA  Subjective  Subjective: Pt seated in wc upon entry, breakfast in front of pt and pt stated \"I'm not hungry. \" Pt pleasant and agreeable to therapy session. General Comment  Comments: Pt doffed socks with use of reacher. Pt seated in wc with re-education to use sock aid for donning socks. Pt with soft sock aid to don socks.  Pt donned shoes with Min A for placement

## 2018-10-12 NOTE — PROGRESS NOTES
Majority of session took place outside of pt's room while completing path finding task to unfamiliar location in hospital.   Objective / Goals:     Patient will tolerate recommended diet consistency w/o overt signs of aspiration or respiratory decline. GOAL MET GOAL MET   Patient will tolerate advanced texture trials with adequate mastication and timely A-P transit. GOAL MET GOAL MET   Patient will answer Y/N and basic \"wh\" questions with 90% accuracy or min cues. Y/N orientation questions: 75% accuracy with min-no cues. General \"wh\" questions: 70% accuracy with min cues. \"wh\" comprehension questions r/t written directions: 60% accuracy with min cues. Goal not directly targeted. Patient will complete graded verbal description tasks with min-mod cues. Min-mod cues to name items required to complete oral care    Oral reading of written directions (5 steps): paraphasias exhibited on 3 words across 5 sentences. Pt unaware of errors despite cues. Mod cues for naming items. Pt with spontaneous comments re: items that were appropriate with more complex grammatical forms. Min cues to express preferences and basic wants / needs    Max fading to min cues to provide appropriate verbal directions for navigation based on written directions. Patient will be oriented to location and situation with mod cues. Pt independently oriented to location; disoriented to situation. Given binary choices, pt oriented to situation. After 10 minute delay, mod-max cues required to reorient to situation and location. When given written aid, pt independently answered questions correctly re: location and situation. Given 5 minute delay after reviewing written aid, pt recalled being asked orientation questions \"a few minutes ago\" but only oriented to location and disoriented to situation. Goal not directly targeted. Patient will maintain attention to task and conversational partner with mod cues.    Min-no cues to attend to conversational partner or verbal tasks presented. Mod-max cues to sustain attention with distractors present for path finding activity. Pt required cues to initiate next step in directions and attend to surroundings. Patient will tolerate ongoing cognitive linguistic assessment. GOAL MET GOAL MET   Other areas targeted: Abstract reasoning - odd one out:  -Identify item that did not belong in group of 4: 67% accuracy independently  -Explain rationale: 67% accuracy. Paraphasias noted. Functional recall: pt recalled mobility tasks completed with OT this AM independently   Functional recall: pt initially without recall of planned destination path finding task but recognized correctly when given 2 choices. After completion of task, pt recalled 2/2 items purchased with mod cues. Education:   Educated pt to rationale for tx tasks and skills targeted. Educated pt to purpose of tx and role of SLP   Safety Devices: [x] Call light within reach  [x] Chair alarm activated and connected to nurse call light system  [] Bed alarm activated   [] Other:  [x] Call light within reach  [x] Chair alarm activated  [] Bed alarm activated   [x] Other: pt left in gym with PT for scheduled tx session     Assessment: Progressing towards goals. Pt with adequate attention for verbal and conversational tasks, increased difficulty sustaining attention for navigation task. Reduced cueing required to express basic wants / needs this date. Plan:  Continue per plan of care.        Interventions used this date:  [x] Speech/Language Treatment  [] Instruction in HEP  [] Dysphagia Treatment [x] Cognitive Treatment   [] Other:     Discharge recommendations:  [] Home independently  [] Home with assistance [x]  24 hour supervision  [] ECF [] Other  Continued Tx Upon Discharge: ? [x] Yes    [] No    [] TBD based on progress while on ARU     [] Vital Stim indicated     [] Other:   Estimated discharge date: 10/20/18    Electronically

## 2018-10-12 NOTE — PROGRESS NOTES
Physical Therapy  Facility/Department: [facility] ACUTE REHAB UNIT  Daily Treatment Note  NAME: Evans Rosa  : 1935  MRN: [id number]    Date of Service: 10/12/2018    Discharge Recommendations:  Home with Home health PT, 24 hour supervision or assist   PT Equipment Recommendations  Equipment Needed: Yes  Mobility Devices: [place] Signs: Rolling    Patient Diagnosis(es): There were no encounter diagnoses. has a past medical history of CHF (congestive heart failure) ([address] Hyperlipidemia; and Hypertension. has a past surgical history that includes hip surgery. Restrictions  Position Activity Restriction  Other position/activity restrictions: Up with assist  Subjective   General  Chart Reviewed: Yes  Additional Pertinent Hx: Pt is an 82 y.o. female admitted to ARU on 18 from [facility]. Pt initially admitted to [facility] from [facility] with complaints of altered mental status. CT Head: positive for hemorrhage in L caudate and putamen. Conservative management per neurosurgery. PMH: CHF, hyperlipidemia, HTN. Family / Caregiver Present: No  Referring Practitioner: Dr. Sabrina Bright  Subjective  Subjective: PT asking, \"You tell me you have 10 stairs at home, but you tell me you have to sit after 6 now. What will happen at home if you need to sit after 6? \"  Pt responding, \"Well then I will sit. \"  PT stating, \"How will you get up from sitting on the steps? \"  Pt responding, \"I don't know. I guess it will be ok. \"  General Comment  Comments: Pt found seated in w/c upon PT arrival.  Pt agreeable to therapy session.   Pain Screening  Patient Currently in Pain: Yes (Pt reported 3/10 RLE pain, RN aware)  Vital Signs  Patient Currently in Pain: Yes (Pt reported 3/10 RLE pain, RN aware)       Orientation     Objective      Transfers  Sit to Stand: Minimal Assistance (verbal cues for hand placement, intermittent increased time to initiate transfers (pt seemingly waiting for PT to assist without attempting) however increased

## 2018-10-13 PROCEDURE — 6370000000 HC RX 637 (ALT 250 FOR IP): Performed by: PHYSICAL MEDICINE & REHABILITATION

## 2018-10-13 PROCEDURE — 1280000000 HC REHAB R&B

## 2018-10-13 RX ADMIN — CIPROFLOXACIN HYDROCHLORIDE 500 MG: 500 TABLET, FILM COATED ORAL at 20:59

## 2018-10-13 RX ADMIN — HYDRALAZINE HYDROCHLORIDE 10 MG: 10 TABLET, FILM COATED ORAL at 08:24

## 2018-10-13 RX ADMIN — CIPROFLOXACIN HYDROCHLORIDE 500 MG: 500 TABLET, FILM COATED ORAL at 08:20

## 2018-10-13 RX ADMIN — AMLODIPINE BESYLATE 10 MG: 10 TABLET ORAL at 08:20

## 2018-10-13 RX ADMIN — METHYLPHENIDATE HYDROCHLORIDE 10 MG: 10 TABLET ORAL at 05:37

## 2018-10-13 RX ADMIN — HYDRALAZINE HYDROCHLORIDE 10 MG: 10 TABLET, FILM COATED ORAL at 05:37

## 2018-10-13 RX ADMIN — HYDRALAZINE HYDROCHLORIDE 10 MG: 10 TABLET, FILM COATED ORAL at 21:00

## 2018-10-13 RX ADMIN — FAMOTIDINE 20 MG: 20 TABLET ORAL at 08:24

## 2018-10-13 RX ADMIN — HYDRALAZINE HYDROCHLORIDE 10 MG: 10 TABLET, FILM COATED ORAL at 13:59

## 2018-10-13 RX ADMIN — DIGOXIN 125 MCG: 125 TABLET ORAL at 08:20

## 2018-10-13 RX ADMIN — LEVETIRACETAM 500 MG: 100 SOLUTION ORAL at 20:59

## 2018-10-13 RX ADMIN — FAMOTIDINE 20 MG: 20 TABLET ORAL at 21:00

## 2018-10-13 RX ADMIN — LEVETIRACETAM 500 MG: 100 SOLUTION ORAL at 08:24

## 2018-10-13 ASSESSMENT — PAIN SCALES - GENERAL
PAINLEVEL_OUTOF10: 0

## 2018-10-13 NOTE — PLAN OF CARE
Problem: Falls - Risk of:  Goal: Will remain free from falls  Will remain free from falls   Outcome: Ongoing  Pt. remains at risk for falls due to impaired gait and weakness. Fall prevention measures in place to promote safety and reduce the risk of falls. Pt. Will remain free of falls during hospitalization. Problem: Risk for Impaired Skin Integrity  Goal: Tissue integrity - skin and mucous membranes  Structural intactness and normal physiological function of skin and  mucous membranes. Outcome: Ongoing  Skin is kept clean and dry. Pt. Turned and repositioned to relieve pressure off bony prominences and to prevent the risk of skin break down. No s/sx of skin breakdown noted. Will continue to monitor.

## 2018-10-14 PROCEDURE — 1280000000 HC REHAB R&B

## 2018-10-14 PROCEDURE — 6370000000 HC RX 637 (ALT 250 FOR IP): Performed by: PHYSICAL MEDICINE & REHABILITATION

## 2018-10-14 RX ADMIN — FAMOTIDINE 20 MG: 20 TABLET ORAL at 20:24

## 2018-10-14 RX ADMIN — LEVETIRACETAM 500 MG: 100 SOLUTION ORAL at 09:01

## 2018-10-14 RX ADMIN — HYDRALAZINE HYDROCHLORIDE 10 MG: 10 TABLET, FILM COATED ORAL at 06:34

## 2018-10-14 RX ADMIN — LEVETIRACETAM 500 MG: 100 SOLUTION ORAL at 20:24

## 2018-10-14 RX ADMIN — DIGOXIN 125 MCG: 125 TABLET ORAL at 09:00

## 2018-10-14 RX ADMIN — CIPROFLOXACIN HYDROCHLORIDE 500 MG: 500 TABLET, FILM COATED ORAL at 09:00

## 2018-10-14 RX ADMIN — HYDRALAZINE HYDROCHLORIDE 10 MG: 10 TABLET, FILM COATED ORAL at 20:24

## 2018-10-14 RX ADMIN — AMLODIPINE BESYLATE 10 MG: 10 TABLET ORAL at 09:00

## 2018-10-14 RX ADMIN — METHYLPHENIDATE HYDROCHLORIDE 10 MG: 10 TABLET ORAL at 06:30

## 2018-10-14 RX ADMIN — FAMOTIDINE 20 MG: 20 TABLET ORAL at 09:00

## 2018-10-14 RX ADMIN — HYDRALAZINE HYDROCHLORIDE 10 MG: 10 TABLET, FILM COATED ORAL at 13:29

## 2018-10-14 RX ADMIN — CIPROFLOXACIN HYDROCHLORIDE 500 MG: 500 TABLET, FILM COATED ORAL at 20:24

## 2018-10-14 ASSESSMENT — PAIN SCALES - GENERAL
PAINLEVEL_OUTOF10: 0

## 2018-10-14 NOTE — PLAN OF CARE
Problem: Falls - Risk of:  Goal: Will remain free from falls  Will remain free from falls   Outcome: Ongoing  Pt is a high fall risk. Bed is locked and in lowest position, call light/bedside table/belongings are within reach. Bed/chair alarms are activated, pt calls out appropriately. Will continue to monitor. Problem: Risk for Impaired Skin Integrity  Goal: Tissue integrity - skin and mucous membranes  Structural intactness and normal physiological function of skin and  mucous membranes. Outcome: Ongoing  Pt has no new skin breakdown this shift. Will continue to monitor    Problem: Pain:  Goal: Pain level will decrease  Pain level will decrease   Outcome: Ongoing  Pt has had no c/o pain this shift.  Will monitor

## 2018-10-15 LAB
ANION GAP SERPL CALCULATED.3IONS-SCNC: 14 MMOL/L (ref 3–16)
BUN BLDV-MCNC: 14 MG/DL (ref 7–20)
CALCIUM SERPL-MCNC: 9.5 MG/DL (ref 8.3–10.6)
CHLORIDE BLD-SCNC: 104 MMOL/L (ref 99–110)
CO2: 22 MMOL/L (ref 21–32)
CREAT SERPL-MCNC: 0.7 MG/DL (ref 0.6–1.2)
GFR AFRICAN AMERICAN: >60
GFR NON-AFRICAN AMERICAN: >60
GLUCOSE BLD-MCNC: 113 MG/DL (ref 70–99)
HCT VFR BLD CALC: 43.4 % (ref 36–48)
HEMOGLOBIN: 14.2 G/DL (ref 12–16)
MCH RBC QN AUTO: 31.1 PG (ref 26–34)
MCHC RBC AUTO-ENTMCNC: 32.8 G/DL (ref 31–36)
MCV RBC AUTO: 94.9 FL (ref 80–100)
PDW BLD-RTO: 13.6 % (ref 12.4–15.4)
PLATELET # BLD: 304 K/UL (ref 135–450)
PMV BLD AUTO: 6.8 FL (ref 5–10.5)
POTASSIUM SERPL-SCNC: 4 MMOL/L (ref 3.5–5.1)
RBC # BLD: 4.57 M/UL (ref 4–5.2)
SODIUM BLD-SCNC: 140 MMOL/L (ref 136–145)
WBC # BLD: 7.6 K/UL (ref 4–11)

## 2018-10-15 PROCEDURE — 6370000000 HC RX 637 (ALT 250 FOR IP): Performed by: PHYSICAL MEDICINE & REHABILITATION

## 2018-10-15 PROCEDURE — 97530 THERAPEUTIC ACTIVITIES: CPT

## 2018-10-15 PROCEDURE — 36415 COLL VENOUS BLD VENIPUNCTURE: CPT

## 2018-10-15 PROCEDURE — 92507 TX SP LANG VOICE COMM INDIV: CPT

## 2018-10-15 PROCEDURE — 80048 BASIC METABOLIC PNL TOTAL CA: CPT

## 2018-10-15 PROCEDURE — 1280000000 HC REHAB R&B

## 2018-10-15 PROCEDURE — 97116 GAIT TRAINING THERAPY: CPT

## 2018-10-15 PROCEDURE — 97535 SELF CARE MNGMENT TRAINING: CPT

## 2018-10-15 PROCEDURE — 97110 THERAPEUTIC EXERCISES: CPT

## 2018-10-15 PROCEDURE — 85027 COMPLETE CBC AUTOMATED: CPT

## 2018-10-15 PROCEDURE — 97127 HC SP THER IVNTJ W/FOCUS COG FUNCJ: CPT

## 2018-10-15 RX ADMIN — CIPROFLOXACIN HYDROCHLORIDE 500 MG: 500 TABLET, FILM COATED ORAL at 21:50

## 2018-10-15 RX ADMIN — FAMOTIDINE 20 MG: 20 TABLET ORAL at 09:10

## 2018-10-15 RX ADMIN — LEVETIRACETAM 500 MG: 100 SOLUTION ORAL at 21:50

## 2018-10-15 RX ADMIN — LEVETIRACETAM 500 MG: 100 SOLUTION ORAL at 09:10

## 2018-10-15 RX ADMIN — DIGOXIN 125 MCG: 125 TABLET ORAL at 09:10

## 2018-10-15 RX ADMIN — HYDRALAZINE HYDROCHLORIDE 10 MG: 10 TABLET, FILM COATED ORAL at 14:30

## 2018-10-15 RX ADMIN — CIPROFLOXACIN HYDROCHLORIDE 500 MG: 500 TABLET, FILM COATED ORAL at 09:10

## 2018-10-15 RX ADMIN — FAMOTIDINE 20 MG: 20 TABLET ORAL at 21:50

## 2018-10-15 RX ADMIN — AMLODIPINE BESYLATE 10 MG: 10 TABLET ORAL at 09:10

## 2018-10-15 RX ADMIN — HYDRALAZINE HYDROCHLORIDE 10 MG: 10 TABLET, FILM COATED ORAL at 06:35

## 2018-10-15 RX ADMIN — METHYLPHENIDATE HYDROCHLORIDE 10 MG: 10 TABLET ORAL at 06:35

## 2018-10-15 RX ADMIN — HYDRALAZINE HYDROCHLORIDE 10 MG: 10 TABLET, FILM COATED ORAL at 21:50

## 2018-10-15 ASSESSMENT — PAIN SCALES - GENERAL
PAINLEVEL_OUTOF10: 0

## 2018-10-15 NOTE — PROGRESS NOTES
[facility]  10/15/2018  [id number]    Chief Complaint: right sided weakness    Subjective:   Resting in wc this morning. No new issues over the weekend. ROS: No CP, SOB, dyspnea    Objective:  Patient Vitals for the past 24 hrs:   BP Temp Temp src Pulse Resp SpO2 Weight   10/15/18 0907 130/61 98.4 °F (36.9 °C) Oral 82 18 99 % -   10/15/18 0635 (!) 126/55 - - - - - 231 lb 0.7 oz (104.8 kg)   10/14/18 2013 115/69 98.7 °F (37.1 °C) Oral 84 16 94 % -   10/14/18 1326 125/68 - - - - - -     Gen: No distress, pleasant. Resting in bed  HEENT: Normocephalic, atraumatic. CV: Regular rate and irregular rhythm. No MRG  Resp: No respiratory distress. CTAB  Abd: Soft, nontender nondistended  Ext: No edema. Neuro: Slightly lethargic but oriented, appropriately interactive. LUE 4+/5 RUE 4/5 RLE 4/5 LLE 4/5    Laboratory data: Available via EMR. Therapy progress:  PT  Position Activity Restriction  Other position/activity restrictions:  Up with assist  Objective     Sit to Stand: Minimal Assistance (With heavy cues and tactile cues to initiate the transfer)  Stand to sit: Stand by assistance, Contact guard assistance  Bed to Chair: Dependent/Total (ModAx2 from EOB to shower chair, heavy verbal cues and tactile cues for sequencing, pt with difficulty placing weight onto LLE in order to move RLE, pt becoming fatigued and requiring ModAx2 to guide hips to surface, ModA to move RLE)  Stand Pivot Transfers: Minimal Assistance (w/c to low mat with RW, w/c to recliner with RW)  Device: Rolling Walker  Other Apparatus: Wheelchair follow  Assistance: Stand by assistance  Distance: 140 feet with 1 standing rest break and 35 feet x 2  OT  PT Equipment Recommendations  Equipment Needed: Yes  Mobility Devices: [place] Hunger: Rolling  Other: continue to assess, pt reports that she does not own any DME  Toilet - Technique: Ambulating  Equipment Used: Raised toilet seat with rails  Assessment        SLP  Current Diet : [facility]

## 2018-10-15 NOTE — CARE COORDINATION
After report from rehab team, CM contacted [facility]  ([phone]). Skilled bed available. [phone] [facility] in admissions  Fax: [phone]    BRANDIE Hillman, PT/OT, Med list, faxed for review. Thank you,     Christian Mendez RN Case Manager  [facility].   P: [phone]      12:27 Update: Patient approved for [facility]. Joseline to start pre-cert with Bal. Will update CM.        10/16/18  9:19 am CM spoke with Rebel Tristan ([phone]) to update on d/c plan for SNF. Mary Barrientos may be able to transport patient but she needs notice of d/c date and time.       Thank you,     Christian Mendez RN Case Manager  [facility].   P: [phone]

## 2018-10-15 NOTE — PATIENT CARE CONFERENCE
The [address] [place] [address] Rehabilitation  Weekly Team Conference Note    Patient Name: Terry Garay        MRN: [id number]    : 1935  (82 y.o.)  Gender: female   Referring Practitioner: Dr. Vilma Agudelo  Diagnosis: Acute ischemic stroke    The team conference for this patient was held on 10/16/2018 at 1:30pm by:  Jagruti Miller. Brittani Yepez MD.    PHYSICAL THERAPY:  Bed Mobility: Scooting: Stand by assistance    Transfers:  Sit to Stand: Minimal Assistance (With heavy cues and tactile cues to initiate the transfer)  Stand to sit: Stand by assistance, Contact guard assistance  Bed to Chair: Dependent/Total (ModAx2 from EOB to shower chair, heavy verbal cues and tactile cues for sequencing, pt with difficulty placing weight onto LLE in order to move RLE, pt becoming fatigued and requiring ModAx2 to guide hips to surface, ModA to move RLE)  Stand Pivot Transfers: Minimal Assistance (w/c to low mat with RW, w/c to recliner with RW)  Comment: Pt found to be incontinent of urine upon PT arrival.  Pt stating that she was not aware that she had been incontinent. Pt [address] for pants management due to incontinence. Pt seated on toilet for several minutes, but did not void. Pt pulling up pants in stand with CGA for balance.     Ambulation 1  Surface: level tile  Device: Rolling Walker  Other Apparatus: Wheelchair follow  Assistance: Stand by assistance  Quality of Gait: Forward trunk flexion, decreased R step length, decreased louise  Distance: 140 feet with 1 standing rest break and 35 feet x 2  Comments: occasional verbal and tactile cues for upright posture    Stairs  # Steps : 6  Stairs Height: 6\"  Rails: Bilateral  Assistance: Minimal assistance  Comment: Step to pattern ascending and descending stairs, cues for correct sequencing    FIMS:  Bed, Chair, Wheel Chair: 3 - Requires 25-49% assistance to transfer  Walk: 5 - Supervision Requires standby supervision or cuing to walk at least 150 feet  Distance to patient, staff empties colostomy  Bowel Frequency of Accidents:  (no value)    Laird Fall Risk Score: 65  Wounds/Incisions/Ulcers: None  Medication Review: reviewed with pt  Pain: No complaints  Consultations/Labs/X-rays: Slightly elevated glucose at times-Pt is not an AcuCheck    Patient/Family Education provided by team:  Role of PT/OT, Importance of increasing number of stairs ascend/descend, LB AE for dressing    CASE MANAGEMENT:  Assessment:    CM spoke with Patient at the bedside. Pt has fluctuating disorientation and is not a reliable narrator.      CM called emergency contact Granddaughter Antolin Gibbons ([phone]) and left a message requesting a call back to complete initial assessment and begin discharge planning from the ARU.     Pt lives in an apartment at [employer]. Prior to coming to the ARU, prior authorization and HENS were completed for the Pt to [facility] ([phone]). Home Medical Care  Patient receiving aid services through [place] [facility]  Pharmacy: UNKNOWN   Potential Assistance Purchasing Medications:  Yes  Does patient want to participate in local refill/meds to beds program?: Yes     Goals of Care  Patient expects to be discharged to[de-identified] continued therapy at other facility; [address]  Patient plans for SNF: Yes   Mode of transport from hospital: Possibly Granddaughter Mary Barrientos      Barriers to discharge: Patient is not a reliable historian. Coordination with family will be one barrier to completing a plan for discharge.     JOSH Granddaughter Mary Barrientos [phone]. Mary Barrientos verified patient currently lives alone at senior apartments, has aid services and does not drive. JOSH advised Alfonso to re-screen for services with [facility]. Mary Barrientos verified interest in discharge to [facility] if appropriate. [facility] if HHC is recommended.      CM will continue to follow for recommendations, DME needs.      TEAM SUMMARY: Pt continues to be progressing slowly with therapies, limited by decreased cognition    DISCHARGE PLAN:  Risk for Readmission: Moderate (10-19)   Critical Items: If High Risk, consider the following recommendations:Home Health Nursing   Estimated Length of Stay: 24 days  Destination: skilled nursing facility  Services at Discharge: SNF Physical Therapy, Occupational Therapy, Speech Therapy and Nursing 3x week  Community Resources:   Equipment at Discharge: TBD at SNF  Factors facilitating achievement of predicted outcomes: cooperative with tx, pleasant  Barriers to the achievement of predicted outcomes: cognitive status, severity of deficits, fluctuations in performance    Interdisciplinary Individualized Plan of Care Review:    · Continue Current Plan of Care: Yes    · Modifications:_____________________________    Special Needs in the Upcoming Week :    [x] Family/Caregiver Education  [] Home visit  []Therapeutic Pass   [] Consults:_______   [] Family Training  [] Other;_______    Patient Goals for Rehab stay:  1. \"to clear my mind\"    Rehab Team Goals for patient for the Upcoming Week:  1. Improved independence with functional problem solving skills    Rehab Team Members in attendance for Team Conference:  :  Leanne Paris    Nurse Manager:  Waleska Enriquez, MSN, RN    Therapy Manager:  Tima Suarez, PT, DPT    Social Work:  KARIS PleitezN, RN    Nursing: Jose Lerma, WILFRID Kenney, WILFRID    Therapy:  Tushar Reese, PT, DPT  Monica Nathan, PT, DPT    Heriberto Castaneda, OTR/L  ADITHYA [facility], OTR/L    Herbie Ross MA/CCC-SLP  Kody Haynes MA/CCC-SLP    Nutrition:  Lydia Rios, RD LD    I approve the established interdisciplinary plan of care as documented within the medical record of [facility]. MD Kelsey Beaulieu MD 10/17/2018, 9:26 AM

## 2018-10-15 NOTE — PROGRESS NOTES
ACUTE REHAB UNIT  SPEECH/LANGUAGE PATHOLOGY      [x] Daily  [] Weekly Care Conference Note  [] Discharge    Patient:Madelin Sorensen      :1935  XAL:[id number]  Rehab Dx/Hx: Acute ischemic stroke ([facility] [address] [I63.9]    Precautions: [x] Aspiration  [x] Fall risk  [] Sternal  [] Seizure [] Hip  [] Weight Bearing [] Other  Lives With: Alone  ST Dx: [x] Aphasia  [] Dysarthria  [] Apraxia   [x] Oropharyngeal dysphagia [x] Cognitive Impairment  [] Other:   Date of Admit: 2018  Room #: 3107/3107-01  Date: 10/15/2018          Current Diet Order:Dietary Nutrition Supplements: Low Calorie High Protein Supplement  DIET DENTAL SOFT; No Drinking Straw   Recommended Form of Meds: Crushed in puree as able  Compensatory Swallowing Strategies: Upright as possible for all oral intake, Alternate solids and liquids, No straws, Small bites/sips, Check for pocketing of food on the Right (supervision during PO intake with cues for small bites / slow rate)     Dentition: Some missing teeth, Dentures bottom, Dentures top (upper and lower partials)  Vision  Vision: Impaired  Vision Exceptions:  (wears glasses)  Hearing  Hearing: Within functional limits  Barriers toward progress: cognitive status, severity of deficits    Date: 10/15/2018      Tx session 1 Tx Session 2   Total Timed Code Min 20 15   Total Treatment Minutes 34 30   Individual Treatment Minutes 34 30   Group Treatment Minutes 0 0   Co-Treat Minutes 0 0   Brief Exception: N/A N/A   Pain Denied when questioned Denied when questioned   Pain Intervention: [] RN notified  [] Repositioned  [] Intervention offered and patient declined  [x] N/A  [] Other:  [] RN notified  [] Repositioned  [] Intervention offered and patient declined  [x] N/A  [] Other:   Subjective:     Pt up in w/c with breakfast tray at side - limited amount of meal consumed thus far and appeared to be d/t poor positioning (tray situated fully to pt's left vs in front).  Pt with spontaneous, appropriate utterances

## 2018-10-15 NOTE — PROGRESS NOTES
Occupational Therapy  Facility/Department: [facility] ACUTE REHAB UNIT  Daily Treatment Note  NAME: Yennifer Shi  : 1935  MRN: [id number]    Date of Service: 10/15/2018    Discharge Recommendations:  Home with assist PRN, Home with Home health OT  OT Equipment Recommendations  Other: patient reports that she has a shower chair but no other equipment at baseline; recommend confirmation with family/caregiver to determine what equipment is currently used at baseline. Patient Diagnosis(es): There were no encounter diagnoses. has a past medical history of CHF (congestive heart failure) ([facility] [address] Hyperlipidemia; and Hypertension. has a past surgical history that includes hip surgery. Restrictions  Position Activity Restriction  Other position/activity restrictions: Up with assist  Subjective   General  Chart Reviewed: Yes  Patient assessed for rehabilitation services?: Yes  Additional Pertinent Hx: 82 y.o. female admit to ARU . Hospital course:  Admit  to [facility] ED with hemorrhagic stroke, present with R side weakness and R facial droop. Transferred to [facility] on . CT head - L side hemorrhage involving caudate, basal ganglia and hyperdensities in L front lobe;  CXR - bilateral airspace disease;  MRI brain - left basal ganglia infarct with 2 areas of acute hemorrhage. Carotid doppler - L side stenosis;    PMHx - CHF, hyperlipidemia, HTN, hip surgery  Response to previous treatment: Patient with no complaints from previous session  Family / Caregiver Present: No  Referring Practitioner: Kp  Diagnosis: CVA  Subjective  Subjective: Pt seated in wc upon entry. Pt very  pleasant and agreeable to therapy session. General Comment  Comments: Pt sit to stand CGA and pt ambulated ~15 ft at A with rw. Pt toilet transfer CGA with use of grab bars and pt toileted at [facility]. Pt then stood at sink at [employer] ~12 min for grooming (oral care/wash face/wash hands/hair) performed at [address].  Pt the

## 2018-10-15 NOTE — PROGRESS NOTES
Nutrition Assessment    Type and Reason for Visit: Reassess    Malnutrition Assessment:  · Malnutrition Status: At risk for malnutrition    Nutrition Diagnosis:   · Problem: Inadequate oral intake  · Etiology: Insufficient energy/nutrient consumption    Signs and symptoms: Intake 0-25%, Intake 25-50%    Nutrition Assessment:  · Subjective Assessment: Pt not in room upon RD visit. Per I/O, po intake improved, consuming % of her meals. LYNNE ONS intake at this time. Nutrition Risk Level   Risk Level: Low    Nutrition Intervention  Food and/or Delivery: Continue current diet, Continue current ONS  Nutrition Education/Counseling/Coordination of Care:  Continued Inpatient Monitoring    Patient assessed for nutrition risk. Deemed to be at low risk at this time. Will continue to follow patient.       Electronically signed by Benita Garcia RD, [place] on 10/15/18 at 1:08 PM    GINA ALEXIS, WESTLEY  Pager:  [phone]  Office:  [phone]

## 2018-10-16 PROCEDURE — 6370000000 HC RX 637 (ALT 250 FOR IP): Performed by: PHYSICAL MEDICINE & REHABILITATION

## 2018-10-16 PROCEDURE — 97110 THERAPEUTIC EXERCISES: CPT

## 2018-10-16 PROCEDURE — 97535 SELF CARE MNGMENT TRAINING: CPT

## 2018-10-16 PROCEDURE — 97116 GAIT TRAINING THERAPY: CPT

## 2018-10-16 PROCEDURE — 97530 THERAPEUTIC ACTIVITIES: CPT

## 2018-10-16 PROCEDURE — 97127 HC OT THER IVNTJ W/FOCUS COG FUNCJ: CPT

## 2018-10-16 PROCEDURE — 92507 TX SP LANG VOICE COMM INDIV: CPT

## 2018-10-16 PROCEDURE — 97127 HC SP THER IVNTJ W/FOCUS COG FUNCJ: CPT

## 2018-10-16 PROCEDURE — 1280000000 HC REHAB R&B

## 2018-10-16 RX ADMIN — HYDRALAZINE HYDROCHLORIDE 10 MG: 10 TABLET, FILM COATED ORAL at 07:55

## 2018-10-16 RX ADMIN — CIPROFLOXACIN HYDROCHLORIDE 500 MG: 500 TABLET, FILM COATED ORAL at 07:56

## 2018-10-16 RX ADMIN — CIPROFLOXACIN HYDROCHLORIDE 500 MG: 500 TABLET, FILM COATED ORAL at 20:39

## 2018-10-16 RX ADMIN — HYDRALAZINE HYDROCHLORIDE 10 MG: 10 TABLET, FILM COATED ORAL at 20:42

## 2018-10-16 RX ADMIN — LEVETIRACETAM 500 MG: 100 SOLUTION ORAL at 07:59

## 2018-10-16 RX ADMIN — AMLODIPINE BESYLATE 10 MG: 10 TABLET ORAL at 07:57

## 2018-10-16 RX ADMIN — DIGOXIN 125 MCG: 125 TABLET ORAL at 07:56

## 2018-10-16 RX ADMIN — FAMOTIDINE 20 MG: 20 TABLET ORAL at 20:39

## 2018-10-16 RX ADMIN — METHYLPHENIDATE HYDROCHLORIDE 10 MG: 10 TABLET ORAL at 07:56

## 2018-10-16 RX ADMIN — FAMOTIDINE 20 MG: 20 TABLET ORAL at 07:56

## 2018-10-16 RX ADMIN — HYDRALAZINE HYDROCHLORIDE 10 MG: 10 TABLET, FILM COATED ORAL at 15:09

## 2018-10-16 RX ADMIN — LEVETIRACETAM 500 MG: 100 SOLUTION ORAL at 20:42

## 2018-10-16 ASSESSMENT — PAIN SCALES - GENERAL
PAINLEVEL_OUTOF10: 0

## 2018-10-16 NOTE — PROGRESS NOTES
ACUTE REHAB UNIT  SPEECH/LANGUAGE PATHOLOGY      [x] Daily  [x] Weekly Care Conference Note  [] Discharge    Patient:Madelin Loyd Pac      :1935  GQJ:[id number]  Rehab Dx/Hx: Acute ischemic stroke ([facility] [address] [I63.9]    Precautions: [x] Aspiration  [x] Fall risk  [] Sternal  [] Seizure [] Hip  [] Weight Bearing [] Other  Lives With: Alone  ST Dx: [x] Aphasia  [] Dysarthria  [] Apraxia   [x] Oropharyngeal dysphagia [x] Cognitive Impairment  [] Other:   Date of Admit: 2018  Room #: 3107/3107-01  Date: 10/16/2018          Current Diet Order:Dietary Nutrition Supplements: Low Calorie High Protein Supplement  DIET DENTAL SOFT; No Drinking Straw   Recommended Form of Meds: Crushed in puree as able  Compensatory Swallowing Strategies: Upright as possible for all oral intake, Alternate solids and liquids, No straws, Small bites/sips, Check for pocketing of food on the Right (supervision during PO intake with cues for small bites / slow rate)     Dentition: Some missing teeth, Dentures bottom, Dentures top (upper and lower partials)  Vision  Vision: Impaired  Vision Exceptions:  (wears glasses)  Hearing  Hearing: Within functional limits  Barriers toward progress: cognitive status, severity of deficits    Date: 10/16/2018       Tx session 1 Tx Session 2 Weekly Summary   Total Timed Code Min 15 20    Total Treatment Minutes 30 30    Individual Treatment Minutes 30 30    Group Treatment Minutes 0 0    Co-Treat Minutes 0 0    Brief Exception: N/A N/A    Pain Denied when asked Denied    Pain Intervention: [] RN notified  [] Repositioned  [] Intervention offered and patient declined  [x] N/A  [] Other:  [] RN notified  [] Repositioned  [] Intervention offered and patient declined  [x] N/A  [] Other:    Subjective:     Pt up in w/c upon entry; SLP provided pt with breakfast tray at beginning of session. Pt pleasant and cooperative - c/o feeling cold. Heat increased in room and pt expressed satisfaction.    OT with pt upon SLP

## 2018-10-16 NOTE — PROGRESS NOTES
place, Left in chair, Call light within reach, Gait belt     Therapy Time   Individual Concurrent Group Co-treatment   Time In 1250         Time Out 1345         Minutes 55                 Timed Code Treatment Minutes: 55 minutes    Total Treatment Minutes: 54 Minutes    If patient discharges prior to next treatment, this note will serve as discharge summary.     Katty Cross PT, DPT #[id number]

## 2018-10-17 PROCEDURE — 92507 TX SP LANG VOICE COMM INDIV: CPT

## 2018-10-17 PROCEDURE — 97127 HC OT THER IVNTJ W/FOCUS COG FUNCJ: CPT

## 2018-10-17 PROCEDURE — 97116 GAIT TRAINING THERAPY: CPT

## 2018-10-17 PROCEDURE — 97530 THERAPEUTIC ACTIVITIES: CPT

## 2018-10-17 PROCEDURE — 6370000000 HC RX 637 (ALT 250 FOR IP): Performed by: PHYSICAL MEDICINE & REHABILITATION

## 2018-10-17 PROCEDURE — 97127 HC SP THER IVNTJ W/FOCUS COG FUNCJ: CPT

## 2018-10-17 PROCEDURE — 1280000000 HC REHAB R&B

## 2018-10-17 PROCEDURE — 97535 SELF CARE MNGMENT TRAINING: CPT

## 2018-10-17 RX ADMIN — LEVETIRACETAM 500 MG: 100 SOLUTION ORAL at 08:53

## 2018-10-17 RX ADMIN — HYDRALAZINE HYDROCHLORIDE 10 MG: 10 TABLET, FILM COATED ORAL at 22:13

## 2018-10-17 RX ADMIN — CIPROFLOXACIN HYDROCHLORIDE 500 MG: 500 TABLET, FILM COATED ORAL at 08:53

## 2018-10-17 RX ADMIN — HYDRALAZINE HYDROCHLORIDE 10 MG: 10 TABLET, FILM COATED ORAL at 05:59

## 2018-10-17 RX ADMIN — FAMOTIDINE 20 MG: 20 TABLET ORAL at 08:53

## 2018-10-17 RX ADMIN — METHYLPHENIDATE HYDROCHLORIDE 10 MG: 10 TABLET ORAL at 05:58

## 2018-10-17 RX ADMIN — AMLODIPINE BESYLATE 10 MG: 10 TABLET ORAL at 08:53

## 2018-10-17 RX ADMIN — DIGOXIN 125 MCG: 125 TABLET ORAL at 08:53

## 2018-10-17 RX ADMIN — FAMOTIDINE 20 MG: 20 TABLET ORAL at 22:13

## 2018-10-17 RX ADMIN — LEVETIRACETAM 500 MG: 100 SOLUTION ORAL at 22:12

## 2018-10-17 RX ADMIN — HYDRALAZINE HYDROCHLORIDE 10 MG: 10 TABLET, FILM COATED ORAL at 14:09

## 2018-10-17 RX ADMIN — CIPROFLOXACIN HYDROCHLORIDE 500 MG: 500 TABLET, FILM COATED ORAL at 22:13

## 2018-10-17 ASSESSMENT — PAIN SCALES - GENERAL
PAINLEVEL_OUTOF10: 0

## 2018-10-17 NOTE — CARE COORDINATION
Spoke to [place] (admissions) at [facility] regarding the [address] denial  For admit for further SNF rehab. Gigi medrano said that they will do the Peer 2 Peer and let us know. Patient will be put on Care Conference tomorrow to talk about any other options per Clarissa Sepulveda. Dr. Robinette Siemens notified. CM will continue to follow patient until discharge.   Electronically signed by Katerin Mora RN on 10/17/2018 at 2:16 PM
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
## 2018-10-17 NOTE — CARE COORDINATION
Dustin Ontivreos from [facility] called back and stated that she was mistaken and that we will have to do the Peer to Peer for patient to be admitted to SNF. Dustin Ontiveros faxed the [address] [facility] Peer to Peer information to this CM (this CM had asked Bal to fax P2P info directly to [address] [place] [address]). This CM emailed P2P info to [address] [place] [address] at e-mailing [email or web address] Their 0-800 number is [phone]. Dr. Goyo Pacheco stated that he had tomorrow afternoon available. The P2P info has been given and email copied to Cynthia Myerskeeper, [facility] Ronald Murrieta Director as well for follow up tomorrow. CM will continue to follow patient until discharge.  Electronically signed by Sabina Lundy RN on 10/17/2018 at 5:36 PM

## 2018-10-17 NOTE — PROGRESS NOTES
ACUTE REHAB UNIT  SPEECH/LANGUAGE PATHOLOGY      [x] Daily  [] Weekly Care Conference Note  [] Discharge    Patient:Madelin Croft      :1935  DCL:[id number]  Rehab Dx/Hx: Acute ischemic stroke ([facility] [address] [I63.9]    Precautions: [x] Aspiration  [x] Fall risk  [] Sternal  [] Seizure [] Hip  [] Weight Bearing [] Other  Lives With: Alone  ST Dx: [x] Aphasia  [] Dysarthria  [] Apraxia   [x] Oropharyngeal dysphagia [x] Cognitive Impairment  [] Other:   Date of Admit: 2018  Room #: 3107/3107-01  Date: 10/17/2018          Current Diet Order:Dietary Nutrition Supplements: Low Calorie High Protein Supplement  DIET DENTAL SOFT; No Drinking Straw   Recommended Form of Meds: Crushed in puree as able  Compensatory Swallowing Strategies: Upright as possible for all oral intake, Alternate solids and liquids, No straws, Small bites/sips, Check for pocketing of food on the Right (supervision during PO intake with cues for small bites / slow rate)     Dentition: Some missing teeth, Dentures bottom, Dentures top (upper and lower partials)  Vision  Vision: Impaired  Vision Exceptions:  (wears glasses)  Hearing  Hearing: Within functional limits  Barriers toward progress: cognitive status, severity of deficits    Date: 10/17/2018      Tx session 1 Tx Session 2   Total Timed Code Min 20 15   Total Treatment Minutes 30 30   Individual Treatment Minutes 30 30   Group Treatment Minutes 0 0   Co-Treat Minutes 0 0   Brief Exception: N/A N/A   Pain Denied when questioned Denied when questioned   Pain Intervention: [] RN notified  [] Repositioned  [] Intervention offered and patient declined  [x] N/A  [] Other:  [] RN notified  [] Repositioned  [] Intervention offered and patient declined  [x] N/A  [] Other:   Subjective:     Pt up in w/c with portions of breakfast tray in front of her. Pleasant and cooperative. Pt up in w/c with eyes closed upon entry but roused to verbal stimuli.  Increased difficulty with attention - readily distracted items with 2/2 errors closely associated to given direction (eg pt steve check symbol instead of \"x\"). Pt required cues for attention to task x 2/4 opps. Patient will tolerate ongoing cognitive linguistic assessment. GOAL MET GOAL MET   Other areas targeted: Insight into deficits: pt continued to deny deficits or need for assistance s/p CVA despite max cues. Functional recall: pt recalled items seen and location visited in hospital during session s/p 5 minute delay independently     Education:   Educated pt to deficits noted, CVA, and role of SLP. Educated pt to rationale for tx tasks and skills targeted   Safety Devices: [x] Call light within reach  [x] Chair alarm activated and connected to nurse call light system  [] Bed alarm activated   [x] Other: pt identified call light when prompted   [x] Call light within reach  [x] Chair alarm activated  [] Bed alarm activated   [] Other:      Assessment: Limited progress towards goals. Pt with increased confusion and difficulty recalling daily events. Plan:  Continue per plan of care.        Interventions used this date:  [x] Speech/Language Treatment  [] Instruction in HEP  [] Dysphagia Treatment [x] Cognitive Treatment   [] Other:     Discharge recommendations:  [] Home independently  [] Home with assistance [x]  24 hour supervision  [] ECF [] Other  Continued Tx Upon Discharge: ? [x] Yes    [] No    [] TBD based on progress while on ARU     [] Vital Stim indicated     [] Other:   Estimated discharge date: 10/19/18    Electronically signed by  Kelly Driscoll MA CCC-SLP; [id number]  Speech-Language Pathologist

## 2018-10-18 LAB
ANION GAP SERPL CALCULATED.3IONS-SCNC: 14 MMOL/L (ref 3–16)
BUN BLDV-MCNC: 13 MG/DL (ref 7–20)
CALCIUM SERPL-MCNC: 9.6 MG/DL (ref 8.3–10.6)
CHLORIDE BLD-SCNC: 105 MMOL/L (ref 99–110)
CO2: 22 MMOL/L (ref 21–32)
CREAT SERPL-MCNC: 0.6 MG/DL (ref 0.6–1.2)
GFR AFRICAN AMERICAN: >60
GFR NON-AFRICAN AMERICAN: >60
GLUCOSE BLD-MCNC: 121 MG/DL (ref 70–99)
HCT VFR BLD CALC: 39.4 % (ref 36–48)
HEMOGLOBIN: 14.1 G/DL (ref 12–16)
MCH RBC QN AUTO: 34.5 PG (ref 26–34)
MCHC RBC AUTO-ENTMCNC: 35.8 G/DL (ref 31–36)
MCV RBC AUTO: 96.2 FL (ref 80–100)
PDW BLD-RTO: 13.4 % (ref 12.4–15.4)
PLATELET # BLD: 306 K/UL (ref 135–450)
PMV BLD AUTO: 7.4 FL (ref 5–10.5)
POTASSIUM SERPL-SCNC: 4 MMOL/L (ref 3.5–5.1)
RBC # BLD: 4.1 M/UL (ref 4–5.2)
SODIUM BLD-SCNC: 141 MMOL/L (ref 136–145)
WBC # BLD: 7.5 K/UL (ref 4–11)

## 2018-10-18 PROCEDURE — 97535 SELF CARE MNGMENT TRAINING: CPT

## 2018-10-18 PROCEDURE — 6370000000 HC RX 637 (ALT 250 FOR IP): Performed by: PHYSICAL MEDICINE & REHABILITATION

## 2018-10-18 PROCEDURE — 85027 COMPLETE CBC AUTOMATED: CPT

## 2018-10-18 PROCEDURE — 97530 THERAPEUTIC ACTIVITIES: CPT

## 2018-10-18 PROCEDURE — 97116 GAIT TRAINING THERAPY: CPT

## 2018-10-18 PROCEDURE — 97127 HC SP THER IVNTJ W/FOCUS COG FUNCJ: CPT

## 2018-10-18 PROCEDURE — 80048 BASIC METABOLIC PNL TOTAL CA: CPT

## 2018-10-18 PROCEDURE — 92507 TX SP LANG VOICE COMM INDIV: CPT

## 2018-10-18 PROCEDURE — 36415 COLL VENOUS BLD VENIPUNCTURE: CPT

## 2018-10-18 PROCEDURE — 1280000000 HC REHAB R&B

## 2018-10-18 RX ADMIN — FAMOTIDINE 20 MG: 20 TABLET ORAL at 10:34

## 2018-10-18 RX ADMIN — HYDRALAZINE HYDROCHLORIDE 10 MG: 10 TABLET, FILM COATED ORAL at 17:26

## 2018-10-18 RX ADMIN — LEVETIRACETAM 500 MG: 100 SOLUTION ORAL at 10:33

## 2018-10-18 RX ADMIN — DIGOXIN 125 MCG: 125 TABLET ORAL at 10:34

## 2018-10-18 RX ADMIN — CIPROFLOXACIN HYDROCHLORIDE 500 MG: 500 TABLET, FILM COATED ORAL at 10:33

## 2018-10-18 RX ADMIN — FAMOTIDINE 20 MG: 20 TABLET ORAL at 21:27

## 2018-10-18 RX ADMIN — HYDRALAZINE HYDROCHLORIDE 10 MG: 10 TABLET, FILM COATED ORAL at 08:05

## 2018-10-18 RX ADMIN — LEVETIRACETAM 500 MG: 100 SOLUTION ORAL at 21:28

## 2018-10-18 RX ADMIN — AMLODIPINE BESYLATE 10 MG: 10 TABLET ORAL at 10:33

## 2018-10-18 RX ADMIN — HYDRALAZINE HYDROCHLORIDE 10 MG: 10 TABLET, FILM COATED ORAL at 21:27

## 2018-10-18 RX ADMIN — METHYLPHENIDATE HYDROCHLORIDE 10 MG: 10 TABLET ORAL at 08:05

## 2018-10-18 ASSESSMENT — PAIN SCALES - GENERAL
PAINLEVEL_OUTOF10: 0
PAINLEVEL_OUTOF10: 0

## 2018-10-18 NOTE — PROGRESS NOTES
Janet Morro  10/18/2018  [id number]    Chief Complaint: right sided weakness    Subjective:   No issues overnight; resting after therapy today. ROS: No CP, SOB, dyspnea    Objective:  Patient Vitals for the past 24 hrs:   BP Temp Temp src Pulse Resp SpO2 Weight   10/18/18 1028 122/70 98 °F (36.7 °C) Oral 74 16 98 % -   10/18/18 0601 - - - - - - 229 lb (103.9 kg)   10/17/18 2210 120/72 98.9 °F (37.2 °C) Oral 68 16 95 % -     Gen: No distress, pleasant. Resting in bed  HEENT: Normocephalic, atraumatic. CV: Regular rate and irregular rhythm. No MRG  Resp: No respiratory distress. CTAB  Abd: Soft, nontender nondistended  Ext: No edema. Neuro: Slightly lethargic but oriented, appropriately interactive. LUE 4+/5 RUE 4/5 RLE 4/5 LLE 4/5    Laboratory data: Available via EMR. Therapy progress:  PT  Position Activity Restriction  Other position/activity restrictions: Up with assist; dental soft diet - no drinking straw  Objective     Sit to Stand: Contact guard assistance, Minimal Assistance (pt fluctuating between CGA-Gris due to decreased initiation, from recliner, EOB, and w/c to RW)  Stand to sit: Stand by assistance (verbal cues for hand placement)  Bed to Chair: Stand by assistance (recliner to/from bed with RW)  Stand Pivot Transfers: Contact guard assistance (with RW once standing - intermittent cues for RW management and sequencing)  Device: Rolling Walker  Other Apparatus: Wheelchair follow  Assistance: Stand by assistance  Distance: 160'  OT  PT Equipment Recommendations  Equipment Needed: Yes  Mobility Devices: Elane Bernadette: Rolling  Other: plan to continue to assess  Toilet - Technique: Ambulating  Equipment Used: Raised toilet seat with rails  Assessment        SLP  Current Diet : [facility] soft  Current Liquid Diet : Thin  Diet Solids Recommendation: Dysphagia II Mechanically Altered  Liquid Consistency Recommendation: Thin    Body mass index is 38.15 kg/m².     Assessment:  Patient Active Problem

## 2018-10-18 NOTE — PLAN OF CARE
Problem: Falls - Risk of:  Goal: Will remain free from falls  Will remain free from falls   Outcome: Ongoing  Pt alert x 3. Up in wheelchair with alarm on. Calls out with needs. Family in room. Will monitor safety. Problem: Pain:  Goal: Pain level will decrease  Pain level will decrease   Outcome: Met This Shift  Pt denies pain this shift.
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
## 2018-10-18 NOTE — FLOWSHEET NOTE
10/18/18 0856   Encounter Summary   Services provided to: Patient   Referral/Consult From: Rounding   Continue Visiting (10/18)   Complexity of Encounter Moderate   Length of Encounter 15 minutes   Spiritual/Taoism   Type Spiritual support   Assessment Approachable   Intervention Active listening;Prayer   Outcome Receptive;Engaged in conversation

## 2018-10-18 NOTE — PROGRESS NOTES
Strengthening, ROM, Balance Training, Functional Mobility Training, Transfer Training, Endurance Training, Wheelchair Mobility Training, Gait Training, Stair training, Neuromuscular Re-education, Home Exercise Program, Safety Education & Training, Patient/Caregiver Education & Training, Equipment Evaluation, Education, & procurement  Safety Devices  Type of devices: Chair alarm in place, Left in chair, Call light within reach, Gait belt     Therapy Time   Individual Concurrent Group Co-treatment   Time In 0900         Time Out 0930         Minutes 30             Second Session:   [address] [facility]   Time In C/ [place] 30         Minutes 30           Total time: 30+30 = 60 minutes      Kwadwo Gutierrez, PT, DPT, CLT  Kevin Singletary, Student Physical Therapist (provided treatment and documentation for second session)
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
## 2018-10-19 PROCEDURE — 6370000000 HC RX 637 (ALT 250 FOR IP): Performed by: PHYSICAL MEDICINE & REHABILITATION

## 2018-10-19 PROCEDURE — 97530 THERAPEUTIC ACTIVITIES: CPT

## 2018-10-19 PROCEDURE — 1280000000 HC REHAB R&B

## 2018-10-19 PROCEDURE — 97535 SELF CARE MNGMENT TRAINING: CPT

## 2018-10-19 PROCEDURE — 97127 HC SP THER IVNTJ W/FOCUS COG FUNCJ: CPT

## 2018-10-19 PROCEDURE — 92507 TX SP LANG VOICE COMM INDIV: CPT

## 2018-10-19 PROCEDURE — 97116 GAIT TRAINING THERAPY: CPT

## 2018-10-19 RX ADMIN — DIGOXIN 125 MCG: 125 TABLET ORAL at 09:08

## 2018-10-19 RX ADMIN — FAMOTIDINE 20 MG: 20 TABLET ORAL at 09:08

## 2018-10-19 RX ADMIN — LEVETIRACETAM 500 MG: 100 SOLUTION ORAL at 22:50

## 2018-10-19 RX ADMIN — LEVETIRACETAM 500 MG: 100 SOLUTION ORAL at 09:08

## 2018-10-19 RX ADMIN — FAMOTIDINE 20 MG: 20 TABLET ORAL at 22:50

## 2018-10-19 RX ADMIN — HYDRALAZINE HYDROCHLORIDE 10 MG: 10 TABLET, FILM COATED ORAL at 22:50

## 2018-10-19 RX ADMIN — AMLODIPINE BESYLATE 10 MG: 10 TABLET ORAL at 09:08

## 2018-10-19 RX ADMIN — HYDRALAZINE HYDROCHLORIDE 10 MG: 10 TABLET, FILM COATED ORAL at 13:55

## 2018-10-19 RX ADMIN — HYDRALAZINE HYDROCHLORIDE 10 MG: 10 TABLET, FILM COATED ORAL at 07:02

## 2018-10-19 RX ADMIN — METHYLPHENIDATE HYDROCHLORIDE 10 MG: 10 TABLET ORAL at 07:02

## 2018-10-19 ASSESSMENT — PAIN SCALES - GENERAL
PAINLEVEL_OUTOF10: 0
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
## 2018-10-19 NOTE — PROGRESS NOTES
assistance 24 hrs. Assessment   Performance deficits / Impairments: Decreased functional mobility ; Decreased endurance;Decreased coordination;Decreased ADL status; Decreased ROM; Decreased strength;Decreased safe awareness;Decreased cognition;Decreased balance  Assessment: Pt SBA sit to stand and functional mobility and SBA. Pt dressed UB (setup) and LB (Min A). Continue OT POC. Treatment Diagnosis: Impaired functional mobility, ADLs, cognition, coordination, and ROM d/t CVA  Prognosis: Fair  Patient Education: instructions for simple dot to dot activity  REQUIRES OT FOLLOW UP: Yes  Activity Tolerance  Activity Tolerance: Patient Tolerated treatment well  Safety Devices  Safety Devices in place: Yes  Type of devices: All fall risk precautions in place; Left in chair;Nurse notified;Call light within reach; Chair alarm in place          Plan   Plan  Times per week: 5x/wk x60 min  Times per day: Daily  Current Treatment Recommendations: Strengthening, Patient/Caregiver Education & Training, Functional Mobility Training, Cognitive Reorientation, Endurance Training, Safety Education & Training, Self-Care / ADL, Neuromuscular Re-education, Equipment Evaluation, Education, & procurement, Cognitive/Perceptual Training, Balance Training  G-Code     OutComes Score                                           AM-PAC Score             Goals  Short term goals  Time Frame for Short term goals: 2 weeks  Short term goal 1: Patient will complete UB dressing with set up - goal met 10/3  Short term goal 2: Patient will complete LB dressing with mod assist and use of AE prn - goal met 10/4  Short term goal 3: Patient will complete functional transfer, including toilet transfer and shower transfer, with mod assist - goal met shower transfer 10/3, goal met toilet transfer 10/4  Short term goal 4: Patient will complete bathing with min assist and use of AE prn - goal met UB 10/10 and LB 10/18  Long term goals  Time Frame for

## 2018-10-19 NOTE — CARE COORDINATION
CM organized meeting with Medical Doctor, Director, rehab and family Mary Barrientos and Beatriz Sherman. Family expressed that their main frustration was feeling like they \"thought we had a plan, but then insurance refused the short term stay so now we're being told we need to look at long term\" and wished for more support in exploring long term options. Following much discussion about the Patient's progress at the ARU and prognosis and safety at a discharge pending in the next few days, Family stated agreement to look at long term care facilities. CM provided a list of facilities within a 10 mile radius to [facility] in [place]  . CM also sent referral to first [facility] [employer] who has skilled services as well as memory care for long term placement. JOSH spoke with [facility] -they do have memory care but no bed available currently. Checking on long term beds in the meantime. JOSH also called Madhavi Douglas [phone] from [facility] regarding beds on the locked memory care assisted living. Left a message, Pending call back.        Thank you,     Christian Mendez, RN Case Manager  [facility] [employer].   P: [phone]

## 2018-10-19 NOTE — CARE COORDINATION
CM called granddaughter [facility] [phone] and spoke with [facility] and her  Gail Matthews via speaker phone. [address] [facility] stated she had not previously understood that her grandmother would need long term care, she stated she thought the goal was always short term care then home. CM explained based on the patient's cognitive status that staff does not feel she would be safe at home without 24 hr supervision and that the reason insurance denied the appeal was because they did not reasonably expect her cognitive status to improve from short term rehab in a skilled nursing setting. [address] [facility] stated she understood the team was now saying her grandmother would require around the clock care but she expressed frustration that this was \"the first time I've heard anybody say long term care\"    CM asked about note from yesterday and family denying applying for Medicaid - [facility] stated she was told that the couldn't go to the facility on Medicaid. CM explained that particular facility would not take the patient with the status \"pending medicaid\" but that other facilities would, however when CM asked if grandmargueriteughter was interested in applying for Medicaid she would not give CM an answer. CM offered to review options for care at home and to refer to [place] on aging. [facility] stated that she was planning on [employer] on aging for a screening for services. [address] [facility] stated she would be making phone calls and in later to speak with CM. CM offered assistance by providing referrals for private duty home care, or a list of local nursing facilities but [facility] declined at this time. CM to coordinate MD, facility director Marshall Cardenas and some rehab staff if available for family meeting around 2pm.      UPDATE: Dr. Good Sake not available until 3pm. CM to contacted [facility] and left a message.         Thank you,     Jhonny Felton, RN Case Manager  [facility] [employer].   P: [phone]

## 2018-10-20 PROCEDURE — 6370000000 HC RX 637 (ALT 250 FOR IP): Performed by: PHYSICAL MEDICINE & REHABILITATION

## 2018-10-20 PROCEDURE — 1280000000 HC REHAB R&B

## 2018-10-20 RX ADMIN — METHYLPHENIDATE HYDROCHLORIDE 10 MG: 10 TABLET ORAL at 06:16

## 2018-10-20 RX ADMIN — FAMOTIDINE 20 MG: 20 TABLET ORAL at 20:30

## 2018-10-20 RX ADMIN — HYDRALAZINE HYDROCHLORIDE 10 MG: 10 TABLET, FILM COATED ORAL at 06:15

## 2018-10-20 RX ADMIN — LEVETIRACETAM 500 MG: 100 SOLUTION ORAL at 20:30

## 2018-10-20 RX ADMIN — HYDRALAZINE HYDROCHLORIDE 10 MG: 10 TABLET, FILM COATED ORAL at 22:28

## 2018-10-20 RX ADMIN — AMLODIPINE BESYLATE 10 MG: 10 TABLET ORAL at 09:00

## 2018-10-20 RX ADMIN — HYDRALAZINE HYDROCHLORIDE 10 MG: 10 TABLET, FILM COATED ORAL at 14:14

## 2018-10-20 RX ADMIN — DIGOXIN 125 MCG: 125 TABLET ORAL at 08:59

## 2018-10-20 RX ADMIN — FAMOTIDINE 20 MG: 20 TABLET ORAL at 09:00

## 2018-10-20 RX ADMIN — LEVETIRACETAM 500 MG: 100 SOLUTION ORAL at 09:00

## 2018-10-20 ASSESSMENT — PAIN SCALES - GENERAL
PAINLEVEL_OUTOF10: 0

## 2018-10-20 NOTE — PLAN OF CARE
Problem: Falls - Risk of:  Goal: Will remain free from falls  Will remain free from falls   Outcome: Ongoing  Client remains free from falls, bed/chair alarm in place, door open, encouraged to use call light for needs, call light is within reach, bed locked in lowest position,  Will continue to monitor. Problem: Risk for Impaired Skin Integrity  Goal: Tissue integrity - skin and mucous membranes  Structural intactness and normal physiological function of skin and  mucous membranes. Outcome: Ongoing   See Cruz scale. Encourage/assist pt to turn and reposition every two hours and as needed. Heels elevated off bed. Protective barrier placed as needed. Patient kept clean and dry. Pillows used for positioning. Will continue to monitor for skin breakdown.

## 2018-10-21 PROCEDURE — 6370000000 HC RX 637 (ALT 250 FOR IP): Performed by: PHYSICAL MEDICINE & REHABILITATION

## 2018-10-21 PROCEDURE — 1280000000 HC REHAB R&B

## 2018-10-21 RX ADMIN — AMLODIPINE BESYLATE 10 MG: 10 TABLET ORAL at 08:34

## 2018-10-21 RX ADMIN — FAMOTIDINE 20 MG: 20 TABLET ORAL at 08:34

## 2018-10-21 RX ADMIN — LEVETIRACETAM 500 MG: 100 SOLUTION ORAL at 20:43

## 2018-10-21 RX ADMIN — LEVETIRACETAM 500 MG: 100 SOLUTION ORAL at 08:34

## 2018-10-21 RX ADMIN — HYDRALAZINE HYDROCHLORIDE 10 MG: 10 TABLET, FILM COATED ORAL at 14:15

## 2018-10-21 RX ADMIN — FAMOTIDINE 20 MG: 20 TABLET ORAL at 20:43

## 2018-10-21 RX ADMIN — DIGOXIN 125 MCG: 125 TABLET ORAL at 08:34

## 2018-10-21 RX ADMIN — HYDRALAZINE HYDROCHLORIDE 10 MG: 10 TABLET, FILM COATED ORAL at 20:43

## 2018-10-21 RX ADMIN — METHYLPHENIDATE HYDROCHLORIDE 10 MG: 10 TABLET ORAL at 07:05

## 2018-10-21 RX ADMIN — HYDRALAZINE HYDROCHLORIDE 10 MG: 10 TABLET, FILM COATED ORAL at 07:04

## 2018-10-21 ASSESSMENT — PAIN SCALES - GENERAL
PAINLEVEL_OUTOF10: 0

## 2018-10-21 NOTE — PLAN OF CARE
Problem: Falls - Risk of:  Goal: Will remain free from falls  Will remain free from falls   Outcome: Ongoing  Pt with no history of falls. Up with assistance, gait belt and walker. Weakness to BLE. Fall precautions maintained. Fall risk armband on. Non skid footwear on when ambulating. Bed in lowest position. Bed alarm in use. Reminded pt to call for assistance with any needs. Call light within reach. No falls thus far during shift.

## 2018-10-22 LAB
ANION GAP SERPL CALCULATED.3IONS-SCNC: 11 MMOL/L (ref 3–16)
BUN BLDV-MCNC: 14 MG/DL (ref 7–20)
CALCIUM SERPL-MCNC: 9.2 MG/DL (ref 8.3–10.6)
CHLORIDE BLD-SCNC: 107 MMOL/L (ref 99–110)
CO2: 25 MMOL/L (ref 21–32)
CREAT SERPL-MCNC: 0.6 MG/DL (ref 0.6–1.2)
GFR AFRICAN AMERICAN: >60
GFR NON-AFRICAN AMERICAN: >60
GLUCOSE BLD-MCNC: 124 MG/DL (ref 70–99)
HCT VFR BLD CALC: 40.6 % (ref 36–48)
HEMOGLOBIN: 13.4 G/DL (ref 12–16)
MCH RBC QN AUTO: 31.3 PG (ref 26–34)
MCHC RBC AUTO-ENTMCNC: 33 G/DL (ref 31–36)
MCV RBC AUTO: 94.8 FL (ref 80–100)
PDW BLD-RTO: 13.4 % (ref 12.4–15.4)
PLATELET # BLD: 297 K/UL (ref 135–450)
PMV BLD AUTO: 7.1 FL (ref 5–10.5)
POTASSIUM SERPL-SCNC: 3.9 MMOL/L (ref 3.5–5.1)
RBC # BLD: 4.29 M/UL (ref 4–5.2)
SODIUM BLD-SCNC: 143 MMOL/L (ref 136–145)
WBC # BLD: 8.5 K/UL (ref 4–11)

## 2018-10-22 PROCEDURE — 80048 BASIC METABOLIC PNL TOTAL CA: CPT

## 2018-10-22 PROCEDURE — 36415 COLL VENOUS BLD VENIPUNCTURE: CPT

## 2018-10-22 PROCEDURE — 1280000000 HC REHAB R&B

## 2018-10-22 PROCEDURE — 92507 TX SP LANG VOICE COMM INDIV: CPT

## 2018-10-22 PROCEDURE — 97127 HC SP THER IVNTJ W/FOCUS COG FUNCJ: CPT

## 2018-10-22 PROCEDURE — 97116 GAIT TRAINING THERAPY: CPT

## 2018-10-22 PROCEDURE — 97535 SELF CARE MNGMENT TRAINING: CPT

## 2018-10-22 PROCEDURE — 6370000000 HC RX 637 (ALT 250 FOR IP): Performed by: PHYSICAL MEDICINE & REHABILITATION

## 2018-10-22 PROCEDURE — 85027 COMPLETE CBC AUTOMATED: CPT

## 2018-10-22 PROCEDURE — 97530 THERAPEUTIC ACTIVITIES: CPT

## 2018-10-22 RX ADMIN — FAMOTIDINE 20 MG: 20 TABLET ORAL at 09:16

## 2018-10-22 RX ADMIN — AMLODIPINE BESYLATE 10 MG: 10 TABLET ORAL at 09:16

## 2018-10-22 RX ADMIN — HYDRALAZINE HYDROCHLORIDE 10 MG: 10 TABLET, FILM COATED ORAL at 06:24

## 2018-10-22 RX ADMIN — METHYLPHENIDATE HYDROCHLORIDE 10 MG: 10 TABLET ORAL at 06:23

## 2018-10-22 RX ADMIN — HYDRALAZINE HYDROCHLORIDE 10 MG: 10 TABLET, FILM COATED ORAL at 15:26

## 2018-10-22 RX ADMIN — LEVETIRACETAM 500 MG: 100 SOLUTION ORAL at 22:01

## 2018-10-22 RX ADMIN — FAMOTIDINE 20 MG: 20 TABLET ORAL at 22:00

## 2018-10-22 RX ADMIN — HYDRALAZINE HYDROCHLORIDE 10 MG: 10 TABLET, FILM COATED ORAL at 22:01

## 2018-10-22 RX ADMIN — LEVETIRACETAM 500 MG: 100 SOLUTION ORAL at 09:16

## 2018-10-22 RX ADMIN — DIGOXIN 125 MCG: 125 TABLET ORAL at 09:16

## 2018-10-22 ASSESSMENT — PAIN SCALES - GENERAL
PAINLEVEL_OUTOF10: 0

## 2018-10-22 NOTE — PROGRESS NOTES
Physical Therapy  Facility/Department: [facility] ACUTE REHAB UNIT  Daily Treatment Note/Possible Discharge Summary  NAME: Zach Jay  : 1935  MRN: [id number]    Date of Service: 10/22/2018    Discharge Recommendations:  Subacute/Skilled Nursing Facility   PT Equipment Recommendations  Equipment Needed: Yes  Mobility Devices: Shruti [place]: Rolling    Patient Diagnosis(es): There were no encounter diagnoses. has a past medical history of CHF (congestive heart failure) ([address] Hyperlipidemia; and Hypertension. has a past surgical history that includes hip surgery. Restrictions  Restrictions/Precautions  Restrictions/Precautions: Fall Risk (high)  Position Activity Restriction  Other position/activity restrictions: Up with assist; dental soft diet - no drinking straw  Subjective   General  Chart Reviewed: Yes  Additional Pertinent Hx: Pt is an 82 y.o. female admitted to ARU on 18 from [facility]. Pt initially admitted to [facility] from [facility] with complaints of altered mental status. CT Head: positive for hemorrhage in L caudate and putamen. Conservative management per neurosurgery. PMH: CHF, hyperlipidemia, HTN. Family / Caregiver Present: No  Referring Practitioner: Dr. Sweta Isaacs  Subjective  Subjective: \"I don't want to do the steps right now. \"  General Comment  Comments: Pt found seated in w/c in dining room upon PT arrival.  Pt agreeable to therapy session with verbal encouragement.   Pain Screening  Patient Currently in Pain: Denies  Vital Signs  Patient Currently in Pain: Denies       Orientation     Objective   Bed mobility  Rolling to Left: Minimal assistance  Rolling to Right: Stand by assistance  Supine to Sit: Stand by assistance  Sit to Supine: Minimal assistance (Gris for BLE placement/initiation, pt sitting EOB approximately 10 minutes stating, \"I'm gonna lay down\" before PT provided assist to initiate transfer)  Comment: HOB flat, no use of bedrails  Transfers  Sit to Stand: Stand by

## 2018-10-22 NOTE — PROGRESS NOTES
Occupational Therapy  Facility/Department: [facility] ACUTE REHAB UNIT  Daily Treatment Note and Discharge Summary  NAME: Arpita Venegas  : 1935  MRN: [id number]    Date of Service: 10/22/2018    Discharge Recommendations:  Subacute/Skilled Nursing Facility  OT Equipment Recommendations  Equipment Needed: No    Patient Diagnosis(es): There were no encounter diagnoses. has a past medical history of CHF (congestive heart failure) ([address] Hyperlipidemia; and Hypertension. has a past surgical history that includes hip surgery. Restrictions  Restrictions/Precautions  Restrictions/Precautions: Fall Risk (high)  Position Activity Restriction  Other position/activity restrictions: Up with assist; dental soft diet - no drinking straw  Subjective   General  Chart Reviewed: Yes  Patient assessed for rehabilitation services?: Yes  Additional Pertinent Hx: 82 y.o. female admit to ARU . Hospital course:  Admit  to [facility] ED with hemorrhagic stroke, present with R side weakness and R facial droop. Transferred to [facility] on . CT head - L side hemorrhage involving caudate, basal ganglia and hyperdensities in L front lobe;  CXR - bilateral airspace disease;  MRI brain - left basal ganglia infarct with 2 areas of acute hemorrhage. Carotid doppler - L side stenosis;    PMHx - CHF, hyperlipidemia, HTN, hip surgery  Response to previous treatment: Patient with no complaints from previous session  Family / Caregiver Present: No  Referring Practitioner: Kp  Diagnosis: CVA  Subjective  Subjective: Pt supine in bed upon arrival, agreeable to therapy session and very pleasant. General Comment  Comments: Pt supine to sit with HOB raised (Supervision). Pt sit to stand SBA and pt ambulated with rw SBA ~15 ft to bathroom. Pt toilet transfer SBA and pt toileted SBA. Pt then shower transfer with use of grab bars (SBA). Pt bathed UB (Mod I) and LB (CGA). Pt then dressed UB (setup) and LB (Min A to don right sock).

## 2018-10-22 NOTE — PATIENT CARE CONFERENCE
The [address] [place] [address] Rehabilitation  Weekly Team Conference Note    Patient Name: Xochilt Osman        MRN: [id number]    : 1935  (82 y.o.)  Gender: female   Referring Practitioner: Dr. Chelsea Piña  Diagnosis: Acute ischemic stroke    The team conference for this patient was held on 10/23/2018 at 1:30pm by:  Jenice Meigs. Destiney Montana MD.    PHYSICAL THERAPY:  Bed Mobility: Scooting: Supervision    Transfers:  Sit to Stand: Stand by assistance (majority SBA, however occasional Gris provided due to decreased initiation, from w/c and EOB to RW)  Stand to sit: Stand by assistance (verbal cues for hand placement)  Bed to Chair: Stand by assistance (w/c to bed with RW)  Stand Pivot Transfers: Contact guard assistance (with RW once standing - intermittent cues for RW management and sequencing)  Comment: CGA to  pen from floor with RW. Ambulation 1  Surface: level tile  Device: Rolling Walker  Other Apparatus: Wheelchair follow  Assistance: Stand by assistance  Quality of Gait: narrow base of support, decreased (B) step length, foot clearance, and heel strike; downward gaze, forward flexed posture, RW too far in front of body  Distance: 141 (including 15' ascend/15' descend ramp) + 50' (limited by destination)  Comments: distance limited by fatigue, verbal encouragement to attempt to increase distance.       Stairs  # Steps : 3 (+ 6)  Stairs Height: 6\"  Rails: Bilateral  Assistance: Contact guard assistance  Comment: step to leading with RLE, heavy verbal encouragement to increase number of stairs, however pt stating number of stairs limited by fatigue    FIMS:  Bed, Chair, Wheel Chair: 4 - Requires steadying assistance only <25% assist  and/or requires assist with one leg only  Walk: 5 - Supervision Requires standby supervision or cuing to walk at least 150 feet  Distance Walked: 141'  Wheel Chair: 1 - Total Assistance Operates wheelchair < 50 feet OR requires two or more people OR patient Requires setup/supervision/cues and/or requires assist with presthesis/brace only  Dressing-Lower: 4 - Requires assist with buttons/zippers/shoelaces and/or assist with shoes only  Toiletin - Requires setup/supervision/cues  Toilet Transfer: 5 - Requires setup/supervision/cues  Shower Transfer: 5 - Supervision, set-up, cues     Assessment: Pt SBA sit to stand and functional mobility and SBA. Pt dressed UB (setup) and LB (Min A). Shower/toilet transfers SBA. Continue OT POC. NUTRITION:  Weight: 231 lb 0.7 oz (104.8 kg) / Body mass index is 38.49 kg/m². Current diet order: Current diet and supplement order: Dietary Nutrition Supplements: Low Calorie High Protein Supplement  DIET DENTAL SOFT; No Drinking Straw      Diet Ordered: Dental Soft, No Straws  Feeding: Able to feed self, Needs set up  Room Service: Non-selective   NSG Recorded PO: PO Meals Eaten (%): 76 - 100% last meal in flowsheets %  Nutrition Risk Level Risk Level: Low  Malnutrition Status Malnutrition Status: At risk for malnutrition  Comparative Standards   Estimated Total Kcals/Day Range: 4116-2125  Estimated Total Protein (g/day) Range:   Please see nutrition evaluation per nutrition standards of care for additional info.      Kayden Brasher RD, LD  Pager:  [phone]  Office:  [phone]    NURSING:FIMS:  Bladder Level of Assistance: 1- requires 75%+ assistance for bladder management tasks, helper changes soiled linens, clothes, absorbant pads, helper cares for and empties atkins catheter  Bladder Frequency of Accidents:  (0)  Bowel Level of Assistance: 1- requires 75%+ assistance for bowel management tasks, staff changes soiled linens, clothes, absorbant pads, helper provides enema or provides digital stimulation to patient, staff empties colostomy  Bowel Frequency of Accidents:  (0)    Laird Fall Risk Score: 40  Wounds/Incisions/Ulcers: None   Medication Review: with pt   Pain: No complaints of pain   Consultations/Labs/X-rays: BMP/CBC

## 2018-10-22 NOTE — PROGRESS NOTES
consistency w/o overt signs of aspiration or respiratory decline. GOAL MET GOAL MET   Patient will tolerate advanced texture trials with adequate mastication and timely A-P transit. GOAL MET GOAL MET   Patient will answer Y/N and basic \"wh\" questions with 90% accuracy or min cues. Y/N orientation questions: 80% accuracy independently    General Y/N questions: 80% accuracy independently, improving to 90% accuracy with min cues Y/N questions to recall events of day: 90% accuracy independently    Targeted via inferential questions re: picture scenes: 64% accuracy with mod-max cues    Patient will complete graded verbal description tasks with min-mod cues. Mod cues to recall and complete verbal description of events over weekend. Confrontation naming: WFL, no cues required   Picture description: 91% accuracy with mod cues to correct paraphasias and expand expression. Patient will be oriented to location and situation with mod cues. Pt initially disoriented to place (\"[facility]\") - mod cues to orient to location. Pt perseverative on broken back as rationale for hospital admission - max cues to orient to situation. Written schedule provided and pt reoriented to location and situation. After 2 minute delay, pt independently referred to schedule to orient self correctly. After 4 minute delay, pt only oriented to location and no initiation made to utilize environmental stimuli. Min-mod cues to navigate schedule to orient self to rest of day's events. Pt oriented to location as hospital independently but erroneous name; independently utilized written aid to orient to situation. At end of session, pt oriented only to location and required max cues to utilize written aid to orient to situation. Patient will maintain attention to task and conversational partner with mod cues.    No cues required to attend to conversational partner or tasks presented during session Min cues to sustain attention

## 2018-10-23 PROCEDURE — 6370000000 HC RX 637 (ALT 250 FOR IP): Performed by: PHYSICAL MEDICINE & REHABILITATION

## 2018-10-23 PROCEDURE — 97116 GAIT TRAINING THERAPY: CPT

## 2018-10-23 PROCEDURE — 97110 THERAPEUTIC EXERCISES: CPT

## 2018-10-23 PROCEDURE — 97127 HC OT THER IVNTJ W/FOCUS COG FUNCJ: CPT

## 2018-10-23 PROCEDURE — 97127 HC SP THER IVNTJ W/FOCUS COG FUNCJ: CPT

## 2018-10-23 PROCEDURE — 92507 TX SP LANG VOICE COMM INDIV: CPT

## 2018-10-23 PROCEDURE — 97530 THERAPEUTIC ACTIVITIES: CPT

## 2018-10-23 PROCEDURE — 1280000000 HC REHAB R&B

## 2018-10-23 PROCEDURE — 97535 SELF CARE MNGMENT TRAINING: CPT

## 2018-10-23 RX ADMIN — HYDRALAZINE HYDROCHLORIDE 10 MG: 10 TABLET, FILM COATED ORAL at 06:52

## 2018-10-23 RX ADMIN — HYDRALAZINE HYDROCHLORIDE 10 MG: 10 TABLET, FILM COATED ORAL at 14:35

## 2018-10-23 RX ADMIN — HYDRALAZINE HYDROCHLORIDE 10 MG: 10 TABLET, FILM COATED ORAL at 22:08

## 2018-10-23 RX ADMIN — LEVETIRACETAM 500 MG: 100 SOLUTION ORAL at 22:08

## 2018-10-23 RX ADMIN — DIGOXIN 125 MCG: 125 TABLET ORAL at 09:44

## 2018-10-23 RX ADMIN — FAMOTIDINE 20 MG: 20 TABLET ORAL at 22:08

## 2018-10-23 RX ADMIN — LEVETIRACETAM 500 MG: 100 SOLUTION ORAL at 09:44

## 2018-10-23 RX ADMIN — FAMOTIDINE 20 MG: 20 TABLET ORAL at 09:44

## 2018-10-23 RX ADMIN — METHYLPHENIDATE HYDROCHLORIDE 10 MG: 10 TABLET ORAL at 06:52

## 2018-10-23 ASSESSMENT — PAIN SCALES - GENERAL
PAINLEVEL_OUTOF10: 0

## 2018-10-23 NOTE — PROGRESS NOTES
cognitive activity of completing a simple maze after given verbal instructions with visual demonstration. Pt able to complete ~ 1/2 of the maze, pt then stated, \"I'll finish this later. \" Call light in reach, chair alarm on. Pain Assessment  Patient Currently in Pain: Denies  Vital Signs  Patient Currently in Pain: Denies   Orientation     Objective                                           Cognition  Overall Cognitive Status: Exceptions  Arousal/Alertness: Delayed responses to stimuli  Following Commands: Follows one step commands with repetition; Follows one step commands with increased time  Attention Span: Difficulty dividing attention; Attends with cues to redirect  Memory: Decreased recall of recent events;Decreased short term memory;Decreased recall of biographical Information  Safety Judgement: Decreased awareness of need for safety;Decreased awareness of need for assistance  Problem Solving: Assistance required to implement solutions;Assistance required to generate solutions;Assistance required to identify errors made;Assistance required to correct errors made;Decreased awareness of errors  Insights: Not aware of deficits  Initiation: Requires cues for all  Sequencing: Requires cues for some         ADDENDUM [id number]  Pt seated in  upon entry, speech therapy finishing therapy session. Per speech therapist, pt was able to correctly complete maze given to her in previous OT session. Pt presented with 2 additional mazes, pt able to complete both with min vc for instructions. Pt then given labeling worksheet of 6 different animals, pt given mod vc throughout worksheet completion for instructions. Pt able to correctly label/write 5/6 animals. Pt then given 25 simple math addition problems to complete. Pt with 18/25 correct. On 2 problems pt using multiplication instead of addition. Pt then agreeable to complete another maze when therapy session was over. Pt given additional maze to complete in her free time.

## 2018-10-23 NOTE — PROGRESS NOTES
Physical Therapy  Facility/Department: [facility] ACUTE REHAB UNIT  Daily Treatment Note  NAME: Ruba Rowland  : 1935  MRN: [id number]    Date of Service: 10/23/2018    Discharge Recommendations:  Subacute/Skilled Nursing Facility   PT Equipment Recommendations  Equipment Needed: No (Defer to SNF)    Patient Diagnosis(es): There were no encounter diagnoses. has a past medical history of CHF (congestive heart failure) ([address] Hyperlipidemia; and Hypertension. has a past surgical history that includes hip surgery. Restrictions  Restrictions/Precautions  Restrictions/Precautions: Fall Risk (high)  Position Activity Restriction  Other position/activity restrictions: Up with assist; dental soft diet - no drinking straw     Subjective   General  Chart Reviewed: Yes  Additional Pertinent Hx: Pt is an 82 y.o. female admitted to ARU on 18 from [facility]. Pt initially admitted to [facility] from [facility] with complaints of altered mental status. CT Head: positive for hemorrhage in L caudate and putamen. Conservative management per neurosurgery. PMH: CHF, hyperlipidemia, HTN. Family / Caregiver Present: Yes Antonio Villar)  Referring Practitioner: Dr. Kathryn Mcmahan  Subjective  Subjective: Pt up in w/c and agreeable to PT with encouragement. Pt with minimal verbalization during therapy.   Pain Screening  Patient Currently in Pain: Denies       Orientation  Orientation  Overall Orientation Status: Impaired (Mild confusion noted with conversation, repeat directions having to be given)     Objective   Transfers  Sit to Stand: Minimal Assistance (To initiate and complete movement)  Stand to sit: Stand by assistance (With cues for hand placement)     Ambulation  Ambulation?: Yes  Ambulation 1  Device: Rolling Walker  Assistance: Stand by assistance  Quality of Gait: Narrow base of support, decreased (B) step length R > L and step height, downward gaze, forward flexed posture  Distance: 20 feet x 2 and 10 feet     Balance  Comments: Pt performed standing ball toss activity from multi angles with good catching and return without LOB with walker in front of her to encourage spontaneous responses    Exercises  Comments: Pt performed recumbent biking x 6 mins to encourage faster reciprocal motion for gait carryover    2nd Session: Pt found seated in w/c upon PT arrival.  Pt was agreeable to therapy session with verbal encouragement and stated, \"Let's go girl. \"  Pt denied pain. Pt's grandson present for session and providing verbal encouragement for pt to increase ambulation distances. Pt CGA-Gris for sit to/from stand transfers due to decreased initiation, from w/c and elevated toilet to RW. Pt ambulated 75' + 90' on level tile with RW and SBA (see above for quality of gait). After first bout, pt stating, \"I really need to use the bathroom. \"  Stand pivot w/c to/from elevated toilet with use of grab bar. Pt [facility] for pants management due to pt being found incontinent of urine. Pt did not void while on toilet. Stand pivot w/c to bed with CGA. Sit to supine with ModA due to decreased initiation. ModA for positioning in supine due to decreased initiation/motor planning. Pt left supine in bed at end of session with bed alarm set and call light/needs within reach. Assessment   Body structures, Functions, Activity limitations: Decreased functional mobility ; Decreased strength;Decreased cognition;Decreased endurance;Decreased balance;Decreased coordination;Decreased ROM; Decreased safe awareness  Assessment: Pt with decreased verbalizations and initiation throughout treatment. Pt requiring repeated cues for task completion at times. Pt appears fatigued. Pt plans to d/c to SNF at d/c. Treatment Diagnosis: decreased functional mobility due to acute ischemic stroke  Patient Education: Reinforcement of verbal cues for technique and timeliness of transfers. Pt needs reinforcement.   REQUIRES PT FOLLOW UP: Yes  Activity Tolerance  Activity

## 2018-10-23 NOTE — PROGRESS NOTES
Pt up to chair watching tv at this time. No c/o pain voiced, no s/sx of distress noted. Tolerated morning meds and fluids well. Fall precautions in place. Call light maintained within reach. Will continue to monitor throughout shift.

## 2018-10-23 NOTE — PROGRESS NOTES
Nutrition Assessment    Type and Reason for Visit: Reassess    Malnutrition Assessment:  · Malnutrition Status: At risk for malnutrition    Nutrition Diagnosis:   · Problem: Inadequate oral intake  · Etiology: Insufficient energy/nutrient consumption    Signs and symptoms: Intake 0-25%, Intake 25-50%    Nutrition Assessment:  · Subjective Assessment: Follow up: Pt continues on dental soft diet with Ensure High Protein. PO intakes are %.  lb. pt seen in room this date. She endorses good PO intakes including Ensure. She denies N/V/D. Pt declined further education needs. Encouraged PO intake to aid in adequate intake. Pt with no questions. Nutrition Risk Level   Risk Level: Low    Nutrition Intervention  Food and/or Delivery: Continue current diet, Continue current ONS  Nutrition Education/Counseling/Coordination of Care:  Continued Inpatient Monitoring    Patient assessed for nutrition risk. Deemed to be at low risk at this time. Will continue to follow patient.       Electronically signed by Tila Hernandez RD, LD on 10/23/18 at 2:35 PM    Contact Number: [phone]

## 2018-10-24 PROCEDURE — 97535 SELF CARE MNGMENT TRAINING: CPT

## 2018-10-24 PROCEDURE — 6370000000 HC RX 637 (ALT 250 FOR IP): Performed by: PHYSICAL MEDICINE & REHABILITATION

## 2018-10-24 PROCEDURE — 92507 TX SP LANG VOICE COMM INDIV: CPT

## 2018-10-24 PROCEDURE — 97530 THERAPEUTIC ACTIVITIES: CPT

## 2018-10-24 PROCEDURE — 97127 HC SP THER IVNTJ W/FOCUS COG FUNCJ: CPT

## 2018-10-24 PROCEDURE — 97116 GAIT TRAINING THERAPY: CPT

## 2018-10-24 PROCEDURE — 1280000000 HC REHAB R&B

## 2018-10-24 RX ORDER — AMLODIPINE BESYLATE 10 MG/1
10 TABLET ORAL DAILY
Qty: 30 TABLET | Refills: 3 | Status: ON HOLD | OUTPATIENT
Start: 2018-10-24 | End: 2019-11-30 | Stop reason: HOSPADM

## 2018-10-24 RX ORDER — FAMOTIDINE 20 MG/1
20 TABLET, FILM COATED ORAL 2 TIMES DAILY
Qty: 60 TABLET | Refills: 3 | Status: SHIPPED | OUTPATIENT
Start: 2018-10-24

## 2018-10-24 RX ORDER — LEVETIRACETAM 100 MG/ML
500 SOLUTION ORAL 2 TIMES DAILY
Qty: 1 BOTTLE | Refills: 3 | Status: SHIPPED | OUTPATIENT
Start: 2018-10-24

## 2018-10-24 RX ORDER — DIGOXIN 125 MCG
125 TABLET ORAL DAILY
Qty: 30 TABLET | Refills: 3 | Status: ON HOLD | OUTPATIENT
Start: 2018-10-24 | End: 2021-12-29 | Stop reason: HOSPADM

## 2018-10-24 RX ADMIN — DIGOXIN 125 MCG: 125 TABLET ORAL at 10:21

## 2018-10-24 RX ADMIN — HYDRALAZINE HYDROCHLORIDE 10 MG: 10 TABLET, FILM COATED ORAL at 22:14

## 2018-10-24 RX ADMIN — HYDRALAZINE HYDROCHLORIDE 10 MG: 10 TABLET, FILM COATED ORAL at 06:48

## 2018-10-24 RX ADMIN — FAMOTIDINE 20 MG: 20 TABLET ORAL at 22:13

## 2018-10-24 RX ADMIN — METHYLPHENIDATE HYDROCHLORIDE 10 MG: 10 TABLET ORAL at 06:48

## 2018-10-24 RX ADMIN — LEVETIRACETAM 500 MG: 100 SOLUTION ORAL at 10:21

## 2018-10-24 RX ADMIN — AMLODIPINE BESYLATE 10 MG: 10 TABLET ORAL at 10:21

## 2018-10-24 RX ADMIN — LEVETIRACETAM 500 MG: 100 SOLUTION ORAL at 22:14

## 2018-10-24 RX ADMIN — FAMOTIDINE 20 MG: 20 TABLET ORAL at 10:21

## 2018-10-24 ASSESSMENT — PAIN SCALES - GENERAL: PAINLEVEL_OUTOF10: 0

## 2018-10-24 NOTE — PROGRESS NOTES
ACUTE REHAB UNIT  SPEECH/LANGUAGE PATHOLOGY      [x] Daily  [] Weekly Care Conference Note  [x] Discharge    Patient:Madelin Zhao      ILX:[birth year]  ALT:[id number]  Rehab Dx/Hx: Acute ischemic stroke ([facility] [address] [I63.9]    Precautions: [x] Aspiration  [x] Fall risk  [] Sternal  [] Seizure [] Hip  [] Weight Bearing [] Other  Lives With: Alone  ST Dx: [x] Aphasia  [] Dysarthria  [] Apraxia   [x] Oropharyngeal dysphagia [x] Cognitive Impairment  [] Other:   Date of Admit: 9/26/2018  Room #: 3107/3107-01  Date: 10/24/2018          Current Diet Order:Dietary Nutrition Supplements: Low Calorie High Protein Supplement  DIET DENTAL SOFT; No Drinking Straw   Recommended Form of Meds: Crushed in puree as able  Compensatory Swallowing Strategies: Upright as possible for all oral intake, Alternate solids and liquids, No straws, Small bites/sips, Check for pocketing of food on the Right (supervision during PO intake with cues for small bites / slow rate)     Dentition: Some missing teeth, Dentures bottom, Dentures top (upper and lower partials)  Vision  Vision: Impaired  Vision Exceptions:  (wears glasses)  Hearing  Hearing: Within functional limits  Barriers toward progress: cognitive status, severity of deficits    Date: 10/24/2018       Tx session 1 Tx Session 2 Discharge Summary   Total Timed Code Min 15 23    Total Treatment Minutes 30 23    Individual Treatment Minutes 30 23    Group Treatment Minutes 0 0    Co-Treat Minutes 0 0    Brief Exception: N/A 7 d/t pt toileting    Pain Denied when questioned Denied when questioned    Pain Intervention: [] RN notified  [] Repositioned  [] Intervention offered and patient declined  [x] N/A  [] Other:  [] RN notified  [] Repositioned  [] Intervention offered and patient declined  [x] N/A  [] Other:    Subjective:     Pt up in w/c, eating breakfast upon entry. Pt slow to complete self-feeding tasks, appearing to become distracted by TV or other external stimuli.  Pt pleasant and

## 2018-10-24 NOTE — PROGRESS NOTES
bathing with min assist and use of AE prn - goal met UB 10/10 and LB 10/18  Long term goals  Time Frame for Long term goals : 3-4 weeks  Long term goal 1: Patient will complete UB dressing at independent level - goal not met 10/24  Long term goal 2: Patient will complete LB dressing MOD I - goal not met 10/24  Long term goal 3: Patient will complete toilet transfer and toileting tasks with MOD I - goal not met 10/24  Long term goal 4: Patient will complete bathing and shower transfer with spvn - goal not met 10/24  Patient Goals   Patient goals : \"I'll know it when it happens. \" - unable to state clear goal       Therapy Time   Individual Concurrent Group Co-treatment   Time In 3822         Time Out 1400         Minutes 75         Timed Code Treatment Minutes: [address], [place] [id number]    Discharge session completed under supervision of this OTR. Patient met 4/8 goals during OT stay; primary limitation is cognition as needed for sequencing and memory of tasks. Recommend continued therapy after discharge to address problem-solving of familiar tasks and maximize independence with BADL routine. Recommend d/c to SNF.      Aleksander Moran, OTR/L #[id number]

## 2018-10-24 NOTE — DISCHARGE INSTR - COC
Continuity of Care Form    Patient Name: Terence July   :  1935  MRN:  [id number]    Admit date:  2018  Discharge date:  ***    Code Status Order: Full Code   Advance Directives:   Advance Care Flowsheet Documentation     Date/Time Healthcare Directive Type of Healthcare Directive Copy in [address] Agent's Name Healthcare Agent's Phone Number    18 [phone]  No, patient does not have an advance directive for healthcare treatment -- -- -- -- --          Admitting Physician:  Jeni Pang MD  PCP: No primary care provider on file.     Discharging Nurse: [facility] Unit/Room#: 3107/3107-01  Discharging Unit Phone Number: ***    Emergency Contact:   Extended Emergency Contact Information  Primary Emergency Contact: [address] Phone: [phone]  Relation: Grandchild  Secondary Emergency Contact: [address] Phone: [phone]  Relation: Grandchild    Past Surgical History:  Past Surgical History:   Procedure Laterality Date    HIP SURGERY         Immunization History:   Immunization History   Administered Date(s) Administered    Influenza, Quadv, 6 mo and older, IM, PF (Flulaval, Fluarix) 2018       Active Problems:  Patient Active Problem List   Diagnosis Code    Hemorrhagic stroke ([facility] [address] I61.9    Acute ischemic stroke ([facility] [address] I63.9       Isolation/Infection:   Isolation          No Isolation            Nurse Assessment:  Last Vital Signs: /73   Pulse 66   Temp 97.7 °F (36.5 °C) (Oral)   Resp 18   Ht 5' 4.96\" (1.65 m)   Wt 229 lb 4.5 oz (104 kg)   SpO2 97%   BMI 38.20 kg/m²     Last documented pain score (0-10 scale): Pain Level: 0  Last Weight:   Wt Readings from Last 1 Encounters:   10/24/18 229 lb 4.5 oz (104 kg)     Mental Status:  {IP PT MENTAL STATUS:37514}    IV Access:  [facility] IV ACCESS:[id number]}    Nursing Mobility/ADLs:  Walking   {[facility] DME YQQK:[id number]}  Transfer  {[facility] YJRX:[id number]}  Bathing  {P

## 2018-10-24 NOTE — PROGRESS NOTES
agreeable to therapy session and stated, \"Oh is that all we have to do? \"  Pt denied pain. Supine to sit with SBA, HOB flat, no use of bedrails. Stand pivot EOB to/from w/c with RW and SBA. Sit to supine with Gris for LLE placement, increased time for initiation, HOB flat, no use of bedrails. Pt rolling to L and R in bed with SBA, HOB flat, no use of bedrails. Pt left supine in bed at end of session with bed alarm set and call light/needs within reach. Assessment   Body structures, Functions, Activity limitations: Decreased functional mobility ; Decreased strength;Decreased cognition;Decreased endurance;Decreased balance;Decreased coordination;Decreased ROM; Decreased safe awareness  Assessment: Pt requiring encouragment for increased ambulation and stairs at this visit. Pt fatigued with functional mobility at this date. Pt has met all STG, but has not reach the level of independence required to achieve all LTG. Physical therapy recommending d/c to SNF  Treatment Diagnosis: decreased functional mobility due to acute ischemic stroke  Patient Education: Reinforcement of verbal cues for technique and timeliness of transfers.    REQUIRES PT FOLLOW UP: Yes  Activity Tolerance  Activity Tolerance: Patient limited by fatigue;Patient limited by cognitive status     G-Code     OutComes Score                                                    AM-PAC Score             Goals  Short term goals  Time Frame for Short term goals: 2 weeks  Short term goal 1: Pt will perform all bed mobility with CGA -met 10/4  Short term goal 2: Pt will perform sit to/from stand with RW and CGA -met 10/9  Short term goal 3: Pt will perform stand pivot with or without RW and ModA -met 9/28  Short term goal 4: Pt will ambulate 10' with RW and ModA - Goal met 9/29  Short term goal 5: Pt will ascend/descend 4 stairs with BHR and ModA - met 10/8  Short term goal 6: Pt will propel w/c 100' with CGA -met 10/9  Long term goals  Time Frame for Long term

## 2018-10-24 NOTE — CARE COORDINATION
Call from Donna Cabezas at [facility]. The earliest they can accept patient is tomorrow, 10/25 at 10:00 am. Stephanie De La Cruz, is coming in today after her work and signing contract and private paying [employer]. They had an opening for transport at 10:30 am tomorrow (10/25) for [facility]. This CM cancelled the 7:00 pm appt. for transport today. [address], called Alfonso, granddaughter, therapy, and let charge nurse time of transport tomorrow. [facility]: 10:30 am on Thursday, 10/25 to [facility]  Nurse Report: [phone], ask for charge nurse  Fax: [phone]  HENS completed. CM will continue to follow patient until discharge.   Electronically signed by Rashid Medina RN on 10/24/2018 at 12:14 PM

## 2018-10-25 VITALS
WEIGHT: 229.72 LBS | TEMPERATURE: 98.7 F | BODY MASS INDEX: 38.27 KG/M2 | HEART RATE: 70 BPM | SYSTOLIC BLOOD PRESSURE: 113 MMHG | HEIGHT: 65 IN | RESPIRATION RATE: 18 BRPM | DIASTOLIC BLOOD PRESSURE: 68 MMHG | OXYGEN SATURATION: 95 %

## 2018-10-25 LAB
ANION GAP SERPL CALCULATED.3IONS-SCNC: 13 MMOL/L (ref 3–16)
BUN BLDV-MCNC: 14 MG/DL (ref 7–20)
CALCIUM SERPL-MCNC: 9.1 MG/DL (ref 8.3–10.6)
CHLORIDE BLD-SCNC: 103 MMOL/L (ref 99–110)
CO2: 23 MMOL/L (ref 21–32)
CREAT SERPL-MCNC: 0.5 MG/DL (ref 0.6–1.2)
GFR AFRICAN AMERICAN: >60
GFR NON-AFRICAN AMERICAN: >60
GLUCOSE BLD-MCNC: 119 MG/DL (ref 70–99)
HCT VFR BLD CALC: 41.6 % (ref 36–48)
HEMOGLOBIN: 13.8 G/DL (ref 12–16)
MCH RBC QN AUTO: 31.3 PG (ref 26–34)
MCHC RBC AUTO-ENTMCNC: 33.1 G/DL (ref 31–36)
MCV RBC AUTO: 94.5 FL (ref 80–100)
PDW BLD-RTO: 13.4 % (ref 12.4–15.4)
PLATELET # BLD: 288 K/UL (ref 135–450)
PMV BLD AUTO: 7.3 FL (ref 5–10.5)
POTASSIUM SERPL-SCNC: 3.9 MMOL/L (ref 3.5–5.1)
RBC # BLD: 4.4 M/UL (ref 4–5.2)
SODIUM BLD-SCNC: 139 MMOL/L (ref 136–145)
WBC # BLD: 8.5 K/UL (ref 4–11)

## 2018-10-25 PROCEDURE — 36415 COLL VENOUS BLD VENIPUNCTURE: CPT

## 2018-10-25 PROCEDURE — 85027 COMPLETE CBC AUTOMATED: CPT

## 2018-10-25 PROCEDURE — 6370000000 HC RX 637 (ALT 250 FOR IP): Performed by: PHYSICAL MEDICINE & REHABILITATION

## 2018-10-25 PROCEDURE — 80048 BASIC METABOLIC PNL TOTAL CA: CPT

## 2018-10-25 RX ADMIN — METHYLPHENIDATE HYDROCHLORIDE 10 MG: 10 TABLET ORAL at 06:37

## 2018-10-25 RX ADMIN — HYDRALAZINE HYDROCHLORIDE 10 MG: 10 TABLET, FILM COATED ORAL at 06:37

## 2018-10-25 RX ADMIN — AMLODIPINE BESYLATE 10 MG: 10 TABLET ORAL at 09:10

## 2018-10-25 RX ADMIN — LEVETIRACETAM 500 MG: 100 SOLUTION ORAL at 09:11

## 2018-10-25 RX ADMIN — FAMOTIDINE 20 MG: 20 TABLET ORAL at 09:10

## 2018-10-25 RX ADMIN — DIGOXIN 125 MCG: 125 TABLET ORAL at 09:10

## 2018-10-25 ASSESSMENT — PAIN SCALES - GENERAL: PAINLEVEL_OUTOF10: 0

## 2019-06-10 ENCOUNTER — APPOINTMENT (OUTPATIENT)
Dept: CT IMAGING | Age: 84
DRG: 463 | End: 2019-06-10
Payer: MEDICAID

## 2019-06-10 ENCOUNTER — HOSPITAL ENCOUNTER (INPATIENT)
Age: 84
LOS: 3 days | Discharge: NURSING, REHAB OR LONG-TERM CARE | DRG: 463 | End: 2019-06-14
Attending: EMERGENCY MEDICINE | Admitting: INTERNAL MEDICINE
Payer: MEDICAID

## 2019-06-10 ENCOUNTER — APPOINTMENT (OUTPATIENT)
Dept: GENERAL RADIOLOGY | Age: 84
DRG: 463 | End: 2019-06-10
Payer: MEDICAID

## 2019-06-10 DIAGNOSIS — R06.89 DYSPNEA AND RESPIRATORY ABNORMALITIES: ICD-10-CM

## 2019-06-10 DIAGNOSIS — R91.8 PULMONARY NODULES: ICD-10-CM

## 2019-06-10 DIAGNOSIS — R41.0 CONFUSION: Primary | ICD-10-CM

## 2019-06-10 DIAGNOSIS — N39.0 URINARY TRACT INFECTION IN FEMALE: ICD-10-CM

## 2019-06-10 DIAGNOSIS — R06.00 DYSPNEA AND RESPIRATORY ABNORMALITIES: ICD-10-CM

## 2019-06-10 DIAGNOSIS — K80.20 GALL STONES: ICD-10-CM

## 2019-06-10 LAB
A/G RATIO: 1 (ref 1.1–2.2)
ALBUMIN SERPL-MCNC: 3.8 G/DL (ref 3.4–5)
ALP BLD-CCNC: 115 U/L (ref 40–129)
ALT SERPL-CCNC: 34 U/L (ref 10–40)
ANION GAP SERPL CALCULATED.3IONS-SCNC: 20 MMOL/L (ref 3–16)
AST SERPL-CCNC: 32 U/L (ref 15–37)
BASE EXCESS VENOUS: 13.3 MMOL/L (ref -3–3)
BASOPHILS ABSOLUTE: 0.1 K/UL (ref 0–0.2)
BASOPHILS RELATIVE PERCENT: 0.9 %
BILIRUB SERPL-MCNC: 1.3 MG/DL (ref 0–1)
BUN BLDV-MCNC: 20 MG/DL (ref 7–20)
CALCIUM SERPL-MCNC: 9.9 MG/DL (ref 8.3–10.6)
CARBOXYHEMOGLOBIN: 2.7 % (ref 0–1.5)
CHLORIDE BLD-SCNC: 91 MMOL/L (ref 99–110)
CO2: 29 MMOL/L (ref 21–32)
CREAT SERPL-MCNC: 1.2 MG/DL (ref 0.6–1.2)
DIGOXIN LEVEL: 1.2 NG/ML (ref 0.8–2)
EOSINOPHILS ABSOLUTE: 0.2 K/UL (ref 0–0.6)
EOSINOPHILS RELATIVE PERCENT: 1 %
GFR AFRICAN AMERICAN: 52
GFR NON-AFRICAN AMERICAN: 43
GLOBULIN: 3.7 G/DL
GLUCOSE BLD-MCNC: 163 MG/DL (ref 70–99)
HCO3 VENOUS: 34.3 MMOL/L (ref 23–29)
HCT VFR BLD CALC: 46.4 % (ref 36–48)
HEMOGLOBIN: 15.8 G/DL (ref 12–16)
INR BLD: 3.68 (ref 0.86–1.14)
LACTIC ACID: 4.4 MMOL/L (ref 0.4–2)
LYMPHOCYTES ABSOLUTE: 2.8 K/UL (ref 1–5.1)
LYMPHOCYTES RELATIVE PERCENT: 17.5 %
MAGNESIUM: 2.4 MG/DL (ref 1.8–2.4)
MCH RBC QN AUTO: 31.3 PG (ref 26–34)
MCHC RBC AUTO-ENTMCNC: 34 G/DL (ref 31–36)
MCV RBC AUTO: 92.1 FL (ref 80–100)
METHEMOGLOBIN VENOUS: 0.2 %
MONOCYTES ABSOLUTE: 0.9 K/UL (ref 0–1.3)
MONOCYTES RELATIVE PERCENT: 5.5 %
NEUTROPHILS ABSOLUTE: 11.9 K/UL (ref 1.7–7.7)
NEUTROPHILS RELATIVE PERCENT: 75.1 %
O2 CONTENT, VEN: 22 VOL %
O2 SAT, VEN: 100 %
O2 THERAPY: ABNORMAL
PCO2, VEN: 30.9 MMHG (ref 40–50)
PDW BLD-RTO: 12.8 % (ref 12.4–15.4)
PH VENOUS: 7.65 (ref 7.35–7.45)
PLATELET # BLD: 294 K/UL (ref 135–450)
PMV BLD AUTO: 7.6 FL (ref 5–10.5)
PO2, VEN: 135 MMHG (ref 25–40)
POTASSIUM REFLEX MAGNESIUM: 2.8 MMOL/L (ref 3.5–5.1)
PRO-BNP: 480 PG/ML (ref 0–449)
PROTHROMBIN TIME: 41.9 SEC (ref 9.8–13)
RBC # BLD: 5.03 M/UL (ref 4–5.2)
SODIUM BLD-SCNC: 140 MMOL/L (ref 136–145)
TCO2 CALC VENOUS: 79 MMOL/L
TOTAL PROTEIN: 7.5 G/DL (ref 6.4–8.2)
TROPONIN: 0.02 NG/ML
WBC # BLD: 15.8 K/UL (ref 4–11)

## 2019-06-10 PROCEDURE — 6370000000 HC RX 637 (ALT 250 FOR IP): Performed by: EMERGENCY MEDICINE

## 2019-06-10 PROCEDURE — 84484 ASSAY OF TROPONIN QUANT: CPT

## 2019-06-10 PROCEDURE — 70450 CT HEAD/BRAIN W/O DYE: CPT

## 2019-06-10 PROCEDURE — 85610 PROTHROMBIN TIME: CPT

## 2019-06-10 PROCEDURE — 99291 CRITICAL CARE FIRST HOUR: CPT

## 2019-06-10 PROCEDURE — 71250 CT THORAX DX C-: CPT

## 2019-06-10 PROCEDURE — 83605 ASSAY OF LACTIC ACID: CPT

## 2019-06-10 PROCEDURE — 83880 ASSAY OF NATRIURETIC PEPTIDE: CPT

## 2019-06-10 PROCEDURE — 82803 BLOOD GASES ANY COMBINATION: CPT

## 2019-06-10 PROCEDURE — 87040 BLOOD CULTURE FOR BACTERIA: CPT

## 2019-06-10 PROCEDURE — 80053 COMPREHEN METABOLIC PANEL: CPT

## 2019-06-10 PROCEDURE — 87086 URINE CULTURE/COLONY COUNT: CPT

## 2019-06-10 PROCEDURE — 71045 X-RAY EXAM CHEST 1 VIEW: CPT

## 2019-06-10 PROCEDURE — 94640 AIRWAY INHALATION TREATMENT: CPT

## 2019-06-10 PROCEDURE — 80162 ASSAY OF DIGOXIN TOTAL: CPT

## 2019-06-10 PROCEDURE — 81001 URINALYSIS AUTO W/SCOPE: CPT

## 2019-06-10 PROCEDURE — 93005 ELECTROCARDIOGRAM TRACING: CPT | Performed by: EMERGENCY MEDICINE

## 2019-06-10 PROCEDURE — 85025 COMPLETE CBC W/AUTO DIFF WBC: CPT

## 2019-06-10 PROCEDURE — 83735 ASSAY OF MAGNESIUM: CPT

## 2019-06-10 RX ORDER — 0.9 % SODIUM CHLORIDE 0.9 %
500 INTRAVENOUS SOLUTION INTRAVENOUS ONCE
Status: COMPLETED | OUTPATIENT
Start: 2019-06-10 | End: 2019-06-11

## 2019-06-10 RX ORDER — POTASSIUM CHLORIDE 750 MG/1
40 TABLET, FILM COATED, EXTENDED RELEASE ORAL ONCE
Status: COMPLETED | OUTPATIENT
Start: 2019-06-10 | End: 2019-06-11

## 2019-06-10 RX ORDER — IPRATROPIUM BROMIDE AND ALBUTEROL SULFATE 2.5; .5 MG/3ML; MG/3ML
1 SOLUTION RESPIRATORY (INHALATION) ONCE
Status: COMPLETED | OUTPATIENT
Start: 2019-06-10 | End: 2019-06-10

## 2019-06-10 RX ADMIN — IPRATROPIUM BROMIDE AND ALBUTEROL SULFATE 1 AMPULE: .5; 3 SOLUTION RESPIRATORY (INHALATION) at 20:30

## 2019-06-11 PROBLEM — E78.00 HIGH CHOLESTEROL: Status: ACTIVE | Noted: 2019-06-11

## 2019-06-11 PROBLEM — I50.9 CHRONIC CONGESTIVE HEART FAILURE (HCC): Status: ACTIVE | Noted: 2019-06-11

## 2019-06-11 PROBLEM — I10 HTN (HYPERTENSION): Status: ACTIVE | Noted: 2019-06-11

## 2019-06-11 PROBLEM — N39.0 UTI (URINARY TRACT INFECTION): Status: ACTIVE | Noted: 2019-06-11

## 2019-06-11 LAB
ANION GAP SERPL CALCULATED.3IONS-SCNC: 12 MMOL/L (ref 3–16)
BACTERIA: ABNORMAL /HPF
BILIRUBIN URINE: NEGATIVE
BLOOD, URINE: NEGATIVE
BUN BLDV-MCNC: 19 MG/DL (ref 7–20)
CALCIUM SERPL-MCNC: 8.5 MG/DL (ref 8.3–10.6)
CHLORIDE BLD-SCNC: 101 MMOL/L (ref 99–110)
CLARITY: ABNORMAL
CO2: 33 MMOL/L (ref 21–32)
COLOR: YELLOW
COMMENT UA: ABNORMAL
CREAT SERPL-MCNC: 1.1 MG/DL (ref 0.6–1.2)
EKG ATRIAL RATE: 234 BPM
EKG DIAGNOSIS: NORMAL
EKG Q-T INTERVAL: 392 MS
EKG QRS DURATION: 104 MS
EKG QTC CALCULATION (BAZETT): 443 MS
EKG R AXIS: 38 DEGREES
EKG T AXIS: -46 DEGREES
EKG VENTRICULAR RATE: 77 BPM
EPITHELIAL CELLS, UA: 2 /HPF (ref 0–5)
GFR AFRICAN AMERICAN: 58
GFR NON-AFRICAN AMERICAN: 48
GLUCOSE BLD-MCNC: 142 MG/DL (ref 70–99)
GLUCOSE URINE: NEGATIVE MG/DL
HYALINE CASTS: 4 /LPF (ref 0–8)
INR BLD: 3.54 (ref 0.86–1.14)
KETONES, URINE: NEGATIVE MG/DL
LACTIC ACID: 1.9 MMOL/L (ref 0.4–2)
LEUKOCYTE ESTERASE, URINE: ABNORMAL
MICROSCOPIC EXAMINATION: YES
NITRITE, URINE: POSITIVE
PH UA: 6 (ref 5–8)
POTASSIUM SERPL-SCNC: 3.5 MMOL/L (ref 3.5–5.1)
PROTEIN UA: NEGATIVE MG/DL
PROTHROMBIN TIME: 40.3 SEC (ref 9.8–13)
RBC UA: ABNORMAL /HPF (ref 0–2)
SODIUM BLD-SCNC: 146 MMOL/L (ref 136–145)
SPECIFIC GRAVITY UA: 1.01 (ref 1–1.03)
URINE REFLEX TO CULTURE: YES
URINE TYPE: ABNORMAL
UROBILINOGEN, URINE: 1 E.U./DL
WBC UA: 3 /HPF (ref 0–5)

## 2019-06-11 PROCEDURE — 80048 BASIC METABOLIC PNL TOTAL CA: CPT

## 2019-06-11 PROCEDURE — 6360000002 HC RX W HCPCS: Performed by: EMERGENCY MEDICINE

## 2019-06-11 PROCEDURE — 36415 COLL VENOUS BLD VENIPUNCTURE: CPT

## 2019-06-11 PROCEDURE — 6370000000 HC RX 637 (ALT 250 FOR IP): Performed by: INTERNAL MEDICINE

## 2019-06-11 PROCEDURE — 96361 HYDRATE IV INFUSION ADD-ON: CPT

## 2019-06-11 PROCEDURE — 2580000003 HC RX 258: Performed by: EMERGENCY MEDICINE

## 2019-06-11 PROCEDURE — 6360000002 HC RX W HCPCS: Performed by: INTERNAL MEDICINE

## 2019-06-11 PROCEDURE — 83605 ASSAY OF LACTIC ACID: CPT

## 2019-06-11 PROCEDURE — 85610 PROTHROMBIN TIME: CPT

## 2019-06-11 PROCEDURE — 94640 AIRWAY INHALATION TREATMENT: CPT

## 2019-06-11 PROCEDURE — 93010 ELECTROCARDIOGRAM REPORT: CPT | Performed by: INTERNAL MEDICINE

## 2019-06-11 PROCEDURE — 96374 THER/PROPH/DIAG INJ IV PUSH: CPT

## 2019-06-11 PROCEDURE — 2580000003 HC RX 258: Performed by: INTERNAL MEDICINE

## 2019-06-11 PROCEDURE — 1200000000 HC SEMI PRIVATE

## 2019-06-11 PROCEDURE — 92526 ORAL FUNCTION THERAPY: CPT

## 2019-06-11 PROCEDURE — 6370000000 HC RX 637 (ALT 250 FOR IP): Performed by: EMERGENCY MEDICINE

## 2019-06-11 PROCEDURE — 94761 N-INVAS EAR/PLS OXIMETRY MLT: CPT

## 2019-06-11 PROCEDURE — 2700000000 HC OXYGEN THERAPY PER DAY

## 2019-06-11 PROCEDURE — 92610 EVALUATE SWALLOWING FUNCTION: CPT

## 2019-06-11 RX ORDER — HYDRALAZINE HYDROCHLORIDE 20 MG/ML
10 INJECTION INTRAMUSCULAR; INTRAVENOUS EVERY 6 HOURS PRN
Status: DISCONTINUED | OUTPATIENT
Start: 2019-06-11 | End: 2019-06-14 | Stop reason: HOSPADM

## 2019-06-11 RX ORDER — PHENAZOPYRIDINE HYDROCHLORIDE 100 MG/1
200 TABLET, FILM COATED ORAL
Status: DISCONTINUED | OUTPATIENT
Start: 2019-06-11 | End: 2019-06-14 | Stop reason: HOSPADM

## 2019-06-11 RX ORDER — LEVETIRACETAM 100 MG/ML
500 SOLUTION ORAL 2 TIMES DAILY
Status: DISCONTINUED | OUTPATIENT
Start: 2019-06-11 | End: 2019-06-14 | Stop reason: HOSPADM

## 2019-06-11 RX ORDER — SODIUM CHLORIDE 9 MG/ML
INJECTION, SOLUTION INTRAVENOUS CONTINUOUS
Status: DISCONTINUED | OUTPATIENT
Start: 2019-06-11 | End: 2019-06-14 | Stop reason: HOSPADM

## 2019-06-11 RX ORDER — ASPIRIN 81 MG/1
81 TABLET, CHEWABLE ORAL DAILY
Status: DISCONTINUED | OUTPATIENT
Start: 2019-06-11 | End: 2019-06-14 | Stop reason: HOSPADM

## 2019-06-11 RX ORDER — FAMOTIDINE 20 MG/1
20 TABLET, FILM COATED ORAL 2 TIMES DAILY
Status: DISCONTINUED | OUTPATIENT
Start: 2019-06-11 | End: 2019-06-14 | Stop reason: HOSPADM

## 2019-06-11 RX ORDER — TORSEMIDE 100 MG/1
100 TABLET ORAL DAILY
Status: DISCONTINUED | OUTPATIENT
Start: 2019-06-11 | End: 2019-06-14 | Stop reason: HOSPADM

## 2019-06-11 RX ORDER — 0.9 % SODIUM CHLORIDE 0.9 %
500 INTRAVENOUS SOLUTION INTRAVENOUS PRN
Status: DISCONTINUED | OUTPATIENT
Start: 2019-06-11 | End: 2019-06-14 | Stop reason: HOSPADM

## 2019-06-11 RX ORDER — METOLAZONE 2.5 MG/1
5 TABLET ORAL DAILY PRN
Status: DISCONTINUED | OUTPATIENT
Start: 2019-06-11 | End: 2019-06-14 | Stop reason: HOSPADM

## 2019-06-11 RX ORDER — AMLODIPINE BESYLATE 5 MG/1
10 TABLET ORAL DAILY
Status: DISCONTINUED | OUTPATIENT
Start: 2019-06-11 | End: 2019-06-14 | Stop reason: HOSPADM

## 2019-06-11 RX ORDER — IPRATROPIUM BROMIDE AND ALBUTEROL SULFATE 2.5; .5 MG/3ML; MG/3ML
1 SOLUTION RESPIRATORY (INHALATION) EVERY 4 HOURS
Status: ON HOLD | COMMUNITY
End: 2019-11-30 | Stop reason: SDUPTHER

## 2019-06-11 RX ORDER — PROMETHAZINE HYDROCHLORIDE 12.5 MG/1
12.5 TABLET ORAL EVERY 6 HOURS PRN
Status: ON HOLD | COMMUNITY
End: 2019-11-30 | Stop reason: HOSPADM

## 2019-06-11 RX ORDER — IPRATROPIUM BROMIDE AND ALBUTEROL SULFATE 2.5; .5 MG/3ML; MG/3ML
1 SOLUTION RESPIRATORY (INHALATION)
Status: DISCONTINUED | OUTPATIENT
Start: 2019-06-11 | End: 2019-06-14 | Stop reason: HOSPADM

## 2019-06-11 RX ORDER — TORSEMIDE 100 MG/1
50 TABLET ORAL DAILY
Status: ON HOLD | COMMUNITY
End: 2021-12-29 | Stop reason: HOSPADM

## 2019-06-11 RX ORDER — WARFARIN SODIUM 2 MG/1
4 TABLET ORAL NIGHTLY
Status: DISCONTINUED | OUTPATIENT
Start: 2019-06-11 | End: 2019-06-11 | Stop reason: DRUGHIGH

## 2019-06-11 RX ORDER — ACETAMINOPHEN 325 MG/1
650 TABLET ORAL EVERY 4 HOURS PRN
Status: DISCONTINUED | OUTPATIENT
Start: 2019-06-11 | End: 2019-06-14 | Stop reason: HOSPADM

## 2019-06-11 RX ORDER — ACETAMINOPHEN 325 MG/1
650 TABLET ORAL EVERY 4 HOURS PRN
COMMUNITY

## 2019-06-11 RX ORDER — SODIUM CHLORIDE 0.9 % (FLUSH) 0.9 %
10 SYRINGE (ML) INJECTION EVERY 12 HOURS SCHEDULED
Status: DISCONTINUED | OUTPATIENT
Start: 2019-06-11 | End: 2019-06-14 | Stop reason: HOSPADM

## 2019-06-11 RX ORDER — WARFARIN SODIUM 4 MG/1
4 TABLET ORAL NIGHTLY
Status: ON HOLD | COMMUNITY
End: 2019-11-30 | Stop reason: HOSPADM

## 2019-06-11 RX ORDER — PROMETHAZINE HYDROCHLORIDE 25 MG/1
12.5 TABLET ORAL EVERY 6 HOURS PRN
Status: DISCONTINUED | OUTPATIENT
Start: 2019-06-11 | End: 2019-06-14 | Stop reason: HOSPADM

## 2019-06-11 RX ORDER — DIGOXIN 125 MCG
125 TABLET ORAL DAILY
Status: DISCONTINUED | OUTPATIENT
Start: 2019-06-11 | End: 2019-06-14 | Stop reason: HOSPADM

## 2019-06-11 RX ORDER — ONDANSETRON 2 MG/ML
4 INJECTION INTRAMUSCULAR; INTRAVENOUS EVERY 6 HOURS PRN
Status: DISCONTINUED | OUTPATIENT
Start: 2019-06-11 | End: 2019-06-14 | Stop reason: HOSPADM

## 2019-06-11 RX ORDER — IPRATROPIUM BROMIDE AND ALBUTEROL SULFATE 2.5; .5 MG/3ML; MG/3ML
1 SOLUTION RESPIRATORY (INHALATION) EVERY 4 HOURS
Status: DISCONTINUED | OUTPATIENT
Start: 2019-06-11 | End: 2019-06-11

## 2019-06-11 RX ORDER — POTASSIUM CHLORIDE 20 MEQ/1
40 TABLET, EXTENDED RELEASE ORAL PRN
Status: DISCONTINUED | OUTPATIENT
Start: 2019-06-11 | End: 2019-06-14 | Stop reason: HOSPADM

## 2019-06-11 RX ORDER — POTASSIUM CHLORIDE 7.45 MG/ML
10 INJECTION INTRAVENOUS PRN
Status: DISCONTINUED | OUTPATIENT
Start: 2019-06-11 | End: 2019-06-14 | Stop reason: HOSPADM

## 2019-06-11 RX ORDER — SODIUM CHLORIDE 0.9 % (FLUSH) 0.9 %
10 SYRINGE (ML) INJECTION PRN
Status: DISCONTINUED | OUTPATIENT
Start: 2019-06-11 | End: 2019-06-14 | Stop reason: HOSPADM

## 2019-06-11 RX ORDER — METOLAZONE 5 MG/1
5 TABLET ORAL DAILY PRN
Status: ON HOLD | COMMUNITY
End: 2019-11-30 | Stop reason: HOSPADM

## 2019-06-11 RX ADMIN — SODIUM CHLORIDE 500 ML: 9 INJECTION, SOLUTION INTRAVENOUS at 00:06

## 2019-06-11 RX ADMIN — IPRATROPIUM BROMIDE AND ALBUTEROL SULFATE 3 ML: .5; 3 SOLUTION RESPIRATORY (INHALATION) at 19:56

## 2019-06-11 RX ADMIN — POTASSIUM CHLORIDE 40 MEQ: 750 TABLET, FILM COATED, EXTENDED RELEASE ORAL at 00:09

## 2019-06-11 RX ADMIN — Medication 10 ML: at 02:28

## 2019-06-11 RX ADMIN — SODIUM CHLORIDE: 9 INJECTION, SOLUTION INTRAVENOUS at 02:28

## 2019-06-11 RX ADMIN — Medication 1 G: at 23:35

## 2019-06-11 RX ADMIN — FAMOTIDINE 20 MG: 20 TABLET ORAL at 09:29

## 2019-06-11 RX ADMIN — PHENAZOPYRIDINE HYDROCHLORIDE 200 MG: 100 TABLET ORAL at 09:29

## 2019-06-11 RX ADMIN — LEVETIRACETAM 500 MG: 100 SOLUTION ORAL at 20:53

## 2019-06-11 RX ADMIN — Medication 10 ML: at 20:53

## 2019-06-11 RX ADMIN — SODIUM CHLORIDE: 9 INJECTION, SOLUTION INTRAVENOUS at 16:30

## 2019-06-11 RX ADMIN — DIGOXIN 125 MCG: 125 TABLET ORAL at 09:30

## 2019-06-11 RX ADMIN — IPRATROPIUM BROMIDE AND ALBUTEROL SULFATE 3 ML: .5; 3 SOLUTION RESPIRATORY (INHALATION) at 23:42

## 2019-06-11 RX ADMIN — IPRATROPIUM BROMIDE AND ALBUTEROL SULFATE 3 ML: .5; 3 SOLUTION RESPIRATORY (INHALATION) at 11:56

## 2019-06-11 RX ADMIN — MAGNESIUM HYDROXIDE 30 ML: 400 SUSPENSION ORAL at 20:53

## 2019-06-11 RX ADMIN — IPRATROPIUM BROMIDE AND ALBUTEROL SULFATE 3 ML: .5; 3 SOLUTION RESPIRATORY (INHALATION) at 07:53

## 2019-06-11 RX ADMIN — ASPIRIN 81 MG 81 MG: 81 TABLET ORAL at 09:29

## 2019-06-11 RX ADMIN — FAMOTIDINE 20 MG: 20 TABLET ORAL at 20:53

## 2019-06-11 RX ADMIN — Medication 1 G: at 00:12

## 2019-06-11 RX ADMIN — TORSEMIDE 100 MG: 100 TABLET ORAL at 09:29

## 2019-06-11 RX ADMIN — PHENAZOPYRIDINE HYDROCHLORIDE 200 MG: 100 TABLET ORAL at 16:30

## 2019-06-11 RX ADMIN — PHENAZOPYRIDINE HYDROCHLORIDE 200 MG: 100 TABLET ORAL at 12:38

## 2019-06-11 RX ADMIN — LEVETIRACETAM 500 MG: 100 SOLUTION ORAL at 09:30

## 2019-06-11 ASSESSMENT — PAIN SCALES - GENERAL
PAINLEVEL_OUTOF10: 0

## 2019-06-11 NOTE — DISCHARGE INSTR - COC
Continuity of Care Form    Patient Name: Miladys Edgar   :  1935  MRN:  [id number]    Admit date:  6/10/2019  Discharge date:  2019  Code Status Order: Full Code   Advance Directives:   Advance Care Flowsheet Documentation     Date/Time Healthcare Directive Type of Healthcare Directive Copy in [address] Agent's Name Healthcare Agent's Phone Number    [phone]  Yes, patient has an advance directive for healthcare treatment  Durable power of  for health care  No, copy requested from family  Next of kin  Salvatore Pump  [phone]          Admitting Physician:  Ramesh Perea MD  PCP: No primary care provider on file.     Discharging Nurse: [facility] Unit/Room#: D7Q-7535/3237-97  Discharging Unit Phone Number: [phone]    Emergency Contact:   Extended Emergency Contact Information  Primary Emergency Contact: [address] Phone: [phone]  Relation: Grandchild  Secondary Emergency Contact: [address] Phone: [phone]  Relation: Grandchild    Past Surgical History:  Past Surgical History:   Procedure Laterality Date    HIP SURGERY         Immunization History:   Immunization History   Administered Date(s) Administered    Influenza, Quadv, 6 mo and older, IM, PF (Flulaval, Fluarix) 2018       Active Problems:  Patient Active Problem List   Diagnosis Code    Hemorrhagic stroke ([facility] [address] I61.9    Acute ischemic stroke ([facility] [address] I63.9    UTI (urinary tract infection) N39.0    HTN (hypertension) I10    High cholesterol E78.00    Chronic congestive heart failure (HCC) I50.9       Isolation/Infection:   Isolation          No Isolation            Nurse Assessment:  Last Vital Signs: BP 98/61   Pulse 65   Temp 98 °F (36.7 °C) (Temporal)   Resp 16   Ht 5' 5\" (1.651 m)   Wt 205 lb 6.4 oz (93.2 kg)   LMP  (LMP Unknown) Comment: post-menopause  SpO2 98%   BMI 34.18 kg/m²     Last documented pain score (0-10 scale): Pain Level: 0  Last Weight:   Wt Readings from Last 1 Encounters:   06/11/19 205 lb 6.4 oz (93.2 kg)     Mental Status:  alert    IV Access:  - None    Nursing Mobility/ADLs:  Walking   Dependent  Transfer  Dependent  Bathing  Dependent  Dressing  Dependent  Toileting  Dependent  Feeding  Dependent  Med Admin  Dependent  Med Delivery   whole and prefers mixed with applesauce    Wound Care Documentation and Therapy:        Elimination:  Continence:   · Bowel: No  · Bladder: No  Urinary Catheter: None   Colostomy/Ileostomy/Ileal Conduit: No       Date of Last BM: 6/11/2019    Intake/Output Summary (Last 24 hours) at 6/11/2019 1539  Last data filed at 6/11/2019 1230  Gross per 24 hour   Intake 480 ml   Output --   Net 480 ml     I/O last 3 completed shifts: In: 480 [P.O.:480]  Out: -     Safety Concerns: At Risk for Falls and Aspiration Risk    Impairments/Disabilities:      None    Nutrition Therapy:  Current Nutrition Therapy:   - Oral Diet:  Dysphagia 1 pureed    Routes of Feeding: Oral  Liquids: No Restrictions  Daily Fluid Restriction: no  Last Modified Barium Swallow with Video (Video Swallowing Test): not done    Treatments at the Time of Hospital Discharge:   Respiratory Treatments: nebulizers  Oxygen Therapy:  is not on home oxygen therapy.   Ventilator:    - No ventilator support    Rehab Therapies: Speech/Language Therapy  Weight Bearing Status/Restrictions: No weight bearing restirctions  Other Medical Equipment (for information only, NOT a DME order):  hospital bed  Other Treatments: none    Patient's personal belongings (please select all that are sent with patient):  clothes    RN SIGNATURE:  Electronically signed by Yvan Gomez RN on 6/12/19 at 9:55 AM    CASE MANAGEMENT/SOCIAL WORK SECTION    Inpatient Status Date: 06/11/2019    Readmission Risk Assessment Score:  Readmission Risk              Risk of Unplanned Readmission:        16           Discharging to Facility/ Agency   Name: [facility]  Address:  [address] [place]  Phone: [phone]  Fax:      [phone]    / signature: Jaison Rincon, [address] [facility]    PHYSICIAN SECTION    Prognosis: Fair    Condition at Discharge: Stable    Rehab Potential (if transferring to Rehab): Good    Recommended Labs or Other Treatments After Discharge:     Physician Certification: I certify the above information and transfer of Mavis Aguilera  is necessary for the continuing treatment of the diagnosis listed and that she requires [facility] for greater 30 days.      Update Admission H&P: No change in H&P    PHYSICIAN SIGNATURE:  Electronically signed by Carlie Mar MD on 6/12/19 at 8:15 AM
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
## 2019-06-11 NOTE — ED NOTES
Pt to be admitted for UTI- orders obtained from Dr. Valente Juarez. Pt resting with respirations even and unlabored. O2 on via NC at 4L/min. Afib continued on monitor. Awaiting admit bed.      Leah Lloyd RN  06/11/19 0057

## 2019-06-11 NOTE — ED NOTES
Pt resting at present. Respirations easier. O2 maintained. Family with pt.      Tomas Newman, WILFRID  06/11/19 3837

## 2019-06-11 NOTE — ED NOTES
After numerous IV attempts - saline lock inserted left AC via ultrasound with blood drawn and sent to lab. Pt tolerated procedures. Pt anxious but slightly tachypneic- unable to obtain O2 sats-O2 on via NC at 4L/min. Afib noted on monitor. Family at bedside with call bell in reach. Comfort measures maintained.           Oumou Klein RN  06/10/19 6439

## 2019-06-11 NOTE — ED NOTES
Pt was incontinent of large amount of foul smelling urine. Straight cathed pt for specimen that was sent to lab. Depends changed and comfort measures maintained. Side rails x 2 up with bed locked in lowest position. Family with pt.      Tomas Newman RN  06/11/19 4644

## 2019-06-11 NOTE — PLAN OF CARE
Problem: Falls - Risk of:  Goal: Will remain free from falls  Description  Will remain free from falls  6/11/2019 1353 by Theresa Ware RN  Outcome: Ongoing  6/11/2019 0356 by Silas Parrish RN  Outcome: Ongoing  Goal: Absence of physical injury  Description  Absence of physical injury  Outcome: Ongoing     Problem: Risk for Impaired Skin Integrity  Goal: Tissue integrity - skin and mucous membranes  Description  Structural intactness and normal physiological function of skin and  mucous membranes.   6/11/2019 1353 by Theresa Ware RN  Outcome: Ongoing  6/11/2019 0356 by Silas Parrish RN  Outcome: Ongoing     Problem: Urinary Elimination:  Goal: Signs and symptoms of infection will decrease  Description  Signs and symptoms of infection will decrease  6/11/2019 1353 by Theresa Ware RN  Outcome: Ongoing  6/11/2019 0356 by Silas Parrish RN  Outcome: Ongoing     Problem: Respiratory:  Goal: Ability to maintain a clear airway will improve  Description  Ability to maintain a clear airway will improve  6/11/2019 1353 by Theresa Ware RN  Outcome: Ongoing  6/11/2019 0356 by Silas Parrish RN  Outcome: Ongoing  Goal: Absence of aspiration  Description  Absence of aspiration  6/11/2019 1353 by Theresa Ware RN  Outcome: Ongoing  6/11/2019 0356 by Silas Parrish RN  Outcome: Ongoing     Problem: OXYGENATION/RESPIRATORY FUNCTION  Goal: Patient will maintain patent airway  Outcome: Ongoing  Goal: Patient will achieve/maintain normal respiratory rate/effort  Description  Respiratory rate and effort will be within normal limits for the patient  Outcome: Ongoing

## 2019-06-11 NOTE — PROGRESS NOTES
Patient arrived to room 5918 from [facility] at [facility]. Patient oriented to room and call light system. VSS. Fall precautions in place, call light and belongings in reach, bed in lowest position, wheels locked in place, side rails up x 2. Bed alarm on.

## 2019-06-11 NOTE — PROGRESS NOTES
Clinical Pharmacist Note:    Pharmacy consulted by Dr. Zaheer Coppola to dose warfarin for Jose G Keenan. Goal INR range: 2.0-3.0    INR today: 3.68  Plan to HOLD warfarin until INR is within goal range. Daily INR is ordered. Pharmacy will continue to follow.      Rajni Elias PharmD

## 2019-06-11 NOTE — CARE COORDINATION
Discharge Planning Assessment  RN/SW discharge planner met with patient to discuss reason for admission, current living situation, and potential needs at the time of discharge    Demographics/Insurance verified: Yes    Current type of dwelling: LTC resident at [facility]    Patient from ECF/SW confirmed with: Thom Mandujano confirmed no pre-cert required due to LTC resident    Living arrangements: LTC    Level of function/Support: Dependent on LTC staff    PCP: MD at facility    Last Visit to PCP: Within the last 6 months    DME: Provided by facility    Active with any community resources/agencies/skilled home care: Needs met by [facility]    Medication compliance issues: None    Financial issues that could impact healthcare: None    Transportation at the time of discharge:  Will require transport back to facility    Tentative discharge plan: Return to [address], [address] [facility]
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
## 2019-06-11 NOTE — ED NOTES
Report reviewed by and given to [facility].  Pt to be transferred to room [address], RN  06/11/19 4994

## 2019-06-11 NOTE — PLAN OF CARE
Problem: Falls - Risk of:  Goal: Will remain free from falls  Description  Will remain free from falls  Outcome: Ongoing  Assess risk factors for falls  Assess fall prevention measures  Assist ambulation and toileting    Problem: Risk for Impaired Skin Integrity  Goal: Tissue integrity - skin and mucous membranes  Description  Structural intactness and normal physiological function of skin and  mucous membranes. Outcome: Ongoing  Assess signs of wound infection  Assist with repositioning  Provide skin care    Problem: Urinary Elimination:  Goal: Signs and symptoms of infection will decrease  Description  Signs and symptoms of infection will decrease  Outcome: Ongoing     Problem: Respiratory:  Goal: Ability to maintain a clear airway will improve  Description  Ability to maintain a clear airway will improve  Outcome: Ongoing     Problem: Respiratory:  Goal: Absence of aspiration  Description  Absence of aspiration  Outcome: Ongoing  Keeping bed elevated. The care plan and education has been reviewed and mutually agreed upon with the patient.

## 2019-06-11 NOTE — PROGRESS NOTES
9:44 AM  Morning assessment complete. Patient repositioned in the bed with pillow support. Brief is dry. Patient set up to eat breakfast. Bed alarm on and call light in reach. The care plan and education has been reviewed and mutually agreed upon with the patient. 1:17 PM  Patient bathed and serenity care performed. Patient incont. Of a large amount of urine. Linens changed. Speech eval ordered. Patient now 97% on RA.

## 2019-06-11 NOTE — PROGRESS NOTES
Admission assessment complete, see flowsheet. Patient in bed, alert and able to follow commands but forgetful, no s/s of distress, respirations even and unlabored on 4L O2 via NC, brief changed, mepilex in place to sacrum, repositioned for comfort. The care plan and education has been reviewed and mutually agreed upon with the patient. All needs attended. Fall precautions in place, call light within reach. Will continue to monitor.
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
## 2019-06-11 NOTE — PROGRESS NOTES
delayed swallow initiation was noted. No overt coughing, throat clearing, or change in vocal quality was noted with thin liquids via cup. Delayed cough was noted with thin liquids via straw. Pt with increased shortness of breath post swallow with all textures, suspect due to impaired oral phase of swallowing and need to [facility] her breath\" to transfer bolus. Prolonged mastication was noted with regular solids with more labored breathing noted. At this time, a Dysphagia I (puree) diet with thin liquids is recommended with ongoing use of aspiration precautions. Dietary Recommendations: Dysphagia I (puree) diet with thin liquids, no straws, meds in puree    Strategies: 90 degree positioning with all p.o. intake; small bites/sips; alternate textures through meal; reduce rate of intake    Treatment/Goals: Speech therapy for dysphagia tx 3-5 times per week. ST.) Pt will tolerate recommended diet without s/s of aspiration   2.) If clinical symptoms of penetration/aspiration continue to be noted,Pt will tolerate MBS to r/o aspiration and determine appropriate diet/liquid level.    3.) Pt will tolerate diet advance to least restricted diet, as clinically indicated, with no signs of aspiration   4.) Pt will improve oral motor function via bolus control exercises 5/5    Oral motor Exam:  Dentition: missing most teeth  Labial/Facial: mildly reduced strength  Lingual: mildly reduced coordination  Voice: within functional limits     Oral Phase:   Reduced/prolonged mastication  Oral holding  Reduced A-P propulsion  Lingual pumping  Apparent premature bolus loss to pharynx  Suspected reduced/delayed palatal retraction    Pharyngeal Phase:  Apparent pharyngeal pooling  Delayed swallow initiation  Decreased laryngeal elevation via palpation   Coughing (delayed) with thin liquids via straw    Patient/Family Education:Education, results and recommendations given to the Pt, her family, and nurse, who verbalized

## 2019-06-12 ENCOUNTER — APPOINTMENT (OUTPATIENT)
Dept: CT IMAGING | Age: 84
DRG: 463 | End: 2019-06-12
Payer: MEDICAID

## 2019-06-12 LAB
A/G RATIO: 1 (ref 1.1–2.2)
ALBUMIN SERPL-MCNC: 3 G/DL (ref 3.4–5)
ALP BLD-CCNC: 84 U/L (ref 40–129)
ALT SERPL-CCNC: 21 U/L (ref 10–40)
ANION GAP SERPL CALCULATED.3IONS-SCNC: 10 MMOL/L (ref 3–16)
AST SERPL-CCNC: 27 U/L (ref 15–37)
BASOPHILS ABSOLUTE: 0.1 K/UL (ref 0–0.2)
BASOPHILS RELATIVE PERCENT: 1 %
BILIRUB SERPL-MCNC: 0.7 MG/DL (ref 0–1)
BUN BLDV-MCNC: 11 MG/DL (ref 7–20)
CALCIUM SERPL-MCNC: 8 MG/DL (ref 8.3–10.6)
CHLORIDE BLD-SCNC: 99 MMOL/L (ref 99–110)
CO2: 33 MMOL/L (ref 21–32)
CREAT SERPL-MCNC: 0.8 MG/DL (ref 0.6–1.2)
EOSINOPHILS ABSOLUTE: 0.3 K/UL (ref 0–0.6)
EOSINOPHILS RELATIVE PERCENT: 3.5 %
GFR AFRICAN AMERICAN: >60
GFR NON-AFRICAN AMERICAN: >60
GLOBULIN: 3 G/DL
GLUCOSE BLD-MCNC: 102 MG/DL (ref 70–99)
GLUCOSE BLD-MCNC: 140 MG/DL (ref 70–99)
HCT VFR BLD CALC: 34.2 % (ref 36–48)
HEMOGLOBIN: 12 G/DL (ref 12–16)
INR BLD: 2.94 (ref 0.86–1.14)
LYMPHOCYTES ABSOLUTE: 1.9 K/UL (ref 1–5.1)
LYMPHOCYTES RELATIVE PERCENT: 22.9 %
MAGNESIUM: 1.9 MG/DL (ref 1.8–2.4)
MCH RBC QN AUTO: 32.9 PG (ref 26–34)
MCHC RBC AUTO-ENTMCNC: 35 G/DL (ref 31–36)
MCV RBC AUTO: 94.2 FL (ref 80–100)
MONOCYTES ABSOLUTE: 0.6 K/UL (ref 0–1.3)
MONOCYTES RELATIVE PERCENT: 7.2 %
NEUTROPHILS ABSOLUTE: 5.3 K/UL (ref 1.7–7.7)
NEUTROPHILS RELATIVE PERCENT: 65.4 %
PDW BLD-RTO: 13.1 % (ref 12.4–15.4)
PERFORMED ON: ABNORMAL
PLATELET # BLD: 194 K/UL (ref 135–450)
PMV BLD AUTO: 7.4 FL (ref 5–10.5)
POTASSIUM REFLEX MAGNESIUM: 3.1 MMOL/L (ref 3.5–5.1)
PROTHROMBIN TIME: 33.5 SEC (ref 9.8–13)
RBC # BLD: 3.64 M/UL (ref 4–5.2)
SODIUM BLD-SCNC: 142 MMOL/L (ref 136–145)
TOTAL PROTEIN: 6 G/DL (ref 6.4–8.2)
URINE CULTURE, ROUTINE: NORMAL
WBC # BLD: 8.2 K/UL (ref 4–11)

## 2019-06-12 PROCEDURE — 6360000004 HC RX CONTRAST MEDICATION: Performed by: INTERNAL MEDICINE

## 2019-06-12 PROCEDURE — 70450 CT HEAD/BRAIN W/O DYE: CPT

## 2019-06-12 PROCEDURE — 6370000000 HC RX 637 (ALT 250 FOR IP): Performed by: INTERNAL MEDICINE

## 2019-06-12 PROCEDURE — 85025 COMPLETE CBC W/AUTO DIFF WBC: CPT

## 2019-06-12 PROCEDURE — 2580000003 HC RX 258: Performed by: INTERNAL MEDICINE

## 2019-06-12 PROCEDURE — 36415 COLL VENOUS BLD VENIPUNCTURE: CPT

## 2019-06-12 PROCEDURE — 70498 CT ANGIOGRAPHY NECK: CPT

## 2019-06-12 PROCEDURE — 80053 COMPREHEN METABOLIC PANEL: CPT

## 2019-06-12 PROCEDURE — 94761 N-INVAS EAR/PLS OXIMETRY MLT: CPT

## 2019-06-12 PROCEDURE — 6360000002 HC RX W HCPCS: Performed by: INTERNAL MEDICINE

## 2019-06-12 PROCEDURE — 70496 CT ANGIOGRAPHY HEAD: CPT

## 2019-06-12 PROCEDURE — 1200000000 HC SEMI PRIVATE

## 2019-06-12 PROCEDURE — 94640 AIRWAY INHALATION TREATMENT: CPT

## 2019-06-12 PROCEDURE — 85610 PROTHROMBIN TIME: CPT

## 2019-06-12 PROCEDURE — 83735 ASSAY OF MAGNESIUM: CPT

## 2019-06-12 RX ORDER — WARFARIN SODIUM 2 MG/1
4 TABLET ORAL DAILY
Status: DISCONTINUED | OUTPATIENT
Start: 2019-06-12 | End: 2019-06-14 | Stop reason: HOSPADM

## 2019-06-12 RX ORDER — AMOXICILLIN AND CLAVULANATE POTASSIUM 875; 125 MG/1; MG/1
1 TABLET, FILM COATED ORAL 2 TIMES DAILY
Qty: 14 TABLET | Refills: 0
Start: 2019-06-12 | End: 2019-06-19

## 2019-06-12 RX ADMIN — IPRATROPIUM BROMIDE AND ALBUTEROL SULFATE 3 ML: .5; 3 SOLUTION RESPIRATORY (INHALATION) at 12:48

## 2019-06-12 RX ADMIN — PHENAZOPYRIDINE HYDROCHLORIDE 200 MG: 100 TABLET ORAL at 08:19

## 2019-06-12 RX ADMIN — IPRATROPIUM BROMIDE AND ALBUTEROL SULFATE 3 ML: .5; 3 SOLUTION RESPIRATORY (INHALATION) at 03:28

## 2019-06-12 RX ADMIN — ASPIRIN 81 MG 81 MG: 81 TABLET ORAL at 08:19

## 2019-06-12 RX ADMIN — DIGOXIN 125 MCG: 125 TABLET ORAL at 08:19

## 2019-06-12 RX ADMIN — FAMOTIDINE 20 MG: 20 TABLET ORAL at 08:19

## 2019-06-12 RX ADMIN — PHENAZOPYRIDINE HYDROCHLORIDE 200 MG: 100 TABLET ORAL at 20:27

## 2019-06-12 RX ADMIN — LEVETIRACETAM 500 MG: 100 SOLUTION ORAL at 20:27

## 2019-06-12 RX ADMIN — IPRATROPIUM BROMIDE AND ALBUTEROL SULFATE 3 ML: .5; 3 SOLUTION RESPIRATORY (INHALATION) at 08:56

## 2019-06-12 RX ADMIN — IPRATROPIUM BROMIDE AND ALBUTEROL SULFATE 3 ML: .5; 3 SOLUTION RESPIRATORY (INHALATION) at 16:57

## 2019-06-12 RX ADMIN — IOPAMIDOL 75 ML: 755 INJECTION, SOLUTION INTRAVENOUS at 19:46

## 2019-06-12 RX ADMIN — FAMOTIDINE 20 MG: 20 TABLET ORAL at 20:27

## 2019-06-12 RX ADMIN — LEVETIRACETAM 500 MG: 100 SOLUTION ORAL at 08:18

## 2019-06-12 RX ADMIN — TORSEMIDE 100 MG: 100 TABLET ORAL at 08:19

## 2019-06-12 RX ADMIN — Medication 1 G: at 23:48

## 2019-06-12 RX ADMIN — SODIUM CHLORIDE: 9 INJECTION, SOLUTION INTRAVENOUS at 04:43

## 2019-06-12 RX ADMIN — IPRATROPIUM BROMIDE AND ALBUTEROL SULFATE 3 ML: .5; 3 SOLUTION RESPIRATORY (INHALATION) at 21:12

## 2019-06-12 RX ADMIN — POTASSIUM CHLORIDE 40 MEQ: 1500 TABLET, EXTENDED RELEASE ORAL at 08:19

## 2019-06-12 RX ADMIN — Medication 10 ML: at 20:30

## 2019-06-12 RX ADMIN — WARFARIN SODIUM 4 MG: 2 TABLET ORAL at 20:32

## 2019-06-12 ASSESSMENT — PAIN SCALES - GENERAL
PAINLEVEL_OUTOF10: 0

## 2019-06-12 NOTE — PLAN OF CARE
Problem: Falls - Risk of:  Goal: Will remain free from falls  Description  Will remain free from falls  6/12/2019 0201 by Angelina South RN  Outcome: Ongoing  Note:   All fall precautions in place. Bed in lowest position and locked. All alarms on and audible. Call light and frequently used items are within reach.

## 2019-06-12 NOTE — PROGRESS NOTES
Stroke Team Brief Note:    Patient is an 83 yo female with a h/o HTN, HLD, a fib, CHF and L frontal lobe infarct and L BG ICH,  who was admitted from her NH with SOB and UTI. Family had reported that the patient had increased confusion over the past week at the NH prior to presentation. Called by bedside provider for acute episode of isolated aphasia that started within the past hour. She is currently on coumadin with an INR of 2.94. Patient is also on LEV, but unclear the indication since I am unable to find documentation of seizure activity. On my exam, the patient is alert, oriented to person, hospital (not which one), but not date. Naming and repetition intact. NIH Stroke Scale      Time: 18:50  Person Administering Scale: Ashanti Jones    Administer stroke scale items in the order listed. Record performance in each category after each subscale exam. Do not go back and change scores. Follow directions provided for each exam technique. Scores should reflect what the patient does, not what the clinician thinks the patient can do. The clinician should record answers while administering the exam and work quickly. Except where indicated, the patient should not be coached (i.e., repeated requests to patient to make a special effort). 1a  Level of consciousness: 0=alert; keenly responsive   1b. LOC questions:  0 = knows name and month of birth   1c. LOC commands: 0=Performs both tasks correctly   2. Best Gaze: 0=normal   3. Visual: 0=No visual loss   4. Facial Palsy: 0=Normal symmetric movement - within limits of dentition   5a. Motor left arm: 0=No drift, limb holds 90 (or 45) degrees for full 10 seconds   5b.   Motor right arm: 0=No drift, limb holds 90 (or 45) degrees for full 10 seconds   6a. motor left le=Some effort against gravity, limb cannot get to or maintain (if cured) 90 (or 45) degrees, drifts down to bed, but has some effort against gravity   6b  Motor right leg: 2=Some effort against gravity, limb cannot get to or maintain (if cured) 90 (or 45) degrees, drifts down to bed, but has some effort against gravity   7. Limb Ataxia: 0=Absent   8. Sensory: 0=Normal; no sensory loss   9. Best Language:  1=Mild to moderate aphasia; some obvious loss of fluency or facility of comprehension without significant limitation on ideas expressed or form of expression. 10. Dysarthria: 1=Mild to moderate, patient slurs at least some words and at worst, can be understood with some difficulty   11. Extinction and Inattention: 0=No abnormality         Total:   6       CT head: Cystic encephalomalacia from old L frontal infarct and BG hemorrhage. CTA: Not available for review at the time of consult. Read pending. Assessment: 83 yo female with a fib on coumadin, currently admitted for sepsis/UTI who had an acute episode of transient aphasia that has resolved on my evaluation. Possible with history of a fib and current infection, patient may have had a TIA, but her symptoms have resolved at this time. Her NIH stroke scale points ae for chronic weakness in bilateral LE, dysarthria secondary to dentition with some mild paraphasias on conversation of unclear chronicity.       1)  Would obtain lipids/A1c for stroke risk factor assessment  2) repeat ECHO; no evidence for L to R shunt in 9/2018, so assessment for function/thrombus most important  3) MRI brain  4) Agree with anticoagulation for stroke prevention in the setting of this patient's a fib  5) Recommend clarifying the need for LEV, as the need for this medication is unclear at this time    Yazmin Aranda MD   Stroke Team

## 2019-06-12 NOTE — PROGRESS NOTES
Assessment completed, see doc flowsheets. Pt is alert, awake and orient to self. Lung sounds are clear. VSS. Medications given per MAR. Call light with in reach, will continue to monitor.

## 2019-06-12 NOTE — CODE DOCUMENTATION
Rapid response called d/t change in mental status, ate lunch and started having arm tremors and jaw. Unable to recognize family or answer questions appropriately.

## 2019-06-12 NOTE — PLAN OF CARE
Pt remains free from falls. Safety precautions in place. Bed in lowest position, bed wheels locked, call light with in reach, bed alarm on, yellow blanket in place, fall risk wrist band on, orange light or SAFE sign outside of doorway. Will continue to monitor.   Jeni Jackson RN

## 2019-06-12 NOTE — CARE COORDINATION
Discharge Plan:     Patient discharged to:   · Name: [facility]  · Address:  [address], [place], [place] [address]  · Phone: [phone]   · Fax:      [phone]    SW/DC Planner faxed, NOELLE and AVS to fax number above. Narcotic Prescriptions faxed were: NA  RN: Dory Cordova will call report to the main number listed above and ask for the supervisor. Medical Transport with: [facility]  [phone]   time: 1500  Family advised of discharge?: Yes, granddaughter present in room  HENS Submitted?:  NA, returning to facility. All discharge needs met per case management.     Gifty Rodriguez, [address] [facility]  [phone]

## 2019-06-12 NOTE — PROGRESS NOTES
At around 1430 today pt started to have a change in status. She was unable to verbalize what was wrong, who she was, where she was, and could not tell you the names of any family that was present. She also would fixate on a word and would repeat said word for every question that was asked to her. Vitals remained stable. She did have tremors to her bilateral upper extremities as well as her lower jaw. The nurse did a full assessment on patient, and determined that a rapid response was warranted. Dr. Maribel Camargo responded to the rapid. He said she was able to respond, he ordered a head CT W/O contrast, and told the RN to notify Dr. Jhonny Gonzalez to figure out a new plan. Pt transported by 2 RN's to CT. Awaiting results. Will continue to monitor.   Meryle Sofia

## 2019-06-12 NOTE — PROGRESS NOTES
Message sent to hospitalist:    I had to call rapid response on pt. She became unable to verbalize, was staring into space, and was having an increase in tremors. Dr. Frieda Katz would like you to call him to speak about what happened. He can be reached at [phone].       Allan Conn
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
## 2019-06-12 NOTE — FLOWSHEET NOTE
06/12/19 1526   NIH/MNHISS Stroke Scale   NIH/MNIHSS Stroke Scale Assessed Yes   Interval Baseline   Level of Consciousness (1a. ) 0   LOC Questions (1b. ) 2   LOC Commands (1c. ) 1   Best Gaze (2. ) 0   Visual (3. ) 0   Facial Palsy (4. ) 0   Motor Arm, Left (5a. ) 1   Motor Arm, Right (5b. ) 2   Motor Leg, Left (6a. ) 3   Motor Leg, Right (6b. ) 2   Limb Ataxia (7. ) (!) 2   Sensory (8. ) (!) 2   Best Language (9. ) 2   Dysarthria (10. ) 1   Extinction and Inattention (11) (!) 1   Modified Total 19   Total 19   Notes With Karrie Beverly RN

## 2019-06-12 NOTE — PROGRESS NOTES
Patient became aphasic. Suspicion of stroke patient on Coumadin INR therapeutic CT of the head does not show any acute stroke not a TPA candidate discussed the case with stroke neurology at [facility].   Obtain CT of the head and neck stat

## 2019-06-12 NOTE — PROGRESS NOTES
Patient apparently admitted for urinary tract infection. Under Dr. Sandee Ernst service.   Patient developed altered mental status, Staring spells, tingling numbness in both upper and lower extremity and diminished speech according to the nurse and the family members speech output has been significantly reduced although patient can talk to me about her complaints but according to daughter she had a previous stroke and her speech was reduced even at that time although she was responding to verbal commands hemodynamically she was stable   Examination revealed examination examination revealed a Neurological  examination revealed grade 5 power in both upper and lower activity speech output is diminished the rest of the system examination was normal next    CT of the head was ordered without contrast stat will speak with the stroke service to see if he is a candidate for TPA will speak with Dr. Sandee Ernst at earliest opportunity thank you

## 2019-06-12 NOTE — CARE COORDINATION
Per caring nurse the patient had an episode of SOB while eating and family is not ready for patient to be discharged at this time. This RN informed that discharge is cancelled at this time. Transport cancelled and [employer] updated.      [facility]  [phone]

## 2019-06-12 NOTE — FLOWSHEET NOTE
Patient alert and oriented. Lungs clear to auscultation, breathing even and unlabored, chest expansion symmetrical, no noted use of accessory muscles, and patient denies any SOB. Bowel sounds present in all quadrants, c/o constipation, MOM given, patient denies any nausea, vomiting and diarrhea at this time. Peripheral pulses present in all extremities, patient has BLE compression stockings in place, patient refuse to take them of at HS.  Assist to a comfortable position in bed and call light within reach.     06/11/19 2042   Vital Signs   Temp 98.2 °F (36.8 °C)   Temp Source Temporal   Pulse 81   Heart Rate Source Brachial   Resp 16   BP (!) 90/53   BP Location Right upper arm   BP Upper/Lower Upper   Patient Position Semi fowlers   Level of Consciousness 0   MEWS Score 2   Patient Currently in Pain Denies   Pain Assessment   Pain Assessment 0-10   Pain Level 0   Oxygen Therapy   SpO2 97 %   O2 Device None (Room air)

## 2019-06-13 ENCOUNTER — APPOINTMENT (OUTPATIENT)
Dept: MRI IMAGING | Age: 84
DRG: 463 | End: 2019-06-13
Payer: MEDICAID

## 2019-06-13 LAB
CHOLESTEROL, TOTAL: 188 MG/DL (ref 0–199)
ESTIMATED AVERAGE GLUCOSE: 131.2 MG/DL
HBA1C MFR BLD: 6.2 %
HDLC SERPL-MCNC: 35 MG/DL (ref 40–60)
INR BLD: 2.18 (ref 0.86–1.14)
LDL CHOLESTEROL CALCULATED: 117 MG/DL
LEFT VENTRICULAR EJECTION FRACTION HIGH VALUE: 65 %
LEFT VENTRICULAR EJECTION FRACTION MODE: NORMAL
LV EF: 65 %
LVEF MODALITY: NORMAL
PROTHROMBIN TIME: 24.9 SEC (ref 9.8–13)
TRIGL SERPL-MCNC: 181 MG/DL (ref 0–150)
VLDLC SERPL CALC-MCNC: 36 MG/DL

## 2019-06-13 PROCEDURE — 1200000000 HC SEMI PRIVATE

## 2019-06-13 PROCEDURE — 94640 AIRWAY INHALATION TREATMENT: CPT

## 2019-06-13 PROCEDURE — 80061 LIPID PANEL: CPT

## 2019-06-13 PROCEDURE — 92526 ORAL FUNCTION THERAPY: CPT

## 2019-06-13 PROCEDURE — 6370000000 HC RX 637 (ALT 250 FOR IP): Performed by: INTERNAL MEDICINE

## 2019-06-13 PROCEDURE — 2580000003 HC RX 258: Performed by: INTERNAL MEDICINE

## 2019-06-13 PROCEDURE — 93306 TTE W/DOPPLER COMPLETE: CPT

## 2019-06-13 PROCEDURE — 83036 HEMOGLOBIN GLYCOSYLATED A1C: CPT

## 2019-06-13 PROCEDURE — 99223 1ST HOSP IP/OBS HIGH 75: CPT | Performed by: PSYCHIATRY & NEUROLOGY

## 2019-06-13 PROCEDURE — 94761 N-INVAS EAR/PLS OXIMETRY MLT: CPT

## 2019-06-13 PROCEDURE — 36415 COLL VENOUS BLD VENIPUNCTURE: CPT

## 2019-06-13 PROCEDURE — 70551 MRI BRAIN STEM W/O DYE: CPT

## 2019-06-13 PROCEDURE — 6360000002 HC RX W HCPCS: Performed by: INTERNAL MEDICINE

## 2019-06-13 PROCEDURE — 85610 PROTHROMBIN TIME: CPT

## 2019-06-13 RX ORDER — LORAZEPAM 2 MG/ML
0.5 INJECTION INTRAMUSCULAR ONCE
Status: COMPLETED | OUTPATIENT
Start: 2019-06-13 | End: 2019-06-13

## 2019-06-13 RX ADMIN — IPRATROPIUM BROMIDE AND ALBUTEROL SULFATE 3 ML: .5; 3 SOLUTION RESPIRATORY (INHALATION) at 20:53

## 2019-06-13 RX ADMIN — IPRATROPIUM BROMIDE AND ALBUTEROL SULFATE 3 ML: .5; 3 SOLUTION RESPIRATORY (INHALATION) at 12:24

## 2019-06-13 RX ADMIN — LEVETIRACETAM 500 MG: 100 SOLUTION ORAL at 10:19

## 2019-06-13 RX ADMIN — LORAZEPAM 0.5 MG: 2 INJECTION INTRAMUSCULAR; INTRAVENOUS at 14:31

## 2019-06-13 RX ADMIN — IPRATROPIUM BROMIDE AND ALBUTEROL SULFATE 3 ML: .5; 3 SOLUTION RESPIRATORY (INHALATION) at 00:38

## 2019-06-13 RX ADMIN — IPRATROPIUM BROMIDE AND ALBUTEROL SULFATE 3 ML: .5; 3 SOLUTION RESPIRATORY (INHALATION) at 08:36

## 2019-06-13 RX ADMIN — FAMOTIDINE 20 MG: 20 TABLET ORAL at 10:18

## 2019-06-13 RX ADMIN — PHENAZOPYRIDINE HYDROCHLORIDE 200 MG: 100 TABLET ORAL at 10:19

## 2019-06-13 RX ADMIN — DIGOXIN 125 MCG: 125 TABLET ORAL at 10:19

## 2019-06-13 RX ADMIN — FAMOTIDINE 20 MG: 20 TABLET ORAL at 20:48

## 2019-06-13 RX ADMIN — PHENAZOPYRIDINE HYDROCHLORIDE 200 MG: 100 TABLET ORAL at 14:24

## 2019-06-13 RX ADMIN — IPRATROPIUM BROMIDE AND ALBUTEROL SULFATE 3 ML: .5; 3 SOLUTION RESPIRATORY (INHALATION) at 04:10

## 2019-06-13 RX ADMIN — Medication 10 ML: at 10:20

## 2019-06-13 RX ADMIN — TORSEMIDE 100 MG: 100 TABLET ORAL at 10:18

## 2019-06-13 RX ADMIN — ASPIRIN 81 MG 81 MG: 81 TABLET ORAL at 10:19

## 2019-06-13 RX ADMIN — WARFARIN SODIUM 4 MG: 2 TABLET ORAL at 20:52

## 2019-06-13 RX ADMIN — AMLODIPINE BESYLATE 10 MG: 5 TABLET ORAL at 10:18

## 2019-06-13 RX ADMIN — PHENAZOPYRIDINE HYDROCHLORIDE 200 MG: 100 TABLET ORAL at 20:52

## 2019-06-13 RX ADMIN — LEVETIRACETAM 500 MG: 100 SOLUTION ORAL at 20:48

## 2019-06-13 RX ADMIN — Medication 10 ML: at 20:54

## 2019-06-13 ASSESSMENT — PAIN SCALES - GENERAL
PAINLEVEL_OUTOF10: 0
PAINLEVEL_OUTOF10: 0

## 2019-06-13 NOTE — PROGRESS NOTES
bites/sips, and take breaks as needed throughout meals to reduce fatigue.     -Compensatory strategies:  90 degree positioning with all p.o. intake; small bites/sips; alternate textures through meal; reduce rate of intake    Diet and Treatment Recommendations:  Continue dysphagia I (puree) diet with thin liquids, no straws, meds in puree    ST.) Pt will tolerate recommended diet without s/s of aspiration (ongoing, 19)  2.) If clinical symptoms of penetration/aspiration continue to be noted,Pt will tolerate MBS to r/o aspiration and determine appropriate diet/liquid level. (ongoing, 19)  3.) Pt will tolerate diet advance to least restricted diet, as clinically indicated, with no signs of aspiration (ongoing, 19)  4.) Pt will improve oral motor function via bolus control exercises 5/5 (ongoing, 19)     Plan:  Continued daily Dysphagia treatment with goals per plan of care. Patient/Family Education:Education given to the Pt and nurse, who verbalized understanding    Discharge Recommendations:  Pt will benefit from continued skilled Speech Therapy for Dysphagia services, prior to returning home. Timed Code Treatment: 0 minutes    Total Treatment Time: 15 minutes      Signature:    ANN Alicea  Speech-Language Pathologist

## 2019-06-13 NOTE — PLAN OF CARE
Problem: Falls - Risk of:  Goal: Will remain free from falls  Description  Will remain free from falls  Outcome: Ongoing  Goal: Absence of physical injury  Description  Absence of physical injury  Outcome: Ongoing     Problem: Risk for Impaired Skin Integrity  Goal: Tissue integrity - skin and mucous membranes  Description  Structural intactness and normal physiological function of skin and  mucous membranes.   Outcome: Ongoing     Problem: Urinary Elimination:  Goal: Signs and symptoms of infection will decrease  Description  Signs and symptoms of infection will decrease  Outcome: Ongoing     Problem: Respiratory:  Goal: Ability to maintain a clear airway will improve  Description  Ability to maintain a clear airway will improve  Outcome: Ongoing  Goal: Absence of aspiration  Description  Absence of aspiration  Outcome: Ongoing     Problem: OXYGENATION/RESPIRATORY FUNCTION  Goal: Patient will maintain patent airway  Outcome: Ongoing  Goal: Patient will achieve/maintain normal respiratory rate/effort  Description  Respiratory rate and effort will be within normal limits for the patient  Outcome: Ongoing

## 2019-06-13 NOTE — PROGRESS NOTES
Pt using accessory muscles to breath. RR is 26/min,  Dr. Faith Durand contacted and order received. Will continue to monitor.

## 2019-06-13 NOTE — CONSULTS
Alcohol use: No    Drug use: No     No Known Allergies  Current Facility-Administered Medications   Medication Dose Route Frequency Provider Last Rate Last Dose    perflutren lipid microspheres (DEFINITY) injection 1.65 mg  1.5 mL Intravenous ONCE PRN Krystyna Lim MD        warfarin (COUMADIN) tablet 4 mg  4 mg Oral Daily Krystyna Lim MD   4 mg at 06/12/19 2032    acetaminophen (TYLENOL) tablet 650 mg  650 mg Oral Q4H PRN Krystyna Lim MD        amLODIPine (NORVASC) tablet 10 mg  10 mg Oral Daily Krystyna Lim MD        aspirin chewable tablet 81 mg  81 mg Oral Daily Krystyna Lim MD   81 mg at 06/12/19 2763    digoxin (LANOXIN) tablet 125 mcg  125 mcg Oral Daily Krystyna Lim MD   125 mcg at 06/12/19 0819    famotidine (PEPCID) tablet 20 mg  20 mg Oral BID Krystyna Lim MD   20 mg at 06/12/19 2027    levETIRAcetam (KEPPRA) 100 MG/ML solution 500 mg  500 mg Oral BID Krystyna Lim MD   500 mg at 06/12/19 2027    metolazone (ZAROXOLYN) tablet 5 mg  5 mg Oral Daily PRN Krystyna Lim MD        [facility]) tablet 12.5 mg  12.5 mg Oral Q6H PRN Krystyna Lim MD        torsemide [facility]) tablet 100 mg  100 mg Oral Daily Krystyna Lim MD   100 mg at 06/12/19 0819    0.9 % sodium chloride infusion   Intravenous Continuous Krystyna Lim MD 75 mL/hr at 06/12/19 0443      sodium chloride flush 0.9 % injection 10 mL  10 mL Intravenous 2 times per day Krystyna Lim MD   10 mL at 06/12/19 2030    sodium chloride flush 0.9 % injection 10 mL  10 mL Intravenous PRN Krystyna Lim MD   10 mL at 06/11/19 0228    magnesium hydroxide (MILK OF MAGNESIA) 400 MG/5ML suspension 30 mL  30 mL Oral Daily PRN Krystyna iLm MD   30 mL at 06/11/19 2053    ondansetron (ZOFRAN) injection 4 mg  4 mg Intravenous Q6H PRN Krystyna Lim MD        phenazopyridine (PYRIDIUM) tablet 200 mg  200 mg Oral TID  Krystyna Lim MD   200 mg at 06/12/19 2027    cefTRIAXone (ROCEPHIN) 1 g in sterile water 10 mL IV syringe  1 g Intravenous Q24H Krystyna Lim MD   1 g at 06/12/19 2348    ipratropium-albuterol (DUONEB) nebulizer solution 3 mL  1 vial Inhalation 6 times per day Krystyna Lim MD   3 mL at 06/13/19 0836    potassium chloride (KLOR-CON M) extended release tablet 40 mEq  40 mEq Oral PRN Krystyna Lim MD   40 mEq at 06/12/19 0819    Or    potassium bicarb-citric acid (EFFER-K) effervescent tablet 40 mEq  40 mEq Oral PRN Krystyna Lim MD        Or    potassium chloride 10 mEq/100 mL IVPB (Peripheral Line)  10 mEq Intravenous PRN Krystyna Lim MD        0.9 % sodium chloride bolus  500 mL Intravenous PRN Krystyna Lim MD        hydrALAZINE (APRESOLINE) injection 10 mg  10 mg Intravenous Q6H PRN Krystyna Lim MD           ROS : A 10-12 system review of constitutional, cardiovascular, respiratory, musculoskeletal, endocrine, skin, hematological, SHEENT, genitourinary, psychiatric and neurologic systems was obtained and updated today and is unremarkable except as mentioned in my HPI      Exam:     Constitutional:   Vitals:    06/13/19 0038 06/13/19 0410 06/13/19 0630 06/13/19 0837   BP:   114/67    Pulse:   57    Resp: 16 18 16 16   Temp:   97.3 °F (36.3 °C)    TempSrc:   Temporal    SpO2: 92% 90% 98% 92%   Weight:   205 lb (93 kg)    Height:           General appearance:  Normal development and appear in no acute distress. Eye: No icterus. Fundus: No blurring of optic disc. Neck: supple  Cardiovascular:  + lower leg edema with good pulsation. Mental Status:   AAO times two. Memory:Poor immediate recall. Intact remote memory  Normal attention span and concentration. Language: intact naming, repeating and fluency   Poor fund of Knowledge. unaware of current events and vocabulary   Cranial Nerves:   II: Visual fields: Full to confrontation and nl [facility]. Pupils: equal, round, reactive to light  III,IV,VI: Extra Ocular Movements are intact.  No nystagmus  V: Facial sensation is intact to pin prick and light touch  VII: Facial strength and movements: intact and symmetric  VIII: Hearing: Intact to finger rub bilaterally  IX: Palate elevation is symmetric  XI: Shoulder shrug is intact  XII: Tongue movements are normal  Musculoskeletal: 5/5 in UE and 3/5 weakness in her legs, chronic    Tone: Normal tone. Reflexes: 2 in UE and diminished in LL  Planters: flexor bilaterally. Coordination: no pronator drift, no dysmetria with FNF in upper extremities. Normal REM. Sensation: normal to all modalities in both arms and decreased to vibration in her legs. Gait/Posture: NT due to weakness    Data:  LABS:   Lab Results   Component Value Date     06/12/2019    K 3.1 06/12/2019    CL 99 06/12/2019    CO2 33 06/12/2019    BUN 11 06/12/2019    CREATININE 0.8 06/12/2019    GFRAA >60 06/12/2019    LABGLOM >60 06/12/2019    GLUCOSE 140 06/12/2019    PHOS 2.7 09/26/2018    MG 1.90 06/12/2019    CALCIUM 8.0 06/12/2019     Lab Results   Component Value Date    WBC 8.2 06/12/2019    RBC 3.64 06/12/2019    HGB 12.0 06/12/2019    HCT 34.2 06/12/2019    MCV 94.2 06/12/2019    RDW 13.1 06/12/2019     06/12/2019     Lab Results   Component Value Date    INR 2.18 (H) 06/13/2019    PROTIME 24.9 (H) 06/13/2019       Neuroimaging and/or  labs reviewed by me and discussed results with the patient/and  family. Impression:  Acute encephalopathy. Possible metabolic encephalopathy versus vascular event. She is getting MRI for possible new stroke. New onset body and hand tremors. Likely secondary to hypoxia, stress-induced and metabolic derangement. Less likely seizure or myoclonus  Generalized debilitation  Hypertension  UTI  Recent stroke with residual  Deficit  Chronic AC  Possible history of seizure on Keppra. Patient daughter denies however any history of seizure.   She was placed in such medication \"for prophylaxis after stroke in October of last year\". Recommendation:   MRI brain  Echo   Lipid panel and A1c  Continue Keppra the same dose for now 500 mg bid  Continue Coumadin and follow INR  Speech and aspiration precaution  PT and OT  Continue current blood pressure medications and blood pressure monitor  Telemetry  Antibiotics  Hydration  ? Statin  Stroke prevention was DW her daughter today  BS monitor  Will follow          Thank you for referring such patient. If you have any questions regarding my consult note, please don't hesitate to call me. Kendall Tellez MD  [phone]    This dictation was generated by voice recognition computer software.  Although all attempts are made to edit the dictation for accuracy, there may be errors in the  transcription that are not intended

## 2019-06-13 NOTE — PLAN OF CARE
Problem: Falls - Risk of:  Goal: Will remain free from falls  Description  Will remain free from falls  Outcome: Ongoing  Note:   All fall precautions in place. Bed in lowest position and locked. All alarms on and audible. Call light and frequently used items are within reach.

## 2019-06-13 NOTE — PROGRESS NOTES
MRI BRAIN PERFORMED WITHOUT CONTRAST ONLY DUE TO PTS  INABILITY TO HOLD STILL FOR SCANNING-MOTION ON SCANS

## 2019-06-13 NOTE — FLOWSHEET NOTE
Patient alert and oriented. Lungs clear to auscultation, breathing even and unlabored, chest expansion symmetrical, no noted use of accessory muscles, and patient denies any SOB. Bowel sounds present in all quadrants, patient denies any nausea, vomiting and diarrhea at this time. Peripheral pulses present in all extremities, patient has BLE compression stockings in place, patient refuse to take them of at HS. Check and changed.  Assist to a comfortable position in bed and call light within reach.       06/12/19 2015   Vital Signs   Temp 97.9 °F (36.6 °C)   Temp Source Temporal   Pulse 56   Heart Rate Source Monitor   Resp 18   /62   BP Location Right upper arm   BP Upper/Lower Upper   MAP (mmHg) 81   Patient Position Semi fowlers   Level of Consciousness 0   MEWS Score 1   Patient Currently in Pain No   Pain Assessment   Pain Assessment 0-10   Pain Level 0   Oxygen Therapy   SpO2 100 %   O2 Device None (Room air)
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
## 2019-06-13 NOTE — PROGRESS NOTES
Rapid Response Quick Summary    Room: Z5M-3010/4668-57    Assessment of concern / patient:  Pt unable to speak and having tremors in her hands. Physician involved:  Dr Quita Bustos    Interventions:  Stat CT head. Disposition:  Pt transported to CT.

## 2019-06-14 VITALS
OXYGEN SATURATION: 97 % | HEIGHT: 65 IN | RESPIRATION RATE: 18 BRPM | BODY MASS INDEX: 34.29 KG/M2 | HEART RATE: 72 BPM | DIASTOLIC BLOOD PRESSURE: 56 MMHG | SYSTOLIC BLOOD PRESSURE: 117 MMHG | WEIGHT: 205.8 LBS | TEMPERATURE: 98 F

## 2019-06-14 LAB
ANION GAP SERPL CALCULATED.3IONS-SCNC: 11 MMOL/L (ref 3–16)
BASOPHILS ABSOLUTE: 0.1 K/UL (ref 0–0.2)
BASOPHILS RELATIVE PERCENT: 1.1 %
BUN BLDV-MCNC: 15 MG/DL (ref 7–20)
CALCIUM SERPL-MCNC: 9 MG/DL (ref 8.3–10.6)
CHLORIDE BLD-SCNC: 101 MMOL/L (ref 99–110)
CO2: 30 MMOL/L (ref 21–32)
CREAT SERPL-MCNC: 0.8 MG/DL (ref 0.6–1.2)
EOSINOPHILS ABSOLUTE: 0.2 K/UL (ref 0–0.6)
EOSINOPHILS RELATIVE PERCENT: 2.1 %
GFR AFRICAN AMERICAN: >60
GFR NON-AFRICAN AMERICAN: >60
GLUCOSE BLD-MCNC: 140 MG/DL (ref 70–99)
HCT VFR BLD CALC: 37.7 % (ref 36–48)
HEMOGLOBIN: 12.9 G/DL (ref 12–16)
INR BLD: 2.33 (ref 0.86–1.14)
LYMPHOCYTES ABSOLUTE: 1.8 K/UL (ref 1–5.1)
LYMPHOCYTES RELATIVE PERCENT: 19.5 %
MCH RBC QN AUTO: 32.8 PG (ref 26–34)
MCHC RBC AUTO-ENTMCNC: 34.3 G/DL (ref 31–36)
MCV RBC AUTO: 95.6 FL (ref 80–100)
MONOCYTES ABSOLUTE: 0.5 K/UL (ref 0–1.3)
MONOCYTES RELATIVE PERCENT: 5.4 %
NEUTROPHILS ABSOLUTE: 6.5 K/UL (ref 1.7–7.7)
NEUTROPHILS RELATIVE PERCENT: 71.9 %
PDW BLD-RTO: 13.7 % (ref 12.4–15.4)
PLATELET # BLD: 236 K/UL (ref 135–450)
PMV BLD AUTO: 7.7 FL (ref 5–10.5)
POTASSIUM SERPL-SCNC: 3.2 MMOL/L (ref 3.5–5.1)
PROTHROMBIN TIME: 26.6 SEC (ref 9.8–13)
RBC # BLD: 3.94 M/UL (ref 4–5.2)
SODIUM BLD-SCNC: 142 MMOL/L (ref 136–145)
WBC # BLD: 9 K/UL (ref 4–11)

## 2019-06-14 PROCEDURE — 2580000003 HC RX 258: Performed by: INTERNAL MEDICINE

## 2019-06-14 PROCEDURE — 6370000000 HC RX 637 (ALT 250 FOR IP): Performed by: INTERNAL MEDICINE

## 2019-06-14 PROCEDURE — 85025 COMPLETE CBC W/AUTO DIFF WBC: CPT

## 2019-06-14 PROCEDURE — 80048 BASIC METABOLIC PNL TOTAL CA: CPT

## 2019-06-14 PROCEDURE — 85610 PROTHROMBIN TIME: CPT

## 2019-06-14 PROCEDURE — 92526 ORAL FUNCTION THERAPY: CPT

## 2019-06-14 PROCEDURE — 97530 THERAPEUTIC ACTIVITIES: CPT

## 2019-06-14 PROCEDURE — 97166 OT EVAL MOD COMPLEX 45 MIN: CPT

## 2019-06-14 PROCEDURE — 94640 AIRWAY INHALATION TREATMENT: CPT

## 2019-06-14 PROCEDURE — 99232 SBSQ HOSP IP/OBS MODERATE 35: CPT | Performed by: PSYCHIATRY & NEUROLOGY

## 2019-06-14 PROCEDURE — 94761 N-INVAS EAR/PLS OXIMETRY MLT: CPT

## 2019-06-14 PROCEDURE — 97535 SELF CARE MNGMENT TRAINING: CPT

## 2019-06-14 PROCEDURE — 6360000002 HC RX W HCPCS: Performed by: INTERNAL MEDICINE

## 2019-06-14 PROCEDURE — 97161 PT EVAL LOW COMPLEX 20 MIN: CPT

## 2019-06-14 PROCEDURE — 36415 COLL VENOUS BLD VENIPUNCTURE: CPT

## 2019-06-14 RX ADMIN — IPRATROPIUM BROMIDE AND ALBUTEROL SULFATE 3 ML: .5; 3 SOLUTION RESPIRATORY (INHALATION) at 04:36

## 2019-06-14 RX ADMIN — IPRATROPIUM BROMIDE AND ALBUTEROL SULFATE 3 ML: .5; 3 SOLUTION RESPIRATORY (INHALATION) at 10:10

## 2019-06-14 RX ADMIN — DIGOXIN 125 MCG: 125 TABLET ORAL at 09:00

## 2019-06-14 RX ADMIN — TORSEMIDE 100 MG: 100 TABLET ORAL at 09:00

## 2019-06-14 RX ADMIN — PHENAZOPYRIDINE HYDROCHLORIDE 200 MG: 100 TABLET ORAL at 17:42

## 2019-06-14 RX ADMIN — Medication 10 ML: at 10:29

## 2019-06-14 RX ADMIN — IPRATROPIUM BROMIDE AND ALBUTEROL SULFATE 3 ML: .5; 3 SOLUTION RESPIRATORY (INHALATION) at 13:26

## 2019-06-14 RX ADMIN — IPRATROPIUM BROMIDE AND ALBUTEROL SULFATE 3 ML: .5; 3 SOLUTION RESPIRATORY (INHALATION) at 00:53

## 2019-06-14 RX ADMIN — WARFARIN SODIUM 4 MG: 2 TABLET ORAL at 17:42

## 2019-06-14 RX ADMIN — POTASSIUM CHLORIDE 40 MEQ: 1500 TABLET, EXTENDED RELEASE ORAL at 11:01

## 2019-06-14 RX ADMIN — AMLODIPINE BESYLATE 10 MG: 5 TABLET ORAL at 09:00

## 2019-06-14 RX ADMIN — FAMOTIDINE 20 MG: 20 TABLET ORAL at 09:00

## 2019-06-14 RX ADMIN — IPRATROPIUM BROMIDE AND ALBUTEROL SULFATE 3 ML: .5; 3 SOLUTION RESPIRATORY (INHALATION) at 16:26

## 2019-06-14 RX ADMIN — PHENAZOPYRIDINE HYDROCHLORIDE 200 MG: 100 TABLET ORAL at 12:27

## 2019-06-14 RX ADMIN — ASPIRIN 81 MG 81 MG: 81 TABLET ORAL at 09:00

## 2019-06-14 RX ADMIN — Medication 1 G: at 00:19

## 2019-06-14 RX ADMIN — PHENAZOPYRIDINE HYDROCHLORIDE 200 MG: 100 TABLET ORAL at 09:00

## 2019-06-14 RX ADMIN — LEVETIRACETAM 500 MG: 100 SOLUTION ORAL at 09:00

## 2019-06-14 ASSESSMENT — PAIN SCALES - GENERAL
PAINLEVEL_OUTOF10: 0

## 2019-06-14 NOTE — CARE COORDINATION
DISCHARGE SUMMARY     DATE OF DISCHARGE: [id number]    DISCHARGE DESTINATION: [facility]    Level of Care: Skilled    Report Number: [phone]    Fax Number:  [phone]    Precert Obtained: n/a    Lise Completed: n/a    PASARR: n/a    Notified: RN, Family and Facility/Agency-bedside Rn and facility aware of transport time-Alfonso aware of transport time.     Prescriptions Faxed:N/A    TRANSPORTATION: [place] [address] Name: [address] [place] [address] up Time: 5pm    Phone Number: [phone]    Electronically signed by BATOOL Elder on 6/14/2019 at 10:05 AM
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
## 2019-06-14 NOTE — PROGRESS NOTES
Physical Therapy    Facility/Department: [facility] 5C  Initial Assessment/Discharge summary    NAME: Sebastian Moncada  : 1935  MRN: [id number]      This patient was seen on 19 for evaluation only, prior to discharge from the hospital to [facility]. Please refer to the initial evaluation below for the functional status at discharge, as this will serve as the discharge note. Marcos Warner [place], [place] [id number]    Date of Service: 2019    Discharge Recommendations: Sebastian Moncada scored a 8 on the AM-PAC short mobility form. Current research shows that an AM-PAC score of 17 or less is typically not associated with a discharge to the patient's home setting. Based on the patients AM-PAC score and their current functional mobility deficits, it is recommended that the patient have 3-5 sessions per week of Physical Therapy at d/c to increase the patients independence. PT Equipment Recommendations  Equipment Needed: No    Assessment   Body structures, Functions, Activity limitations: Decreased functional mobility ; Decreased endurance;Decreased strength;Decreased balance;Decreased safe awareness;Decreased cognition  Assessment: pt. presents with above deficits below baseline function and will benefit from skilled PT to improve overall function and safely return to PLOF   Treatment Diagnosis: impaired strength, functional mobility, and endurance   Prognosis: Good  Decision Making: Low Complexity  Exam: functional mobility, balance, AMPAC   Clinical Presentation: stable   Patient Education: reason for PT eval, discharge plan   Barriers to Learning: cognitive   REQUIRES PT FOLLOW UP: Yes  Activity Tolerance  Activity Tolerance: Patient limited by fatigue;Patient limited by endurance; Patient limited by cognitive status       Patient Diagnosis(es): The primary encounter diagnosis was Confusion.  Diagnoses of Urinary tract infection in female, Dyspnea and respiratory abnormalities, Pulmonary nodules, and Gall stones were also pertinent to this visit. has a past medical history of CHF (congestive heart failure) ([address] Hyperlipidemia, and Hypertension. has a past surgical history that includes hip surgery. Restrictions  Restrictions/Precautions  Restrictions/Precautions: Fall Risk(high fall risk  Simultaneous filing. User may not have seen previous data.)  Position Activity Restriction  Other position/activity restrictions: 82 y.o. female who presents because of difficulty breathing. Symptoms have been progressive over the past 1 week but worse this afternoon. She did have an argument with her roommate and started breathing very heavily afterward. She denies any chest pain. Family has not noticed any weight gain. The patient is confused and cannot provide reliable history. Family says her confusion has been worse than baseline and the nursing home recently checked her for UTI which was negative last week. Patient denies any headache or abdominal pain. Vision/Hearing  Vision: Within Functional Limits  Hearing: Within functional limits     Subjective  General  Chart Reviewed:  Yes  Additional Pertinent Hx: CHF, HTN   Response To Previous Treatment: Not applicable  Family / Caregiver Present: No  Diagnosis: UTI   Follows Commands: Impaired(disoriented to situation, required repetition and increased time to process )  General Comment  Comments: pt. was supine in bed upon arrival   Subjective  Subjective: pt. was agreeable to PT eval on this date   Pain Screening  Patient Currently in Pain: Denies  Vital Signs  Patient Currently in Pain: Denies       Orientation  Orientation  Overall Orientation Status: Impaired  Orientation Level: Disoriented to time;Oriented to place;Oriented to situation;Oriented to person  Social/Functional History  Social/Functional History  Lives With: Alone  Type of Home: Assisted living(nursing facility)  Home Layout: One level  Bathroom Shower/Tub: Walk-in shower  Bathroom Equipment: Grab bars in shower, Shower chair  Home Equipment: Rolling walker  ADL Assistance: Needs assistance  Homemaking Assistance: Needs assistance  Homemaking Responsibilities: No  Ambulation Assistance: Needs assistance  Transfer Assistance: Needs assistance  Active : No  Additional Comments: fall in august- poor historian    Objective     Observation/Palpation  Posture: Fair(rounded shoulders, flexed posture )    AROM RLE (degrees)  RLE AROM: WFL  AROM LLE (degrees)  LLE AROM : WFL  Strength RLE  Strength RLE: Exception  R Hip Flexion: 3+/5  R Knee Extension: 4+/5  R Ankle Dorsiflexion: 4+/5  Strength LLE  Strength LLE: Exception  L Hip Flexion: 3/5  L Knee Extension: 4+/5  L Ankle Dorsiflexion: 4+/5     Sensation  Overall Sensation Status: WFL(recent numbness and tingling in BLE )  Bed mobility  Rolling to Right: Minimal assistance  Supine to Sit: Moderate assistance  Sit to Supine:  Moderate assistance  Scooting: Minimal assistance  Comment: required assistance to lift legs back into bed, vc and tc for initiation and sequencing, HOB raised, use handrail for rolling   Transfers  Sit to Stand: (max A from EO and from [facility] with steady )  Stand to sit: Maximum Assistance  Squat Pivot Transfers: Maximum Assistance  Comment: pt. transfers from B to [facility] requiring max A, pt. required increased time and cues for initiating and sequencing, stand pivot transfer with max A x1 for EOB to [facility], use of smiley steady for transfer from [facility] back to bed with max A to stand   Ambulation  Ambulation?: No(unsafe to attempt at this time )  Stairs/Curb  Stairs?: No     Balance  Posture: Fair  Sitting - Static: Good  Sitting - Dynamic: Fair;+  Standing - Static: Poor  Standing - Dynamic: Poor  Comments: standing balance required max A to maintain         Plan   Plan  Times per week: 3-5  Times per day: Daily  Current Treatment Recommendations: Strengthening, Gait Training, Balance Training, Functional Mobility Training, Endurance Training, Transfer

## 2019-06-14 NOTE — PROGRESS NOTES
Speech Language Pathology  Dysphagia Treatment Note    Name:  Angel Yan  :   1935  Medical Diagnosis:  UTI (urinary tract infection) [N39.0]  UTI (urinary tract infection) [N39.0]  Treatment Diagnosis: Oropharyngeal Dysphagia  Pain level: denied     Current Diet Level: Dysphagia I (puree) diet with thin liquids, no straws, meds in puree    Tolerance of Current Diet Level:   Per chart review no difficulty with current diet. Assessment of Texture Tolerance:  -Impressions: Pt seen sitting upright in bed. Nurse in room giving medication with puree. Trials of thin liquids provided between pills, and soft solids provided after. Thin liquids revealed delayed swallow initiation. No overt s/s aspiration noted with thin liquids or puree. Soft solids revealed prolonged mastication with cough noted while chewing. Pt with increased WOB during mastication of soft solids, and became SOB at end of trials. At this time continuation of puree diet is recommended due to increased WOB with soft solids.     -Compensatory strategies:  90 degree positioning with all p.o. intake; small bites/sips; alternate textures through meal; reduce rate of intake    Diet and Treatment Recommendations:  Continue dysphagia I (puree) diet with thin liquids, no straws, meds in puree    ST.) Pt will tolerate recommended diet without s/s of aspiration (ongoing, 19)  2.) If clinical symptoms of penetration/aspiration continue to be noted,Pt will tolerate MBS to r/o aspiration and determine appropriate diet/liquid level. (ongoing, 19)  3.) Pt will tolerate diet advance to least restricted diet, as clinically indicated, with no signs of aspiration (ongoing, 19)  4.) Pt will improve oral motor function via bolus control exercises / (ongoing, 19)     Plan:  Continued daily Dysphagia treatment with goals per plan of care.     Patient/Family Education:Education given to the Pt and nurse, who verbalized understanding    Discharge Recommendations:  Pt will benefit from continued skilled Speech Therapy for Dysphagia services, prior to returning home. Timed Code Treatment: 0 minutes    Total Treatment Time: 12 minutes    PHILIP Merida   Clinician      The speech-language pathologist was present, directed the patient's care, made skilled judgment and was responsible for assessment and treatment.     Denise Goyal M.A., [address] [place] [address]  Speech-Language Pathologist

## 2019-06-14 NOTE — PROGRESS NOTES
Pt with chronic CHF, unable to determine type or severity - as it is chronic, no workup done this admission    Pt's acute mentation change felt to be due to metabolic encephalopathy

## 2019-06-14 NOTE — PROGRESS NOTES
sodium chloride infusion   Intravenous Continuous Madison Vasquez MD 75 mL/hr at 06/12/19 0443      sodium chloride flush 0.9 % injection 10 mL  10 mL Intravenous 2 times per day Madison Vasquez MD   10 mL at 06/14/19 1029    sodium chloride flush 0.9 % injection 10 mL  10 mL Intravenous PRN Madison Vasquez MD   10 mL at 06/11/19 0228    magnesium hydroxide (MILK OF MAGNESIA) 400 MG/5ML suspension 30 mL  30 mL Oral Daily PRN Madison Vasquez MD   30 mL at 06/11/19 2053    ondansetron (ZOFRAN) injection 4 mg  4 mg Intravenous Q6H PRN Madison Vasquez MD        phenazopyridine (PYRIDIUM) tablet 200 mg  200 mg Oral TID WC Madison Vasquez MD   200 mg at 06/14/19 1227    cefTRIAXone (ROCEPHIN) 1 g in sterile water 10 mL IV syringe  1 g Intravenous Q24H Madison Vasquez MD   1 g at 06/14/19 0019    ipratropium-albuterol (DUONEB) nebulizer solution 3 mL  1 vial Inhalation 6 times per day Madison Vasquez MD   3 mL at 06/14/19 1010    potassium chloride (KLOR-CON M) extended release tablet 40 mEq  40 mEq Oral PRN Madison Vasquez MD   40 mEq at 06/14/19 1101    Or    potassium bicarb-citric acid (EFFER-K) effervescent tablet 40 mEq  40 mEq Oral PRN Madison Vasquez MD        Or    potassium chloride 10 mEq/100 mL IVPB (Peripheral Line)  10 mEq Intravenous PRAUTUMN Vasquez MD        0.9 % sodium chloride bolus  500 mL Intravenous PRN Madison Vasquez MD        hydrALAZINE (APRESOLINE) injection 10 mg  10 mg Intravenous Q6H PRN Madison Vasquez MD         No Known Allergies  family history is not on file. reports that she has quit smoking. She has never used smokeless tobacco. She reports that she does not drink alcohol or use drugs.          Objective:  Exam:   Constitutional:   Vitals:    06/14/19 0846 06/14/19 0852 06/14/19 1010 06/14/19 1215   BP: 109/78 109/78  (!) 117/56   Pulse: 79 79  72   Resp:    18   Temp:  97.5 °F (36.4 °C)  98 °F (36.7 °C)   TempSrc:  Temporal  Temporal   SpO2:  93% 94% 99% Weight:       Height:         General appearance:  Normal development and appear in no acute distress. Eye: No icterus. Neck: supple  Cardiovascular:  No lower leg edema with good pulsation. Mental Status:   Awake, alert and orientated x2  Poor immediate recall  Intact language  Poor fund of knowledge and vocabulary  Fluent speech  Good attention . Cranial Nerves:   II: Visual fields: Full. Pupils: equal, round, reactive to light  III,IV,VI: Extra Ocular Movements are intact. No nystagmus  V: Facial sensation is intact  VII: Facial strength and movements: intact and symmetric  IX: Palate elevation is symmetric  XI: Shoulder shrug is intact  XII: Tongue movements are normal  Musculoskeletal: 5/5 in all 4 extremities. Tone: Normal tone. Reflexes: Symmetric in the arms and legs  Planters: flexor bilaterally. Coordination: no pronator drift, no dysmetria with FNF. Normal REM. Sensation: normal to all modalities in both arms and legs. Gait/Posture: steady gait with assistant        Data:  LABS:   Lab Results   Component Value Date     06/14/2019    K 3.2 06/14/2019    K 3.1 06/12/2019     06/14/2019    CO2 30 06/14/2019    BUN 15 06/14/2019    CREATININE 0.8 06/14/2019    GFRAA >60 06/14/2019    LABGLOM >60 06/14/2019    GLUCOSE 140 06/14/2019    PHOS 2.7 09/26/2018    MG 1.90 06/12/2019    CALCIUM 9.0 06/14/2019     Lab Results   Component Value Date    WBC 9.0 06/14/2019    RBC 3.94 06/14/2019    HGB 12.9 06/14/2019    HCT 37.7 06/14/2019    MCV 95.6 06/14/2019    RDW 13.7 06/14/2019     06/14/2019     Lab Results   Component Value Date    INR 2.33 (H) 06/14/2019    PROTIME 26.6 (H) 06/14/2019     Neuroimaging and/or  labs reviewed by me and discussed results with the patient. Impression:  Acute encephalopathy. Possible metabolic encephalopathy, improved   New onset body and hand tremors. Likely secondary to hypoxia, stress-induced and metabolic derangement.   Less likely seizure or myoclonus  Generalized debilitation  Hypertension  UTI  Recent stroke with residual  Deficit  Chronic AC  Possible history of seizure on Keppra. Recommendation  Secondary stroke prevention was discussed with the patient  Continue Coumadin and follow INR  Discussed risk of falling and bleeding on Coumadin  Continue Keppra and advised the patient and her daughter yesterday to discuss with her PCP the need to be on lifelong Keppra. May need to consider stopping such medication  PT and OT  Blood pressure monitor at home  Hydration  Can be discharged from neurology  No further recommendation           Monse Gilman MD   [phone]      This dictation was generated by voice recognition computer software. Although all attempts are made to edit the dictation for accuracy, there may be errors in the transcription that are not intended.

## 2019-06-14 NOTE — PROGRESS NOTES
Patient sleeping soundly, VSS, will continue to monitor. Fall precautions in place, hourly rounding, call light and belongings in reach, bed in lowest position, wheels locked in place, side rails up x 2, walkways free of clutter, bed alarm on.

## 2019-06-14 NOTE — PROGRESS NOTES
Occupational Therapy   Occupational Therapy Initial Assessment  Date: 2019   Patient Name: Briana Yanes  MRN: [id number]     : 1935    Date of Service: 2019    Discharge Recommendations:  Briana Yanes scored a 10/24 on the AM-PAC ADL Inpatient form. Current research shows that an AM-PAC score of 17 or less is typically not associated with a discharge to the patient's home setting. Based on the patients AM-PAC score and their current ADL deficits, it is recommended that the patient have 3-5 sessions per week of Occupational Therapy at d/c to increase the patients independence. OT Equipment Recommendations  Equipment Needed: No    Assessment   Performance deficits / Impairments: Decreased functional mobility ; Decreased cognition;Decreased high-level IADLs;Decreased ADL status; Decreased endurance;Decreased fine motor control;Decreased ROM; Decreased strength;Decreased balance  Treatment Diagnosis: decreased independence with ADL related to late affects of old CVA  Prognosis: Good  Decision Making: Medium Complexity  Exam: ADL, mobility, transfers  Assistance / Modification: physical assist of two and steady  Patient Education: OT eval, plan of care  Barriers to Learning: cognition  REQUIRES OT FOLLOW UP: Yes  Activity Tolerance  Activity Tolerance: Patient limited by fatigue;Treatment limited secondary to decreased cognition  Safety Devices  Safety Devices in place: Yes  Type of devices: All fall risk precautions in place; Patient at risk for falls;Call light within reach; Bed alarm in place; Left in bed;Nurse notified           Patient Diagnosis(es): The primary encounter diagnosis was Confusion. Diagnoses of Urinary tract infection in female, Dyspnea and respiratory abnormalities, Pulmonary nodules, and Gall stones were also pertinent to this visit. has a past medical history of CHF (congestive heart failure) ([address] Hyperlipidemia, and Hypertension.    has a past surgical history that includes hip surgery. Treatment Diagnosis: decreased independence with ADL related to late affects of old CVA      Restrictions  Restrictions/Precautions  Restrictions/Precautions: Fall Risk(high fall risk  Simultaneous filing. User may not have seen previous data.)  Position Activity Restriction  Other position/activity restrictions: 82 y.o. female who presents because of difficulty breathing. Symptoms have been progressive over the past 1 week but worse this afternoon. She did have an argument with her roommate and started breathing very heavily afterward. She denies any chest pain. Family has not noticed any weight gain. The patient is confused and cannot provide reliable history. Family says her confusion has been worse than baseline and the nursing home recently checked her for UTI which was negative last week. Patient denies any headache or abdominal pain. Subjective   General  Chart Reviewed: Yes  Family / Caregiver Present: No  General Comment  Comments: 82 y. o. female who presents because of difficulty breathing.  Symptoms have been progressive over the past 1 week but worse this afternoon.  She did have an argument with her roommate and started breathing very heavily afterward.  She denies any chest pain.  Family has not noticed any weight gain.  The patient is confused and cannot provide reliable history.  Family says her confusion has been worse than baseline and the nursing home recently checked her for UTI which was negative last week.  Patient denies any headache or abdominal pain.  However, her history is not felt to be reliable nor is it consistent with repeated questioning  Orthostatic B/P and Pulse?: Yes  Oxygen Therapy  SpO2: 93 %  Pulse Oximeter Device Mode: Intermittent  O2 Device: Other (Comment)  Social/Functional History  Social/Functional History  Lives With: Alone  Type of Home: Assisted living(nursing facility)  Home Layout: One level  Bathroom Shower/Tub: Walk-in shower  Bathroom Equipment: Grab bars in shower, Shower chair  Home Equipment: Rolling walker  ADL Assistance: Needs assistance  Homemaking Assistance: Needs assistance  Homemaking Responsibilities: No  Ambulation Assistance: Needs assistance  Transfer Assistance: Needs assistance  Active : No  Additional Comments: fall in august- poor historian       Objective        Orientation  Overall Orientation Status: Impaired  Orientation Level: Oriented to person;Disoriented to time;Disoriented to place  Observation/Palpation  Posture: Fair(rounded shoulders, flexed posture )  Balance  Sitting Balance: Stand by assistance  Standing Balance: Dependent/Total(max of two)  Standing Balance  Time: up to 2 minutes  Activity: stand pivot to commode for toileting with max of two, max of two to pull underware over hips  Functional Mobility  Functional - Mobility Device: Rolling Walker  Toilet Transfers  Toilet - Technique: Stand pivot  Equipment Used: Standard bedside commode  Toilet Transfer: Dependent/Total  Toilet Transfers Comments: max of two  ADL  Feeding: Pureed diet;Maximum assistance; Increased time to complete(pt. fatigues with eating)  UE Dressing: Minimal assistance  LE Dressing: Maximum assistance  Toileting: Dependent/Total  Additional Comments: Patient incontinent of large amount of urine needing bed linen changed. Max assist for pericare and lower body dress due to decreased hip rom and impaired problem solving. Decreased initiation throughout self care  Tone RUE  RUE Tone: Normotonic  Tone LUE  LUE Tone: Normotonic  Coordination  Coordination and Movement description: Decreased speed;Right UE;Left UE     Bed mobility  Supine to Sit: Moderate assistance  Sit to Supine: Moderate assistance  Transfers  Sit to stand: Maximum assistance  Stand to sit: Maximum assistance     Cognition  Overall Cognitive Status: Exceptions  Arousal/Alertness: Delayed responses to stimuli  Following Commands:  Follows one step commands with increased time  Attention Span: Difficulty dividing attention; Unable to maintain attention; Difficulty attending to directions  Memory: Decreased short term memory;Decreased recall of recent events  Problem Solving: Assistance required to generate solutions;Assistance required to implement solutions;Assistance required to identify errors made;Assistance required to correct errors made  Initiation: Requires cues for all  Sequencing: Requires cues for all        Sensation  Overall Sensation Status: WFL(recent numbness and tingling in BLE )        LUE AROM (degrees)  LUE AROM : WFL  RUE AROM (degrees)  RUE AROM : WFL                      Plan   Plan  Times per week: 3-5  Times per day: Daily  Current Treatment Recommendations: Balance Training, Functional Mobility Training, Self-Care / ADL, Patient/Caregiver Education & Training, Safety Education & Training, Equipment Evaluation, Education, & procurement, Neuromuscular Re-education    G-Code     OutComes Score                                                  AM-PAC Score        AM-[facility] Inpatient Daily Activity Raw Score: 10 (06/14/19 0921)  AM-PAC Inpatient ADL T-Scale Score : 27.31 (06/14/19 0921)  ADL Inpatient CMS 0-100% Score: 74.7 (06/14/19 0921)  ADL Inpatient CMS G-Code Modifier : CL (06/14/19 9565)    Goals  Short term goals  Time Frame for Short term goals: discharge  Short term goal 1: mod assist functional transfers  Short term goal 2: mod assist bathing   Short term goal 3: mod assist dressing  Short term goal 4: mod assist toileting  Patient Goals   Patient goals : patient unable to state       Therapy Time   Individual Concurrent Group Co-treatment   Time In 0804         Time Out 0904         Minutes 60         Timed Code Treatment Minutes: Anitha Cortez 155, OT

## 2019-06-16 LAB — BLOOD CULTURE, ROUTINE: NORMAL

## 2019-06-18 NOTE — PROGRESS NOTES
Occupational Therapy  Occupational Therapy Discharge Summary    Name: Ge Cordova  : 1935    The pt was evaluated by OT on 19 and seen for 0 treatment sessions prior to DC to ECF on 19 per MD order. The pt's acute therapy goals were:  Short term goals  Time Frame for Short term goals: discharge  Short term goal 1: mod assist functional transfers  Short term goal 2: mod assist bathing   Short term goal 3: mod assist dressing  Short term goal 4: mod assist toileting        Patient met 0 goals during stay. Number of Refusals:0  Number of Holds: 0  During this hospitalization, the patient was educated on:  Patient Education: OT eval, plan of care    DC pt from OT caseload at this time. Thank you!     WESTON Johnson, OTR/L [id number]

## 2019-11-27 ENCOUNTER — APPOINTMENT (OUTPATIENT)
Dept: GENERAL RADIOLOGY | Age: 84
DRG: 637 | End: 2019-11-27
Payer: COMMERCIAL

## 2019-11-27 ENCOUNTER — APPOINTMENT (OUTPATIENT)
Dept: CT IMAGING | Age: 84
DRG: 637 | End: 2019-11-27
Payer: COMMERCIAL

## 2019-11-27 ENCOUNTER — HOSPITAL ENCOUNTER (INPATIENT)
Age: 84
LOS: 2 days | Discharge: NURSING, REHAB OR LONG-TERM CARE | DRG: 637 | End: 2019-11-30
Attending: EMERGENCY MEDICINE | Admitting: INTERNAL MEDICINE
Payer: COMMERCIAL

## 2019-11-27 DIAGNOSIS — R41.82 ALTERED MENTAL STATUS, UNSPECIFIED ALTERED MENTAL STATUS TYPE: Primary | ICD-10-CM

## 2019-11-27 DIAGNOSIS — E86.0 DEHYDRATION: ICD-10-CM

## 2019-11-27 DIAGNOSIS — E87.0 HYPERNATREMIA: ICD-10-CM

## 2019-11-27 DIAGNOSIS — N17.9 AKI (ACUTE KIDNEY INJURY) (HCC): ICD-10-CM

## 2019-11-27 DIAGNOSIS — E23.2 DIABETES INSIPIDUS (HCC): ICD-10-CM

## 2019-11-27 LAB
BASE EXCESS VENOUS: 4.6 MMOL/L (ref -3–3)
BASOPHILS ABSOLUTE: 0.1 K/UL (ref 0–0.2)
BASOPHILS RELATIVE PERCENT: 0.5 %
CARBOXYHEMOGLOBIN: 3.5 % (ref 0–1.5)
EOSINOPHILS ABSOLUTE: 0 K/UL (ref 0–0.6)
EOSINOPHILS RELATIVE PERCENT: 0.1 %
HCO3 VENOUS: 27.8 MMOL/L (ref 23–29)
HCT VFR BLD CALC: 53.3 % (ref 36–48)
HEMOGLOBIN: 16.4 G/DL (ref 12–16)
INR BLD: 1.49 (ref 0.86–1.14)
LACTIC ACID, SEPSIS: 2.4 MMOL/L (ref 0.4–1.9)
LYMPHOCYTES ABSOLUTE: 1.8 K/UL (ref 1–5.1)
LYMPHOCYTES RELATIVE PERCENT: 15.5 %
MCH RBC QN AUTO: 30.9 PG (ref 26–34)
MCHC RBC AUTO-ENTMCNC: 30.8 G/DL (ref 31–36)
MCV RBC AUTO: 100.4 FL (ref 80–100)
METHEMOGLOBIN VENOUS: 0.2 %
MONOCYTES ABSOLUTE: 0.7 K/UL (ref 0–1.3)
MONOCYTES RELATIVE PERCENT: 5.7 %
NEUTROPHILS ABSOLUTE: 9.2 K/UL (ref 1.7–7.7)
NEUTROPHILS RELATIVE PERCENT: 78.2 %
O2 CONTENT, VEN: 24 VOL %
O2 SAT, VEN: 100 %
O2 THERAPY: ABNORMAL
PCO2, VEN: 36.3 MMHG (ref 40–50)
PDW BLD-RTO: 13.5 % (ref 12.4–15.4)
PH VENOUS: 7.49 (ref 7.35–7.45)
PLATELET # BLD: 299 K/UL (ref 135–450)
PMV BLD AUTO: 9.1 FL (ref 5–10.5)
PO2, VEN: 139 MMHG (ref 25–40)
PROTHROMBIN TIME: 17.4 SEC (ref 10–13.2)
RBC # BLD: 5.31 M/UL (ref 4–5.2)
REASON FOR REJECTION: NORMAL
REJECTED TEST: NORMAL
TCO2 CALC VENOUS: 65 MMOL/L
WBC # BLD: 11.8 K/UL (ref 4–11)

## 2019-11-27 PROCEDURE — 87040 BLOOD CULTURE FOR BACTERIA: CPT

## 2019-11-27 PROCEDURE — 85610 PROTHROMBIN TIME: CPT

## 2019-11-27 PROCEDURE — 83690 ASSAY OF LIPASE: CPT

## 2019-11-27 PROCEDURE — 71045 X-RAY EXAM CHEST 1 VIEW: CPT

## 2019-11-27 PROCEDURE — 83036 HEMOGLOBIN GLYCOSYLATED A1C: CPT

## 2019-11-27 PROCEDURE — 84484 ASSAY OF TROPONIN QUANT: CPT

## 2019-11-27 PROCEDURE — 83880 ASSAY OF NATRIURETIC PEPTIDE: CPT

## 2019-11-27 PROCEDURE — 85025 COMPLETE CBC W/AUTO DIFF WBC: CPT

## 2019-11-27 PROCEDURE — 82803 BLOOD GASES ANY COMBINATION: CPT

## 2019-11-27 PROCEDURE — 80053 COMPREHEN METABOLIC PANEL: CPT

## 2019-11-27 PROCEDURE — 83605 ASSAY OF LACTIC ACID: CPT

## 2019-11-27 PROCEDURE — 82010 KETONE BODYS QUAN: CPT

## 2019-11-27 PROCEDURE — 93005 ELECTROCARDIOGRAM TRACING: CPT | Performed by: EMERGENCY MEDICINE

## 2019-11-27 PROCEDURE — 99285 EMERGENCY DEPT VISIT HI MDM: CPT

## 2019-11-27 PROCEDURE — 70450 CT HEAD/BRAIN W/O DYE: CPT

## 2019-11-28 PROBLEM — I48.91 ATRIAL FIBRILLATION (HCC): Status: ACTIVE | Noted: 2019-11-28

## 2019-11-28 PROBLEM — E87.0 HYPERNATREMIA: Status: ACTIVE | Noted: 2019-11-28

## 2019-11-28 PROBLEM — N17.9 AKI (ACUTE KIDNEY INJURY) (HCC): Status: ACTIVE | Noted: 2019-11-28

## 2019-11-28 PROBLEM — N39.0 URINARY TRACT INFECTION IN FEMALE: Status: RESOLVED | Noted: 2019-06-11 | Resolved: 2019-11-28

## 2019-11-28 PROBLEM — E13.9 DIABETES 1.5, MANAGED AS TYPE 1 (HCC): Status: ACTIVE | Noted: 2019-11-28

## 2019-11-28 PROBLEM — G93.41 ACUTE METABOLIC ENCEPHALOPATHY: Status: ACTIVE | Noted: 2019-11-28

## 2019-11-28 LAB
A/G RATIO: 1 (ref 1.1–2.2)
ALBUMIN SERPL-MCNC: 4 G/DL (ref 3.4–5)
ALBUMIN SERPL-MCNC: 4 G/DL (ref 3.4–5)
ALBUMIN SERPL-MCNC: 4.1 G/DL (ref 3.4–5)
ALP BLD-CCNC: 124 U/L (ref 40–129)
ALT SERPL-CCNC: 38 U/L (ref 10–40)
ANION GAP SERPL CALCULATED.3IONS-SCNC: 14 MMOL/L (ref 3–16)
ANION GAP SERPL CALCULATED.3IONS-SCNC: 15 MMOL/L (ref 3–16)
ANION GAP SERPL CALCULATED.3IONS-SCNC: 16 MMOL/L (ref 3–16)
ANION GAP SERPL CALCULATED.3IONS-SCNC: 17 MMOL/L (ref 3–16)
AST SERPL-CCNC: 22 U/L (ref 15–37)
BETA-HYDROXYBUTYRATE: 0.7 MMOL/L (ref 0–0.27)
BILIRUB SERPL-MCNC: 0.4 MG/DL (ref 0–1)
BILIRUBIN URINE: NEGATIVE
BLOOD, URINE: NEGATIVE
BUN BLDV-MCNC: 39 MG/DL (ref 7–20)
BUN BLDV-MCNC: 40 MG/DL (ref 7–20)
BUN BLDV-MCNC: 45 MG/DL (ref 7–20)
BUN BLDV-MCNC: 48 MG/DL (ref 7–20)
BUN BLDV-MCNC: 48 MG/DL (ref 7–20)
BUN BLDV-MCNC: 58 MG/DL (ref 7–20)
BUN BLDV-MCNC: 58 MG/DL (ref 7–20)
CALCIUM SERPL-MCNC: 9 MG/DL (ref 8.3–10.6)
CALCIUM SERPL-MCNC: 9.2 MG/DL (ref 8.3–10.6)
CALCIUM SERPL-MCNC: 9.4 MG/DL (ref 8.3–10.6)
CALCIUM SERPL-MCNC: 9.5 MG/DL (ref 8.3–10.6)
CALCIUM SERPL-MCNC: 9.5 MG/DL (ref 8.3–10.6)
CALCIUM SERPL-MCNC: 9.6 MG/DL (ref 8.3–10.6)
CALCIUM SERPL-MCNC: 9.8 MG/DL (ref 8.3–10.6)
CHLORIDE BLD-SCNC: 106 MMOL/L (ref 99–110)
CHLORIDE BLD-SCNC: 115 MMOL/L (ref 99–110)
CHLORIDE BLD-SCNC: 118 MMOL/L (ref 99–110)
CHLORIDE BLD-SCNC: 119 MMOL/L (ref 99–110)
CHLORIDE BLD-SCNC: 122 MMOL/L (ref 99–110)
CHLORIDE BLD-SCNC: 125 MMOL/L (ref 99–110)
CHLORIDE BLD-SCNC: 128 MMOL/L (ref 99–110)
CLARITY: CLEAR
CO2: 20 MMOL/L (ref 21–32)
CO2: 25 MMOL/L (ref 21–32)
CO2: 25 MMOL/L (ref 21–32)
CO2: 26 MMOL/L (ref 21–32)
CO2: 27 MMOL/L (ref 21–32)
CO2: 27 MMOL/L (ref 21–32)
CO2: 28 MMOL/L (ref 21–32)
COLOR: YELLOW
CREAT SERPL-MCNC: 1.5 MG/DL (ref 0.6–1.2)
CREAT SERPL-MCNC: 1.7 MG/DL (ref 0.6–1.2)
CREAT SERPL-MCNC: 1.8 MG/DL (ref 0.6–1.2)
CREAT SERPL-MCNC: 2.2 MG/DL (ref 0.6–1.2)
CREAT SERPL-MCNC: 2.2 MG/DL (ref 0.6–1.2)
DIGOXIN LEVEL: 0.6 NG/ML (ref 0.8–2)
EKG ATRIAL RATE: 84 BPM
EKG DIAGNOSIS: NORMAL
EKG P AXIS: 18 DEGREES
EKG P-R INTERVAL: 172 MS
EKG Q-T INTERVAL: 382 MS
EKG QRS DURATION: 100 MS
EKG QTC CALCULATION (BAZETT): 451 MS
EKG R AXIS: -12 DEGREES
EKG T AXIS: 155 DEGREES
EKG VENTRICULAR RATE: 84 BPM
GFR AFRICAN AMERICAN: 26
GFR AFRICAN AMERICAN: 26
GFR AFRICAN AMERICAN: 33
GFR AFRICAN AMERICAN: 35
GFR AFRICAN AMERICAN: 40
GFR NON-AFRICAN AMERICAN: 21
GFR NON-AFRICAN AMERICAN: 21
GFR NON-AFRICAN AMERICAN: 27
GFR NON-AFRICAN AMERICAN: 29
GFR NON-AFRICAN AMERICAN: 33
GLOBULIN: 4.1 G/DL
GLUCOSE BLD-MCNC: 1048 MG/DL (ref 70–99)
GLUCOSE BLD-MCNC: 131 MG/DL (ref 70–99)
GLUCOSE BLD-MCNC: 140 MG/DL (ref 70–99)
GLUCOSE BLD-MCNC: 148 MG/DL (ref 70–99)
GLUCOSE BLD-MCNC: 161 MG/DL (ref 70–99)
GLUCOSE BLD-MCNC: 162 MG/DL (ref 70–99)
GLUCOSE BLD-MCNC: 168 MG/DL (ref 70–99)
GLUCOSE BLD-MCNC: 173 MG/DL (ref 70–99)
GLUCOSE BLD-MCNC: 173 MG/DL (ref 70–99)
GLUCOSE BLD-MCNC: 174 MG/DL (ref 70–99)
GLUCOSE BLD-MCNC: 175 MG/DL (ref 70–99)
GLUCOSE BLD-MCNC: 194 MG/DL (ref 70–99)
GLUCOSE BLD-MCNC: 194 MG/DL (ref 70–99)
GLUCOSE BLD-MCNC: 196 MG/DL (ref 70–99)
GLUCOSE BLD-MCNC: 215 MG/DL (ref 70–99)
GLUCOSE BLD-MCNC: 215 MG/DL (ref 70–99)
GLUCOSE BLD-MCNC: 222 MG/DL (ref 70–99)
GLUCOSE BLD-MCNC: 235 MG/DL (ref 70–99)
GLUCOSE BLD-MCNC: 265 MG/DL (ref 70–99)
GLUCOSE BLD-MCNC: 309 MG/DL (ref 70–99)
GLUCOSE BLD-MCNC: 392 MG/DL (ref 70–99)
GLUCOSE BLD-MCNC: 594 MG/DL (ref 70–99)
GLUCOSE BLD-MCNC: 892 MG/DL (ref 70–99)
GLUCOSE BLD-MCNC: >600 MG/DL (ref 70–99)
GLUCOSE URINE: >=1000 MG/DL
KETONES, URINE: NEGATIVE MG/DL
LEUKOCYTE ESTERASE, URINE: NEGATIVE
LIPASE: 29 U/L (ref 13–60)
MAGNESIUM: 2.5 MG/DL (ref 1.8–2.4)
MAGNESIUM: 2.6 MG/DL (ref 1.8–2.4)
MAGNESIUM: 2.8 MG/DL (ref 1.8–2.4)
MAGNESIUM: 2.8 MG/DL (ref 1.8–2.4)
MAGNESIUM: 3.2 MG/DL (ref 1.8–2.4)
MICROSCOPIC EXAMINATION: ABNORMAL
NITRITE, URINE: NEGATIVE
OSMOLALITY: 352 MOSM/KG (ref 278–305)
PERFORMED ON: ABNORMAL
PH UA: 5.5 (ref 5–8)
PHOSPHORUS: 2.1 MG/DL (ref 2.5–4.9)
PHOSPHORUS: 2.7 MG/DL (ref 2.5–4.9)
PHOSPHORUS: 3.4 MG/DL (ref 2.5–4.9)
PHOSPHORUS: 3.5 MG/DL (ref 2.5–4.9)
PHOSPHORUS: 3.7 MG/DL (ref 2.5–4.9)
PHOSPHORUS: 3.9 MG/DL (ref 2.5–4.9)
POTASSIUM REFLEX MAGNESIUM: 4.8 MMOL/L (ref 3.5–5.1)
POTASSIUM SERPL-SCNC: 3.1 MMOL/L (ref 3.5–5.1)
POTASSIUM SERPL-SCNC: 3.8 MMOL/L (ref 3.5–5.1)
POTASSIUM SERPL-SCNC: 4 MMOL/L (ref 3.5–5.1)
POTASSIUM SERPL-SCNC: 4.3 MMOL/L (ref 3.5–5.1)
POTASSIUM SERPL-SCNC: 4.7 MMOL/L (ref 3.5–5.1)
POTASSIUM SERPL-SCNC: 5.1 MMOL/L (ref 3.5–5.1)
PRO-BNP: 282 PG/ML (ref 0–449)
PROTEIN UA: NEGATIVE MG/DL
SODIUM BLD-SCNC: 151 MMOL/L (ref 136–145)
SODIUM BLD-SCNC: 152 MMOL/L (ref 136–145)
SODIUM BLD-SCNC: 157 MMOL/L (ref 136–145)
SODIUM BLD-SCNC: 159 MMOL/L (ref 136–145)
SODIUM BLD-SCNC: 163 MMOL/L (ref 136–145)
SODIUM BLD-SCNC: 166 MMOL/L (ref 136–145)
SODIUM BLD-SCNC: 167 MMOL/L (ref 136–145)
SPECIFIC GRAVITY UA: 1.02 (ref 1–1.03)
TOTAL PROTEIN: 8.1 G/DL (ref 6.4–8.2)
TROPONIN: 0.01 NG/ML
TROPONIN: <0.01 NG/ML
URINE REFLEX TO CULTURE: ABNORMAL
URINE TYPE: ABNORMAL
UROBILINOGEN, URINE: 0.2 E.U./DL

## 2019-11-28 PROCEDURE — 2580000003 HC RX 258: Performed by: INTERNAL MEDICINE

## 2019-11-28 PROCEDURE — 6370000000 HC RX 637 (ALT 250 FOR IP): Performed by: INTERNAL MEDICINE

## 2019-11-28 PROCEDURE — 96374 THER/PROPH/DIAG INJ IV PUSH: CPT

## 2019-11-28 PROCEDURE — 84100 ASSAY OF PHOSPHORUS: CPT

## 2019-11-28 PROCEDURE — 83930 ASSAY OF BLOOD OSMOLALITY: CPT

## 2019-11-28 PROCEDURE — 2000000000 HC ICU R&B

## 2019-11-28 PROCEDURE — 6360000002 HC RX W HCPCS: Performed by: INTERNAL MEDICINE

## 2019-11-28 PROCEDURE — 81003 URINALYSIS AUTO W/O SCOPE: CPT

## 2019-11-28 PROCEDURE — 2580000003 HC RX 258: Performed by: EMERGENCY MEDICINE

## 2019-11-28 PROCEDURE — 6370000000 HC RX 637 (ALT 250 FOR IP): Performed by: EMERGENCY MEDICINE

## 2019-11-28 PROCEDURE — 99223 1ST HOSP IP/OBS HIGH 75: CPT | Performed by: INTERNAL MEDICINE

## 2019-11-28 PROCEDURE — 92610 EVALUATE SWALLOWING FUNCTION: CPT

## 2019-11-28 PROCEDURE — 82947 ASSAY GLUCOSE BLOOD QUANT: CPT

## 2019-11-28 PROCEDURE — 94640 AIRWAY INHALATION TREATMENT: CPT

## 2019-11-28 PROCEDURE — 36415 COLL VENOUS BLD VENIPUNCTURE: CPT

## 2019-11-28 PROCEDURE — 80069 RENAL FUNCTION PANEL: CPT

## 2019-11-28 PROCEDURE — 83735 ASSAY OF MAGNESIUM: CPT

## 2019-11-28 PROCEDURE — 84484 ASSAY OF TROPONIN QUANT: CPT

## 2019-11-28 PROCEDURE — 80162 ASSAY OF DIGOXIN TOTAL: CPT

## 2019-11-28 PROCEDURE — 92526 ORAL FUNCTION THERAPY: CPT

## 2019-11-28 PROCEDURE — 93010 ELECTROCARDIOGRAM REPORT: CPT | Performed by: INTERNAL MEDICINE

## 2019-11-28 PROCEDURE — 94761 N-INVAS EAR/PLS OXIMETRY MLT: CPT

## 2019-11-28 RX ORDER — ASPIRIN 81 MG/1
81 TABLET ORAL DAILY
Status: DISCONTINUED | OUTPATIENT
Start: 2019-11-28 | End: 2019-11-29 | Stop reason: ALTCHOICE

## 2019-11-28 RX ORDER — POLYVINYL ALCOHOL 14 MG/ML
1 SOLUTION/ DROPS OPHTHALMIC 2 TIMES DAILY
Status: DISCONTINUED | OUTPATIENT
Start: 2019-11-28 | End: 2019-11-30 | Stop reason: HOSPADM

## 2019-11-28 RX ORDER — SODIUM CHLORIDE 9 MG/ML
INJECTION, SOLUTION INTRAVENOUS CONTINUOUS
Status: DISCONTINUED | OUTPATIENT
Start: 2019-11-28 | End: 2019-11-28

## 2019-11-28 RX ORDER — GUAIFENESIN 100 MG/5ML
200 SYRUP ORAL 4 TIMES DAILY PRN
Status: ON HOLD | COMMUNITY
End: 2019-11-30 | Stop reason: HOSPADM

## 2019-11-28 RX ORDER — DEXTROSE MONOHYDRATE 50 MG/ML
INJECTION, SOLUTION INTRAVENOUS CONTINUOUS
Status: DISCONTINUED | OUTPATIENT
Start: 2019-11-28 | End: 2019-11-30

## 2019-11-28 RX ORDER — SODIUM CHLORIDE 450 MG/100ML
INJECTION, SOLUTION INTRAVENOUS CONTINUOUS
Status: DISCONTINUED | OUTPATIENT
Start: 2019-11-28 | End: 2019-11-28

## 2019-11-28 RX ORDER — IPRATROPIUM BROMIDE AND ALBUTEROL SULFATE 2.5; .5 MG/3ML; MG/3ML
1 SOLUTION RESPIRATORY (INHALATION) 2 TIMES DAILY
Status: DISCONTINUED | OUTPATIENT
Start: 2019-11-28 | End: 2019-11-30 | Stop reason: HOSPADM

## 2019-11-28 RX ORDER — POTASSIUM CHLORIDE 29.8 MG/ML
20 INJECTION INTRAVENOUS ONCE
Status: DISCONTINUED | OUTPATIENT
Start: 2019-11-28 | End: 2019-11-28 | Stop reason: ALTCHOICE

## 2019-11-28 RX ORDER — DEXTROSE MONOHYDRATE 25 G/50ML
12.5 INJECTION, SOLUTION INTRAVENOUS PRN
Status: DISCONTINUED | OUTPATIENT
Start: 2019-11-28 | End: 2019-11-29

## 2019-11-28 RX ORDER — FAMOTIDINE 20 MG/1
20 TABLET, FILM COATED ORAL DAILY PRN
Status: DISCONTINUED | OUTPATIENT
Start: 2019-11-28 | End: 2019-11-28

## 2019-11-28 RX ORDER — HYDROCODONE BITARTRATE AND ACETAMINOPHEN 5; 325 MG/1; MG/1
1 TABLET ORAL EVERY 6 HOURS PRN
Status: ON HOLD | COMMUNITY
End: 2019-11-30 | Stop reason: HOSPADM

## 2019-11-28 RX ORDER — DEXTROSE MONOHYDRATE 50 MG/ML
INJECTION, SOLUTION INTRAVENOUS CONTINUOUS
Status: ACTIVE | OUTPATIENT
Start: 2019-11-28 | End: 2019-11-28

## 2019-11-28 RX ORDER — MAGNESIUM SULFATE 1 G/100ML
1 INJECTION INTRAVENOUS PRN
Status: DISCONTINUED | OUTPATIENT
Start: 2019-11-28 | End: 2019-11-30 | Stop reason: HOSPADM

## 2019-11-28 RX ORDER — POTASSIUM CHLORIDE 20 MEQ/1
20 TABLET, EXTENDED RELEASE ORAL 4 TIMES DAILY
Status: DISCONTINUED | OUTPATIENT
Start: 2019-11-28 | End: 2019-11-28

## 2019-11-28 RX ORDER — SODIUM CHLORIDE 9 MG/ML
INJECTION, SOLUTION INTRAVENOUS
Status: DISPENSED
Start: 2019-11-28 | End: 2019-11-28

## 2019-11-28 RX ORDER — POTASSIUM CHLORIDE 7.45 MG/ML
10 INJECTION INTRAVENOUS PRN
Status: DISCONTINUED | OUTPATIENT
Start: 2019-11-28 | End: 2019-11-28

## 2019-11-28 RX ORDER — FAMOTIDINE 20 MG/1
20 TABLET, FILM COATED ORAL 2 TIMES DAILY
Status: DISCONTINUED | OUTPATIENT
Start: 2019-11-28 | End: 2019-11-28 | Stop reason: DRUGHIGH

## 2019-11-28 RX ORDER — ACETAMINOPHEN 325 MG/1
650 TABLET ORAL EVERY 4 HOURS PRN
Status: DISCONTINUED | OUTPATIENT
Start: 2019-11-28 | End: 2019-11-28

## 2019-11-28 RX ORDER — IPRATROPIUM BROMIDE AND ALBUTEROL SULFATE 2.5; .5 MG/3ML; MG/3ML
1 SOLUTION RESPIRATORY (INHALATION) EVERY 4 HOURS
Status: DISCONTINUED | OUTPATIENT
Start: 2019-11-28 | End: 2019-11-28

## 2019-11-28 RX ORDER — HYDROCODONE BITARTRATE AND ACETAMINOPHEN 5; 325 MG/1; MG/1
1 TABLET ORAL EVERY 6 HOURS PRN
Status: DISCONTINUED | OUTPATIENT
Start: 2019-11-28 | End: 2019-11-28

## 2019-11-28 RX ORDER — DEXTROSE MONOHYDRATE 25 G/50ML
12.5 INJECTION, SOLUTION INTRAVENOUS PRN
Status: DISCONTINUED | OUTPATIENT
Start: 2019-11-28 | End: 2019-11-28

## 2019-11-28 RX ORDER — SODIUM CHLORIDE 0.9 % (FLUSH) 0.9 %
10 SYRINGE (ML) INJECTION PRN
Status: DISCONTINUED | OUTPATIENT
Start: 2019-11-28 | End: 2019-11-28

## 2019-11-28 RX ORDER — AMLODIPINE BESYLATE 5 MG/1
10 TABLET ORAL DAILY
Status: DISCONTINUED | OUTPATIENT
Start: 2019-11-28 | End: 2019-11-28

## 2019-11-28 RX ORDER — IPRATROPIUM BROMIDE AND ALBUTEROL SULFATE 2.5; .5 MG/3ML; MG/3ML
1 SOLUTION RESPIRATORY (INHALATION) 2 TIMES DAILY
COMMUNITY

## 2019-11-28 RX ORDER — POTASSIUM CHLORIDE 7.45 MG/ML
10 INJECTION INTRAVENOUS PRN
Status: DISCONTINUED | OUTPATIENT
Start: 2019-11-28 | End: 2019-11-30 | Stop reason: HOSPADM

## 2019-11-28 RX ORDER — TORSEMIDE 100 MG/1
100 TABLET ORAL DAILY
Status: DISCONTINUED | OUTPATIENT
Start: 2019-11-28 | End: 2019-11-28

## 2019-11-28 RX ORDER — GUAIFENESIN 100 MG/5ML
200 SOLUTION ORAL 4 TIMES DAILY PRN
Status: DISCONTINUED | OUTPATIENT
Start: 2019-11-28 | End: 2019-11-28

## 2019-11-28 RX ORDER — FAMOTIDINE 20 MG/1
10 TABLET, FILM COATED ORAL DAILY
Status: DISCONTINUED | OUTPATIENT
Start: 2019-11-28 | End: 2019-11-28

## 2019-11-28 RX ORDER — DEXTROSE AND SODIUM CHLORIDE 5; .45 G/100ML; G/100ML
INJECTION, SOLUTION INTRAVENOUS CONTINUOUS PRN
Status: DISCONTINUED | OUTPATIENT
Start: 2019-11-28 | End: 2019-11-28

## 2019-11-28 RX ORDER — POTASSIUM CHLORIDE 20 MEQ/1
40 TABLET, EXTENDED RELEASE ORAL DAILY
COMMUNITY

## 2019-11-28 RX ORDER — 0.9 % SODIUM CHLORIDE 0.9 %
1000 INTRAVENOUS SOLUTION INTRAVENOUS ONCE
Status: COMPLETED | OUTPATIENT
Start: 2019-11-28 | End: 2019-11-28

## 2019-11-28 RX ORDER — LEVETIRACETAM 100 MG/ML
500 SOLUTION ORAL 2 TIMES DAILY
Status: DISCONTINUED | OUTPATIENT
Start: 2019-11-28 | End: 2019-11-30 | Stop reason: HOSPADM

## 2019-11-28 RX ORDER — INSULIN LISPRO 100 [IU]/ML
0-18 INJECTION, SOLUTION INTRAVENOUS; SUBCUTANEOUS
Status: DISCONTINUED | OUTPATIENT
Start: 2019-11-28 | End: 2019-11-28

## 2019-11-28 RX ORDER — DIGOXIN 125 MCG
125 TABLET ORAL DAILY
Status: DISCONTINUED | OUTPATIENT
Start: 2019-11-28 | End: 2019-11-30 | Stop reason: HOSPADM

## 2019-11-28 RX ORDER — INSULIN LISPRO 100 [IU]/ML
0-9 INJECTION, SOLUTION INTRAVENOUS; SUBCUTANEOUS NIGHTLY
Status: DISCONTINUED | OUTPATIENT
Start: 2019-11-28 | End: 2019-11-28

## 2019-11-28 RX ORDER — DIGOXIN 0.25 MG/ML
250 INJECTION INTRAMUSCULAR; INTRAVENOUS ONCE
Status: COMPLETED | OUTPATIENT
Start: 2019-11-28 | End: 2019-11-28

## 2019-11-28 RX ORDER — MAGNESIUM SULFATE 1 G/100ML
1 INJECTION INTRAVENOUS PRN
Status: DISCONTINUED | OUTPATIENT
Start: 2019-11-28 | End: 2019-11-28

## 2019-11-28 RX ORDER — ATROPINE SULFATE 0.1 MG/ML
INJECTION INTRAVENOUS
Status: DISCONTINUED
Start: 2019-11-28 | End: 2019-11-29

## 2019-11-28 RX ORDER — POTASSIUM CHLORIDE 20 MEQ/1
40 TABLET, EXTENDED RELEASE ORAL PRN
Status: DISCONTINUED | OUTPATIENT
Start: 2019-11-28 | End: 2019-11-28

## 2019-11-28 RX ORDER — DEXTROSE AND SODIUM CHLORIDE 5; .45 G/100ML; G/100ML
INJECTION, SOLUTION INTRAVENOUS CONTINUOUS PRN
Status: DISCONTINUED | OUTPATIENT
Start: 2019-11-28 | End: 2019-11-29 | Stop reason: ALTCHOICE

## 2019-11-28 RX ORDER — SODIUM CHLORIDE 0.9 % (FLUSH) 0.9 %
10 SYRINGE (ML) INJECTION EVERY 12 HOURS SCHEDULED
Status: DISCONTINUED | OUTPATIENT
Start: 2019-11-28 | End: 2019-11-30 | Stop reason: HOSPADM

## 2019-11-28 RX ORDER — POLYVINYL ALCOHOL 14 MG/ML
1 SOLUTION/ DROPS OPHTHALMIC EVERY 12 HOURS PRN
Status: DISCONTINUED | OUTPATIENT
Start: 2019-11-28 | End: 2019-11-28

## 2019-11-28 RX ORDER — ONDANSETRON 2 MG/ML
4 INJECTION INTRAMUSCULAR; INTRAVENOUS EVERY 6 HOURS PRN
Status: DISCONTINUED | OUTPATIENT
Start: 2019-11-28 | End: 2019-11-30 | Stop reason: HOSPADM

## 2019-11-28 RX ORDER — IPRATROPIUM BROMIDE AND ALBUTEROL SULFATE 2.5; .5 MG/3ML; MG/3ML
1 SOLUTION RESPIRATORY (INHALATION) EVERY 4 HOURS PRN
Status: DISCONTINUED | OUTPATIENT
Start: 2019-11-28 | End: 2019-11-28

## 2019-11-28 RX ADMIN — APIXABAN 2.5 MG: 2.5 TABLET, FILM COATED ORAL at 09:10

## 2019-11-28 RX ADMIN — SODIUM CHLORIDE: 4.5 INJECTION, SOLUTION INTRAVENOUS at 06:24

## 2019-11-28 RX ADMIN — IPRATROPIUM BROMIDE AND ALBUTEROL SULFATE 3 ML: .5; 3 SOLUTION RESPIRATORY (INHALATION) at 07:44

## 2019-11-28 RX ADMIN — POLYVINYL ALCOHOL 1 DROP: 14 SOLUTION/ DROPS OPHTHALMIC at 09:10

## 2019-11-28 RX ADMIN — METOPROLOL TARTRATE 12.5 MG: 25 TABLET, FILM COATED ORAL at 09:19

## 2019-11-28 RX ADMIN — INSULIN HUMAN 20 UNITS: 100 INJECTION, SOLUTION PARENTERAL at 00:48

## 2019-11-28 RX ADMIN — APIXABAN 2.5 MG: 2.5 TABLET, FILM COATED ORAL at 21:41

## 2019-11-28 RX ADMIN — LEVETIRACETAM 500 MG: 500 SOLUTION ORAL at 21:41

## 2019-11-28 RX ADMIN — LEVETIRACETAM 500 MG: 500 SOLUTION ORAL at 09:10

## 2019-11-28 RX ADMIN — DEXTROSE MONOHYDRATE: 50 INJECTION, SOLUTION INTRAVENOUS at 14:08

## 2019-11-28 RX ADMIN — POTASSIUM CHLORIDE 10 MEQ: 7.46 INJECTION, SOLUTION INTRAVENOUS at 10:59

## 2019-11-28 RX ADMIN — DEXTROSE MONOHYDRATE: 50 INJECTION, SOLUTION INTRAVENOUS at 09:42

## 2019-11-28 RX ADMIN — IPRATROPIUM BROMIDE AND ALBUTEROL SULFATE 3 ML: .5; 3 SOLUTION RESPIRATORY (INHALATION) at 20:32

## 2019-11-28 RX ADMIN — SODIUM CHLORIDE: 9 INJECTION, SOLUTION INTRAVENOUS at 04:09

## 2019-11-28 RX ADMIN — DEXTROSE MONOHYDRATE: 50 INJECTION, SOLUTION INTRAVENOUS at 20:47

## 2019-11-28 RX ADMIN — SODIUM CHLORIDE 0.03 UNITS/KG/HR: 9 INJECTION, SOLUTION INTRAVENOUS at 20:46

## 2019-11-28 RX ADMIN — ASPIRIN 81 MG: 81 TABLET, COATED ORAL at 09:10

## 2019-11-28 RX ADMIN — POLYVINYL ALCOHOL 1 DROP: 14 SOLUTION/ DROPS OPHTHALMIC at 21:41

## 2019-11-28 RX ADMIN — METOPROLOL TARTRATE 12.5 MG: 25 TABLET, FILM COATED ORAL at 21:41

## 2019-11-28 RX ADMIN — DIGOXIN 125 MCG: 125 TABLET ORAL at 09:10

## 2019-11-28 RX ADMIN — SODIUM CHLORIDE 0.1 UNITS/KG/HR: 9 INJECTION, SOLUTION INTRAVENOUS at 05:10

## 2019-11-28 RX ADMIN — DIGOXIN 250 MCG: 0.25 INJECTION INTRAMUSCULAR; INTRAVENOUS at 09:19

## 2019-11-28 RX ADMIN — SODIUM CHLORIDE 1000 ML: 9 INJECTION, SOLUTION INTRAVENOUS at 00:18

## 2019-11-28 ASSESSMENT — PAIN SCALES - WONG BAKER
WONGBAKER_NUMERICALRESPONSE: 0

## 2019-11-28 ASSESSMENT — PAIN SCALES - GENERAL: PAINLEVEL_OUTOF10: 0

## 2019-11-29 ENCOUNTER — APPOINTMENT (OUTPATIENT)
Dept: MRI IMAGING | Age: 84
DRG: 637 | End: 2019-11-29
Payer: COMMERCIAL

## 2019-11-29 LAB
ANION GAP SERPL CALCULATED.3IONS-SCNC: 12 MMOL/L (ref 3–16)
ANION GAP SERPL CALCULATED.3IONS-SCNC: 13 MMOL/L (ref 3–16)
BUN BLDV-MCNC: 21 MG/DL (ref 7–20)
BUN BLDV-MCNC: 26 MG/DL (ref 7–20)
BUN BLDV-MCNC: 31 MG/DL (ref 7–20)
BUN BLDV-MCNC: 34 MG/DL (ref 7–20)
CALCIUM SERPL-MCNC: 8.5 MG/DL (ref 8.3–10.6)
CALCIUM SERPL-MCNC: 8.8 MG/DL (ref 8.3–10.6)
CALCIUM SERPL-MCNC: 9 MG/DL (ref 8.3–10.6)
CALCIUM SERPL-MCNC: 9.1 MG/DL (ref 8.3–10.6)
CHLORIDE BLD-SCNC: 110 MMOL/L (ref 99–110)
CHLORIDE BLD-SCNC: 111 MMOL/L (ref 99–110)
CHLORIDE BLD-SCNC: 114 MMOL/L (ref 99–110)
CHLORIDE BLD-SCNC: 118 MMOL/L (ref 99–110)
CO2: 23 MMOL/L (ref 21–32)
CO2: 24 MMOL/L (ref 21–32)
CO2: 25 MMOL/L (ref 21–32)
CO2: 26 MMOL/L (ref 21–32)
CREAT SERPL-MCNC: 0.9 MG/DL (ref 0.6–1.2)
CREAT SERPL-MCNC: 1.1 MG/DL (ref 0.6–1.2)
CREAT SERPL-MCNC: 1.3 MG/DL (ref 0.6–1.2)
CREAT SERPL-MCNC: 1.3 MG/DL (ref 0.6–1.2)
ESTIMATED AVERAGE GLUCOSE: 208.7 MG/DL
GFR AFRICAN AMERICAN: 47
GFR AFRICAN AMERICAN: 47
GFR AFRICAN AMERICAN: 57
GFR AFRICAN AMERICAN: >60
GFR NON-AFRICAN AMERICAN: 39
GFR NON-AFRICAN AMERICAN: 39
GFR NON-AFRICAN AMERICAN: 47
GFR NON-AFRICAN AMERICAN: 60
GLUCOSE BLD-MCNC: 141 MG/DL (ref 70–99)
GLUCOSE BLD-MCNC: 149 MG/DL (ref 70–99)
GLUCOSE BLD-MCNC: 150 MG/DL (ref 70–99)
GLUCOSE BLD-MCNC: 154 MG/DL (ref 70–99)
GLUCOSE BLD-MCNC: 156 MG/DL (ref 70–99)
GLUCOSE BLD-MCNC: 164 MG/DL (ref 70–99)
GLUCOSE BLD-MCNC: 171 MG/DL (ref 70–99)
GLUCOSE BLD-MCNC: 173 MG/DL (ref 70–99)
GLUCOSE BLD-MCNC: 173 MG/DL (ref 70–99)
GLUCOSE BLD-MCNC: 187 MG/DL (ref 70–99)
GLUCOSE BLD-MCNC: 188 MG/DL (ref 70–99)
GLUCOSE BLD-MCNC: 196 MG/DL (ref 70–99)
GLUCOSE BLD-MCNC: 206 MG/DL (ref 70–99)
GLUCOSE BLD-MCNC: 216 MG/DL (ref 70–99)
GLUCOSE BLD-MCNC: 219 MG/DL (ref 70–99)
GLUCOSE BLD-MCNC: 252 MG/DL (ref 70–99)
HBA1C MFR BLD: 8.9 %
LEFT VENTRICULAR EJECTION FRACTION MODE: NORMAL
LV EF: 55 %
LV EF: 55 %
LVEF MODALITY: NORMAL
MAGNESIUM: 2.3 MG/DL (ref 1.8–2.4)
MAGNESIUM: 2.4 MG/DL (ref 1.8–2.4)
MAGNESIUM: 2.5 MG/DL (ref 1.8–2.4)
MAGNESIUM: 2.5 MG/DL (ref 1.8–2.4)
OSMOLALITY: 336 MOSM/KG (ref 278–305)
PERFORMED ON: ABNORMAL
PHOSPHORUS: 3.3 MG/DL (ref 2.5–4.9)
PHOSPHORUS: 3.5 MG/DL (ref 2.5–4.9)
PHOSPHORUS: 3.5 MG/DL (ref 2.5–4.9)
PHOSPHORUS: 3.7 MG/DL (ref 2.5–4.9)
POTASSIUM SERPL-SCNC: 3 MMOL/L (ref 3.5–5.1)
POTASSIUM SERPL-SCNC: 3.5 MMOL/L (ref 3.5–5.1)
POTASSIUM SERPL-SCNC: 3.6 MMOL/L (ref 3.5–5.1)
POTASSIUM SERPL-SCNC: 4 MMOL/L (ref 3.5–5.1)
REASON FOR REJECTION: NORMAL
REJECTED TEST: NORMAL
SODIUM BLD-SCNC: 147 MMOL/L (ref 136–145)
SODIUM BLD-SCNC: 149 MMOL/L (ref 136–145)
SODIUM BLD-SCNC: 150 MMOL/L (ref 136–145)
SODIUM BLD-SCNC: 156 MMOL/L (ref 136–145)

## 2019-11-29 PROCEDURE — 93880 EXTRACRANIAL BILAT STUDY: CPT

## 2019-11-29 PROCEDURE — 93306 TTE W/DOPPLER COMPLETE: CPT

## 2019-11-29 PROCEDURE — 6370000000 HC RX 637 (ALT 250 FOR IP): Performed by: INTERNAL MEDICINE

## 2019-11-29 PROCEDURE — 2580000003 HC RX 258: Performed by: INTERNAL MEDICINE

## 2019-11-29 PROCEDURE — 83735 ASSAY OF MAGNESIUM: CPT

## 2019-11-29 PROCEDURE — 70551 MRI BRAIN STEM W/O DYE: CPT

## 2019-11-29 PROCEDURE — 84100 ASSAY OF PHOSPHORUS: CPT

## 2019-11-29 PROCEDURE — 94640 AIRWAY INHALATION TREATMENT: CPT

## 2019-11-29 PROCEDURE — 99233 SBSQ HOSP IP/OBS HIGH 50: CPT | Performed by: INTERNAL MEDICINE

## 2019-11-29 PROCEDURE — 6360000002 HC RX W HCPCS: Performed by: INTERNAL MEDICINE

## 2019-11-29 PROCEDURE — 2000000000 HC ICU R&B

## 2019-11-29 PROCEDURE — 80048 BASIC METABOLIC PNL TOTAL CA: CPT

## 2019-11-29 PROCEDURE — 36415 COLL VENOUS BLD VENIPUNCTURE: CPT

## 2019-11-29 RX ORDER — POTASSIUM BICARBONATE 25 MEQ/1
25 TABLET, EFFERVESCENT ORAL 2 TIMES DAILY
Status: DISCONTINUED | OUTPATIENT
Start: 2019-11-29 | End: 2019-11-30 | Stop reason: HOSPADM

## 2019-11-29 RX ORDER — INSULIN LISPRO 100 [IU]/ML
0-12 INJECTION, SOLUTION INTRAVENOUS; SUBCUTANEOUS
Status: DISCONTINUED | OUTPATIENT
Start: 2019-11-29 | End: 2019-11-30 | Stop reason: HOSPADM

## 2019-11-29 RX ORDER — DEXTROSE MONOHYDRATE 25 G/50ML
12.5 INJECTION, SOLUTION INTRAVENOUS PRN
Status: DISCONTINUED | OUTPATIENT
Start: 2019-11-29 | End: 2019-11-30 | Stop reason: HOSPADM

## 2019-11-29 RX ORDER — SODIUM CHLORIDE 0.9 % (FLUSH) 0.9 %
10 SYRINGE (ML) INJECTION PRN
Status: DISCONTINUED | OUTPATIENT
Start: 2019-11-29 | End: 2019-11-30 | Stop reason: HOSPADM

## 2019-11-29 RX ORDER — LIDOCAINE HYDROCHLORIDE 10 MG/ML
5 INJECTION, SOLUTION EPIDURAL; INFILTRATION; INTRACAUDAL; PERINEURAL ONCE
Status: DISCONTINUED | OUTPATIENT
Start: 2019-11-29 | End: 2019-11-29 | Stop reason: ALTCHOICE

## 2019-11-29 RX ORDER — DEXTROSE MONOHYDRATE 50 MG/ML
100 INJECTION, SOLUTION INTRAVENOUS PRN
Status: DISCONTINUED | OUTPATIENT
Start: 2019-11-29 | End: 2019-11-30 | Stop reason: HOSPADM

## 2019-11-29 RX ORDER — SODIUM CHLORIDE 0.9 % (FLUSH) 0.9 %
10 SYRINGE (ML) INJECTION EVERY 12 HOURS SCHEDULED
Status: DISCONTINUED | OUTPATIENT
Start: 2019-11-29 | End: 2019-11-30 | Stop reason: HOSPADM

## 2019-11-29 RX ORDER — NICOTINE POLACRILEX 4 MG
15 LOZENGE BUCCAL PRN
Status: DISCONTINUED | OUTPATIENT
Start: 2019-11-29 | End: 2019-11-30 | Stop reason: HOSPADM

## 2019-11-29 RX ORDER — ASPIRIN 81 MG/1
81 TABLET, CHEWABLE ORAL DAILY
Status: DISCONTINUED | OUTPATIENT
Start: 2019-11-29 | End: 2019-11-30 | Stop reason: HOSPADM

## 2019-11-29 RX ADMIN — IPRATROPIUM BROMIDE AND ALBUTEROL SULFATE 3 ML: .5; 3 SOLUTION RESPIRATORY (INHALATION) at 09:25

## 2019-11-29 RX ADMIN — IPRATROPIUM BROMIDE AND ALBUTEROL SULFATE 3 ML: .5; 3 SOLUTION RESPIRATORY (INHALATION) at 19:34

## 2019-11-29 RX ADMIN — POTASSIUM BICARBONATE 25 MEQ: 978 TABLET, EFFERVESCENT ORAL at 10:41

## 2019-11-29 RX ADMIN — POTASSIUM CHLORIDE 10 MEQ: 7.46 INJECTION, SOLUTION INTRAVENOUS at 09:57

## 2019-11-29 RX ADMIN — APIXABAN 2.5 MG: 2.5 TABLET, FILM COATED ORAL at 10:41

## 2019-11-29 RX ADMIN — INSULIN GLARGINE 15 UNITS: 100 INJECTION, SOLUTION SUBCUTANEOUS at 21:58

## 2019-11-29 RX ADMIN — POTASSIUM BICARBONATE 25 MEQ: 978 TABLET, EFFERVESCENT ORAL at 21:55

## 2019-11-29 RX ADMIN — LEVETIRACETAM 500 MG: 500 SOLUTION ORAL at 10:42

## 2019-11-29 RX ADMIN — DEXTROSE MONOHYDRATE: 50 INJECTION, SOLUTION INTRAVENOUS at 02:24

## 2019-11-29 RX ADMIN — POLYVINYL ALCOHOL 1 DROP: 14 SOLUTION/ DROPS OPHTHALMIC at 22:03

## 2019-11-29 RX ADMIN — DIGOXIN 125 MCG: 125 TABLET ORAL at 10:41

## 2019-11-29 RX ADMIN — Medication 10 ML: at 10:42

## 2019-11-29 RX ADMIN — ASPIRIN 81 MG 81 MG: 81 TABLET ORAL at 10:41

## 2019-11-29 RX ADMIN — Medication 10 ML: at 02:25

## 2019-11-29 RX ADMIN — METOPROLOL TARTRATE 12.5 MG: 25 TABLET, FILM COATED ORAL at 10:41

## 2019-11-29 RX ADMIN — METOPROLOL TARTRATE 12.5 MG: 25 TABLET, FILM COATED ORAL at 21:54

## 2019-11-29 RX ADMIN — LEVETIRACETAM 500 MG: 500 SOLUTION ORAL at 21:54

## 2019-11-29 RX ADMIN — INSULIN LISPRO 2 UNITS: 100 INJECTION, SOLUTION INTRAVENOUS; SUBCUTANEOUS at 21:57

## 2019-11-29 RX ADMIN — APIXABAN 2.5 MG: 2.5 TABLET, FILM COATED ORAL at 21:54

## 2019-11-29 RX ADMIN — POLYVINYL ALCOHOL 1 DROP: 14 SOLUTION/ DROPS OPHTHALMIC at 10:43

## 2019-11-29 ASSESSMENT — PAIN SCALES - PAIN ASSESSMENT IN ADVANCED DEMENTIA (PAINAD)
BODYLANGUAGE: 0
CONSOLABILITY: 0
FACIALEXPRESSION: 0
BODYLANGUAGE: 0
CONSOLABILITY: 0
FACIALEXPRESSION: 0
NEGVOCALIZATION: 0
BODYLANGUAGE: 0
FACIALEXPRESSION: 0
TOTALSCORE: 0
BREATHING: 0
BREATHING: 0
TOTALSCORE: 0
NEGVOCALIZATION: 0
CONSOLABILITY: 0
NEGVOCALIZATION: 0
FACIALEXPRESSION: 0
BREATHING: 0
NEGVOCALIZATION: 0
FACIALEXPRESSION: 0
NEGVOCALIZATION: 0
NEGVOCALIZATION: 0
TOTALSCORE: 0
NEGVOCALIZATION: 0
BODYLANGUAGE: 0
TOTALSCORE: 0
CONSOLABILITY: 0
TOTALSCORE: 0
CONSOLABILITY: 0
TOTALSCORE: 0
CONSOLABILITY: 0
FACIALEXPRESSION: 0
BREATHING: 0
CONSOLABILITY: 0
CONSOLABILITY: 0
BREATHING: 0
FACIALEXPRESSION: 0
CONSOLABILITY: 0
BREATHING: 0
NEGVOCALIZATION: 0
NEGVOCALIZATION: 0
BODYLANGUAGE: 0
BODYLANGUAGE: 0
BREATHING: 0
BREATHING: 0
TOTALSCORE: 0
TOTALSCORE: 0
BODYLANGUAGE: 0
BREATHING: 0
BODYLANGUAGE: 0
TOTALSCORE: 0
FACIALEXPRESSION: 0
NEGVOCALIZATION: 0
CONSOLABILITY: 0
BODYLANGUAGE: 0
BREATHING: 0
BODYLANGUAGE: 0
TOTALSCORE: 0

## 2019-11-29 ASSESSMENT — PAIN SCALES - GENERAL
PAINLEVEL_OUTOF10: 0

## 2019-11-30 VITALS
HEIGHT: 65 IN | TEMPERATURE: 97.3 F | DIASTOLIC BLOOD PRESSURE: 66 MMHG | SYSTOLIC BLOOD PRESSURE: 99 MMHG | OXYGEN SATURATION: 100 % | BODY MASS INDEX: 34.71 KG/M2 | WEIGHT: 208.34 LBS | HEART RATE: 54 BPM | RESPIRATION RATE: 16 BRPM

## 2019-11-30 PROBLEM — E87.0 HYPERNATREMIA: Status: RESOLVED | Noted: 2019-11-28 | Resolved: 2019-11-30

## 2019-11-30 PROBLEM — G93.41 ACUTE METABOLIC ENCEPHALOPATHY: Status: RESOLVED | Noted: 2019-11-28 | Resolved: 2019-11-30

## 2019-11-30 PROBLEM — N17.9 AKI (ACUTE KIDNEY INJURY) (HCC): Status: RESOLVED | Noted: 2019-11-28 | Resolved: 2019-11-30

## 2019-11-30 PROBLEM — E11.01 HYPERGLYCEMIC HYPEROSMOLAR NONKETOTIC COMA (HCC): Status: ACTIVE | Noted: 2019-11-28

## 2019-11-30 LAB
ANION GAP SERPL CALCULATED.3IONS-SCNC: 10 MMOL/L (ref 3–16)
BUN BLDV-MCNC: 17 MG/DL (ref 7–20)
CALCIUM SERPL-MCNC: 8.7 MG/DL (ref 8.3–10.6)
CHLORIDE BLD-SCNC: 107 MMOL/L (ref 99–110)
CO2: 25 MMOL/L (ref 21–32)
CREAT SERPL-MCNC: 0.9 MG/DL (ref 0.6–1.2)
GFR AFRICAN AMERICAN: >60
GFR NON-AFRICAN AMERICAN: 60
GLUCOSE BLD-MCNC: 255 MG/DL (ref 70–99)
GLUCOSE BLD-MCNC: 285 MG/DL (ref 70–99)
GLUCOSE BLD-MCNC: 332 MG/DL (ref 70–99)
MAGNESIUM: 2.3 MG/DL (ref 1.8–2.4)
PERFORMED ON: ABNORMAL
PERFORMED ON: ABNORMAL
PHOSPHORUS: 2.5 MG/DL (ref 2.5–4.9)
POTASSIUM SERPL-SCNC: 4 MMOL/L (ref 3.5–5.1)
SODIUM BLD-SCNC: 142 MMOL/L (ref 136–145)

## 2019-11-30 PROCEDURE — 6370000000 HC RX 637 (ALT 250 FOR IP): Performed by: INTERNAL MEDICINE

## 2019-11-30 PROCEDURE — 94761 N-INVAS EAR/PLS OXIMETRY MLT: CPT

## 2019-11-30 PROCEDURE — G0008 ADMIN INFLUENZA VIRUS VAC: HCPCS | Performed by: INTERNAL MEDICINE

## 2019-11-30 PROCEDURE — 2580000003 HC RX 258: Performed by: INTERNAL MEDICINE

## 2019-11-30 PROCEDURE — 94640 AIRWAY INHALATION TREATMENT: CPT

## 2019-11-30 PROCEDURE — 84100 ASSAY OF PHOSPHORUS: CPT

## 2019-11-30 PROCEDURE — 83036 HEMOGLOBIN GLYCOSYLATED A1C: CPT

## 2019-11-30 PROCEDURE — 83735 ASSAY OF MAGNESIUM: CPT

## 2019-11-30 PROCEDURE — 80048 BASIC METABOLIC PNL TOTAL CA: CPT

## 2019-11-30 PROCEDURE — 90686 IIV4 VACC NO PRSV 0.5 ML IM: CPT | Performed by: INTERNAL MEDICINE

## 2019-11-30 PROCEDURE — 6360000002 HC RX W HCPCS: Performed by: INTERNAL MEDICINE

## 2019-11-30 RX ORDER — INSULIN LISPRO 100 [IU]/ML
5 INJECTION, SOLUTION INTRAVENOUS; SUBCUTANEOUS
Status: DISCONTINUED | OUTPATIENT
Start: 2019-11-30 | End: 2019-11-30 | Stop reason: HOSPADM

## 2019-11-30 RX ORDER — IPRATROPIUM BROMIDE AND ALBUTEROL SULFATE 2.5; .5 MG/3ML; MG/3ML
1 SOLUTION RESPIRATORY (INHALATION) EVERY 6 HOURS PRN
Qty: 100 VIAL | Refills: 1 | Status: SHIPPED | OUTPATIENT
Start: 2019-11-30 | End: 2019-12-30

## 2019-11-30 RX ORDER — INSULIN LISPRO 100 [IU]/ML
0-12 INJECTION, SOLUTION INTRAVENOUS; SUBCUTANEOUS
Qty: 10 ML | Refills: 1 | Status: SHIPPED | OUTPATIENT
Start: 2019-11-30

## 2019-11-30 RX ORDER — INSULIN LISPRO 100 [IU]/ML
5 INJECTION, SOLUTION INTRAVENOUS; SUBCUTANEOUS
Qty: 10 ML | Refills: 1 | Status: SHIPPED | OUTPATIENT
Start: 2019-11-30

## 2019-11-30 RX ADMIN — POLYVINYL ALCOHOL 1 DROP: 14 SOLUTION/ DROPS OPHTHALMIC at 08:39

## 2019-11-30 RX ADMIN — INFLUENZA A VIRUS A/BRISBANE/02/2018 IVR-190 (H1N1) ANTIGEN (PROPIOLACTONE INACTIVATED), INFLUENZA A VIRUS A/KANSAS/14/2017 X-327 (H3N2) ANTIGEN (PROPIOLACTONE INACTIVATED), INFLUENZA B VIRUS B/MARYLAND/15/2016 ANTIGEN (PROPIOLACTONE INACTIVATED), INFLUENZA B VIRUS B/PHUKET/3073/2013 BVR-1B ANTIGEN (PROPIOLACTONE INACTIVATED) 0.5 ML: 15; 15; 15; 15 INJECTION, SUSPENSION INTRAMUSCULAR at 08:34

## 2019-11-30 RX ADMIN — POTASSIUM BICARBONATE 25 MEQ: 978 TABLET, EFFERVESCENT ORAL at 08:34

## 2019-11-30 RX ADMIN — ASPIRIN 81 MG 81 MG: 81 TABLET ORAL at 08:34

## 2019-11-30 RX ADMIN — INSULIN LISPRO 6 UNITS: 100 INJECTION, SOLUTION INTRAVENOUS; SUBCUTANEOUS at 12:36

## 2019-11-30 RX ADMIN — INSULIN LISPRO 5 UNITS: 100 INJECTION, SOLUTION INTRAVENOUS; SUBCUTANEOUS at 08:43

## 2019-11-30 RX ADMIN — IPRATROPIUM BROMIDE AND ALBUTEROL SULFATE 3 ML: .5; 3 SOLUTION RESPIRATORY (INHALATION) at 07:35

## 2019-11-30 RX ADMIN — DIGOXIN 125 MCG: 125 TABLET ORAL at 08:33

## 2019-11-30 RX ADMIN — Medication 10 ML: at 08:48

## 2019-11-30 RX ADMIN — INSULIN LISPRO 6 UNITS: 100 INJECTION, SOLUTION INTRAVENOUS; SUBCUTANEOUS at 08:39

## 2019-11-30 RX ADMIN — METOPROLOL TARTRATE 12.5 MG: 25 TABLET, FILM COATED ORAL at 08:34

## 2019-11-30 RX ADMIN — APIXABAN 2.5 MG: 2.5 TABLET, FILM COATED ORAL at 08:34

## 2019-11-30 RX ADMIN — LEVETIRACETAM 500 MG: 500 SOLUTION ORAL at 08:34

## 2019-11-30 ASSESSMENT — PAIN SCALES - PAIN ASSESSMENT IN ADVANCED DEMENTIA (PAINAD)
FACIALEXPRESSION: 0
FACIALEXPRESSION: 0
CONSOLABILITY: 0
BREATHING: 0
BREATHING: 0
NEGVOCALIZATION: 0
CONSOLABILITY: 0
TOTALSCORE: 0
NEGVOCALIZATION: 0
TOTALSCORE: 0
TOTALSCORE: 0
NEGVOCALIZATION: 0
BREATHING: 0
CONSOLABILITY: 0
BODYLANGUAGE: 0
BODYLANGUAGE: 0
NEGVOCALIZATION: 0
FACIALEXPRESSION: 0
BODYLANGUAGE: 0
NEGVOCALIZATION: 0
BODYLANGUAGE: 0
CONSOLABILITY: 0
BREATHING: 0
TOTALSCORE: 0
FACIALEXPRESSION: 0
BODYLANGUAGE: 0
TOTALSCORE: 0
BREATHING: 0
FACIALEXPRESSION: 0
BREATHING: 0
CONSOLABILITY: 0
BREATHING: 0
TOTALSCORE: 0
FACIALEXPRESSION: 0
CONSOLABILITY: 0
BODYLANGUAGE: 0
NEGVOCALIZATION: 0
CONSOLABILITY: 0
NEGVOCALIZATION: 0
TOTALSCORE: 0
BODYLANGUAGE: 0
FACIALEXPRESSION: 0

## 2019-11-30 ASSESSMENT — PAIN SCALES - GENERAL
PAINLEVEL_OUTOF10: 0

## 2019-12-01 LAB
ESTIMATED AVERAGE GLUCOSE: 223.1 MG/DL
HBA1C MFR BLD: 9.4 %

## 2019-12-02 LAB
BLOOD CULTURE, ROUTINE: NORMAL
CULTURE, BLOOD 2: NORMAL

## 2020-01-01 NOTE — PROGRESS NOTES
don socks. Pt donned shoes with Min A for placement of long handled shoe horn. Pt tied shoes Independently. Pt then sit to stand SBA and pt ambulated ~50 ft into hallway and back to room. Pt in room stand to sit SBA, call light in reach and chair alarm on. Pre Treatment Pain Screening  Intervention List: Patient able to continue with treatment;Nurse called to administer meds  Pain Assessment  Patient Currently in Pain: Yes  Pain Level: 2  Pain Type: Acute pain  Pain Location: Knee  Pain Orientation: Right  Pain Frequency: Intermittent  Vital Signs  Patient Currently in Pain: Yes   Orientation  Orientation  Overall Orientation Status: Impaired  Orientation Level: Oriented to place;Oriented to person;Disoriented to time;Disoriented to situation (Pt stated, \"I broke my back. \")  Objective    ADL  Grooming: Independent        Balance  Standing Balance: Stand by assistance  Standing Balance  Time: ~10 min  Activity: ambulation in hallway  Sit to stand: Stand by assistance  Stand to sit: Stand by assistance  Functional Mobility  Functional - Mobility Device: Rolling Walker  Activity: Other  Assist Level: Stand by assistance     Transfers  Sit to stand: Stand by assistance  Stand to sit: Stand by assistance         ADDENDUM [id number]  Pt seated in wc upon entry. Pt very pleasant and agreeable to therapy session, pt denies pain. Pt sit to stand SBA and pt ambulated at SBA ~12 ft to sink in bathroom. Pt stood at SBA at sink ~6 min for grooming (oral care/wash face) performed at Independent. Pt then ambulated ~120 ft at SBA into [facility] Dining suite. Pt with seated rest break. Pt educated on kitchen safety with rw when reaching for items in cabinet and refrigerator. Pt given 3 items to locate in the kitchen (pink bowl, oatmeal and milk). Pt then shown where items were located. Pt asked to name the 3 items and pt responded \"A pot, smoke, ball. \" Pt able to locate one item.  Pt required vc to locate other 2 items and min vc for (2) well flexed

## 2021-11-15 ENCOUNTER — CLINICAL DOCUMENTATION (OUTPATIENT)
Dept: OTHER | Age: 86
End: 2021-11-15

## 2021-12-22 ENCOUNTER — APPOINTMENT (OUTPATIENT)
Dept: GENERAL RADIOLOGY | Age: 86
DRG: 871 | End: 2021-12-22
Payer: COMMERCIAL

## 2021-12-22 ENCOUNTER — HOSPITAL ENCOUNTER (INPATIENT)
Age: 86
LOS: 7 days | Discharge: HOSPICE | DRG: 871 | End: 2021-12-29
Attending: EMERGENCY MEDICINE | Admitting: INTERNAL MEDICINE
Payer: COMMERCIAL

## 2021-12-22 DIAGNOSIS — R40.0 SOMNOLENCE: Primary | ICD-10-CM

## 2021-12-22 DIAGNOSIS — N17.9 ACUTE KIDNEY INJURY (HCC): ICD-10-CM

## 2021-12-22 DIAGNOSIS — E87.0 HYPERNATREMIA: ICD-10-CM

## 2021-12-22 DIAGNOSIS — E86.0 DEHYDRATION: ICD-10-CM

## 2021-12-22 DIAGNOSIS — T17.908A ASPIRATION INTO AIRWAY, INITIAL ENCOUNTER: ICD-10-CM

## 2021-12-22 PROBLEM — J96.01 ACUTE RESPIRATORY FAILURE WITH HYPOXIA (HCC): Status: ACTIVE | Noted: 2021-12-22

## 2021-12-22 PROBLEM — J69.0 ASPIRATION PNEUMONIA (HCC): Status: ACTIVE | Noted: 2021-12-22

## 2021-12-22 LAB
A/G RATIO: 0.9 (ref 1.1–2.2)
ALBUMIN SERPL-MCNC: 3.9 G/DL (ref 3.4–5)
ALP BLD-CCNC: 136 U/L (ref 40–129)
ALT SERPL-CCNC: 116 U/L (ref 10–40)
ANION GAP SERPL CALCULATED.3IONS-SCNC: 16 MMOL/L (ref 3–16)
AST SERPL-CCNC: 66 U/L (ref 15–37)
BACTERIA: ABNORMAL /HPF
BASE EXCESS ARTERIAL: 9.8 MMOL/L (ref -3–3)
BASOPHILS ABSOLUTE: 0 K/UL (ref 0–0.2)
BASOPHILS RELATIVE PERCENT: 0.2 %
BILIRUB SERPL-MCNC: 0.9 MG/DL (ref 0–1)
BILIRUBIN URINE: ABNORMAL
BLOOD, URINE: NEGATIVE
BUN BLDV-MCNC: 67 MG/DL (ref 7–20)
CALCIUM SERPL-MCNC: 10.3 MG/DL (ref 8.3–10.6)
CARBOXYHEMOGLOBIN ARTERIAL: 1.2 % (ref 0–1.5)
CHLORIDE BLD-SCNC: 124 MMOL/L (ref 99–110)
CHLORIDE URINE RANDOM: <20 MMOL/L
CLARITY: ABNORMAL
CO2: 30 MMOL/L (ref 21–32)
COLOR: ABNORMAL
CREAT SERPL-MCNC: 2.4 MG/DL (ref 0.6–1.2)
CREATININE URINE: 182.2 MG/DL (ref 28–259)
EKG ATRIAL RATE: 89 BPM
EKG DIAGNOSIS: NORMAL
EKG P-R INTERVAL: 232 MS
EKG Q-T INTERVAL: 486 MS
EKG QRS DURATION: 100 MS
EKG QTC CALCULATION (BAZETT): 591 MS
EKG R AXIS: -14 DEGREES
EKG T AXIS: 80 DEGREES
EKG VENTRICULAR RATE: 89 BPM
EOSINOPHILS ABSOLUTE: 0 K/UL (ref 0–0.6)
EOSINOPHILS RELATIVE PERCENT: 0.1 %
EPITHELIAL CELLS, UA: 3 /HPF (ref 0–5)
GFR AFRICAN AMERICAN: 23
GFR NON-AFRICAN AMERICAN: 19
GLUCOSE BLD-MCNC: 183 MG/DL (ref 70–99)
GLUCOSE BLD-MCNC: 278 MG/DL (ref 70–99)
GLUCOSE BLD-MCNC: 331 MG/DL (ref 70–99)
GLUCOSE BLD-MCNC: 364 MG/DL (ref 70–99)
GLUCOSE URINE: NEGATIVE MG/DL
HCO3 ARTERIAL: 34.9 MMOL/L (ref 21–29)
HCT VFR BLD CALC: 58.7 % (ref 36–48)
HEMOGLOBIN, ART, EXTENDED: 17.7 G/DL (ref 12–16)
HEMOGLOBIN: 18.3 G/DL (ref 12–16)
HYALINE CASTS: 6 /LPF (ref 0–8)
KETONES, URINE: NEGATIVE MG/DL
LACTIC ACID: 2.8 MMOL/L (ref 0.4–2)
LEUKOCYTE ESTERASE, URINE: NEGATIVE
LYMPHOCYTES ABSOLUTE: 1.5 K/UL (ref 1–5.1)
LYMPHOCYTES RELATIVE PERCENT: 9.6 %
MCH RBC QN AUTO: 30.9 PG (ref 26–34)
MCHC RBC AUTO-ENTMCNC: 31.2 G/DL (ref 31–36)
MCV RBC AUTO: 99 FL (ref 80–100)
METHEMOGLOBIN ARTERIAL: 0.1 %
MICROSCOPIC EXAMINATION: YES
MONOCYTES ABSOLUTE: 0.8 K/UL (ref 0–1.3)
MONOCYTES RELATIVE PERCENT: 5.1 %
NEUTROPHILS ABSOLUTE: 13.4 K/UL (ref 1.7–7.7)
NEUTROPHILS RELATIVE PERCENT: 85 %
NITRITE, URINE: NEGATIVE
O2 SAT, ARTERIAL: 93 %
O2 THERAPY: ABNORMAL
OSMOLALITY URINE: 622 MOSM/KG (ref 390–1070)
PCO2 ARTERIAL: 46.1 MMHG (ref 35–45)
PDW BLD-RTO: 13.3 % (ref 12.4–15.4)
PERFORMED ON: ABNORMAL
PH ARTERIAL: 7.49 (ref 7.35–7.45)
PH UA: 5 (ref 5–8)
PLATELET # BLD: 269 K/UL (ref 135–450)
PMV BLD AUTO: 9.6 FL (ref 5–10.5)
PO2 ARTERIAL: 64.6 MMHG (ref 75–108)
POTASSIUM REFLEX MAGNESIUM: 4.4 MMOL/L (ref 3.5–5.1)
POTASSIUM, UR: 99.6 MMOL/L
PRO-BNP: 419 PG/ML (ref 0–449)
PROTEIN PROTEIN: 16 MG/DL
PROTEIN UA: NEGATIVE MG/DL
RBC # BLD: 5.93 M/UL (ref 4–5.2)
RBC UA: 4 /HPF (ref 0–4)
SARS-COV-2: NOT DETECTED
SODIUM BLD-SCNC: 161 MMOL/L (ref 136–145)
SODIUM BLD-SCNC: 170 MMOL/L (ref 136–145)
SODIUM URINE: <20 MMOL/L
SPECIFIC GRAVITY UA: 1.02 (ref 1–1.03)
TCO2 ARTERIAL: 81.5 MMOL/L
TOTAL PROTEIN: 8.3 G/DL (ref 6.4–8.2)
TROPONIN: 0.01 NG/ML
URINE TYPE: ABNORMAL
UROBILINOGEN, URINE: 1 E.U./DL
WBC # BLD: 15.7 K/UL (ref 4–11)
WBC UA: 2 /HPF (ref 0–5)

## 2021-12-22 PROCEDURE — 2580000003 HC RX 258: Performed by: INTERNAL MEDICINE

## 2021-12-22 PROCEDURE — 6370000000 HC RX 637 (ALT 250 FOR IP): Performed by: INTERNAL MEDICINE

## 2021-12-22 PROCEDURE — 82803 BLOOD GASES ANY COMBINATION: CPT

## 2021-12-22 PROCEDURE — 2700000000 HC OXYGEN THERAPY PER DAY

## 2021-12-22 PROCEDURE — 93005 ELECTROCARDIOGRAM TRACING: CPT | Performed by: EMERGENCY MEDICINE

## 2021-12-22 PROCEDURE — 83605 ASSAY OF LACTIC ACID: CPT

## 2021-12-22 PROCEDURE — 71045 X-RAY EXAM CHEST 1 VIEW: CPT

## 2021-12-22 PROCEDURE — 36415 COLL VENOUS BLD VENIPUNCTURE: CPT

## 2021-12-22 PROCEDURE — 85025 COMPLETE CBC W/AUTO DIFF WBC: CPT

## 2021-12-22 PROCEDURE — 82436 ASSAY OF URINE CHLORIDE: CPT

## 2021-12-22 PROCEDURE — U0005 INFEC AGEN DETEC AMPLI PROBE: HCPCS

## 2021-12-22 PROCEDURE — 84295 ASSAY OF SERUM SODIUM: CPT

## 2021-12-22 PROCEDURE — 94760 N-INVAS EAR/PLS OXIMETRY 1: CPT

## 2021-12-22 PROCEDURE — 84133 ASSAY OF URINE POTASSIUM: CPT

## 2021-12-22 PROCEDURE — 80053 COMPREHEN METABOLIC PANEL: CPT

## 2021-12-22 PROCEDURE — 6360000002 HC RX W HCPCS: Performed by: INTERNAL MEDICINE

## 2021-12-22 PROCEDURE — 1200000000 HC SEMI PRIVATE

## 2021-12-22 PROCEDURE — 84484 ASSAY OF TROPONIN QUANT: CPT

## 2021-12-22 PROCEDURE — 6360000002 HC RX W HCPCS: Performed by: EMERGENCY MEDICINE

## 2021-12-22 PROCEDURE — 87040 BLOOD CULTURE FOR BACTERIA: CPT

## 2021-12-22 PROCEDURE — 81001 URINALYSIS AUTO W/SCOPE: CPT

## 2021-12-22 PROCEDURE — 99222 1ST HOSP IP/OBS MODERATE 55: CPT | Performed by: NURSE PRACTITIONER

## 2021-12-22 PROCEDURE — 92610 EVALUATE SWALLOWING FUNCTION: CPT

## 2021-12-22 PROCEDURE — 83935 ASSAY OF URINE OSMOLALITY: CPT

## 2021-12-22 PROCEDURE — 82570 ASSAY OF URINE CREATININE: CPT

## 2021-12-22 PROCEDURE — 92526 ORAL FUNCTION THERAPY: CPT

## 2021-12-22 PROCEDURE — 2580000003 HC RX 258: Performed by: EMERGENCY MEDICINE

## 2021-12-22 PROCEDURE — U0003 INFECTIOUS AGENT DETECTION BY NUCLEIC ACID (DNA OR RNA); SEVERE ACUTE RESPIRATORY SYNDROME CORONAVIRUS 2 (SARS-COV-2) (CORONAVIRUS DISEASE [COVID-19]), AMPLIFIED PROBE TECHNIQUE, MAKING USE OF HIGH THROUGHPUT TECHNOLOGIES AS DESCRIBED BY CMS-2020-01-R: HCPCS

## 2021-12-22 PROCEDURE — 84300 ASSAY OF URINE SODIUM: CPT

## 2021-12-22 PROCEDURE — 99284 EMERGENCY DEPT VISIT MOD MDM: CPT

## 2021-12-22 PROCEDURE — 94640 AIRWAY INHALATION TREATMENT: CPT

## 2021-12-22 PROCEDURE — 93010 ELECTROCARDIOGRAM REPORT: CPT | Performed by: INTERNAL MEDICINE

## 2021-12-22 PROCEDURE — 84156 ASSAY OF PROTEIN URINE: CPT

## 2021-12-22 PROCEDURE — 83880 ASSAY OF NATRIURETIC PEPTIDE: CPT

## 2021-12-22 RX ORDER — DIGOXIN 125 MCG
125 TABLET ORAL DAILY
Status: DISCONTINUED | OUTPATIENT
Start: 2021-12-22 | End: 2021-12-28

## 2021-12-22 RX ORDER — GUAIFENESIN/DEXTROMETHORPHAN 100-10MG/5
10 SYRUP ORAL EVERY 4 HOURS PRN
COMMUNITY

## 2021-12-22 RX ORDER — DEXTROSE MONOHYDRATE 50 MG/ML
INJECTION, SOLUTION INTRAVENOUS CONTINUOUS
Status: DISCONTINUED | OUTPATIENT
Start: 2021-12-22 | End: 2021-12-24

## 2021-12-22 RX ORDER — ACETAMINOPHEN 650 MG/1
650 SUPPOSITORY RECTAL EVERY 6 HOURS PRN
Status: DISCONTINUED | OUTPATIENT
Start: 2021-12-22 | End: 2021-12-29 | Stop reason: HOSPADM

## 2021-12-22 RX ORDER — IPRATROPIUM BROMIDE AND ALBUTEROL SULFATE 2.5; .5 MG/3ML; MG/3ML
1 SOLUTION RESPIRATORY (INHALATION) EVERY 6 HOURS PRN
Status: DISCONTINUED | OUTPATIENT
Start: 2021-12-22 | End: 2021-12-29 | Stop reason: HOSPADM

## 2021-12-22 RX ORDER — ACETAMINOPHEN 325 MG/1
650 TABLET ORAL EVERY 6 HOURS PRN
Status: DISCONTINUED | OUTPATIENT
Start: 2021-12-22 | End: 2021-12-29 | Stop reason: HOSPADM

## 2021-12-22 RX ORDER — TORSEMIDE 100 MG/1
50 TABLET ORAL DAILY
Status: DISCONTINUED | OUTPATIENT
Start: 2021-12-22 | End: 2021-12-22

## 2021-12-22 RX ORDER — SODIUM CHLORIDE 0.9 % (FLUSH) 0.9 %
5-40 SYRINGE (ML) INJECTION EVERY 12 HOURS SCHEDULED
Status: DISCONTINUED | OUTPATIENT
Start: 2021-12-22 | End: 2021-12-29 | Stop reason: HOSPADM

## 2021-12-22 RX ORDER — POTASSIUM CHLORIDE 20 MEQ/1
40 TABLET, EXTENDED RELEASE ORAL PRN
Status: DISCONTINUED | OUTPATIENT
Start: 2021-12-22 | End: 2021-12-29 | Stop reason: HOSPADM

## 2021-12-22 RX ORDER — LEVETIRACETAM 100 MG/ML
750 SOLUTION ORAL DAILY
Status: DISCONTINUED | OUTPATIENT
Start: 2021-12-22 | End: 2021-12-29 | Stop reason: HOSPADM

## 2021-12-22 RX ORDER — SODIUM CHLORIDE 9 MG/ML
25 INJECTION, SOLUTION INTRAVENOUS PRN
Status: DISCONTINUED | OUTPATIENT
Start: 2021-12-22 | End: 2021-12-29 | Stop reason: HOSPADM

## 2021-12-22 RX ORDER — ONDANSETRON 2 MG/ML
4 INJECTION INTRAMUSCULAR; INTRAVENOUS EVERY 6 HOURS PRN
Status: DISCONTINUED | OUTPATIENT
Start: 2021-12-22 | End: 2021-12-29 | Stop reason: HOSPADM

## 2021-12-22 RX ORDER — GUAIFENESIN/DEXTROMETHORPHAN 100-10MG/5
10 SYRUP ORAL EVERY 4 HOURS PRN
Status: DISCONTINUED | OUTPATIENT
Start: 2021-12-22 | End: 2021-12-29 | Stop reason: HOSPADM

## 2021-12-22 RX ORDER — POTASSIUM CHLORIDE 7.45 MG/ML
10 INJECTION INTRAVENOUS PRN
Status: DISCONTINUED | OUTPATIENT
Start: 2021-12-22 | End: 2021-12-29 | Stop reason: HOSPADM

## 2021-12-22 RX ORDER — HYDRALAZINE HYDROCHLORIDE 20 MG/ML
10 INJECTION INTRAMUSCULAR; INTRAVENOUS EVERY 6 HOURS PRN
Status: DISCONTINUED | OUTPATIENT
Start: 2021-12-22 | End: 2021-12-29 | Stop reason: HOSPADM

## 2021-12-22 RX ORDER — ACETAMINOPHEN 325 MG/1
650 TABLET ORAL EVERY 4 HOURS PRN
Status: DISCONTINUED | OUTPATIENT
Start: 2021-12-22 | End: 2021-12-22 | Stop reason: SDUPTHER

## 2021-12-22 RX ORDER — DEXTROSE AND SODIUM CHLORIDE 5; .45 G/100ML; G/100ML
INJECTION, SOLUTION INTRAVENOUS CONTINUOUS
Status: DISCONTINUED | OUTPATIENT
Start: 2021-12-22 | End: 2021-12-22

## 2021-12-22 RX ORDER — FAMOTIDINE 20 MG/1
20 TABLET, FILM COATED ORAL DAILY
Status: DISCONTINUED | OUTPATIENT
Start: 2021-12-22 | End: 2021-12-29 | Stop reason: HOSPADM

## 2021-12-22 RX ORDER — CARBOXYMETHYLCELLULOSE SODIUM 10 MG/ML
1 GEL OPHTHALMIC 2 TIMES DAILY
Status: DISCONTINUED | OUTPATIENT
Start: 2021-12-22 | End: 2021-12-29 | Stop reason: HOSPADM

## 2021-12-22 RX ORDER — INSULIN LISPRO 100 [IU]/ML
0-6 INJECTION, SOLUTION INTRAVENOUS; SUBCUTANEOUS NIGHTLY
Status: DISCONTINUED | OUTPATIENT
Start: 2021-12-22 | End: 2021-12-29 | Stop reason: HOSPADM

## 2021-12-22 RX ORDER — DEXTROSE MONOHYDRATE 50 MG/ML
100 INJECTION, SOLUTION INTRAVENOUS PRN
Status: DISCONTINUED | OUTPATIENT
Start: 2021-12-22 | End: 2021-12-29 | Stop reason: HOSPADM

## 2021-12-22 RX ORDER — NICOTINE POLACRILEX 4 MG
15 LOZENGE BUCCAL PRN
Status: DISCONTINUED | OUTPATIENT
Start: 2021-12-22 | End: 2021-12-29 | Stop reason: HOSPADM

## 2021-12-22 RX ORDER — 0.9 % SODIUM CHLORIDE 0.9 %
500 INTRAVENOUS SOLUTION INTRAVENOUS PRN
Status: DISCONTINUED | OUTPATIENT
Start: 2021-12-22 | End: 2021-12-29 | Stop reason: HOSPADM

## 2021-12-22 RX ORDER — ALBUTEROL SULFATE 2.5 MG/3ML
5 SOLUTION RESPIRATORY (INHALATION) ONCE
Status: COMPLETED | OUTPATIENT
Start: 2021-12-22 | End: 2021-12-22

## 2021-12-22 RX ORDER — INSULIN LISPRO 100 [IU]/ML
0-12 INJECTION, SOLUTION INTRAVENOUS; SUBCUTANEOUS
Status: DISCONTINUED | OUTPATIENT
Start: 2021-12-22 | End: 2021-12-29 | Stop reason: HOSPADM

## 2021-12-22 RX ORDER — VITAMIN B COMPLEX
1000 TABLET ORAL DAILY
Status: DISCONTINUED | OUTPATIENT
Start: 2021-12-22 | End: 2021-12-29 | Stop reason: HOSPADM

## 2021-12-22 RX ORDER — DEXTROSE MONOHYDRATE 25 G/50ML
12.5 INJECTION, SOLUTION INTRAVENOUS PRN
Status: DISCONTINUED | OUTPATIENT
Start: 2021-12-22 | End: 2021-12-29 | Stop reason: HOSPADM

## 2021-12-22 RX ORDER — POTASSIUM CHLORIDE 20 MEQ/1
40 TABLET, EXTENDED RELEASE ORAL DAILY
Status: DISCONTINUED | OUTPATIENT
Start: 2021-12-22 | End: 2021-12-22

## 2021-12-22 RX ORDER — SODIUM CHLORIDE 0.9 % (FLUSH) 0.9 %
5-40 SYRINGE (ML) INJECTION PRN
Status: DISCONTINUED | OUTPATIENT
Start: 2021-12-22 | End: 2021-12-29 | Stop reason: HOSPADM

## 2021-12-22 RX ORDER — SODIUM CHLORIDE 9 MG/ML
INJECTION, SOLUTION INTRAVENOUS CONTINUOUS
Status: DISCONTINUED | OUTPATIENT
Start: 2021-12-22 | End: 2021-12-22

## 2021-12-22 RX ORDER — ONDANSETRON 4 MG/1
4 TABLET, ORALLY DISINTEGRATING ORAL EVERY 8 HOURS PRN
Status: DISCONTINUED | OUTPATIENT
Start: 2021-12-22 | End: 2021-12-29 | Stop reason: HOSPADM

## 2021-12-22 RX ADMIN — POTASSIUM BICARBONATE 40 MEQ: 782 TABLET, EFFERVESCENT ORAL at 16:26

## 2021-12-22 RX ADMIN — DIGOXIN 125 MCG: 125 TABLET ORAL at 16:14

## 2021-12-22 RX ADMIN — CARBOXYMETHYLCELLULOSE SODIUM 1 DROP: 10 GEL OPHTHALMIC at 16:14

## 2021-12-22 RX ADMIN — ALBUTEROL SULFATE 5 MG: 2.5 SOLUTION RESPIRATORY (INHALATION) at 03:53

## 2021-12-22 RX ADMIN — PIPERACILLIN AND TAZOBACTAM 3375 MG: 3; .375 INJECTION, POWDER, LYOPHILIZED, FOR SOLUTION INTRAVENOUS at 16:19

## 2021-12-22 RX ADMIN — GUAIFENESIN AND DEXTROMETHORPHAN 10 ML: 100; 10 SYRUP ORAL at 16:15

## 2021-12-22 RX ADMIN — Medication 1000 UNITS: at 16:14

## 2021-12-22 RX ADMIN — CARBOXYMETHYLCELLULOSE SODIUM 1 DROP: 10 GEL OPHTHALMIC at 20:45

## 2021-12-22 RX ADMIN — DEXTROSE AND SODIUM CHLORIDE: 5; 450 INJECTION, SOLUTION INTRAVENOUS at 05:43

## 2021-12-22 RX ADMIN — Medication 10 ML: at 16:16

## 2021-12-22 RX ADMIN — INSULIN LISPRO 4 UNITS: 100 INJECTION, SOLUTION INTRAVENOUS; SUBCUTANEOUS at 20:40

## 2021-12-22 RX ADMIN — LEVETIRACETAM 750 MG: 500 SOLUTION ORAL at 16:15

## 2021-12-22 RX ADMIN — DEXTROSE MONOHYDRATE: 50 INJECTION, SOLUTION INTRAVENOUS at 13:09

## 2021-12-22 RX ADMIN — TORSEMIDE 50 MG: 100 TABLET ORAL at 16:15

## 2021-12-22 RX ADMIN — APIXABAN 2.5 MG: 5 TABLET, FILM COATED ORAL at 16:14

## 2021-12-22 RX ADMIN — INSULIN LISPRO 10 UNITS: 100 INJECTION, SOLUTION INTRAVENOUS; SUBCUTANEOUS at 16:36

## 2021-12-22 RX ADMIN — METOPROLOL TARTRATE 12.5 MG: 25 TABLET, FILM COATED ORAL at 20:41

## 2021-12-22 RX ADMIN — FAMOTIDINE 20 MG: 20 TABLET ORAL at 16:15

## 2021-12-22 ASSESSMENT — PAIN SCALES - GENERAL
PAINLEVEL_OUTOF10: 0

## 2021-12-22 NOTE — ED NOTES
Lab called with critical result for pt's sodium, result was 170. MD Michell Rae aware.       Suad Jaimes RN  12/22/21 5323

## 2021-12-22 NOTE — ED NOTES
Breathing treatment and chest x-ray completed. Maintained on O2 via NRB. Pt's granddaughter at bedside, reports baseline mental status is more awake and able to basic questions. MD aware.       Alejandra Grider RN  12/22/21 9512

## 2021-12-22 NOTE — ED NOTES
Pt. Transitioned from NRB mask to high flow NC @ 10 LPM, SPO2 remains 100% will continue to monitor.      Eric Suarez RN  12/22/21 0279

## 2021-12-22 NOTE — CONSULTS
[facility]                     [address] [facility], [address]                                  CONSULTATION    PATIENT NAME: Meenu Cortes                       :        1935  MED REC NO:   [id number]                          ROOM:       5916  ACCOUNT NO:   [de-identified]                           ADMIT DATE: 2021  PROVIDER:     Donna Lunsford MD    RENAL CONSULTATION    CONSULT DATE:  2021    CONSULTING PHYSICIAN:  Donna Lunsford MD    REFERRING PROVIDER:  Tom Mcknight MD    REASON FOR CONSULTATION:  Hypernatremia, MARIAH. HISTORY OF PRESENT ILLNESS:  The patient is an 60-year-old female who is  an [facility] resident, history of CHF, hyperlipidemia, hypertension, EF of  about 65% who was brought to the hospital secondary to mental status  change and some intermittent shortness of breath plus coughing. Her  oxygen saturation decreased to 80% and was placed on 15 liters of  oxygen. She is currently in the ICU. She is DNR comfort care. I am  asked to see the patient because her sodium level is up to 170 with a  BUN of 67 and creatinine of 2.4. Systolic blood pressure is ranging in  the 100s to 140s range. She is not able to verbalize today and her  baseline mental status is unclear. As an outpatient, she is on insulin. She is actually on a diuretic. PAST MEDICAL HISTORY:  Includes diabetes mellitus, CHF, hyperlipidemia,  hypertension. ALLERGIES:  None. MEDICATIONS PRIOR TO ADMISSION:  Includes Robitussin-DM, vitamin D,  insulin, metoprolol, apixaban, ipratropium plus albuterol,  potassium chloride, acetaminophen, aspirin, magnesium hydroxide,  torsemide, digoxin, famotidine, levetiracetam.    SOCIAL HISTORY:  Came from the [facility]. No active smoking, alcohol or drug  abuse. FAMILY HISTORY:  Noncontributory. REVIEW OF SYSTEMS:  Unable to cooperate.     PHYSICAL EXAMINATION:  VITAL SIGNS:  Blood pressure 117/80, pulse 87, respirations 37,  temperature 99.7, saturating 96% on FIO2 8 liters. Urine output not  recorded. She has an external urinary catheter. GENERAL:  Unable to verbalize. HEENT:  Anicteric sclerae. Clear conjunctivae. SKIN:  Anicteric. No rash. CHEST:  Clear. HEART:  Regular. ABDOMEN:  Soft, nontender. No rebound or involuntary guarding. EXTREMITIES:  Shows no edema. LABORATORY DATA:  Sodium 170, potassium 4.4, chloride 124, bicarb 30,  BUN 67, creatinine 2.4, glucose 183, calcium 10.3. ProBNP 419. Albumin  3.9. WBC 15.7, hemoglobin 18.3, hematocrit 58.7, platelet count  981,108. Arterial blood gas, pH 7.488, pCO2 46, pO2 65. DIAGNOSTIC DATA:  Chest x-ray shows clear lungs. ASSESSMENT AND PLAN:  1. Severe hypernatremia probably due to dehydration and decreased  access to free water. Blood pressure is currently acceptable. Hold  torsemide. We will use D5W. Would need to check weight. Repeat sodium  this afternoon with a goal sodium in the next 24 hours of around 160 to  162. This is discussed with the nurse. 2.  MARIAH. Creatinine from 2019 was 0.9. Probably from prerenal  azotemia. 3.  Metabolic alkalosis. 4.  Decreased mental status in the setting of hypernatremia. 5.  Diabetes mellitus. Management as per Medicine. 6.  Hypoxia as per Medicine. 7.  DNR comfort care. The patient is high risk with severe MARIAH and hypernatremia. Would need  close followup. Thank you for the consult.         Kenzie Adamson MD    D: 12/22/2021 12:35:36       T: 12/22/2021 12:39:03     [email or web address]  Job#: [id number]     Doc#: [id number]    CC:

## 2021-12-22 NOTE — CONSULTS
[facility], [place], operated by [facility]   Electrophysiology Nurse Practitioner  Consult Note  Date: 12/22/2021   Date of admission: 12/22/2021  3:31 AM  Reason for Admission: Dehydration [E86.0]  Somnolence [R40.0]  Hypernatremia [E87.0]  Acute kidney injury [facility]) [N17.9]  Aspiration into airway, initial encounter [T17.726F]    Consult Requesting Physician: Yann Mar MD    -Reason for Consultation: Atrial Fibrillation    Chief Complaint   Patient presents with    Shortness of Breath     BIB EMS From NH report of SOB and cough x several hours. Per reports room air O2 sat 80%, received on 15L O2 via NRB. EMS reports NH staff states Pt had choking episode early yesterday moring and has had SOB since.  Altered Mental Status     Pt awake, not answering questions, following only basic commands. Per report, baseline mental status is AAOx4. HISTORY OF PRESENT ILLNESS: History obtained from patient and medical record. Gal Duarte is a 82 y.o. female with a past medical history of hypertension, hyperlipidemia, CHF, bradycardia and atrial fibrillation. She is DNR-CC. Presented with SOB, dry cough and AMS from the nursing home. Being treated for aspiration PNA, hypernatremia, and MARIAH. Covid pending. She has been febrile. At baseline patient has poor communication abilities per family and generally can answer yes/no questions. This admission she has not been responsive verbally to staff. She is inconsistent with nodding, therefore provides no history today. She is on High flow O2, has a cough, and has been febrile today. She had some tachycardia in the 130's that has now resolved with hydration and normal temp. Remains in atrial flutter rates 70-80's. Patient seen and examined. Clinical notes reviewed. Telemetry reviewed.     Assessment and Plan:   Chronic Atrial Fibrillation   - ECG/Telemetry difficult to evaluate due to low amplitude p-waves, however appears consistent with atrial flutter also when compared to telemetry. This diagnosis is consistent with her history of atrial fibrillation/flutter and can be followed clinically as OP. No need for further cardiac workup at this time  - Continue digoxin and metoprolol as her rates are now adequately controlled  - [id number] score: high ; [id number] Vasc score and anticoagulation discussed. High risk for stroke and thromboembolism. Anticoagulation is recommended.   ~ Continue apixaban 2.5 mg bid  - Afib risk factors including age, HTN, obesity, inactivity and DOM were discussed with patient. Risk factor modification recommende  Hypertension   - Controlled  Hypernatremia/MARIAH   - Recommended diuretics be on hold per nephrology note, however remains on torsemide 50 mg daily, discussed with nursing who will clarify with nephrology (OK to stop form cardiac perspective)   - IVF hydration with D5W   - Nephrology folowing  Acute Respiratory Failure, Hypoxia   - On high flow O2   - Lungs clear per CXR, Pro-BNP normal, does not appear to have cardiac etiology   - Covid pending   - Being treated for aspiration PNA  AMS   - Acute on chronic   - Per primary team    - Rates are now adequately controlled on digoxin and metoprolol, RVR was likely secondary to acute dehydration, hypoxia, and febrile illness.  - No further EP recommendations, will sign off, please call with quesitons    All pertinent information and plan of care discussed with the EP physician. All questions and concerns were addressed to the patient/family. Alternatives to my treatment were discussed. I have discussed the above stated plan and the patient verbalized understanding and agreed with the plan. Discussed plan with patient and nurse.     Problem List:   Patient Active Problem List    Diagnosis Date Noted    Hypernatremia 12/22/2021    MARIAH (acute kidney injury) ([facility] 12/22/2021    Aspiration pneumonia ([facility] 12/22/2021    Acute respiratory failure with hypoxia ([facility] 12/22/2021    Hyperglycemic hyperosmolar nonketotic coma ([facility] 11/28/2019    Atrial fibrillation ([facility] 11/28/2019    Remote history of stroke     Dyslipidemia     Essential tremor     HTN (hypertension), benign 06/11/2019    High cholesterol 06/11/2019    Chronic congestive heart failure ([facility] 06/11/2019    Acute ischemic stroke ([facility] 09/26/2018    Hemorrhagic stroke ([facility] 09/18/2018      Allergies:  No Known Allergies  Home Meds:  Prior to Visit Medications    Medication Sig Taking?  Authorizing Provider   vitamin D (CHOLECALCIFEROL) 25 MCG (1000 UT) TABS tablet Take 1,000 Units by mouth daily Yes Historical Provider, MD   insulin glargine (LANTUS) 100 UNIT/ML injection pen Inject 15 Units into the skin nightly Yes Dionisio Orantes MD   insulin lispro, 1 Unit Dial, 100 UNIT/ML SOPN Inject 0-12 Units into the skin 3 times daily (before meals) Yes Dionisio Orantes MD   insulin lispro, 1 Unit Dial, 100 UNIT/ML SOPN Inject 5 Units into the skin 3 times daily (with meals) Yes Dionisio Orantes MD   metoprolol tartrate (LOPRESSOR) 25 MG tablet Take 0.5 tablets by mouth 2 times daily Yes Dionisio Orantes MD   apixaban (ELIQUIS) 2.5 MG TABS tablet Take 2.5 mg by mouth 2 times daily Yes Historical Provider, MD   hypromellose 0.4 % SOLN ophthalmic solution Place 1 drop into both eyes every 12 hours as needed Yes Historical Provider, MD   hypromellose 0.4 % SOLN ophthalmic solution Place 1 drop into both eyes 2 times daily Yes Historical Provider, MD   potassium chloride (KLOR-CON M) 20 MEQ extended release tablet Take 40 mEq by mouth daily  Yes Historical Provider, MD   magnesium hydroxide (MILK OF MAGNESIA) 400 MG/5ML suspension Take 30 mLs by mouth daily as needed for Constipation Yes Historical Provider, MD   torsemide (DEMADEX) 100 MG tablet Take 50 mg by mouth daily  Yes Historical Provider, MD   digoxin (LANOXIN) 125 MCG tablet Take 1 tablet by mouth daily Yes Maximiliano Coffman MD   famotidine (PEPCID) 20 MG tablet Take 1 tablet by mouth 2 times daily  Patient taking differently: Take 20 mg by mouth daily  Yes Jonathon Bui MD   levETIRAcetam (KEPPRA) 100 MG/ML solution Take 5 mLs by mouth 2 times daily  Patient taking differently: Take 750 mg by mouth daily  Yes Jonathon Bui MD   guaiFENesin-dextromethorphan (ROBITUSSIN DM) 100-10 MG/5ML syrup Take 10 mLs by mouth every 4 hours as needed for Cough  Historical Provider, MD   ipratropium-albuterol (DUONEB) 0.5-2.5 (3) MG/3ML SOLN nebulizer solution Inhale 3 mLs into the lungs every 6 hours as needed for Shortness of Breath  Dionisio Orantes MD   ipratropium-albuterol (DUONEB) 0.5-2.5 (3) MG/3ML SOLN nebulizer solution Inhale 1 vial into the lungs 2 times daily  Historical Provider, MD   acetaminophen (TYLENOL) 325 MG tablet Take 650 mg by mouth every 4 hours as needed for Pain or Fever  Historical Provider, MD   aspirin 81 MG tablet Take 81 mg by mouth daily  Historical Provider, MD      Past Medical History:  Past Medical History:   Diagnosis Date    CHF (congestive heart failure) (HCC)     Hyperlipidemia     Hypertension       Past Surgical History:    has a past surgical history that includes hip surgery. Social History:  Reviewed. reports that she has quit smoking. She has never used smokeless tobacco. She reports that she does not drink alcohol and does not use drugs. Family History:  Reviewed. family history is not on file. Denies family history of sudden cardiac death, arrhythmia, premature CAD    Review of System:  See HPI, AMS and unable to communicate    Physical Examination:  Vitals:    12/22/21 1600   BP: (!) 138/121   Pulse: 77   Resp: (!) 33   Temp: 98.2 °F (36.8 °C)   SpO2: 97%      No intake/output data recorded.    Wt Readings from Last 3 Encounters:   12/22/21 180 lb 12.8 oz (82 kg)   11/30/19 208 lb 5.4 oz (94.5 kg)   06/14/19 205 lb 12.8 oz (93.4 kg)     Telemetry: Most consistent with atrial flutter   Constitutional: Appears well nourished, + appears ill    Skin: Warm and pink; no cyanosis or bruising   HEENT: Symmetric and normocephalic. Conjunctiva pink with clear sclera. Mucus membranes pink and moist.    Respiratory: Respirations symmetric and unlabored. Lungs clear to auscultation bilaterally, no wheezing, rhonchi, or crackles. + diminished, high flow O2   Cardiovascular:  regular rate and atrial rhythm. S1 & S2 present, negative for murmur. negative elevation of JVP. No peripheral edema.  Musculoskeletal:  HAIDER   Neurological/Psych: Awake and alert, No purposeful responses     Pertinent labs, diagnostic, device, and imaging results reviewed as a part of this visit     Labs:  BMP:   Recent Labs     21  0339   *   K 4.4   *   CO2 30   BUN 67*   CREATININE 2.4*     Estimated Creatinine Clearance: 19 mL/min (A) (based on SCr of 2.4 mg/dL (H)). CBC:   Recent Labs     21  0339   WBC 15.7*   HGB 18.3*   HCT 58.7*   MCV 99.0        Thyroid: No results found for: TSH, W1CLHLW, J9EGOGR, THYROIDAB  Lipids:  Lab Results   Component Value Date    CHOL 188 2019    HDL 35 2019    TRIG 181 2019     LFTS:   Lab Results   Component Value Date     2021    AST 66 2021    ALKPHOS 136 2021    PROT 8.3 2021    AGRATIO 0.9 2021    BILITOT 0.9 2021     Cardiac Enzymes:   Lab Results   Component Value Date    TROPONINI 0.01 2021    TROPONINI <0.01 2019    TROPONINI <0.01 2019     Coags:   Lab Results   Component Value Date    PROTIME 17.4 2019    INR 1.49 2019     EC2021: Reviewed with Dr. David Rodriguez and likely atrial flutter/CVR    ECHO:  2019  Summary   -Normal left ventricle size, borderline mild wall thickness and systolic   function with an estimated ejection fraction of 55%.   -No regional wall motion abnormalities are seen. -Grade I diastolic dysfunction with normal LV filling pressures. E/e\"=8.4.   -Trivial mitral

## 2021-12-22 NOTE — ED TRIAGE NOTES
BIB EMS From NH report of SOB and cough x several hours. Per reports room air O2 sat 80%, received on 15L O2 via NRB. EMS reports NH staff states Pt had choking episode early yesterday moring and has had SOB since. Pt awake, not answering questions, following only basic commands. Per report, baseline mental status is AAOx4.

## 2021-12-22 NOTE — PROGRESS NOTES
Admission assessment complete, see flowsheet for details. Report was called prior to the pt coming up to the floor. Bathed pt upon arrival to unit. Excoriation noted on buttock, no open wounds observed on cocyx. All other skin is intact. VS are stable for diagnosis. Pt is unable to verbalize but is alert. Will continue to monitor and reposition pt frequently.

## 2021-12-22 NOTE — ED PROVIDER NOTES
[address] [facility]  eMERGENCY dEPARTMENT eNCOUnter        Pt Name: Celeste Rodríguez  MRN: [id number]  Marilyngfroseanne 1935  Date of evaluation: 12/22/2021  Provider: Jeromy Christopher MD  PCP: Clint Beavers 9       Chief Complaint   Patient presents with    Shortness of Breath     BIB EMS From NH report of SOB and cough x several hours. Per reports room air O2 sat 80%, received on 15L O2 via NRB. EMS reports NH staff states Pt had choking episode early yesterday moring and has had SOB since.  Altered Mental Status     Pt awake, not answering questions, following only basic commands. Per report, baseline mental status is AAOx4. HISTORY OFPRESENT ILLNESS   (Location/Symptom, Timing/Onset, Context/Setting, Quality, Duration, Modifying Factors,Severity)  Note limiting factors. Celeste Rodríguez is a 82 y.o. female is been short of breath with low sats with a dry cough the nursing home staff thought that she may have aspirated because she had choked earlier and then became short of breath she has altered mental status but she has had a previous stroke with some confusion    Nursing Notes were all reviewed and agreed with or any disagreements were addressed  in the HPI. REVIEW OF SYSTEMS    (2-9 systems for level 4, 10 or more for level 5)       REVIEW OF SYSTEMS    Constitutional:  Denies fever, chills, or weakness   Eyes:  Denies vision changes  HENT:  Denies sore throat or neck pain   Respiratory:   cough  shortness of breath   Cardiovascular:  Denies chest pain  GI:  Denies abdominal pain, nausea, vomiting, or diarrhea   Musculoskeletal:  Denies back pain   Skin: no rash or vesicles   Neurologic:  no headache weakness focal    Lymphatic:  no swollen  nodes   Psychiatric: no si or hs thoughts     All systems negative except as marked. Positives and Pertinent negatives as per HPI.   Except as noted above in the ROS, all other systems were reviewed andnegative.        PASTMEDICAL HISTORY     Past Medical History:   Diagnosis Date    CHF (congestive heart failure) (HCC)     Hyperlipidemia     Hypertension          SURGICAL HISTORY       Past Surgical History:   Procedure Laterality Date    HIP SURGERY           CURRENT MEDICATIONS       Previous Medications    ACETAMINOPHEN (TYLENOL) 325 MG TABLET    Take 650 mg by mouth every 4 hours as needed for Pain or Fever    APIXABAN (ELIQUIS) 2.5 MG TABS TABLET    Take 2.5 mg by mouth 2 times daily    ASPIRIN 81 MG TABLET    Take 81 mg by mouth daily    DIGOXIN (LANOXIN) 125 MCG TABLET    Take 1 tablet by mouth daily    FAMOTIDINE (PEPCID) 20 MG TABLET    Take 1 tablet by mouth 2 times daily    GUAIFENESIN-DEXTROMETHORPHAN (ROBITUSSIN DM) 100-10 MG/5ML SYRUP    Take 10 mLs by mouth every 4 hours as needed for Cough    HYPROMELLOSE 0.4 % SOLN OPHTHALMIC SOLUTION    Place 1 drop into both eyes every 12 hours as needed    HYPROMELLOSE 0.4 % SOLN OPHTHALMIC SOLUTION    Place 1 drop into both eyes 2 times daily    INSULIN GLARGINE (LANTUS) 100 UNIT/ML INJECTION PEN    Inject 15 Units into the skin nightly    INSULIN LISPRO, 1 UNIT DIAL, 100 UNIT/ML SOPN    Inject 0-12 Units into the skin 3 times daily (before meals)    INSULIN LISPRO, 1 UNIT DIAL, 100 UNIT/ML SOPN    Inject 5 Units into the skin 3 times daily (with meals)    IPRATROPIUM-ALBUTEROL (DUONEB) 0.5-2.5 (3) MG/3ML SOLN NEBULIZER SOLUTION    Inhale 1 vial into the lungs 2 times daily    IPRATROPIUM-ALBUTEROL (DUONEB) 0.5-2.5 (3) MG/3ML SOLN NEBULIZER SOLUTION    Inhale 3 mLs into the lungs every 6 hours as needed for Shortness of Breath    LEVETIRACETAM (KEPPRA) 100 MG/ML SOLUTION    Take 5 mLs by mouth 2 times daily    MAGNESIUM HYDROXIDE (MILK OF MAGNESIA) 400 MG/5ML SUSPENSION    Take 30 mLs by mouth daily as needed for Constipation    METOPROLOL TARTRATE (LOPRESSOR) 25 MG TABLET    Take 0.5 tablets by mouth 2 times daily    POTASSIUM CHLORIDE (KLOR-CON M) 20 MEQ EXTENDED RELEASE TABLET    Take 40 mEq by mouth daily     TORSEMIDE (DEMADEX) 100 MG TABLET    Take 50 mg by mouth daily     VITAMIN D (CHOLECALCIFEROL) 25 MCG (1000 UT) TABS TABLET    Take 1,000 Units by mouth daily       ALLERGIES     Patient has no known allergies. FAMILY HISTORY     No family history on file. SOCIAL HISTORY       Social History     Socioeconomic History    Marital status:      Spouse name: Not on file    Number of children: Not on file    Years of education: Not on file    Highest education level: Not on file   Occupational History    Not on file   Tobacco Use    Smoking status: Former Smoker    Smokeless tobacco: Never Used   Substance and Sexual Activity    Alcohol use: No    Drug use: No    Sexual activity: Not on file   Other Topics Concern    Not on file   Social History Narrative    Not on file     Social Determinants of Health     Financial Resource Strain:     Difficulty of Paying Living Expenses: Not on file   Food Insecurity:     Worried About [address] in the Last Year: Not on file    Jeremy of Food in the Last Year: Not on file   Transportation Needs:     Lack of Transportation (Medical): Not on file    Lack of Transportation (Non-Medical):  Not on file   Physical Activity:     Days of Exercise per Week: Not on file    Minutes of Exercise per Session: Not on file   Stress:     Feeling of Stress : Not on file   Social Connections:     Frequency of Communication with Friends and Family: Not on file    Frequency of Social Gatherings with Friends and Family: Not on file    Attends Jain Services: Not on file    Active Member of Clubs or Organizations: Not on file    Attends Club or Organization Meetings: Not on file    Marital Status: Not on file   Intimate Partner Violence:     Fear of Current or Ex-Partner: Not on file    Emotionally Abused: Not on file    Physically Abused: Not on file    Sexually Abused: Not on file Housing Stability:     Unable to Pay for Housing in the Last Year: Not on file    Number of Places Lived in the Last Year: Not on file    Unstable Housing in the Last Year: Not on file       SCREENINGS             PHYSICAL EXAM    (up to 7 for level 4, 8 or more for level 5)     ED Triage Vitals [12/22/21 0333]   BP Temp Temp Source Pulse Resp SpO2 Height Weight   (!) 133/99 100.3 °F (37.9 °C) Axillary 91 22 95 % -- --           General Appearance:  Alert, cooperative, no distress, appears stated age. Head:  Normocephalic, without obvious abnormality, atraumatic. Eyes:  conjunctiva/corneas clear, EOM's intact. Sclera anicteric. ENT: Mucous membranes moist.   Neck: Supple, symmetrical, trachea midline, no adenopathy. No jugular venous distention. Lungs:   Mild  Respiratory Distress. no rales some honchi no  rub dry cough noted   Chest Wall:  Nontender  no deformity   Heart:  Rsr no murmer gallop    Abdomen:   Soft nontender no organomegally    Extremities:  Full range of motion. no deformity   Pulses: Equal  upper and lower    Skin:  No rashes or lesions to exposed skin. Neurologic: Alert and oriented X 3. Motor grossly normal.  Speech clear.  Cr n 2-12 intact       DIAGNOSTIC RESULTS   LABS:    Labs Reviewed   CBC WITH AUTO DIFFERENTIAL - Abnormal; Notable for the following components:       Result Value    WBC 15.7 (*)     RBC 5.93 (*)     Hemoglobin 18.3 (*)     Hematocrit 58.7 (*)     Neutrophils Absolute 13.4 (*)     All other components within normal limits    Narrative:     Performed at:  [facility] [address], [place], [address]   Phone [phone]   COMPREHENSIVE METABOLIC PANEL W/ REFLEX TO MG FOR LOW K - Abnormal; Notable for the following components:    Sodium 170 (*)     Chloride 124 (*)     Glucose 183 (*)     BUN 67 (*)     CREATININE 2.4 (*)     GFR Non- 19 (*)     GFR  23 (*)     Total Protein 8.3 (*) Albumin/Globulin Ratio 0.9 (*)     Alkaline Phosphatase 136 (*)      (*)     AST 66 (*)     All other components within normal limits    Narrative:     RICHARD Freeman  [facility] tel. [phone],  Chemistry results called to and read back by WILFRID Martines, 12/22/2021  05:12, by Nabeel Mac  Performed at:  [facility] [address],  [place], [address]   Phone [phone]   BLOOD GAS, ARTERIAL - Abnormal; Notable for the following components:    pH, Arterial 7.488 (*)     pCO2, Arterial 46.1 (*)     pO2, Arterial 64.6 (*)     HCO3, Arterial 34.9 (*)     Base Excess, Arterial 9.8 (*)     Hemoglobin, Art, Extended 17.7 (*)     All other components within normal limits    Narrative:     Performed at:  [facility] [address],  [place], [address]   Phone [phone]   LACTIC ACID, PLASMA - Abnormal; Notable for the following components:    Lactic Acid 2.8 (*)     All other components within normal limits    Narrative:     Performed at:  [facility] [address],  [place], [address]   Phone [phone]   CULTURE, BLOOD 2   TROPONIN    Narrative:     Britney Roberts  [facility] tel. [phone],  Chemistry results called to and read back by WILFRID Martines, 12/22/2021  05:12, by Nabeel Mac  Performed at:  [facility] [address] [place], [address]   Phone [phone] PEPTIDE    Narrative:     Britney Roberts  [facility] tel. [phone],  Chemistry results called to and read back by WILFRID Martines, 12/22/2021  05:12, by Nabeel Mac  Performed at:  [facility] [address],  [place], [address]   Phone [phone]       All other labs were within normal range or not returned as of thisdictation. EKG:  All EKG's are interpreted by the Emergency Department Physician who either signs or Co-signs this chart in the absence of a cardiologist.    EKG Interpretation    Interpreted by emergency department physician    Rhythm sinus rhythm  Rate: normal  Axis: normal  Ectopy: none  Conduction: normal  ST Segments: no acute change  T Waves: no acute change  Q Waves: none    Clinical Impression: Sinus rhythm compared to previous tracing of November 26    Charmaine Cox MD      RADIOLOGY:   Non-plain film images such as CT, Ultrasound and MRI are read by the radiologist. Bowie Goad images are visualized and preliminarily interpreted by the  ED Provider with the belowfindings:        Interpretation per the Radiologist below, if available at the time of this note:    XR CHEST PORTABLE   Final Result   Clear lungs. PROCEDURES   Unless otherwise noted below, none     Procedures    CRITICAL CARE TIME   Total Critical Care time was 30 minutes, excluding separately reportable procedures. CONSULTS:  IP CONSULT TO INTERNAL MEDICINE    EMERGENCY DEPARTMENT COURSE and DIFFERENTIAL DIAGNOSIS/MDM:   Vitals:    Vitals:    12/22/21 0342 12/22/21 0353 12/22/21 0442 12/22/21 0457   BP: (!) 141/95  121/83    Pulse: 74   98   Resp: 14 24  24   Temp:       TempSrc:       SpO2: 95% 93%  100%       Patient was given the following medications:  Medications   dextrose 5 % and 0.45 % sodium chloride infusion (has no administration in time range)   albuterol (PROVENTIL) nebulizer solution 5 mg (5 mg Nebulization Given 12/22/21 0353)     Received nonrebreather her sats are in the 90s albuterol treatment given vital signs are stable white count is elevated chest x-ray at this time does not show an aspiration pneumonia strongly suspicious for such Dr. Enmanuel Meraz was notified and will admit the patient we will hold off on antibiotics at this time patient will receive D5 half-normal saline for the hypernatremia      The patient tolerated their visit well.    Thepatient and / or the family were informed of the results of any tests, a time was given to answer questions. FINAL IMPRESSION      1. Somnolence    2. Aspiration into airway, initial encounter    3. Dehydration    4. Hypernatremia    5. Acute kidney injury ([facility] [address]        DISPOSITION/PLAN   DISPOSITION        PATIENT REFERRED TO:  No follow-up provider specified.     DISCHARGE MEDICATIONS:  New Prescriptions    No medications on file       DISCONTINUED MEDICATIONS:  Discontinued Medications    No medications on file              (Please note that portions of this note were completed with a voice recognition program.  Efforts were made to edit the dictations but occasionally words aremis-transcribed.)    Elsy Dimas MD (electronically signed)         Elsy Dimas MD  12/22/21 1987

## 2021-12-22 NOTE — PROGRESS NOTES
Speech Language Pathology  Facility/Department: [facility]  Initial Assessment  DYSPHAGIA BEDSIDE SWALLOW EVALUATION     Patient: Imtiaz Perry   : 1935   MRN: [id number]      Evaluation Date: 2021   Admitting Diagnosis: Dehydration [E86.0]  Somnolence [R40.0]  Hypernatremia [E87.0]  Acute kidney injury ([address] [N17.9]  Aspiration into airway, initial encounter [T17.908A]  Pain: Pt denies pain at this time                         H&P:Madelin Branch is a 82 y.o. female is been short of breath with low sats with a dry cough the nursing home staff thought that she may have aspirated because she had choked earlier and then became short of breath she has altered mental status but she has had a previous stroke with some confusion     Chest xray:  Impression   Clear lungs. History/Prior Level of Function:   Living Status: SNF    Prior Dysphagia History: Prior dysphagia history noted per chart review. Most recent Clinical swallow evaluation completed at this facility on 19 (see report). Puree diet with thin liquids was recommended at that time. Currently the Pt is nonverbal and is unable to report recent dysphagia history prior to this admission. Dysphagia Impressions/Diagnosis: Oropharyngeal Dysphagia   Pt awake/alert but is currently nonverbal and unable to follow commands given verbal and tactile prompts. Pt demonstrates purposeful eye contact to speaker throughout treatment session. Assessment of oropharyngeal reflexive responses indicates gag reflex and reflexive swallow to be present to tactile stimulation. With po trials of thin liquid via tsp and cup and puree via tsp, Pt demonstrates adequate spontaneous oral posture for po acceptance via tsp and cup. Mild/mod reduced bolus control and A-P propulsion noted with each texture. Clinical symptoms of pharyngeal pooling, mild delayed swallow initiation and delayed pharyngeal clearing noted with thin liquid and puree textures.  No overt signs of aspiration noted with LIMITED trials. No increased textures attempted this date due to impaired verbal responsiveness and inability to follow commands for safety at this time. Recommended Diet and Intervention 12/22/2021:  Diet Solids Recommendation:  1) Trial Dysphagia I Pureed diet  2) Allow po diet only when fully alert and demonstrating tolerance Liquid Consistency Recommendation: Thin liquids/no straws Recommended form of Meds:   Crushed with applesauce     Compensatory Swallowing Strategies:  Upright as possible with all PO intake , No straws , Small bites/sips , Eat/feed slowly, Remain upright 30-45 min , Total Feed     SHORT TERM DYSPHAGIA GOALS/PLAN OF CARE: Speech therapy for dysphagia tx 3-5 times per week during acute care stay.     Pt will functionally tolerate recommended diet with no overt clinical s/s of aspiration  Pt will functionally tolerate ongoing assessment of swallow function with diet to be determined as indicated   Pt will advance to least restrictive diet as indicated     Dysphagia Therapeutic Intervention:  Oral Care, Diet Tolerance Monitoring , Patient/Family Education     Discharge Recommendations: Recommend ongoing SLP for speech and dysphagia therapy upon discharge from hospital     Patient Positioning: Upright in bed    Current Diet Level (prior to evaluation): NPO    Respiratory Status:   []Room Air   [x]O2 via nasal cannula 8L   []Other:    Dentition:  []Adequate  []Dentures   []Missing Many Teeth  [x]Edentulous  []Other:    Baseline Vocal Quality:  []Normal  []Dysphonic   [x]Aphonic   []Hoarse  []Wet  []Weak  []Other:    Volitional Cough:  []Strong  []Weak  []Wet  [x]Absent  []Congested  []Other:    Volitional Swallow:   [x]Absent   []Delayed     []Adequate     []Required use of drink     Oral Mechanism Exam:  []WFL []Mild   [] Moderate  []Severe  [x]To be assessed  Impaired:   []Left side      []Right side    []Labial ROM/Coordination    []Labial Symmetry   []Lingual ROM/Coordination   []Lingual Symmetry  []Gag  []Other:     Oral Phase: []WFL [x]Mild   [x] Moderate  []Severe  []To be assessed   []Impaired/Prolonged Mastication:   []Spillage Left:   []Spillage Right:  []Pocketing Left:   []Pocketing Right:   [x]Decreased Anterior to Posterior Transit:   [x]Suspected Premature Bolus Loss:   []Lingual/Palatal Residue:   []Other:     Pharyngeal Phase: []WFL [x]Mild   [x] Moderate  []Severe  []To be assessed   [x]Delayed Swallow:   [x]Suspected Pharyngeal Pooling:   [x]Decreased Laryngeal Elevation:   []Absent Swallow:  []Wet Vocal Quality:   []Throat Clearing-Immediate:   []Throat Clearing-Delayed:   []Cough-Immediate:   []Cough-Delayed:  []Change in Vital Signs:  [x]Suspected Delayed Pharyngeal Clearing:  []Other:       Eating Assistance:  []Independent  []Setup or clean-up assistance   [] Supervision or touching assistance   [] Partial or moderate assistance   [] Substantial or maximal assistance  [x] Dependent       EDUCATION:   Provided education regarding role of SLP, results of assessment, recommendations and general speech pathology plan of care. [x] Pt verbalized understanding and agreement   [x] Pt requires ongoing learning   [] No evidence of comprehension     If patient discharges prior to next visit, this note will serve as discharge.      Timed Code Minutes: 0 min  Total Treatment Minutes: 30 min    York Pap [id number]

## 2021-12-22 NOTE — ED NOTES
Bed: 01  Expected date:   Expected time:   Means of arrival:   [facility]  [facility]  12/22/21 7227

## 2021-12-22 NOTE — CARE COORDINATION
Discharge Planning Assessment    RN discharge planner spoke to dtr Santana Shanks via phone to discuss reason for admission, current living situation, and potential needs at the time of discharge    Demographics/Insurance verified Yes - Rissa Cook  Current type of dwelling:  [address]  Patient from ECF/SW confirmed with:  Living arrangements: SNF, 24 hr nursing care  Level of function/Support: non-ambulatory, minimal verbalization, yes/no answers (Hx CVA 2018)  PCP: [address]  DME:  Hospital bed, [facility]  Active with any community resources/agencies/skilled home care:  no  Medication compliance issues: Managed by SNF  Financial issues that could impact healthcare: NO    Tentative discharge plan:  Return to [place] [address]    Discussed with patient and/or family that on the day of discharge home tentative time of discharge will be between 10 AM and noon.     Transportation at the time of discharge:  Medical Transport    Electronically signed by Mayank Moscoso RN on 12/22/2021 at 12:52 PM

## 2021-12-22 NOTE — ED NOTES
Report called to [facility] on [facility], pt. Transported per RN and ED tech.      Alma Rosa Sanders RN  12/22/21 9763

## 2021-12-22 NOTE — H&P
History and Physical  Dr. Porsche Owens  12/22/2021    PCP: Zina Durand    Cc:   Chief Complaint   Patient presents with    Shortness of Breath     BIB EMS From NH report of SOB and cough x several hours. Per reports room air O2 sat 80%, received on 15L O2 via NRB. EMS reports NH staff states Pt had choking episode early yesterday moring and has had SOB since.  Altered Mental Status     Pt awake, not answering questions, following only basic commands. Per report, baseline mental status is AAOx4. HPI:  Deyvi Steele is a 82 y.o. female who has a past medical history of CHF (congestive heart failure) ([facility] [address] Hyperlipidemia, and Hypertension. Patient presents with Hypernatremia. HPI  (1-3 for expanded problem focused, ?4 for detailed/comprehensive)     82 y.o. female is been short of breath with low sats with a dry cough the nursing home staff thought that she may have aspirated because she had choked earlier and then became short of breath she has altered mental status but she has had a previous stroke with some confusion    She is on 15L o2 in ER  Unable to give me any other hx at this time  Given new onset o2 requirement, covid swab ordered by myself (was not done prior to my encounter in ER)    Imaging reviewed   Impression:       Clear lungs. Pt to be admitted, placed on iv abx empirically, cont on high flow o2, await covid test         Problem list of hospitalization thus far:   Active Hospital Problems    Diagnosis     Hypernatremia [E87.0]     MARIAH (acute kidney injury) ([facility] [address] [N17.9]     Aspiration pneumonia ([facility] [address] [J69.0]     Acute respiratory failure with hypoxia ([facility] [address] [J96.01]     HTN (hypertension), benign [I10]     High cholesterol [E78.00]          Review of Systems: (1 system for EPF, 2-9 for detailed, 10+ for comprehensive)  Unable to get from patient       Past Medical History:   Past Medical History:   Diagnosis Date    CHF (congestive heart failure) ([facility] [address]     Hyperlipidemia     Hypertension        Past Surgical History:   Past Surgical History:   Procedure Laterality Date    HIP SURGERY         Social History:   Social History     Tobacco History     Smoking Status  Former Smoker    Smokeless Tobacco Use  Never Used          Alcohol History     Alcohol Use Status  No          Drug Use     Drug Use Status  No          Sexual Activity     Sexually Active  Not Asked                Fam History: No family history on file. PFSH: The above PMHx, PSHx, SocHx, FamHx has been reviewed by myself. (1 area for detailed, 2-3 for comprehensive)      Code Status: Prior    Meds - following list of home medications fromelectronic chart has been reviewed by myself  Prior to Admission medications    Medication Sig Start Date End Date Taking?  Authorizing Provider   guaiFENesin-dextromethorphan (ROBITUSSIN DM) 100-10 MG/5ML syrup Take 10 mLs by mouth every 4 hours as needed for Cough   Yes Historical Provider, MD   vitamin D (CHOLECALCIFEROL) 25 MCG (1000 UT) TABS tablet Take 1,000 Units by mouth daily   Yes Historical Provider, MD   metoprolol tartrate (LOPRESSOR) 25 MG tablet Take 0.5 tablets by mouth 2 times daily 11/30/19  Yes Dionisio Orantes MD   apixaban (ELIQUIS) 2.5 MG TABS tablet Take 2.5 mg by mouth 2 times daily   Yes Historical Provider, MD   hypromellose 0.4 % SOLN ophthalmic solution Place 1 drop into both eyes every 12 hours as needed   Yes Historical Provider, MD   hypromellose 0.4 % SOLN ophthalmic solution Place 1 drop into both eyes 2 times daily   Yes Historical Provider, MD   potassium chloride (KLOR-CON M) 20 MEQ extended release tablet Take 40 mEq by mouth daily    Yes Historical Provider, MD   acetaminophen (TYLENOL) 325 MG tablet Take 650 mg by mouth every 4 hours as needed for Pain or Fever   Yes Historical Provider, MD   torsemide (DEMADEX) 100 MG tablet Take 50 mg by mouth daily    Yes Historical Provider, MD   digoxin (LANOXIN) 125 MCG tablet Take 1 tablet by mouth daily 10/24/18  Yes Ryne Ward MD   famotidine (PEPCID) 20 MG tablet Take 1 tablet by mouth 2 times daily  Patient taking differently: Take 20 mg by mouth daily  10/24/18  Yes Ryne Ward MD   levETIRAcetam (KEPPRA) 100 MG/ML solution Take 5 mLs by mouth 2 times daily  Patient taking differently: Take 750 mg by mouth daily  10/24/18  Yes Ryne Ward MD   ipratropium-albuterol (DUONEB) 0.5-2.5 (3) MG/3ML SOLN nebulizer solution Inhale 3 mLs into the lungs every 6 hours as needed for Shortness of Breath 11/30/19 12/30/19  Dionisio Orantes MD   insulin glargine (LANTUS) 100 UNIT/ML injection pen Inject 15 Units into the skin nightly 11/30/19   Dionisio Orantes MD   insulin lispro, 1 Unit Dial, 100 UNIT/ML SOPN Inject 0-12 Units into the skin 3 times daily (before meals) 11/30/19   Dionisio Orantes MD   insulin lispro, 1 Unit Dial, 100 UNIT/ML SOPN Inject 5 Units into the skin 3 times daily (with meals) 11/30/19   Dionisio Orantes MD   ipratropium-albuterol (DUONEB) 0.5-2.5 (3) MG/3ML SOLN nebulizer solution Inhale 1 vial into the lungs 2 times daily    Historical Provider, MD   aspirin 81 MG tablet Take 81 mg by mouth daily    Historical Provider, MD   magnesium hydroxide (MILK OF MAGNESIA) 400 MG/5ML suspension Take 30 mLs by mouth daily as needed for Constipation    Historical Provider, MD         No Known Allergies          EXAM: (2-7 system for EPF/Detailed, ?8 for Comprehensive)  /82   Pulse 126   Temp 100.3 °F (37.9 °C) (Axillary)   Resp (!) 32   LMP  (LMP Unknown) Comment: post-menopause  SpO2 100%   Constitutional: vitals as above: alert, appears stated age   Head: Normocephalic, without obvious abnormality, atraumatic    Eyes:lids and lashes normal, conjunctivae and sclerae normal and pupils equal, round, reactive to light and accomodation    EMNT: external ears normal, nares midline    Neck: no carotid bruit, supple, symmetrical, trachea midline and thyroid not enlarged, symmetric, no tenderness/mass/nodules     Respiratory: crackles bilat  Cardiovascular: normal rate, regular rhythm, normal S1 and S2 and no murmurs    Gastrointestinal: soft, non-tender, non-distended, normal bowel sounds, no masses or organomegaly    Extremities: no clubbing, no edema    Skin:No rashes or nodules noted.     Neurologic:negative         LABS:  Labs Reviewed   CBC WITH AUTO DIFFERENTIAL - Abnormal; Notable for the following components:       Result Value    WBC 15.7 (*)     RBC 5.93 (*)     Hemoglobin 18.3 (*)     Hematocrit 58.7 (*)     Neutrophils Absolute 13.4 (*)     All other components within normal limits    Narrative:     Performed at:  [facility] [address], [place], [address] [employer]   Phone [phone]   COMPREHENSIVE METABOLIC PANEL W/ REFLEX TO MG FOR LOW K - Abnormal; Notable for the following components:    Sodium 170 (*)     Chloride 124 (*)     Glucose 183 (*)     BUN 67 (*)     CREATININE 2.4 (*)     GFR Non- 19 (*)     GFR  23 (*)     Total Protein 8.3 (*)     Albumin/Globulin Ratio 0.9 (*)     Alkaline Phosphatase 136 (*)      (*)     AST 66 (*)     All other components within normal limits    Narrative:     CALL  [facility] tel. [phone],  Chemistry results called to and read back by WILFRID Cramer, 12/22/2021  05:12, by Daphne Carreon  Performed at:  [facility] [address], [place], [address]   Phone [phone]   BLOOD GAS, ARTERIAL - Abnormal; Notable for the following components:    pH, Arterial 7.488 (*)     pCO2, Arterial 46.1 (*)     pO2, Arterial 64.6 (*)     HCO3, Arterial 34.9 (*)     Base Excess, Arterial 9.8 (*)     Hemoglobin, Art, Extended 17.7 (*)     All other components within normal limits    Narrative:     Performed at:  [facility] [address], [place], [facility] [employer]   Phone [phone]   LACTIC ACID, PLASMA - Abnormal; Notable for the following components:    Lactic Acid 2.8 (*)     All other components within normal limits    Narrative:     Performed at:  [facility]  [facility] [address]   Phone [phone]   CULTURE, BLOOD 2   CULTURE, BLOOD 1   TROPONIN    Narrative:     Tierra Milton  [facility] tel. [phone],  Chemistry results called to and read back by WILFRID Kelley, 12/22/2021  05:12, by Rick Franks  Performed at:  [facility]  [address] [facility] [address]   Phone [phone]    Narrative:     Tierra Milton  [facility] tel. [phone],  Chemistry results called to and read back by WILFRID Kelley, 12/22/2021  05:12, by Rick Franks  Performed at:  [facility]  [address] [facility], [address]   Phone [phone]         IMAGING:  Imaging results from the ER have been reviewed in the computerized chart. XR CHEST PORTABLE    Result Date: 12/22/2021  EXAMINATION: ONE XRAY VIEW OF THE CHEST 12/22/2021 3:53 am COMPARISON: Chest x-ray dated 11/27/2019 HISTORY: ORDERING SYSTEM PROVIDED HISTORY: Dyspnea probable l aspiration TECHNOLOGIST PROVIDED HISTORY: Reason for exam:->Dyspnea probable l aspiration Reason for Exam: Dyspnea probable l aspiration Relevant Medical/Surgical History: previous CHF, hypertension and hyperlipidemia FINDINGS: Clear lungs. No pleural effusion or pneumothorax. Cardiomediastinal silhouette is unremarkable. Visualized osseous structures are unremarkable. Clear lungs. EKG:   EKG from ER, reviewed by self - it shows AFib at 80  Old chart reviewed, EKG dated 11/29/19 is reviewed, there is not difference noted.     Old study shows normal sinus rhythm at 84    Lab Results   Component Value Date    GLUCOSE 183 12/22/2021     Lab Results   Component Value Date    POCGLU 255 11/30/2019     /82 Pulse 126   Temp 100.3 °F (37.9 °C) (Axillary)   Resp (!) 32   LMP  (LMP Unknown) Comment: post-menopause  SpO2 100%     MEDICAL DECISION MAKING:    Principal Problem:    Hypernatremia -New Problem to me. Significant hypernatremia, 170 in ER,  Cause unclear, suspected poor po intake  Plan: ivf. Repeat labs in am. Consult nephrology  Active Problems:    HTN (hypertension), benign -Established problem. Stable. 104/82  Plan: Pt home BP meds reviewed and will be continued. IV Hydralazine ordered for control of extremely high blood pressures. Will monitor labs to assess Creat/K for possible complications of medications. High cholesterol -Established problem. Stable. Plan: Continue present orders/plan. MARIAH (acute kidney injury) ([facility] [address] -New Problem to me. Suspect hypovolemia  Plan: ivf for now    Aspiration pneumonia ([facility] [address] -New Problem to me. Given alteration of mentation, rapid decline in oxygenation status this must be considered  Plan: empiric abx. SLP eval requested. Cont to r/o covid    Acute respiratory failure with hypoxia ([address] -Established problem. Uncontrolled. 15L o2 requirement  Plan: tx as above             Diagnoses as listed above, designated as new or established and plan outlined for each. Data Reviewed:   (1) Lab tests were reviewed or ordered. (1) Radiology tests were reviewed or ordered. (1) Medical test (Echo, EKG, PFT/shelbi) were ordered. (1)History was not obtained from someone other than patient  (1) Old records were reviewed - see HPI/MDM for pertinent details if review done. (2) Case was discussed with another health care provider: Dr Felice Kaplan  (2) Imaging was viewed by myself. (2) EKG  was viewed by myself.        The patient is being placed in inpatient status with the expectation of requiring a hospital stay spanning at least two midnights for care and treatment of the problems noted in the problem list.  This determination is also based on thepatients comorbidities and past medical history, the severity and timing of the signs and symptoms upon presentation.     Total critical care time caring for this patient with life threatening illness, including direct patient contact, management of life support systems, review of data including imaging and labs, discussions with other team members and physicians at least 46 minutes today          (Please note that portions of this note were completed with a voice recognition program.  Efforts were made to edit the dictations but occasionally words are mis-transcribed.)      Electronically signed by: Hair Sawyer MD 12/22/2021

## 2021-12-22 NOTE — PLAN OF CARE
Pt's skin was assessed this shift and will continue to reposition every few hours and check for incontinence.

## 2021-12-23 LAB
ALBUMIN SERPL-MCNC: 3.2 G/DL (ref 3.4–5)
ANION GAP SERPL CALCULATED.3IONS-SCNC: 10 MMOL/L (ref 3–16)
BASOPHILS ABSOLUTE: 0 K/UL (ref 0–0.2)
BASOPHILS RELATIVE PERCENT: 0.4 %
BUN BLDV-MCNC: 65 MG/DL (ref 7–20)
C-REACTIVE PROTEIN: 210.1 MG/L (ref 0–5.1)
CALCIUM SERPL-MCNC: 9 MG/DL (ref 8.3–10.6)
CHLORIDE BLD-SCNC: 120 MMOL/L (ref 99–110)
CO2: 30 MMOL/L (ref 21–32)
CREAT SERPL-MCNC: 2 MG/DL (ref 0.6–1.2)
EOSINOPHILS ABSOLUTE: 0 K/UL (ref 0–0.6)
EOSINOPHILS RELATIVE PERCENT: 0 %
GFR AFRICAN AMERICAN: 29
GFR NON-AFRICAN AMERICAN: 24
GLUCOSE BLD-MCNC: 194 MG/DL (ref 70–99)
GLUCOSE BLD-MCNC: 242 MG/DL (ref 70–99)
GLUCOSE BLD-MCNC: 242 MG/DL (ref 70–99)
GLUCOSE BLD-MCNC: 280 MG/DL (ref 70–99)
GLUCOSE BLD-MCNC: 311 MG/DL (ref 70–99)
HCT VFR BLD CALC: 45.4 % (ref 36–48)
HEMOGLOBIN: 14.2 G/DL (ref 12–16)
LYMPHOCYTES ABSOLUTE: 1.7 K/UL (ref 1–5.1)
LYMPHOCYTES RELATIVE PERCENT: 14.4 %
MCH RBC QN AUTO: 31.1 PG (ref 26–34)
MCHC RBC AUTO-ENTMCNC: 31.3 G/DL (ref 31–36)
MCV RBC AUTO: 99.4 FL (ref 80–100)
MONOCYTES ABSOLUTE: 0.5 K/UL (ref 0–1.3)
MONOCYTES RELATIVE PERCENT: 4 %
NEUTROPHILS ABSOLUTE: 9.5 K/UL (ref 1.7–7.7)
NEUTROPHILS RELATIVE PERCENT: 81.2 %
PDW BLD-RTO: 13.8 % (ref 12.4–15.4)
PERFORMED ON: ABNORMAL
PHOSPHORUS: 2 MG/DL (ref 2.5–4.9)
PLATELET # BLD: 161 K/UL (ref 135–450)
PMV BLD AUTO: 11.1 FL (ref 5–10.5)
POTASSIUM SERPL-SCNC: 3.6 MMOL/L (ref 3.5–5.1)
PROCALCITONIN: 2.45 NG/ML (ref 0–0.15)
RBC # BLD: 4.56 M/UL (ref 4–5.2)
REASON FOR REJECTION: NORMAL
REASON FOR REJECTION: NORMAL
REJECTED TEST: NORMAL
REJECTED TEST: NORMAL
SODIUM BLD-SCNC: 153 MMOL/L (ref 136–145)
SODIUM BLD-SCNC: 160 MMOL/L (ref 136–145)
WBC # BLD: 11.6 K/UL (ref 4–11)

## 2021-12-23 PROCEDURE — 6370000000 HC RX 637 (ALT 250 FOR IP): Performed by: INTERNAL MEDICINE

## 2021-12-23 PROCEDURE — 86140 C-REACTIVE PROTEIN: CPT

## 2021-12-23 PROCEDURE — 85025 COMPLETE CBC W/AUTO DIFF WBC: CPT

## 2021-12-23 PROCEDURE — 6360000002 HC RX W HCPCS: Performed by: INTERNAL MEDICINE

## 2021-12-23 PROCEDURE — 36415 COLL VENOUS BLD VENIPUNCTURE: CPT

## 2021-12-23 PROCEDURE — 92526 ORAL FUNCTION THERAPY: CPT

## 2021-12-23 PROCEDURE — 84295 ASSAY OF SERUM SODIUM: CPT

## 2021-12-23 PROCEDURE — 2580000003 HC RX 258: Performed by: INTERNAL MEDICINE

## 2021-12-23 PROCEDURE — 2500000003 HC RX 250 WO HCPCS: Performed by: INTERNAL MEDICINE

## 2021-12-23 PROCEDURE — 99232 SBSQ HOSP IP/OBS MODERATE 35: CPT | Performed by: NURSE PRACTITIONER

## 2021-12-23 PROCEDURE — 80069 RENAL FUNCTION PANEL: CPT

## 2021-12-23 PROCEDURE — 84145 PROCALCITONIN (PCT): CPT

## 2021-12-23 PROCEDURE — 1200000000 HC SEMI PRIVATE

## 2021-12-23 RX ORDER — LIDOCAINE HYDROCHLORIDE 10 MG/ML
5 INJECTION, SOLUTION EPIDURAL; INFILTRATION; INTRACAUDAL; PERINEURAL ONCE
Status: DISCONTINUED | OUTPATIENT
Start: 2021-12-23 | End: 2021-12-27 | Stop reason: SDUPTHER

## 2021-12-23 RX ORDER — SODIUM CHLORIDE 0.9 % (FLUSH) 0.9 %
5-40 SYRINGE (ML) INJECTION PRN
Status: DISCONTINUED | OUTPATIENT
Start: 2021-12-23 | End: 2021-12-29 | Stop reason: HOSPADM

## 2021-12-23 RX ORDER — SODIUM CHLORIDE 0.9 % (FLUSH) 0.9 %
5-40 SYRINGE (ML) INJECTION EVERY 12 HOURS SCHEDULED
Status: DISCONTINUED | OUTPATIENT
Start: 2021-12-23 | End: 2021-12-29 | Stop reason: HOSPADM

## 2021-12-23 RX ORDER — 0.9 % SODIUM CHLORIDE 0.9 %
500 INTRAVENOUS SOLUTION INTRAVENOUS ONCE
Status: COMPLETED | OUTPATIENT
Start: 2021-12-23 | End: 2021-12-23

## 2021-12-23 RX ORDER — SODIUM CHLORIDE 9 MG/ML
25 INJECTION, SOLUTION INTRAVENOUS PRN
Status: DISCONTINUED | OUTPATIENT
Start: 2021-12-23 | End: 2021-12-29 | Stop reason: HOSPADM

## 2021-12-23 RX ADMIN — INSULIN LISPRO 4 UNITS: 100 INJECTION, SOLUTION INTRAVENOUS; SUBCUTANEOUS at 12:23

## 2021-12-23 RX ADMIN — POTASSIUM PHOSPHATE, MONOBASIC AND POTASSIUM PHOSPHATE, DIBASIC 15 MMOL: 224; 236 INJECTION, SOLUTION, CONCENTRATE INTRAVENOUS at 15:37

## 2021-12-23 RX ADMIN — Medication 10 ML: at 10:20

## 2021-12-23 RX ADMIN — DIGOXIN 125 MCG: 125 TABLET ORAL at 10:19

## 2021-12-23 RX ADMIN — CARBOXYMETHYLCELLULOSE SODIUM 1 DROP: 10 GEL OPHTHALMIC at 10:19

## 2021-12-23 RX ADMIN — Medication 1000 UNITS: at 10:19

## 2021-12-23 RX ADMIN — INSULIN LISPRO 8 UNITS: 100 INJECTION, SOLUTION INTRAVENOUS; SUBCUTANEOUS at 10:12

## 2021-12-23 RX ADMIN — DEXTROSE MONOHYDRATE: 50 INJECTION, SOLUTION INTRAVENOUS at 20:00

## 2021-12-23 RX ADMIN — INSULIN LISPRO 2 UNITS: 100 INJECTION, SOLUTION INTRAVENOUS; SUBCUTANEOUS at 18:06

## 2021-12-23 RX ADMIN — SODIUM CHLORIDE 500 ML: 9 INJECTION, SOLUTION INTRAVENOUS at 12:20

## 2021-12-23 RX ADMIN — APIXABAN 2.5 MG: 5 TABLET, FILM COATED ORAL at 21:49

## 2021-12-23 RX ADMIN — LEVETIRACETAM 750 MG: 500 SOLUTION ORAL at 10:19

## 2021-12-23 RX ADMIN — Medication 10 ML: at 22:31

## 2021-12-23 RX ADMIN — INSULIN LISPRO 2 UNITS: 100 INJECTION, SOLUTION INTRAVENOUS; SUBCUTANEOUS at 21:54

## 2021-12-23 RX ADMIN — Medication 10 ML: at 22:32

## 2021-12-23 RX ADMIN — FAMOTIDINE 20 MG: 20 TABLET ORAL at 10:19

## 2021-12-23 RX ADMIN — APIXABAN 2.5 MG: 5 TABLET, FILM COATED ORAL at 10:19

## 2021-12-23 RX ADMIN — PIPERACILLIN AND TAZOBACTAM 3375 MG: 3; .375 INJECTION, POWDER, LYOPHILIZED, FOR SOLUTION INTRAVENOUS at 17:49

## 2021-12-23 RX ADMIN — PIPERACILLIN AND TAZOBACTAM 3375 MG: 3; .375 INJECTION, POWDER, LYOPHILIZED, FOR SOLUTION INTRAVENOUS at 05:19

## 2021-12-23 ASSESSMENT — PAIN SCALES - PAIN ASSESSMENT IN ADVANCED DEMENTIA (PAINAD)
CONSOLABILITY: 0
NEGVOCALIZATION: 0
CONSOLABILITY: 0
TOTALSCORE: 0
BODYLANGUAGE: 0
BREATHING: 1
NEGVOCALIZATION: 0
TOTALSCORE: 0
FACIALEXPRESSION: 0
NEGVOCALIZATION: 0
NEGVOCALIZATION: 0
BREATHING: 1
CONSOLABILITY: 0
NEGVOCALIZATION: 1
FACIALEXPRESSION: 1
FACIALEXPRESSION: 1
BODYLANGUAGE: 0
TOTALSCORE: 3
BREATHING: 1
BREATHING: 0
BODYLANGUAGE: 0
NEGVOCALIZATION: 1
BODYLANGUAGE: 0
TOTALSCORE: 0
CONSOLABILITY: 0
TOTALSCORE: 1
BREATHING: 0
TOTALSCORE: 3
CONSOLABILITY: 0
FACIALEXPRESSION: 0
CONSOLABILITY: 0
FACIALEXPRESSION: 0
BREATHING: 0
FACIALEXPRESSION: 0

## 2021-12-23 ASSESSMENT — PAIN SCALES - GENERAL
PAINLEVEL_OUTOF10: 0
PAINLEVEL_OUTOF10: 0

## 2021-12-23 NOTE — PROGRESS NOTES
Progress Note - Dr. Stephen Green - Internal Medicine  PCP: Benny Ponce [address] [facility]. [address] / [place] [address] [phone]    Hospital Day: 1  Code Status: DNR-CC  Current Diet: ADULT DIET; Dysphagia - Pureed; Low Potassium (Less than 3000 mg/day); Low Phosphorus (Less than 1000 mg); No Drinking Straws        CC: follow up on medical issues    Subjective:   Taya Shaw is a 82 y.o. female. Pt seen and examined  Chart reviewed since last visit, labs and imaging below        Pt does not offer hx  Na a little better  BP is soft    She denies chest pain, denies shortness of breath, denies nausea,  denies emesis. 10 system Review of Systems is reviewed with patient, and pertinent positives are noted in HPI above . Otherwise, Review of systems is negative. I have reviewed the patient's medical and social history in detail and updated the computerized patient record. To recap: She  has a past medical history of CHF (congestive heart failure) ([facility] Hyperlipidemia, and Hypertension. . She  has a past surgical history that includes hip surgery. . She  reports that she has quit smoking. She has never used smokeless tobacco. She reports that she does not drink alcohol and does not use drugs. .        Active Hospital Problems    Diagnosis Date Noted    Hypernatremia [E87.0] 12/22/2021    MARIAH (acute kidney injury) ([facility] [N17.9] 12/22/2021    Aspiration pneumonia ([facility] [J69.0] 12/22/2021    Acute respiratory failure with hypoxia (HCC) [J96.01] 12/22/2021    Atrial fibrillation ([facility] [I48.91] 11/28/2019    HTN (hypertension), benign [I10] 06/11/2019    High cholesterol [E78.00] 06/11/2019       Current Facility-Administered Medications: 0.9 % sodium chloride bolus, 500 mL, IntraVENous, Once  famotidine (PEPCID) tablet 20 mg, 20 mg, Oral, Daily  digoxin (LANOXIN) tablet 125 mcg, 125 mcg, Oral, Daily  apixaban (ELIQUIS) tablet 2.5 mg, 2.5 mg, Oral, BID  guaiFENesin-dextromethorphan (ROBITUSSIN DM) 100-10 MG/5ML syrup 10 mL, 10 mL, Oral, Q4H PRN  carboxymethylcellulose PF (THERATEARS) 1 % ophthalmic gel 1 drop, 1 drop, Both Eyes, BID  ipratropium-albuterol (DUONEB) nebulizer solution 3 mL, 1 vial, Inhalation, Q6H PRN  levETIRAcetam (KEPPRA) 100 MG/ML solution 750 mg, 750 mg, Oral, Daily  metoprolol tartrate (LOPRESSOR) tablet 12.5 mg, 12.5 mg, Oral, BID  vitamin D (CHOLECALCIFEROL) tablet 1,000 Units, 1,000 Units, Oral, Daily  sodium chloride flush 0.9 % injection 5-40 mL, 5-40 mL, IntraVENous, 2 times per day  sodium chloride flush 0.9 % injection 5-40 mL, 5-40 mL, IntraVENous, PRN  0.9 % sodium chloride infusion, 25 mL, IntraVENous, PRN  ondansetron (ZOFRAN-ODT) disintegrating tablet 4 mg, 4 mg, Oral, Q8H PRN **OR** ondansetron (ZOFRAN) injection 4 mg, 4 mg, IntraVENous, Q6H PRN  acetaminophen (TYLENOL) tablet 650 mg, 650 mg, Oral, Q6H PRN **OR** acetaminophen (TYLENOL) suppository 650 mg, 650 mg, Rectal, Q6H PRN  hydrALAZINE (APRESOLINE) injection 10 mg, 10 mg, IntraVENous, Q6H PRN  0.9 % sodium chloride bolus, 500 mL, IntraVENous, PRN  potassium chloride (KLOR-CON M) extended release tablet 40 mEq, 40 mEq, Oral, PRN **OR** potassium bicarb-citric acid (EFFER-K) effervescent tablet 40 mEq, 40 mEq, Oral, PRN **OR** potassium chloride 10 mEq/100 mL IVPB (Peripheral Line), 10 mEq, IntraVENous, PRN  dextrose 5 % solution, , IntraVENous, Continuous  piperacillin-tazobactam (ZOSYN) 3,375 mg in dextrose 5 % 50 mL IVPB extended infusion (mini-bag), 3,375 mg, IntraVENous, Q12H  insulin lispro (1 Unit Dial) 0-12 Units, 0-12 Units, SubCUTAneous, TID WC  insulin lispro (1 Unit Dial) 0-6 Units, 0-6 Units, SubCUTAneous, Nightly  glucose (GLUTOSE) 40 % oral gel 15 g, 15 g, Oral, PRN  dextrose 50 % IV solution, 12.5 g, IntraVENous, PRN  glucagon (rDNA) injection 1 mg, 1 mg, IntraMUSCular, PRN  dextrose 5 % solution, 100 mL/hr, IntraVENous, PRN         Objective:  BP (!) 85/59   Pulse 78   Temp 97.8 °F (36.6 °C) (Tympanic) Resp 28   Ht 5' 7.72\" (1.72 m) Comment: Measured w/ tape  Wt 172 lb 11.2 oz (78.3 kg)   LMP  (LMP Unknown) Comment: post-menopause  SpO2 94%   BMI 26.48 kg/m²      Patient Vitals for the past 24 hrs:   BP Temp Temp src Pulse Resp SpO2 Height Weight   12/23/21 1000 (!) 85/59 -- -- 78 28 -- -- --   12/23/21 0900 90/66 97.8 °F (36.6 °C) Tympanic 103 22 94 % -- --   12/23/21 0500 110/76 97.2 °F (36.2 °C) Temporal 99 26 99 % -- 172 lb 11.2 oz (78.3 kg)   12/23/21 0136 109/86 97.1 °F (36.2 °C) Temporal 72 29 98 % -- --   12/22/21 2035 112/72 97.6 °F (36.4 °C) Temporal 82 (!) 33 100 % -- --   12/22/21 1900 126/64 -- -- 80 (!) 37 -- -- --   12/22/21 1800 127/74 -- -- 89 (!) 35 -- -- --   12/22/21 1700 124/75 -- -- 86 (!) 38 -- -- --   12/22/21 1600 (!) 138/121 98.2 °F (36.8 °C) Tympanic 77 (!) 33 97 % -- --   12/22/21 1400 114/61 -- -- 80 (!) 38 97 % -- --   12/22/21 1300 (!) 117/48 -- -- 95 (!) 34 -- 5' 7.72\" (1.72 m) 180 lb 12.8 oz (82 kg)     Patient Vitals for the past 96 hrs (Last 3 readings):   Weight   12/23/21 0500 172 lb 11.2 oz (78.3 kg)   12/22/21 1300 180 lb 12.8 oz (82 kg)           Intake/Output Summary (Last 24 hours) at 12/23/2021 1217  Last data filed at 12/23/2021 1104  Gross per 24 hour   Intake 1282.81 ml   Output 950 ml   Net 332.81 ml         Physical Exam:   Vitals as above  General appearance: alert, appears stated age and cooperative    Head: Normocephalic, without obvious abnormality, atraumatic    Lungs: clear to auscultation bilaterally    Heart: irregular rate and rhythm, S1, S2 normal, no murmur    Abdomen: soft, non-tender; bowel sounds normal; no masses, no organomegaly    Extremities: extremities normal, atraumatic, no cyanosis, no edema      Labs:  Lab Results   Component Value Date    WBC 11.6 (H) 12/23/2021    HGB 14.2 12/23/2021    HCT 45.4 12/23/2021     12/23/2021    CHOL 188 06/13/2019    TRIG 181 (H) 06/13/2019    HDL 35 (L) 06/13/2019     (H) 12/22/2021    AST 66 empiric abx    Acute respiratory failure with hypoxia ([facility] -Established problem. Stable. Now off ow  Plan: Continue present orders/plan.        (Please note that portions of this note were completed with a voice recognition program.  Efforts were made to edit the dictations but occasionally words are mis-transcribed.)        Sp Winkler MD  12/23/2021

## 2021-12-23 NOTE — PROGRESS NOTES
Speech Language Pathology  Dysphagia Treatment Note    Name:  Celeste Rodríguez  :   1935  Medical Diagnosis:  Dehydration [E86.0]  Somnolence [R40.0]  Hypernatremia [E87.0]  Acute kidney injury ([facility] [N17.9]  Aspiration into airway, initial encounter [T12.333M]  Treatment Diagnosis: Oropharyngeal Dysphagia  Pain level: Pt did not report pain    Diet level prior to treatment: Dysphagia I Pureed with thin liquids, no straws, Meds crushed in puree   Tolerance of Current Diet Level: Per RN report, Pt had some coughing with PO intake yesterday however today has demonstrated good tolerance. Assessment of Texture Tolerance:  -Impressions: Pt was seen sitting upright in bed, RN was feeding patient breakfast upon entry. Trials of puree and thin liquids were provided. Pt demonstrated oral holding and reduced A-P propulsion with puree and thin liquids. Pt intermittently benefited from verbal cues to \"swallow\" in order to achieved a more timely swallow initiation. Delayed coughing was noted x1 with puree textures, suspect due to premature bolus loss to pharynx with significantly delayed swallow initiation. No overt clinical s/s of aspiration/penetration noted with thin liquid trials, however Pt remains at risk if strict precautions are not followed. Recommend continuation of current diet with strict use of aspiration precautions.      Diet and Treatment Recommendations 2021:  Continue current diet     Compensatory strategies:Upright as possible with all PO intake , No straws , Small bites/sips , Eat/feed slowly, Remain upright 30-45 min , Total Feed     Dysphagia Goals:   1.) Pt will functionally tolerate recommended diet with no overt clinical s/s of aspiration (ongoing 2021)   2.) Pt will functionally tolerate ongoing assessment of swallow function with diet to be determined as indicated (ongoing 2021)   3.) Pt will advance to least restrictive diet as indicated (ongoing 2021)     Plan: Continued daily Dysphagia treatment with goals per plan of care. Patient/Family Education:Education given to the Pt and nurse, who verbalized understanding    Discharge Recommendations:  Pt will benefit from continued skilled Speech Therapy for Dysphagia services, prior to returning home. Timed Code Treatment: 0 minutes    Total Treatment Time: 15 minutes     If patient discharges prior to next session this note will serve as a discharge summary.      Signature:  Nakul Holden M.A., [address] [place] [address]  Speech-Language Pathologist

## 2021-12-23 NOTE — PROGRESS NOTES
[facility]   Electrophysiology Progress Note     Date: 12/23/2021  Admit Date: 12/22/2021     Reason for consultation: Atrial fibrillation    Chief Complaint:   Chief Complaint   Patient presents with    Shortness of Breath     BIB EMS From NH report of SOB and cough x several hours. Per reports room air O2 sat 80%, received on 15L O2 via NRB. EMS reports NH staff states Pt had choking episode early yesterday moring and has had SOB since.  Altered Mental Status     Pt awake, not answering questions, following only basic commands. Per report, baseline mental status is AAOx4. History of Present Illness: History obtained from patient and medical record. Fidelia Salinas is a 82 y.o. female with a past medical history of hypertension, hyperlipidemia, CHF, bradycardia and atrial fibrillation. She is DNR-CC.       Pt presented with SOB, dry cough and AMS from the nursing home. She was being treated for aspiration PNA, hypernatremia, and MARIAH. At baseline patient has poor communication abilities per family and generally can answer yes/no questions. This admission she has not been responsive verbally to staff. She had tachycardia in the 130's that resolved with hydration and normal temp. Remains in atrial flutter rates 70-80's. Interval Hx: Today, she is being seen for follow up. Pt is non-verbal, but is able to respond to commands. She remains in controlled atrial flutter. She is hypotensive and just received an IV fluid bolus. Patient seen and examined. Clinical notes reviewed. Telemetry reviewed. No new complaints today. No major events overnight. Allergies:  No Known Allergies    Home Meds:  Prior to Visit Medications    Medication Sig Taking?  Authorizing Provider   vitamin D (CHOLECALCIFEROL) 25 MCG (1000 UT) TABS tablet Take 1,000 Units by mouth daily Yes Historical Provider, MD   insulin glargine (LANTUS) 100 UNIT/ML injection pen Inject 15 Units into the skin nightly Yes Dionisio Orantes MD   insulin lispro, 1 Unit Dial, 100 UNIT/ML SOPN Inject 0-12 Units into the skin 3 times daily (before meals) Yes Dionisio Orantes MD   insulin lispro, 1 Unit Dial, 100 UNIT/ML SOPN Inject 5 Units into the skin 3 times daily (with meals) Yes Dionisio Orantes MD   metoprolol tartrate (LOPRESSOR) 25 MG tablet Take 0.5 tablets by mouth 2 times daily Yes Dionisio Orantes MD   apixaban (ELIQUIS) 2.5 MG TABS tablet Take 2.5 mg by mouth 2 times daily Yes Historical Provider, MD   hypromellose 0.4 % SOLN ophthalmic solution Place 1 drop into both eyes every 12 hours as needed Yes Historical Provider, MD   hypromellose 0.4 % SOLN ophthalmic solution Place 1 drop into both eyes 2 times daily Yes Historical Provider, MD   potassium chloride (KLOR-CON M) 20 MEQ extended release tablet Take 40 mEq by mouth daily  Yes Historical Provider, MD   magnesium hydroxide (MILK OF MAGNESIA) 400 MG/5ML suspension Take 30 mLs by mouth daily as needed for Constipation Yes Historical Provider, MD   torsemide (DEMADEX) 100 MG tablet Take 50 mg by mouth daily  Yes Historical Provider, MD   digoxin (LANOXIN) 125 MCG tablet Take 1 tablet by mouth daily Yes Leif Wills MD   famotidine (PEPCID) 20 MG tablet Take 1 tablet by mouth 2 times daily  Patient taking differently: Take 20 mg by mouth daily  Yes Leif Wills MD   levETIRAcetam (KEPPRA) 100 MG/ML solution Take 5 mLs by mouth 2 times daily  Patient taking differently: Take 750 mg by mouth daily  Yes Leif Wills MD   guaiFENesin-dextromethorphan (ROBITUSSIN DM) 100-10 MG/5ML syrup Take 10 mLs by mouth every 4 hours as needed for Cough  Historical Provider, MD   ipratropium-albuterol (DUONEB) 0.5-2.5 (3) MG/3ML SOLN nebulizer solution Inhale 3 mLs into the lungs every 6 hours as needed for Shortness of Breath  Dionisio Orantes MD   ipratropium-albuterol (DUONEB) 0.5-2.5 (3) MG/3ML SOLN nebulizer solution Inhale 1 vial into the lungs 2 times daily  Historical Provider, MD   acetaminophen (TYLENOL) 325 MG tablet Take 650 mg by mouth every 4 hours as needed for Pain or Fever  Historical Provider, MD   aspirin 81 MG tablet Take 81 mg by mouth daily  Historical Provider, MD      Scheduled Meds:   lidocaine 1 % injection  5 mL IntraDERmal Once    sodium chloride flush  5-40 mL IntraVENous 2 times per day    famotidine  20 mg Oral Daily    digoxin  125 mcg Oral Daily    apixaban  2.5 mg Oral BID    carboxymethylcellulose PF  1 drop Both Eyes BID    levETIRAcetam  750 mg Oral Daily    metoprolol tartrate  12.5 mg Oral BID    Vitamin D  1,000 Units Oral Daily    sodium chloride flush  5-40 mL IntraVENous 2 times per day    piperacillin-tazobactam  3,375 mg IntraVENous Q12H    insulin lispro  0-12 Units SubCUTAneous TID WC    insulin lispro  0-6 Units SubCUTAneous Nightly     Continuous Infusions:   sodium chloride      sodium chloride      dextrose 130 mL/hr at 12/22/21 1802    dextrose       PRN Meds:sodium chloride flush, sodium chloride, guaiFENesin-dextromethorphan, ipratropium-albuterol, sodium chloride flush, sodium chloride, ondansetron **OR** ondansetron, acetaminophen **OR** acetaminophen, hydrALAZINE, sodium chloride, potassium chloride **OR** potassium alternative oral replacement **OR** potassium chloride, glucose, dextrose, glucagon (rDNA), dextrose     Past Medical History:  Past Medical History:   Diagnosis Date    [facility]     Hyperlipidemia     Hypertension       Past Surgical History:    has a past surgical history that includes hip surgery. Social History:  Reviewed. reports that she has quit smoking. She has never used smokeless tobacco. She reports that she does not drink alcohol and does not use drugs. Family History:  Reviewed. family history is not on file.      Review of Systems:  · ROS difficult to obtain due to pt being non-verbal    Physical Examination:  Vitals:    12/23/21 1330   BP: (!) 82/55   Pulse: 106   Resp: 28   Temp: 97.4 °F (36.3 °C)   SpO2: 94%      In: 1282.8 [P.O.:650; I.V.:616.3]  Out: 950    Wt Readings from Last 3 Encounters:   12/23/21 172 lb 11.2 oz (78.3 kg)   11/30/19 208 lb 5.4 oz (94.5 kg)   06/14/19 205 lb 12.8 oz (93.4 kg)       Intake/Output Summary (Last 24 hours) at 12/23/2021 1338  Last data filed at 12/23/2021 1104  Gross per 24 hour   Intake 1282.81 ml   Output 950 ml   Net 332.81 ml       Telemetry: Personally Reviewed  - Atrial fib/flutter, rate 80-100s  · Constitutional: Cooperative and in no apparent distress, and appears well nourished  · Skin: Warm and pink; no pallor, cyanosis, bruising, or clubbing  · HEENT: Symmetric and normocephalic. PERRL, EOM intact. Conjunctiva pink with clear sclera. Mucus membranes pink and moist. Teeth intact. Thyroid smooth without nodules or goiter. · Cardiovascular: Regular rate and irregular rhythm. S1/S2 present without murmurs, rubs, or gallops. Peripheral pulses 2+, capillary refill < 3 seconds. No elevation of JVP. No peripheral edema  · Respiratory: Respirations symmetric and unlabored. Lungs clear to auscultation bilaterally, no wheezing, crackles, or rhonchi  · Gastrointestinal: Abdomen soft and round. Bowel sounds normoactive in all quadrants without tenderness or masses. · Musculoskeletal: + generalized weakness  · Neurologic/Psych: Awake. LYNNE orientation as non-verbal    Pertinent labs, diagnostic, device, and imaging results reviewed as a part of this visit    Labs:    BMP:   Recent Labs     12/22/21  0339 12/22/21  1806 12/23/21  1015   * 161* 160*   K 4.4  --  3.6   *  --  120*   CO2 30  --  30   PHOS  --   --  2.0*   BUN 67*  --  65*   CREATININE 2.4*  --  2.0*     Estimated Creatinine Clearance: 23 mL/min (A) (based on SCr of 2 mg/dL (H)).    CBC:   Recent Labs     12/22/21  0339 12/23/21  0830   WBC 15.7* 11.6*   HGB 18.3* 14.2   HCT 58.7* 45.4   MCV 99.0 99.4    161     Thyroid: No results found for: TSH, K3GZOQN, N4UWWMC, THYROIDAB  Lipids:   Lab Results   Component Value Date    CHOL 188 2019    HDL 35 2019    TRIG 181 2019     LFTS:   Lab Results   Component Value Date     2021    AST 66 2021    ALKPHOS 136 2021    PROT 8.3 2021    AGRATIO 0.9 2021    BILITOT 0.9 2021     Cardiac Enzymes:   Lab Results   Component Value Date    TROPONINI 0.01 2021    TROPONINI <0.01 2019    TROPONINI <0.01 2019     Coags:   Lab Results   Component Value Date    PROTIME 17.4 2019    INR 1.49 2019       EC21  Atrial fibrillation    ECHO:    -Normal left ventricle size, borderline mild wall thickness and systolic function with an estimated ejection fraction of 55%.   -No regional wall motion abnormalities are seen. -Grade I diastolic dysfunction with normal LV filling pressures. E/e\"=8.4.   -Trivial mitral regurgitation.   -Aortic valve appears sclerotic but opens adequately. -Mild tricuspid regurgitation.   -Estimated pulmonary artery systolic pressure is normal at 25-30 mmHg assuming a right atrial pressure of 3 mmHg. CXR: 21  Clear lungs. Problem List:   Patient Active Problem List    Diagnosis Date Noted    Hypernatremia 2021    MARIAH (acute kidney injury) ([address] 2021    Aspiration pneumonia ([address] 2021    Acute respiratory failure with hypoxia ([address] 2021    Hyperglycemic hyperosmolar nonketotic coma ([address] 2019    Atrial fibrillation ([address] 2019    Remote history of stroke     Dyslipidemia     Essential tremor     HTN (hypertension), benign 2019    High cholesterol 2019    Chronic congestive heart failure ([address] 2019    Acute ischemic stroke ([address] 2018    Hemorrhagic stroke ([address] 2018        Assessment and Plan:     1.  Chronic permanent Atrial Fibrillation  - Currently in AF  - Continue digoxin 125 mcg QD  - [id number] score:high; [id number] Vasc score and anticoagulation discussed. High risk for stroke and thromboembolism. Anticoagulation is recommended. Risk of bleeding was discussed.  ~ On Eliquis 2.5 mg BID (Age >80 and creat >1.5)    2. Hypotension   - BP on soft side likely secondary to dehydration/hypernatremia   - Ok to hold metoprolol if SBP <95   - Pt may benefit from IVF    3. MARIAH, Hyperkalemia   - Remains elevated, but improving   - On IVF   - Monitor UO closely   - Nephrology following    4. AMS   - Alert, nods to questions/commands    ~ ? Baseline    Pt is DNR-CC. No further cardiac work up needed    No further EP recommendations. EP will sign off. Please call if there are any questions. Thank you for consult. All pertinent information and plan of care discussed with the EP physician. All questions and concerns were addressed to the patient. Alternatives to my treatment were discussed. I have discussed the above stated plan with patient and the nurse. The patient verbalized understanding and agreed with the plan. Thank you for allowing to us to participate in the care of Ivon Vigil    The patient was seen for >25 minutes. I reviewed interval history, physical exam, review of data including labs, imaging, development and implementation of treatment plan and coordination of complex care.     Christy Bassett, APRN-CNP  [facility]   Office: [phone]

## 2021-12-23 NOTE — PROGRESS NOTES
(H) 12/23/2021    K 3.6 12/23/2021     (H) 12/23/2021    CO2 30 12/23/2021       Assessment/Plan:  1. Severe hypernatremia probably due to dehydration and decreased  access to free water. Discontinued Torsemide. Better. Decrease D5W to 110ml/hour. Repeat sodium this afternoon with a goal sodium in the next 24 hours of around 150 to152. Free water deficit 2.1L. This is discussed with the nurse. 2.  MARIAH. Creatinine from 2019 was 0.9. Probably from prerenal  Azotemia. Improving. 3.  Metabolic alkalosis. 4.  Decreased mental status in the setting of hypernatremia. Better. 5.  Diabetes mellitus. Management as per Medicine. 6.  Hypoxia as per Medicine. 7.  Hypotension-agree with NSS bolus. Can bolus with NSS PRN. 8.  Hypophosphatemia-replace  9.   DNR comfort care    Arcadio Young MD

## 2021-12-23 NOTE — PLAN OF CARE
Skin integrety is maintaining, turning every couple of hours, protective barrier to help with breakdown.  Will continue to monitor

## 2021-12-23 NOTE — PLAN OF CARE
Problem: Skin Integrity:  Goal: Will show no infection signs and symptoms  Description: Will show no infection signs and symptoms  12/23/2021 1836 by Davion Gaspar RN  Outcome: Ongoing  12/23/2021 1119 by Nadia Ch RN  Outcome: Ongoing  Goal: Absence of new skin breakdown  Description: Absence of new skin breakdown  12/23/2021 1119 by Nadia Ch RN  Outcome: Ongoing     Problem: Pain:  Goal: Pain level will decrease  Description: Pain level will decrease  Outcome: Ongoing     Problem: Fluid Volume:  Goal: Signs and symptoms of dehydration will decrease  Description: Signs and symptoms of dehydration will decrease  Outcome: Ongoing  Goal: Diagnostic test results will improve  Description: Diagnostic test results will improve  Outcome: Ongoing     Problem: Physical Regulation:  Goal: Complications related to the disease process, condition or treatment will be avoided or minimized  Description: Complications related to the disease process, condition or treatment will be avoided or minimized  Outcome: Ongoing

## 2021-12-23 NOTE — PROGRESS NOTES
Reassessment documented. No changes noted in care. Pt sleeping between care. Pt continues to not give verbal responses to questions. Will continue to monitor.

## 2021-12-23 NOTE — CARE COORDINATION
Messaged MD for low BP. He ordered 500cc NS bolus. Verified with Nephrology prior to giving due to her high sodium levels.

## 2021-12-23 NOTE — PROGRESS NOTES
PM assessment complete and documented. Meds given per MAR. Pt will open eyes but will not respond verbally to questions. NC secure with sats noted. Will continue to monitor.

## 2021-12-23 NOTE — CARE COORDINATION
CM spoke to Danyell Benavides from [facility], confirming pt is a LTC resident. Can return either LTC or SN without precert. Pt will need a rapid or PRC w/in 48 hours of admission. CM to call Con[facility]'s cell over the holidays for any DC planning.       [address] [place] [address] Admissions  Cell: [phone]    Electronically signed by Ashley Mancilla RN on 12/23/2021 at 9:11 AM

## 2021-12-24 LAB
ANION GAP SERPL CALCULATED.3IONS-SCNC: 12 MMOL/L (ref 3–16)
ANION GAP SERPL CALCULATED.3IONS-SCNC: 13 MMOL/L (ref 3–16)
BASOPHILS ABSOLUTE: 0 K/UL (ref 0–0.2)
BASOPHILS RELATIVE PERCENT: 0.4 %
BUN BLDV-MCNC: 43 MG/DL (ref 7–20)
BUN BLDV-MCNC: 50 MG/DL (ref 7–20)
CALCIUM SERPL-MCNC: 8.5 MG/DL (ref 8.3–10.6)
CALCIUM SERPL-MCNC: 8.8 MG/DL (ref 8.3–10.6)
CHLORIDE BLD-SCNC: 108 MMOL/L (ref 99–110)
CHLORIDE BLD-SCNC: 112 MMOL/L (ref 99–110)
CO2: 26 MMOL/L (ref 21–32)
CO2: 27 MMOL/L (ref 21–32)
CREAT SERPL-MCNC: 1.6 MG/DL (ref 0.6–1.2)
CREAT SERPL-MCNC: 1.7 MG/DL (ref 0.6–1.2)
EOSINOPHILS ABSOLUTE: 0.1 K/UL (ref 0–0.6)
EOSINOPHILS RELATIVE PERCENT: 0.9 %
GFR AFRICAN AMERICAN: 35
GFR AFRICAN AMERICAN: 37
GFR NON-AFRICAN AMERICAN: 29
GFR NON-AFRICAN AMERICAN: 31
GLUCOSE BLD-MCNC: 183 MG/DL (ref 70–99)
GLUCOSE BLD-MCNC: 220 MG/DL (ref 70–99)
GLUCOSE BLD-MCNC: 223 MG/DL (ref 70–99)
GLUCOSE BLD-MCNC: 225 MG/DL (ref 70–99)
GLUCOSE BLD-MCNC: 234 MG/DL (ref 70–99)
GLUCOSE BLD-MCNC: 249 MG/DL (ref 70–99)
HCT VFR BLD CALC: 44.1 % (ref 36–48)
HEMOGLOBIN: 14.2 G/DL (ref 12–16)
LACTIC ACID: 2.4 MMOL/L (ref 0.4–2)
LYMPHOCYTES ABSOLUTE: 1.5 K/UL (ref 1–5.1)
LYMPHOCYTES RELATIVE PERCENT: 14.6 %
MAGNESIUM: 2.6 MG/DL (ref 1.8–2.4)
MCH RBC QN AUTO: 31.2 PG (ref 26–34)
MCHC RBC AUTO-ENTMCNC: 32.2 G/DL (ref 31–36)
MCV RBC AUTO: 96.9 FL (ref 80–100)
MONOCYTES ABSOLUTE: 0.5 K/UL (ref 0–1.3)
MONOCYTES RELATIVE PERCENT: 4.4 %
NEUTROPHILS ABSOLUTE: 8.4 K/UL (ref 1.7–7.7)
NEUTROPHILS RELATIVE PERCENT: 79.7 %
PDW BLD-RTO: 12.5 % (ref 12.4–15.4)
PERFORMED ON: ABNORMAL
PHOSPHORUS: 2.5 MG/DL (ref 2.5–4.9)
PLATELET # BLD: 149 K/UL (ref 135–450)
PMV BLD AUTO: 9.7 FL (ref 5–10.5)
POTASSIUM REFLEX MAGNESIUM: 3.3 MMOL/L (ref 3.5–5.1)
POTASSIUM SERPL-SCNC: 3.2 MMOL/L (ref 3.5–5.1)
RBC # BLD: 4.56 M/UL (ref 4–5.2)
SODIUM BLD-SCNC: 148 MMOL/L (ref 136–145)
SODIUM BLD-SCNC: 150 MMOL/L (ref 136–145)
WBC # BLD: 10.6 K/UL (ref 4–11)

## 2021-12-24 PROCEDURE — 6360000002 HC RX W HCPCS: Performed by: INTERNAL MEDICINE

## 2021-12-24 PROCEDURE — 1200000000 HC SEMI PRIVATE

## 2021-12-24 PROCEDURE — 84100 ASSAY OF PHOSPHORUS: CPT

## 2021-12-24 PROCEDURE — 2580000003 HC RX 258: Performed by: INTERNAL MEDICINE

## 2021-12-24 PROCEDURE — 6370000000 HC RX 637 (ALT 250 FOR IP): Performed by: INTERNAL MEDICINE

## 2021-12-24 PROCEDURE — 80048 BASIC METABOLIC PNL TOTAL CA: CPT

## 2021-12-24 PROCEDURE — 36415 COLL VENOUS BLD VENIPUNCTURE: CPT

## 2021-12-24 PROCEDURE — 83735 ASSAY OF MAGNESIUM: CPT

## 2021-12-24 PROCEDURE — 85025 COMPLETE CBC W/AUTO DIFF WBC: CPT

## 2021-12-24 PROCEDURE — 83605 ASSAY OF LACTIC ACID: CPT

## 2021-12-24 RX ORDER — DEXTROSE, SODIUM CHLORIDE, AND POTASSIUM CHLORIDE 5; .2; .15 G/100ML; G/100ML; G/100ML
INJECTION INTRAVENOUS CONTINUOUS
Status: DISCONTINUED | OUTPATIENT
Start: 2021-12-24 | End: 2021-12-24

## 2021-12-24 RX ADMIN — INSULIN LISPRO 4 UNITS: 100 INJECTION, SOLUTION INTRAVENOUS; SUBCUTANEOUS at 12:22

## 2021-12-24 RX ADMIN — INSULIN LISPRO 1 UNITS: 100 INJECTION, SOLUTION INTRAVENOUS; SUBCUTANEOUS at 20:38

## 2021-12-24 RX ADMIN — APIXABAN 2.5 MG: 5 TABLET, FILM COATED ORAL at 20:38

## 2021-12-24 RX ADMIN — Medication 1000 UNITS: at 09:16

## 2021-12-24 RX ADMIN — Medication 10 ML: at 09:17

## 2021-12-24 RX ADMIN — APIXABAN 2.5 MG: 5 TABLET, FILM COATED ORAL at 09:16

## 2021-12-24 RX ADMIN — Medication 10 ML: at 20:37

## 2021-12-24 RX ADMIN — PIPERACILLIN AND TAZOBACTAM 3375 MG: 3; .375 INJECTION, POWDER, LYOPHILIZED, FOR SOLUTION INTRAVENOUS at 05:57

## 2021-12-24 RX ADMIN — CARBOXYMETHYLCELLULOSE SODIUM 1 DROP: 10 GEL OPHTHALMIC at 20:38

## 2021-12-24 RX ADMIN — LEVETIRACETAM 750 MG: 500 SOLUTION ORAL at 09:17

## 2021-12-24 RX ADMIN — POTASSIUM CHLORIDE: 2 INJECTION, SOLUTION, CONCENTRATE INTRAVENOUS at 12:22

## 2021-12-24 RX ADMIN — DIGOXIN 125 MCG: 125 TABLET ORAL at 09:16

## 2021-12-24 RX ADMIN — FAMOTIDINE 20 MG: 20 TABLET ORAL at 09:16

## 2021-12-24 RX ADMIN — DEXTROSE MONOHYDRATE: 50 INJECTION, SOLUTION INTRAVENOUS at 04:31

## 2021-12-24 RX ADMIN — PIPERACILLIN AND TAZOBACTAM 3375 MG: 3; .375 INJECTION, POWDER, LYOPHILIZED, FOR SOLUTION INTRAVENOUS at 18:13

## 2021-12-24 RX ADMIN — METOPROLOL TARTRATE 12.5 MG: 25 TABLET, FILM COATED ORAL at 20:38

## 2021-12-24 RX ADMIN — INSULIN LISPRO 4 UNITS: 100 INJECTION, SOLUTION INTRAVENOUS; SUBCUTANEOUS at 09:18

## 2021-12-24 RX ADMIN — INSULIN LISPRO 4 UNITS: 100 INJECTION, SOLUTION INTRAVENOUS; SUBCUTANEOUS at 18:14

## 2021-12-24 ASSESSMENT — PAIN SCALES - PAIN ASSESSMENT IN ADVANCED DEMENTIA (PAINAD)
CONSOLABILITY: 0
TOTALSCORE: 0
TOTALSCORE: 0
CONSOLABILITY: 0
CONSOLABILITY: 0
NEGVOCALIZATION: 0
NEGVOCALIZATION: 0
TOTALSCORE: 0
BODYLANGUAGE: 0
FACIALEXPRESSION: 0
NEGVOCALIZATION: 0
BODYLANGUAGE: 0
FACIALEXPRESSION: 0
CONSOLABILITY: 0
FACIALEXPRESSION: 0
BREATHING: 0
TOTALSCORE: 0
BREATHING: 0
NEGVOCALIZATION: 0
NEGVOCALIZATION: 0
CONSOLABILITY: 0
BREATHING: 0
FACIALEXPRESSION: 0
BODYLANGUAGE: 0
CONSOLABILITY: 0
BREATHING: 0
NEGVOCALIZATION: 0
CONSOLABILITY: 0
NEGVOCALIZATION: 0
CONSOLABILITY: 0
TOTALSCORE: 0
NEGVOCALIZATION: 0
CONSOLABILITY: 0
NEGVOCALIZATION: 0
TOTALSCORE: 0
FACIALEXPRESSION: 0
BODYLANGUAGE: 0
CONSOLABILITY: 0
BODYLANGUAGE: 0
BREATHING: 0
NEGVOCALIZATION: 0
TOTALSCORE: 0
FACIALEXPRESSION: 0
BODYLANGUAGE: 0
NEGVOCALIZATION: 0
BODYLANGUAGE: 0
TOTALSCORE: 0
BREATHING: 0
CONSOLABILITY: 0
BREATHING: 0
BODYLANGUAGE: 0
FACIALEXPRESSION: 0
NEGVOCALIZATION: 0
BODYLANGUAGE: 0
FACIALEXPRESSION: 0
BODYLANGUAGE: 0
TOTALSCORE: 0
TOTALSCORE: 0
FACIALEXPRESSION: 0
TOTALSCORE: 0
NEGVOCALIZATION: 0
BREATHING: 0
TOTALSCORE: 0
BODYLANGUAGE: 0
CONSOLABILITY: 0
BODYLANGUAGE: 0
FACIALEXPRESSION: 0
BREATHING: 0
CONSOLABILITY: 0
BREATHING: 0
BREATHING: 0
CONSOLABILITY: 0
FACIALEXPRESSION: 0
BREATHING: 0
CONSOLABILITY: 0
TOTALSCORE: 0
FACIALEXPRESSION: 0
BODYLANGUAGE: 0
FACIALEXPRESSION: 0
FACIALEXPRESSION: 0
TOTALSCORE: 0
BODYLANGUAGE: 0
BREATHING: 0
TOTALSCORE: 0
BODYLANGUAGE: 0
TOTALSCORE: 0
NEGVOCALIZATION: 0
FACIALEXPRESSION: 0
FACIALEXPRESSION: 0
NEGVOCALIZATION: 0
BREATHING: 0
BODYLANGUAGE: 0
NEGVOCALIZATION: 0
CONSOLABILITY: 0

## 2021-12-24 ASSESSMENT — PAIN SCALES - WONG BAKER
WONGBAKER_NUMERICALRESPONSE: 0

## 2021-12-24 ASSESSMENT — PAIN SCALES - GENERAL
PAINLEVEL_OUTOF10: 0

## 2021-12-24 NOTE — PROGRESS NOTES
Progress Note - Dr. Darion Rodriguez - Internal Medicine  PCP: Brianna Scott [id number] [facility]. [address] [phone]    Hospital Day: 2  Code Status: DNR-CC  Current Diet: ADULT DIET; Dysphagia - Pureed; Low Potassium (Less than 3000 mg/day); Low Phosphorus (Less than 1000 mg); No Drinking Straws        CC: follow up on medical issues    Subjective:   Dolores Vergara is a 82 y.o. female. Pt seen and examined  Chart reviewed since last visit, labs and imaging below        Pt does not offer hx  Na cont to improve,  150 today. (170 -> 160 -> 150)  BP is soft again    She denies chest pain, denies shortness of breath, denies nausea,  denies emesis. 10 system Review of Systems is reviewed with patient, and pertinent positives are noted in HPI above . Otherwise, Review of systems is negative. I have reviewed the patient's medical and social history in detail and updated the computerized patient record. To recap: She  has a past medical history of CHF (congestive heart failure) ([facility] [address] Hyperlipidemia, and Hypertension. . She  has a past surgical history that includes hip surgery. . She  reports that she has quit smoking. She has never used smokeless tobacco. She reports that she does not drink alcohol and does not use drugs. .        Active Hospital Problems    Diagnosis Date Noted    Hypernatremia [E87.0] 12/22/2021    MARIAH (acute kidney injury) ([facility] [facility] [N17.9] 12/22/2021    Aspiration pneumonia ([facility] [facility] [J69.0] 12/22/2021    Acute respiratory failure with hypoxia (HCC) [J96.01] 12/22/2021    Atrial fibrillation ([facility] [I48.91] 11/28/2019    HTN (hypertension), benign [I10] 06/11/2019    High cholesterol [E78.00] 06/11/2019       Current Facility-Administered Medications: lidocaine PF 1 % injection 5 mL, 5 mL, IntraDERmal, Once  sodium chloride flush 0.9 % injection 5-40 mL, 5-40 mL, IntraVENous, 2 times per day  sodium chloride flush 0.9 % injection 5-40 mL, 5-40 mL, IntraVENous, PRN  0.9 % sodium chloride infusion, 25 mL, IntraVENous, PRN  famotidine (PEPCID) tablet 20 mg, 20 mg, Oral, Daily  digoxin (LANOXIN) tablet 125 mcg, 125 mcg, Oral, Daily  apixaban (ELIQUIS) tablet 2.5 mg, 2.5 mg, Oral, BID  guaiFENesin-dextromethorphan (ROBITUSSIN DM) 100-10 MG/5ML syrup 10 mL, 10 mL, Oral, Q4H PRN  carboxymethylcellulose PF (THERATEARS) 1 % ophthalmic gel 1 drop, 1 drop, Both Eyes, BID  ipratropium-albuterol (DUONEB) nebulizer solution 3 mL, 1 vial, Inhalation, Q6H PRN  levETIRAcetam (KEPPRA) 100 MG/ML solution 750 mg, 750 mg, Oral, Daily  metoprolol tartrate (LOPRESSOR) tablet 12.5 mg, 12.5 mg, Oral, BID  vitamin D (CHOLECALCIFEROL) tablet 1,000 Units, 1,000 Units, Oral, Daily  sodium chloride flush 0.9 % injection 5-40 mL, 5-40 mL, IntraVENous, 2 times per day  sodium chloride flush 0.9 % injection 5-40 mL, 5-40 mL, IntraVENous, PRN  0.9 % sodium chloride infusion, 25 mL, IntraVENous, PRN  ondansetron (ZOFRAN-ODT) disintegrating tablet 4 mg, 4 mg, Oral, Q8H PRN **OR** ondansetron (ZOFRAN) injection 4 mg, 4 mg, IntraVENous, Q6H PRN  acetaminophen (TYLENOL) tablet 650 mg, 650 mg, Oral, Q6H PRN **OR** acetaminophen (TYLENOL) suppository 650 mg, 650 mg, Rectal, Q6H PRN  hydrALAZINE (APRESOLINE) injection 10 mg, 10 mg, IntraVENous, Q6H PRN  0.9 % sodium chloride bolus, 500 mL, IntraVENous, PRN  potassium chloride (KLOR-CON M) extended release tablet 40 mEq, 40 mEq, Oral, PRN **OR** potassium bicarb-citric acid (EFFER-K) effervescent tablet 40 mEq, 40 mEq, Oral, PRN **OR** potassium chloride 10 mEq/100 mL IVPB (Peripheral Line), 10 mEq, IntraVENous, PRN  dextrose 5 % solution, , IntraVENous, Continuous  piperacillin-tazobactam (ZOSYN) 3,375 mg in dextrose 5 % 50 mL IVPB extended infusion (mini-bag), 3,375 mg, IntraVENous, Q12H  insulin lispro (1 Unit Dial) 0-12 Units, 0-12 Units, SubCUTAneous, TID WC  insulin lispro (1 Unit Dial) 0-6 Units, 0-6 Units, SubCUTAneous, Nightly  glucose (GLUTOSE) 40 % oral gel 15 g, 15 g, problem. Stable. Plan: cont meds    Atrial fibrillation ([address]  -Established problem. Stable. Still in fib  Plan: cont to monitor on tele    MARIAH (acute kidney injury) ([address] -Established problem. Stable. Creat 1.7  Plan: Continue present orders/plan. Aspiration pneumonia ([address] -Established problem. Stable. CRP up. procal 2.45  Plan: cont empiric abx as it appears to be bacterial infxn    Acute respiratory failure with hypoxia ([address] -Established problem. Stable. Now off o2  Plan: Continue present orders/plan.        (Please note that portions of this note were completed with a voice recognition program.  Efforts were made to edit the dictations but occasionally words are mis-transcribed.)        Carol Metzger MD  12/24/2021

## 2021-12-24 NOTE — PROGRESS NOTES
[facility] Note    Patient Active Problem List   Diagnosis    [facility]    [facility]    HTN (hypertension), benign    High cholesterol    [facility] failure [facility]    Remote history of stroke    Dyslipidemia    Essential tremor    Hyperglycemic hyperosmolar nonketotic coma ([facility]    Atrial fibrillation (HCC)    Hypernatremia    MARIAH (acute [facility] injury) ([facility]    Aspiration pneumonia ([facility]    Acute respiratory failure with hypoxia ([facility]       Past Medical History:   has a past medical history of CHF (congestive heart failure) ([facility] Hyperlipidemia, and Hypertension. Past Social History:   reports that she has quit smoking. She has never used smokeless tobacco. She reports that she does not drink alcohol and does not use drugs.     Subjective:  Awake but not able to verbalize effectively  Review of Systems unable to obtain      Objective:      /67   Pulse 69   Temp 98 °F (36.7 °C) (Axillary)   Resp 16   Ht 5' 7.72\" (1.72 m) Comment: Measured w/ tape  Wt 179 lb 11.2 oz (81.5 kg)   LMP  (LMP Unknown) Comment: post-menopause  SpO2 94%   BMI 27.55 kg/m²     Wt Readings from Last 3 Encounters:   12/24/21 179 lb 11.2 oz (81.5 kg)   11/30/19 208 lb 5.4 oz (94.5 kg)   06/14/19 205 lb 12.8 oz (93.4 kg)       BP Readings from Last 3 Encounters:   12/24/21 107/67   11/30/19 99/66   06/14/19 (!) 117/56     Chest- clear  Heart-regular  Abd-soft  Ext- no edema    Labs  Hemoglobin   Date Value Ref Range Status   12/24/2021 14.2 12.0 - 16.0 g/dL Final     Hematocrit   Date Value Ref Range Status   12/24/2021 44.1 36.0 - 48.0 % Final     WBC   Date Value Ref Range Status   12/24/2021 10.6 4.0 - 11.0 K/uL Final     Platelets   Date Value Ref Range Status   12/24/2021 149 135 - 450 K/uL Final     Lab Results   Component Value Date    CREATININE 1.7 (H) 12/24/2021    BUN 50 (H) 12/24/2021     (H) 12/24/2021    K 3.2 (L) 12/24/2021     (H) 12/24/2021    CO2 26 12/24/2021       Assessment/Plan:  1. Severe hypernatremia probably due to dehydration and decreased  access to free water. Free water deficit 2.1L. Discontinued Torsemide. .   Change D5W to D5 + 0.2% Saline + KCL   Change rate 110 to 50 ml/hour. Repeat sodium with a goal sodium in the next 24 hours of around mid - high 140s      2. MARIAH. Creatinine from 2019 was 0.9. Probably from prerenal  Azotemia. Improving. 3.  Metabolic alkalosis. 4.  Decreased mental status in the setting of hypernatremia. Better. 5.  Diabetes mellitus. Management as per Medicine. 6.  Hypoxia as per Medicine. 7.  Hypotension-agree with NSS bolus. Can bolus with NSS PRN. 8.  Hypophosphatemia-replace  9.   DNR comfort care    Andrew Carrasco MD

## 2021-12-24 NOTE — CONSULTS
[facility] Note    Patient Active Problem List   Diagnosis    [facility]    Acute ischemic stroke ([facility]    HTN (hypertension), benign    High cholesterol    Chronic congestive heart failure ([facility]    Remote history of stroke    Dyslipidemia    Essential tremor    Hyperglycemic hyperosmolar nonketotic coma ([facility]    Atrial fibrillation (HCC)    Hypernatremia    MARIAH (acute kidney injury) ([facility]    Aspiration pneumonia ([facility]    Acute respiratory failure with hypoxia ([facility]       Past Medical History:   has a past medical history of CHF (congestive heart failure) ([facility] Hyperlipidemia, and Hypertension. Past Social History:   reports that she has quit smoking. She has never used smokeless tobacco. She reports that she does not drink alcohol and does not use drugs.     Subjective:  Awake but not able to verbalize effectively  Review of Systems unable to obtain      Objective:      /68   Pulse 69   Temp 98.6 °F (37 °C) (Axillary)   Resp 16   Ht 5' 7.72\" (1.72 m) Comment: Measured w/ tape  Wt 179 lb 11.2 oz (81.5 kg)   LMP  (LMP Unknown) Comment: post-menopause  SpO2 93%   BMI 27.55 kg/m²     Wt Readings from Last 3 Encounters:   12/24/21 179 lb 11.2 oz (81.5 kg)   11/30/19 208 lb 5.4 oz (94.5 kg)   06/14/19 205 lb 12.8 oz (93.4 kg)       BP Readings from Last 3 Encounters:   12/24/21 104/68   11/30/19 99/66   06/14/19 (!) 117/56     Chest- clear  Heart-regular  Abd-soft  Ext- no edema    Labs  Hemoglobin   Date Value Ref Range Status   12/24/2021 14.2 12.0 - 16.0 g/dL Final     Hematocrit   Date Value Ref Range Status   12/24/2021 44.1 36.0 - 48.0 % Final     WBC   Date Value Ref Range Status   12/24/2021 10.6 4.0 - 11.0 K/uL Final     Platelets   Date Value Ref Range Status   12/24/2021 149 135 - 450 K/uL Final     Lab Results   Component Value Date    CREATININE 1.7 (H) 12/24/2021    BUN 50 (H) 12/24/2021     (H) 12/24/2021    K 3.2 (L) 12/24/2021     (H) 12/24/2021    CO2 26 12/24/2021       Assessment/Plan:  1. Severe hypernatremia probably due to dehydration and decreased  access to free water. Free water deficit 2.1L. Discontinued Torsemide. .   Change D5W to D5 + 0.2% Saline + KCL   Change rate 110 to 50 ml/hour. Repeat sodium with a goal sodium in the next 24 hours of around mid - high 140s      2. MARIAH. Creatinine from 2019 was 0.9. Probably from prerenal  Azotemia. Improving. 3.  Metabolic alkalosis. 4.  Decreased mental status in the setting of hypernatremia. Better. 5.  Diabetes mellitus. Management as per Medicine. 6.  Hypoxia as per Medicine. 7.  Hypotension-agree with NSS bolus. Can bolus with NSS PRN. 8.  Hypophosphatemia-replace  9.   DNR comfort care    Colin Mota MD

## 2021-12-25 LAB
ALBUMIN SERPL-MCNC: 2.7 G/DL (ref 3.4–5)
ALBUMIN SERPL-MCNC: 2.8 G/DL (ref 3.4–5)
ANION GAP SERPL CALCULATED.3IONS-SCNC: 11 MMOL/L (ref 3–16)
ANION GAP SERPL CALCULATED.3IONS-SCNC: 11 MMOL/L (ref 3–16)
BUN BLDV-MCNC: 31 MG/DL (ref 7–20)
BUN BLDV-MCNC: 36 MG/DL (ref 7–20)
CALCIUM SERPL-MCNC: 8.6 MG/DL (ref 8.3–10.6)
CALCIUM SERPL-MCNC: 8.6 MG/DL (ref 8.3–10.6)
CHLORIDE BLD-SCNC: 109 MMOL/L (ref 99–110)
CHLORIDE BLD-SCNC: 115 MMOL/L (ref 99–110)
CO2: 28 MMOL/L (ref 21–32)
CO2: 28 MMOL/L (ref 21–32)
CREAT SERPL-MCNC: 1.3 MG/DL (ref 0.6–1.2)
CREAT SERPL-MCNC: 1.3 MG/DL (ref 0.6–1.2)
GFR AFRICAN AMERICAN: 47
GFR AFRICAN AMERICAN: 47
GFR NON-AFRICAN AMERICAN: 39
GFR NON-AFRICAN AMERICAN: 39
GLUCOSE BLD-MCNC: 157 MG/DL (ref 70–99)
GLUCOSE BLD-MCNC: 166 MG/DL (ref 70–99)
GLUCOSE BLD-MCNC: 177 MG/DL (ref 70–99)
GLUCOSE BLD-MCNC: 178 MG/DL (ref 70–99)
GLUCOSE BLD-MCNC: 202 MG/DL (ref 70–99)
GLUCOSE BLD-MCNC: 256 MG/DL (ref 70–99)
PERFORMED ON: ABNORMAL
PHOSPHORUS: 2 MG/DL (ref 2.5–4.9)
PHOSPHORUS: 3 MG/DL (ref 2.5–4.9)
POTASSIUM SERPL-SCNC: 3.1 MMOL/L (ref 3.5–5.1)
POTASSIUM SERPL-SCNC: 3.7 MMOL/L (ref 3.5–5.1)
SODIUM BLD-SCNC: 148 MMOL/L (ref 136–145)
SODIUM BLD-SCNC: 154 MMOL/L (ref 136–145)

## 2021-12-25 PROCEDURE — 1200000000 HC SEMI PRIVATE

## 2021-12-25 PROCEDURE — 6370000000 HC RX 637 (ALT 250 FOR IP): Performed by: INTERNAL MEDICINE

## 2021-12-25 PROCEDURE — 2580000003 HC RX 258: Performed by: INTERNAL MEDICINE

## 2021-12-25 PROCEDURE — 6360000002 HC RX W HCPCS: Performed by: INTERNAL MEDICINE

## 2021-12-25 PROCEDURE — 36415 COLL VENOUS BLD VENIPUNCTURE: CPT

## 2021-12-25 PROCEDURE — 80069 RENAL FUNCTION PANEL: CPT

## 2021-12-25 PROCEDURE — 2500000003 HC RX 250 WO HCPCS: Performed by: INTERNAL MEDICINE

## 2021-12-25 RX ADMIN — Medication 10 ML: at 11:55

## 2021-12-25 RX ADMIN — INSULIN LISPRO 4 UNITS: 100 INJECTION, SOLUTION INTRAVENOUS; SUBCUTANEOUS at 18:01

## 2021-12-25 RX ADMIN — PIPERACILLIN AND TAZOBACTAM 3375 MG: 3; .375 INJECTION, POWDER, LYOPHILIZED, FOR SOLUTION INTRAVENOUS at 04:47

## 2021-12-25 RX ADMIN — DIGOXIN 125 MCG: 125 TABLET ORAL at 11:55

## 2021-12-25 RX ADMIN — PIPERACILLIN AND TAZOBACTAM 3375 MG: 3; .375 INJECTION, POWDER, LYOPHILIZED, FOR SOLUTION INTRAVENOUS at 15:26

## 2021-12-25 RX ADMIN — INSULIN LISPRO 6 UNITS: 100 INJECTION, SOLUTION INTRAVENOUS; SUBCUTANEOUS at 12:12

## 2021-12-25 RX ADMIN — INSULIN LISPRO 1 UNITS: 100 INJECTION, SOLUTION INTRAVENOUS; SUBCUTANEOUS at 22:45

## 2021-12-25 RX ADMIN — Medication 10 ML: at 12:04

## 2021-12-25 RX ADMIN — FAMOTIDINE 20 MG: 20 TABLET ORAL at 11:56

## 2021-12-25 RX ADMIN — LEVETIRACETAM 750 MG: 500 SOLUTION ORAL at 11:51

## 2021-12-25 RX ADMIN — METOPROLOL TARTRATE 12.5 MG: 25 TABLET, FILM COATED ORAL at 22:35

## 2021-12-25 RX ADMIN — CARBOXYMETHYLCELLULOSE SODIUM 1 DROP: 10 GEL OPHTHALMIC at 22:38

## 2021-12-25 RX ADMIN — POTASSIUM PHOSPHATE, MONOBASIC AND POTASSIUM PHOSPHATE, DIBASIC 30 MMOL: 224; 236 INJECTION, SOLUTION, CONCENTRATE INTRAVENOUS at 12:18

## 2021-12-25 RX ADMIN — CARBOXYMETHYLCELLULOSE SODIUM 1 DROP: 10 GEL OPHTHALMIC at 11:56

## 2021-12-25 RX ADMIN — INSULIN LISPRO 2 UNITS: 100 INJECTION, SOLUTION INTRAVENOUS; SUBCUTANEOUS at 07:54

## 2021-12-25 RX ADMIN — APIXABAN 2.5 MG: 5 TABLET, FILM COATED ORAL at 22:36

## 2021-12-25 RX ADMIN — POTASSIUM BICARBONATE 40 MEQ: 782 TABLET, EFFERVESCENT ORAL at 11:54

## 2021-12-25 RX ADMIN — APIXABAN 2.5 MG: 5 TABLET, FILM COATED ORAL at 11:51

## 2021-12-25 RX ADMIN — POTASSIUM CHLORIDE: 2 INJECTION, SOLUTION, CONCENTRATE INTRAVENOUS at 13:24

## 2021-12-25 RX ADMIN — Medication 1000 UNITS: at 11:50

## 2021-12-25 ASSESSMENT — PAIN SCALES - PAIN ASSESSMENT IN ADVANCED DEMENTIA (PAINAD)
BODYLANGUAGE: 0
BREATHING: 0
NEGVOCALIZATION: 0
NEGVOCALIZATION: 0
BODYLANGUAGE: 0
BREATHING: 0
BODYLANGUAGE: 0
TOTALSCORE: 0
NEGVOCALIZATION: 0
BREATHING: 0
CONSOLABILITY: 0
CONSOLABILITY: 0
BREATHING: 0
FACIALEXPRESSION: 0
CONSOLABILITY: 0
TOTALSCORE: 0
FACIALEXPRESSION: 0
FACIALEXPRESSION: 0
TOTALSCORE: 0
CONSOLABILITY: 0
BREATHING: 0
TOTALSCORE: 0
CONSOLABILITY: 0
TOTALSCORE: 0
CONSOLABILITY: 0
FACIALEXPRESSION: 0
CONSOLABILITY: 0
BODYLANGUAGE: 0
BODYLANGUAGE: 0
BREATHING: 0
CONSOLABILITY: 0
NEGVOCALIZATION: 0
BREATHING: 0
FACIALEXPRESSION: 0
NEGVOCALIZATION: 0
FACIALEXPRESSION: 0
TOTALSCORE: 0
TOTALSCORE: 0
BODYLANGUAGE: 0
BODYLANGUAGE: 0
FACIALEXPRESSION: 0
TOTALSCORE: 0
BODYLANGUAGE: 0
BODYLANGUAGE: 0
NEGVOCALIZATION: 0
FACIALEXPRESSION: 0
NEGVOCALIZATION: 0
BODYLANGUAGE: 0
BREATHING: 0
FACIALEXPRESSION: 0
TOTALSCORE: 0
CONSOLABILITY: 0
FACIALEXPRESSION: 0
FACIALEXPRESSION: 0
CONSOLABILITY: 0
TOTALSCORE: 0
NEGVOCALIZATION: 0
BODYLANGUAGE: 0
CONSOLABILITY: 0
TOTALSCORE: 0
NEGVOCALIZATION: 0
NEGVOCALIZATION: 0
BREATHING: 0
NEGVOCALIZATION: 0

## 2021-12-25 ASSESSMENT — PAIN SCALES - GENERAL: PAINLEVEL_OUTOF10: 0

## 2021-12-25 ASSESSMENT — PAIN SCALES - WONG BAKER: WONGBAKER_NUMERICALRESPONSE: 0

## 2021-12-25 NOTE — PROGRESS NOTES
[facility] Note    Patient Active Problem List   Diagnosis    [facility]    [facility]    HTN (hypertension), benign    High cholesterol    [facility] congestive [facility] failure [facility]    Remote history of stroke    Dyslipidemia    Essential tremor    Hyperglycemic hyperosmolar nonketotic coma ([facility]    Atrial fibrillation (HCC)    Hypernatremia    MARIAH (acute kidney injury) ([facility]    Aspiration pneumonia ([facility]    Acute respiratory failure with hypoxia ([facility]       Past Medical History:   has a past medical history of CHF (congestive heart failure) ([facility] Hyperlipidemia, and Hypertension. Past Social History:   reports that she has quit smoking. She has never used smokeless tobacco. She reports that she does not drink alcohol and does not use drugs.     Subjective:  Seen resting in bed Awake but not able to verbalize effectively  Review of Systems unable to obtain      Objective:      /70   Pulse 73   Temp 99.1 °F (37.3 °C) (Oral)   Resp 18   Ht 5' 7.72\" (1.72 m) Comment: Measured w/ tape  Wt 179 lb 11.2 oz (81.5 kg)   LMP  (LMP Unknown) Comment: post-menopause  SpO2 91%   BMI 27.55 kg/m²     Wt Readings from Last 3 Encounters:   12/24/21 179 lb 11.2 oz (81.5 kg)   11/30/19 208 lb 5.4 oz (94.5 kg)   06/14/19 205 lb 12.8 oz (93.4 kg)       BP Readings from Last 3 Encounters:   12/25/21 114/70   11/30/19 99/66   06/14/19 (!) 117/56     Chest- clear  Heart-regular  Abd-soft  Ext- no edema    Labs  Hemoglobin   Date Value Ref Range Status   12/24/2021 14.2 12.0 - 16.0 g/dL Final     Hematocrit   Date Value Ref Range Status   12/24/2021 44.1 36.0 - 48.0 % Final     WBC   Date Value Ref Range Status   12/24/2021 10.6 4.0 - 11.0 K/uL Final     Platelets   Date Value Ref Range Status   12/24/2021 149 135 - 450 K/uL Final     Lab Results   Component Value Date    CREATININE 1.3 (H) 12/25/2021    BUN 36 (H) 12/25/2021     (H) 12/25/2021    K 3.1 (L) 12/25/2021     (H) 12/25/2021    CO2 28 12/25/2021       Assessment/Plan:  1. Severe hypernatremia probably due to dehydration and decreased  access to free water. Free water deficit 2.1L. Discontinued Torsemide. .   Changed  D5W to D5 + 0.2% Saline + KCL 20 mEq  Change rate 50 to 100 ml/hour. Replete phos     F/up recheck labs in 4 hrs     Repeat sodium with a goal sodium in the next 24 hours of around mid - high 140s    D/W nurse       2. MARIAH. Creatinine from 2019 was 0.9. Probably from prerenal  Azotemia. Improving. 3.  Metabolic alkalosis. 4.  Decreased mental status in the setting of hypernatremia. Better. 5.  Diabetes mellitus. Management as per Medicine. 6.  Hypoxia as per Medicine. 7.  Hypotension-agree with NSS bolus. Can bolus with NSS PRN. 8.  Hypophosphatemia-replace  9. DNR comfort care    High complexity. Multiple medical problems. Discussed with patient 's treatment team.  Nurse   Thank you for allowing me to participate in this patient's care. Please do not hesitate to contact me for any questions/concerns. We will follow along with you.      Rick Palomino MD  [facility] [address]   Phone: [phone] or [facility] [employer]  Fax: [phone]

## 2021-12-25 NOTE — PROGRESS NOTES
Progress Note - Dr. Bettye Maxwell - Internal Medicine  PCP: Brad Ovalles [address] [facility]. [address] [phone]    Hospital Day: 3  Code Status: DNR-CC  Current Diet: ADULT DIET; Dysphagia - Pureed; Low Potassium (Less than 3000 mg/day); Low Phosphorus (Less than 1000 mg); No Drinking Straws        CC: follow up on medical issues    Subjective:   Gila Tolentino is a 82 y.o. female. Pt seen and examined  Chart reviewed since last visit, labs and imaging below        Pt does not offer hx  Na still high,  154 today. (170 -> 160 -> 150 --> 154)  BP is better, now in 110s    She denies chest pain, denies shortness of breath, denies nausea,  denies emesis. 10 system Review of Systems is reviewed with patient, and pertinent positives are noted in HPI above . Otherwise, Review of systems is negative. I have reviewed the patient's medical and social history in detail and updated the computerized patient record. To recap: She  has a past medical history of CHF (congestive heart failure) ([facility] Hyperlipidemia, and Hypertension. . She  has a past surgical history that includes hip surgery. . She  reports that she has quit smoking. She has never used smokeless tobacco. She reports that she does not drink alcohol and does not use drugs. .        Active Hospital Problems    Diagnosis Date Noted    Hypernatremia [E87.0] 12/22/2021    MARIAH (acute kidney injury) ([facility] [N17.9] 12/22/2021    [facility] [J69.0] 12/22/2021    Acute respiratory failure with hypoxia (HCC) [J96.01] 12/22/2021    Atrial fibrillation ([facility] [I48.91] 11/28/2019    HTN (hypertension), benign [I10] 06/11/2019    High cholesterol [E78.00] 06/11/2019       Current Facility-Administered Medications: potassium chloride 20 mEq in dextrose 5 % and 0.2 % NaCl 1,000 mL infusion, , IntraVENous, Continuous  lidocaine PF 1 % injection 5 mL, 5 mL, IntraDERmal, Once  sodium chloride flush 0.9 % injection 5-40 mL, 5-40 mL, IntraVENous, 2 times per day  sodium chloride flush 0.9 % injection 5-40 mL, 5-40 mL, IntraVENous, PRN  0.9 % sodium chloride infusion, 25 mL, IntraVENous, PRN  famotidine (PEPCID) tablet 20 mg, 20 mg, Oral, Daily  digoxin (LANOXIN) tablet 125 mcg, 125 mcg, Oral, Daily  apixaban (ELIQUIS) tablet 2.5 mg, 2.5 mg, Oral, BID  guaiFENesin-dextromethorphan (ROBITUSSIN DM) 100-10 MG/5ML syrup 10 mL, 10 mL, Oral, Q4H PRN  carboxymethylcellulose PF (THERATEARS) 1 % ophthalmic gel 1 drop, 1 drop, Both Eyes, BID  ipratropium-albuterol (DUONEB) nebulizer solution 3 mL, 1 vial, Inhalation, Q6H PRN  levETIRAcetam (KEPPRA) 100 MG/ML solution 750 mg, 750 mg, Oral, Daily  metoprolol tartrate (LOPRESSOR) tablet 12.5 mg, 12.5 mg, Oral, BID  vitamin D (CHOLECALCIFEROL) tablet 1,000 Units, 1,000 Units, Oral, Daily  sodium chloride flush 0.9 % injection 5-40 mL, 5-40 mL, IntraVENous, 2 times per day  sodium chloride flush 0.9 % injection 5-40 mL, 5-40 mL, IntraVENous, PRN  0.9 % sodium chloride infusion, 25 mL, IntraVENous, PRN  ondansetron (ZOFRAN-ODT) disintegrating tablet 4 mg, 4 mg, Oral, Q8H PRN **OR** ondansetron (ZOFRAN) injection 4 mg, 4 mg, IntraVENous, Q6H PRN  acetaminophen (TYLENOL) tablet 650 mg, 650 mg, Oral, Q6H PRN **OR** acetaminophen (TYLENOL) suppository 650 mg, 650 mg, Rectal, Q6H PRN  hydrALAZINE (APRESOLINE) injection 10 mg, 10 mg, IntraVENous, Q6H PRN  0.9 % sodium chloride bolus, 500 mL, IntraVENous, PRN  potassium chloride (KLOR-CON M) extended release tablet 40 mEq, 40 mEq, Oral, PRN **OR** potassium bicarb-citric acid (EFFER-K) effervescent tablet 40 mEq, 40 mEq, Oral, PRN **OR** potassium chloride 10 mEq/100 mL IVPB (Peripheral Line), 10 mEq, IntraVENous, PRN  piperacillin-tazobactam (ZOSYN) 3,375 mg in dextrose 5 % 50 mL IVPB extended infusion (mini-bag), 3,375 mg, IntraVENous, Q12H  insulin lispro (1 Unit Dial) 0-12 Units, 0-12 Units, SubCUTAneous, TID WC  insulin lispro (1 Unit Dial) 0-6 Units, 0-6 Units, SubCUTAneous, Nightly  glucose (GLUTOSE) 40 % oral gel 15 g, 15 g, Oral, PRN  dextrose 50 % IV solution, 12.5 g, IntraVENous, PRN  glucagon (rDNA) injection 1 mg, 1 mg, IntraMUSCular, PRN  dextrose 5 % solution, 100 mL/hr, IntraVENous, PRN         Objective:  /70   Pulse 73   Temp 99.1 °F (37.3 °C) (Oral)   Resp 18   Ht 5' 7.72\" (1.72 m) Comment: Measured w/ tape  Wt 179 lb 11.2 oz (81.5 kg)   LMP  (LMP Unknown) Comment: post-menopause  SpO2 91%   BMI 27.55 kg/m²      Patient Vitals for the past 24 hrs:   BP Temp Temp src Pulse Resp SpO2   12/25/21 0820 114/70 99.1 °F (37.3 °C) Oral 73 18 91 %   12/25/21 0435 102/63 97.3 °F (36.3 °C) Axillary 64 16 --   12/25/21 0118 105/66 97.7 °F (36.5 °C) Axillary 67 17 --   12/24/21 2240 -- -- -- 64 -- --   12/24/21 2038 -- -- -- 70 -- --   12/24/21 1948 119/75 98.9 °F (37.2 °C) Axillary 73 16 94 %   12/24/21 1814 -- -- -- 70 -- --   12/24/21 1710 -- -- -- 68 -- --   12/24/21 1556 112/70 98.8 °F (37.1 °C) Axillary 73 16 94 %   12/24/21 1442 -- -- -- 71 -- --   12/24/21 1218 104/68 98.6 °F (37 °C) Axillary 69 16 93 %   12/24/21 1142 -- -- -- 70 -- --   12/24/21 1035 -- -- -- 74 -- --     Patient Vitals for the past 96 hrs (Last 3 readings):   Weight   12/24/21 0415 179 lb 11.2 oz (81.5 kg)   12/23/21 0500 172 lb 11.2 oz (78.3 kg)   12/22/21 1300 180 lb 12.8 oz (82 kg)           Intake/Output Summary (Last 24 hours) at 12/25/2021 5964  Last data filed at 12/24/2021 2240  Gross per 24 hour   Intake 116.47 ml   Output 850 ml   Net -733.53 ml         Physical Exam:   Vitals as above  General appearance: alert, appears stated age and cooperative    Head: Normocephalic, without obvious abnormality, atraumatic    Lungs: clear to auscultation bilaterally    Heart: irregular rate and rhythm, S1, S2 normal, no murmur    Abdomen: soft, non-tender; bowel sounds normal; no masses, no organomegaly    Extremities: extremities normal, atraumatic, no cyanosis, no edema Labs:  Lab Results   Component Value Date    WBC 10.6 12/24/2021    HGB 14.2 12/24/2021    HCT 44.1 12/24/2021     12/24/2021    CHOL 188 06/13/2019    TRIG 181 (H) 06/13/2019    HDL 35 (L) 06/13/2019     (H) 12/22/2021    AST 66 (H) 12/22/2021     (H) 12/25/2021    K 3.1 (L) 12/25/2021     (H) 12/25/2021    CREATININE 1.3 (H) 12/25/2021    BUN 36 (H) 12/25/2021    CO2 28 12/25/2021    INR 1.49 (H) 11/27/2019    LABA1C 9.4 11/30/2019    LABMICR YES 12/22/2021     Lab Results   Component Value Date    TROPONINI 0.01 12/22/2021       Recent Imaging Results are Reviewed:  XR CHEST PORTABLE    Result Date: 12/22/2021  EXAMINATION: ONE XRAY VIEW OF THE CHEST 12/22/2021 3:53 am COMPARISON: Chest x-ray dated 11/27/2019 HISTORY: ORDERING SYSTEM PROVIDED HISTORY: Dyspnea probable l aspiration TECHNOLOGIST PROVIDED HISTORY: Reason for exam:->Dyspnea probable l aspiration Reason for Exam: Dyspnea probable l aspiration Relevant Medical/Surgical History: previous CHF, hypertension and hyperlipidemia FINDINGS: Clear lungs. No pleural effusion or pneumothorax. Cardiomediastinal silhouette is unremarkable. Visualized osseous structures are unremarkable. Clear lungs. Lab Results   Component Value Date    GLUCOSE 178 12/25/2021     Lab Results   Component Value Date    POCGLU 157 12/25/2021     /70   Pulse 73   Temp 99.1 °F (37.3 °C) (Oral)   Resp 18   Ht 5' 7.72\" (1.72 m) Comment: Measured w/ tape  Wt 179 lb 11.2 oz (81.5 kg)   LMP  (LMP Unknown) Comment: post-menopause  SpO2 91%   BMI 27.55 kg/m²     Assessment and Plan:  Principal Problem:    Hypernatremia -Established problem. Improving. Na 154  Plan: cont free water per renal  Active Problems:    HTN (hypertension), benign -Established problem. 114/70  Plan: cont fluids. Watch closely    High cholesterol -Established problem. Stable. Plan: cont meds    Atrial fibrillation ([facility]  -Established problem. Stable.   Still in fib  Plan: cont to monitor on tele    MARIAH (acute kidney injury) ([address] -Established problem. Stable. Creat 1.3  Plan: Continue present orders/plan. Aspiration pneumonia ([address] -Established problem. Stable. CRP up. procal 2.45  Plan: cont empiric abx as it appears to be bacterial infxn    Acute respiratory failure with hypoxia ([address] -Established problem. Stable. Now off o2  Plan: Continue present orders/plan.        (Please note that portions of this note were completed with a voice recognition program.  Efforts were made to edit the dictations but occasionally words are mis-transcribed.)        Steffanie De La Paz MD  12/25/2021

## 2021-12-25 NOTE — FLOWSHEET NOTE
12/24/21 1948   Vital Signs   Temp 98.9 °F (37.2 °C)   Temp Source Axillary   Pulse 73   Heart Rate Source Monitor   Resp 16   /75   BP Location Right leg   MAP (mmHg) 90   Patient Position Semi fowlers   Level of Consciousness Alert (0)   MEWS Score 1   Patient Currently in Pain No   Pain Assessment   Pain Assessment Faces   Pain Level 0   Tyler-Baker Pain Rating 0   PAINAD (Pain Assessment in Advance Dementia)   Breathing 0   Negative Vocalization 0   Facial Expression 0   Body Language 0   Consolability 0   PAINAD Score 0   Oxygen Therapy   SpO2 94 %   Pulse Oximeter Device Mode Continuous   Pulse Oximeter Device Location Finger   O2 Device None (Room air)     Shift assessment complete. VSS. Pt is resting comfortably in bed. Pt not able to express complaints. POC discussed with patient and patient states understanding and is in agreement with plan. Q2 turn. Purewick in place. Call light and bedside table within reach. No further needs expressed at this time.

## 2021-12-25 NOTE — PLAN OF CARE
Problem: Fluid Volume:  Goal: Signs and symptoms of dehydration will decrease  Description: Signs and symptoms of dehydration will decrease  Outcome: Ongoing   Pt becoming more alert. Vital signs improving. Sodium level improving. Problem: Fluid Volume:  Goal: Diagnostic test results will improve  Description: Diagnostic test results will improve  Outcome: Ongoing   Pt's sodium level continues to improve. Most recent lab draw 148. Next draw in am. Will continue to monitor.

## 2021-12-25 NOTE — PROGRESS NOTES
Pt incont of small formed bm, pericare provided, purewiock changed. Pt repositioned in bed. Fall precautions in place, bed alarm in place.

## 2021-12-25 NOTE — PROGRESS NOTES
Pt laying in bed. Assisted pt with eating breakfast. Pt had coughing spell with drinking coffee. Pt tolerated drinking apple juice. Message left for SLP about coughing episode. o2 sats 90% on RA, no sob observed. Crackles heard in mark bases. IVF infusing. Pt repositioned. Fall precautions in place. Bed alarm in place.  Call bell in reach, instructed to call for assist.

## 2021-12-26 ENCOUNTER — APPOINTMENT (OUTPATIENT)
Dept: GENERAL RADIOLOGY | Age: 86
DRG: 871 | End: 2021-12-26
Payer: COMMERCIAL

## 2021-12-26 LAB
ALBUMIN SERPL-MCNC: 2.4 G/DL (ref 3.4–5)
ANION GAP SERPL CALCULATED.3IONS-SCNC: 11 MMOL/L (ref 3–16)
BASOPHILS ABSOLUTE: 0 K/UL (ref 0–0.2)
BASOPHILS RELATIVE PERCENT: 0.3 %
BLOOD CULTURE, ROUTINE: NORMAL
BUN BLDV-MCNC: 23 MG/DL (ref 7–20)
CALCIUM SERPL-MCNC: 8.2 MG/DL (ref 8.3–10.6)
CHLORIDE BLD-SCNC: 112 MMOL/L (ref 99–110)
CO2: 26 MMOL/L (ref 21–32)
CREAT SERPL-MCNC: 1.1 MG/DL (ref 0.6–1.2)
CULTURE, BLOOD 2: NORMAL
EOSINOPHILS ABSOLUTE: 0.1 K/UL (ref 0–0.6)
EOSINOPHILS RELATIVE PERCENT: 1.2 %
GFR AFRICAN AMERICAN: 57
GFR NON-AFRICAN AMERICAN: 47
GLUCOSE BLD-MCNC: 178 MG/DL (ref 70–99)
GLUCOSE BLD-MCNC: 178 MG/DL (ref 70–99)
GLUCOSE BLD-MCNC: 197 MG/DL (ref 70–99)
GLUCOSE BLD-MCNC: 219 MG/DL (ref 70–99)
HCT VFR BLD CALC: 36.5 % (ref 36–48)
HEMOGLOBIN: 12.3 G/DL (ref 12–16)
LYMPHOCYTES ABSOLUTE: 1.7 K/UL (ref 1–5.1)
LYMPHOCYTES RELATIVE PERCENT: 15.9 %
MAGNESIUM: 2.6 MG/DL (ref 1.8–2.4)
MCH RBC QN AUTO: 32.2 PG (ref 26–34)
MCHC RBC AUTO-ENTMCNC: 33.8 G/DL (ref 31–36)
MCV RBC AUTO: 95.3 FL (ref 80–100)
MONOCYTES ABSOLUTE: 0.4 K/UL (ref 0–1.3)
MONOCYTES RELATIVE PERCENT: 3.9 %
NEUTROPHILS ABSOLUTE: 8.2 K/UL (ref 1.7–7.7)
NEUTROPHILS RELATIVE PERCENT: 78.7 %
PDW BLD-RTO: 12.5 % (ref 12.4–15.4)
PERFORMED ON: ABNORMAL
PHOSPHORUS: 2.3 MG/DL (ref 2.5–4.9)
PLATELET # BLD: 149 K/UL (ref 135–450)
PMV BLD AUTO: 9.2 FL (ref 5–10.5)
POTASSIUM SERPL-SCNC: 3.9 MMOL/L (ref 3.5–5.1)
PROCALCITONIN: 0.52 NG/ML (ref 0–0.15)
RBC # BLD: 3.83 M/UL (ref 4–5.2)
SODIUM BLD-SCNC: 149 MMOL/L (ref 136–145)
WBC # BLD: 10.4 K/UL (ref 4–11)

## 2021-12-26 PROCEDURE — C1751 CATH, INF, PER/CENT/MIDLINE: HCPCS

## 2021-12-26 PROCEDURE — 83735 ASSAY OF MAGNESIUM: CPT

## 2021-12-26 PROCEDURE — 2580000003 HC RX 258: Performed by: INTERNAL MEDICINE

## 2021-12-26 PROCEDURE — 1200000000 HC SEMI PRIVATE

## 2021-12-26 PROCEDURE — 6360000002 HC RX W HCPCS: Performed by: INTERNAL MEDICINE

## 2021-12-26 PROCEDURE — 84145 PROCALCITONIN (PCT): CPT

## 2021-12-26 PROCEDURE — 80069 RENAL FUNCTION PANEL: CPT

## 2021-12-26 PROCEDURE — 36569 INSJ PICC 5 YR+ W/O IMAGING: CPT

## 2021-12-26 PROCEDURE — 71046 X-RAY EXAM CHEST 2 VIEWS: CPT

## 2021-12-26 PROCEDURE — 85025 COMPLETE CBC W/AUTO DIFF WBC: CPT

## 2021-12-26 PROCEDURE — 92526 ORAL FUNCTION THERAPY: CPT

## 2021-12-26 PROCEDURE — 02HV33Z INSERTION OF INFUSION DEVICE INTO SUPERIOR VENA CAVA, PERCUTANEOUS APPROACH: ICD-10-PCS | Performed by: INTERNAL MEDICINE

## 2021-12-26 PROCEDURE — 36415 COLL VENOUS BLD VENIPUNCTURE: CPT

## 2021-12-26 PROCEDURE — 6370000000 HC RX 637 (ALT 250 FOR IP): Performed by: INTERNAL MEDICINE

## 2021-12-26 RX ORDER — SODIUM CHLORIDE 0.9 % (FLUSH) 0.9 %
5-40 SYRINGE (ML) INJECTION EVERY 12 HOURS SCHEDULED
Status: DISCONTINUED | OUTPATIENT
Start: 2021-12-26 | End: 2021-12-29 | Stop reason: HOSPADM

## 2021-12-26 RX ORDER — LIDOCAINE HYDROCHLORIDE 10 MG/ML
5 INJECTION, SOLUTION EPIDURAL; INFILTRATION; INTRACAUDAL; PERINEURAL ONCE
Status: DISCONTINUED | OUTPATIENT
Start: 2021-12-26 | End: 2021-12-29 | Stop reason: HOSPADM

## 2021-12-26 RX ORDER — SODIUM CHLORIDE 0.9 % (FLUSH) 0.9 %
5-40 SYRINGE (ML) INJECTION PRN
Status: DISCONTINUED | OUTPATIENT
Start: 2021-12-26 | End: 2021-12-29 | Stop reason: HOSPADM

## 2021-12-26 RX ORDER — SODIUM CHLORIDE 9 MG/ML
25 INJECTION, SOLUTION INTRAVENOUS PRN
Status: DISCONTINUED | OUTPATIENT
Start: 2021-12-26 | End: 2021-12-29 | Stop reason: HOSPADM

## 2021-12-26 RX ADMIN — CARBOXYMETHYLCELLULOSE SODIUM 1 DROP: 10 GEL OPHTHALMIC at 22:00

## 2021-12-26 RX ADMIN — APIXABAN 2.5 MG: 5 TABLET, FILM COATED ORAL at 12:06

## 2021-12-26 RX ADMIN — INSULIN LISPRO 2 UNITS: 100 INJECTION, SOLUTION INTRAVENOUS; SUBCUTANEOUS at 12:03

## 2021-12-26 RX ADMIN — PIPERACILLIN AND TAZOBACTAM 3375 MG: 3; .375 INJECTION, POWDER, LYOPHILIZED, FOR SOLUTION INTRAVENOUS at 00:11

## 2021-12-26 RX ADMIN — LEVETIRACETAM 750 MG: 500 SOLUTION ORAL at 12:04

## 2021-12-26 RX ADMIN — INSULIN LISPRO 1 UNITS: 100 INJECTION, SOLUTION INTRAVENOUS; SUBCUTANEOUS at 22:02

## 2021-12-26 RX ADMIN — POTASSIUM CHLORIDE: 2 INJECTION, SOLUTION, CONCENTRATE INTRAVENOUS at 19:51

## 2021-12-26 RX ADMIN — FAMOTIDINE 20 MG: 20 TABLET ORAL at 12:06

## 2021-12-26 RX ADMIN — Medication 1000 UNITS: at 22:00

## 2021-12-26 RX ADMIN — INSULIN LISPRO 2 UNITS: 100 INJECTION, SOLUTION INTRAVENOUS; SUBCUTANEOUS at 09:05

## 2021-12-26 RX ADMIN — PIPERACILLIN AND TAZOBACTAM 3375 MG: 3; .375 INJECTION, POWDER, LYOPHILIZED, FOR SOLUTION INTRAVENOUS at 07:09

## 2021-12-26 RX ADMIN — DIGOXIN 125 MCG: 125 TABLET ORAL at 12:07

## 2021-12-26 RX ADMIN — PIPERACILLIN AND TAZOBACTAM 3375 MG: 3; .375 INJECTION, POWDER, LYOPHILIZED, FOR SOLUTION INTRAVENOUS at 19:58

## 2021-12-26 RX ADMIN — APIXABAN 2.5 MG: 5 TABLET, FILM COATED ORAL at 21:59

## 2021-12-26 RX ADMIN — POTASSIUM CHLORIDE: 2 INJECTION, SOLUTION, CONCENTRATE INTRAVENOUS at 00:10

## 2021-12-26 ASSESSMENT — PAIN SCALES - PAIN ASSESSMENT IN ADVANCED DEMENTIA (PAINAD)
NEGVOCALIZATION: 0
BODYLANGUAGE: 0
BODYLANGUAGE: 0
TOTALSCORE: 0
NEGVOCALIZATION: 0
CONSOLABILITY: 0
FACIALEXPRESSION: 0
NEGVOCALIZATION: 0
CONSOLABILITY: 0
BODYLANGUAGE: 0
TOTALSCORE: 0
TOTALSCORE: 0
FACIALEXPRESSION: 0
BREATHING: 0
NEGVOCALIZATION: 0
BREATHING: 0
BREATHING: 0
NEGVOCALIZATION: 0
BREATHING: 0
TOTALSCORE: 0
CONSOLABILITY: 0
BODYLANGUAGE: 0
BREATHING: 0
TOTALSCORE: 0
NEGVOCALIZATION: 0
NEGVOCALIZATION: 0
CONSOLABILITY: 0
CONSOLABILITY: 0
NEGVOCALIZATION: 0
TOTALSCORE: 0
NEGVOCALIZATION: 0
FACIALEXPRESSION: 0
BODYLANGUAGE: 0
BREATHING: 0
NEGVOCALIZATION: 0
BREATHING: 0
BODYLANGUAGE: 0
FACIALEXPRESSION: 0
FACIALEXPRESSION: 0
BODYLANGUAGE: 0
CONSOLABILITY: 0
BODYLANGUAGE: 0
TOTALSCORE: 0
BODYLANGUAGE: 0
FACIALEXPRESSION: 0
CONSOLABILITY: 0
FACIALEXPRESSION: 0
FACIALEXPRESSION: 0
CONSOLABILITY: 0
TOTALSCORE: 0
FACIALEXPRESSION: 0
FACIALEXPRESSION: 0
BODYLANGUAGE: 0
NEGVOCALIZATION: 0
BREATHING: 0
BODYLANGUAGE: 0
CONSOLABILITY: 0
CONSOLABILITY: 0
TOTALSCORE: 0
CONSOLABILITY: 0
TOTALSCORE: 0
BREATHING: 0
TOTALSCORE: 0
NEGVOCALIZATION: 0
CONSOLABILITY: 0
BREATHING: 0
TOTALSCORE: 0
NEGVOCALIZATION: 0
FACIALEXPRESSION: 0
BODYLANGUAGE: 0
BODYLANGUAGE: 0
BREATHING: 0
FACIALEXPRESSION: 0
FACIALEXPRESSION: 0
TOTALSCORE: 0
CONSOLABILITY: 0

## 2021-12-26 ASSESSMENT — PAIN SCALES - WONG BAKER
WONGBAKER_NUMERICALRESPONSE: 0

## 2021-12-26 ASSESSMENT — PAIN SCALES - GENERAL
PAINLEVEL_OUTOF10: 0
PAINLEVEL_OUTOF10: 0

## 2021-12-26 NOTE — PROGRESS NOTES
Speech Language Pathology  Attempt  Johan Padgett  1935   [phone]    RN called to request SLP re-eval of swallowing due to coughing with liquids. Pt is currently off the floor to radiology. Will follow-up and re-attempt as therapy schedule allows. Thank you,    Yohana CRONIN.  CCC-SLP S.P. [id number]  Speech-Language Pathologist

## 2021-12-26 NOTE — PROGRESS NOTES
Progress Note - Dr. Jackelyn Bird - Internal Medicine  PCP: Lori Luna [address] [facility]. [address] / [place] [address] [phone]    Hospital Day: 4  Code Status: DNR-CC  Current Diet: ADULT DIET; Dysphagia - Pureed; Low Potassium (Less than 3000 mg/day); Low Phosphorus (Less than 1000 mg); No Drinking Straws        CC: follow up on medical issues    Subjective:   Sera Guillaume is a 82 y.o. female. Pt seen and examined  Chart reviewed since last visit, labs and imaging below        Pt does not offer hx  Na still high,  149 today. (170 -> 160 -> 150 --> 154 -->149)  BP is better, now in 140s    She does not offer interval hx nor ROS    I have reviewed the patient's medical and social history in detail and updated the computerized patient record. To recap: She  has a past medical history of CHF (congestive heart failure) ([facility] Hyperlipidemia, and Hypertension. . She  has a past surgical history that includes hip surgery. . She  reports that she has quit smoking. She has never used smokeless tobacco. She reports that she does not drink alcohol and does not use drugs. .        Active Hospital Problems    Diagnosis Date Noted    Hypernatremia [E87.0] 12/22/2021    MARIAH (acute kidney injury) ([facility] [N17.9] 12/22/2021    [facility] [J69.0] 12/22/2021    Acute respiratory failure with hypoxia (HCC) [J96.01] 12/22/2021    Atrial fibrillation ([facility] [I48.91] 11/28/2019    HTN (hypertension), benign [I10] 06/11/2019    High cholesterol [E78.00] 06/11/2019       Current Facility-Administered Medications: piperacillin-tazobactam (ZOSYN) 3,375 mg in dextrose 5 % 50 mL IVPB extended infusion (mini-bag), 3,375 mg, IntraVENous, Q8H  potassium chloride 20 mEq in dextrose 5 % and 0.2 % NaCl 1,000 mL infusion, , IntraVENous, Continuous  lidocaine PF 1 % injection 5 mL, 5 mL, IntraDERmal, Once  sodium chloride flush 0.9 % injection 5-40 mL, 5-40 mL, IntraVENous, 2 times per day  sodium chloride flush 0.9 % injection 5-40 mL, 5-40 mL, IntraVENous, PRN  0.9 % sodium chloride infusion, 25 mL, IntraVENous, PRN  famotidine (PEPCID) tablet 20 mg, 20 mg, Oral, Daily  digoxin (LANOXIN) tablet 125 mcg, 125 mcg, Oral, Daily  apixaban (ELIQUIS) tablet 2.5 mg, 2.5 mg, Oral, BID  guaiFENesin-dextromethorphan (ROBITUSSIN DM) 100-10 MG/5ML syrup 10 mL, 10 mL, Oral, Q4H PRN  carboxymethylcellulose PF (THERATEARS) 1 % ophthalmic gel 1 drop, 1 drop, Both Eyes, BID  ipratropium-albuterol (DUONEB) nebulizer solution 3 mL, 1 vial, Inhalation, Q6H PRN  levETIRAcetam (KEPPRA) 100 MG/ML solution 750 mg, 750 mg, Oral, Daily  metoprolol tartrate (LOPRESSOR) tablet 12.5 mg, 12.5 mg, Oral, BID  vitamin D (CHOLECALCIFEROL) tablet 1,000 Units, 1,000 Units, Oral, Daily  sodium chloride flush 0.9 % injection 5-40 mL, 5-40 mL, IntraVENous, 2 times per day  sodium chloride flush 0.9 % injection 5-40 mL, 5-40 mL, IntraVENous, PRN  0.9 % sodium chloride infusion, 25 mL, IntraVENous, PRN  ondansetron (ZOFRAN-ODT) disintegrating tablet 4 mg, 4 mg, Oral, Q8H PRN **OR** ondansetron (ZOFRAN) injection 4 mg, 4 mg, IntraVENous, Q6H PRN  acetaminophen (TYLENOL) tablet 650 mg, 650 mg, Oral, Q6H PRN **OR** acetaminophen (TYLENOL) suppository 650 mg, 650 mg, Rectal, Q6H PRN  hydrALAZINE (APRESOLINE) injection 10 mg, 10 mg, IntraVENous, Q6H PRN  0.9 % sodium chloride bolus, 500 mL, IntraVENous, PRN  potassium chloride (KLOR-CON M) extended release tablet 40 mEq, 40 mEq, Oral, PRN **OR** potassium bicarb-citric acid (EFFER-K) effervescent tablet 40 mEq, 40 mEq, Oral, PRN **OR** potassium chloride 10 mEq/100 mL IVPB (Peripheral Line), 10 mEq, IntraVENous, PRN  insulin lispro (1 Unit Dial) 0-12 Units, 0-12 Units, SubCUTAneous, TID WC  insulin lispro (1 Unit Dial) 0-6 Units, 0-6 Units, SubCUTAneous, Nightly  glucose (GLUTOSE) 40 % oral gel 15 g, 15 g, Oral, PRN  dextrose 50 % IV solution, 12.5 g, IntraVENous, PRN  glucagon (rDNA) injection 1 mg, 1 mg, IntraMUSCular, PRN  dextrose 5 % solution, 100 mL/hr, IntraVENous, PRN         Objective:  BP (!) 143/64   Pulse 55   Temp 98 °F (36.7 °C) (Axillary)   Resp 18   Ht 5' 7.72\" (1.72 m) Comment: Measured w/ tape  Wt 185 lb 9.6 oz (84.2 kg)   LMP  (LMP Unknown) Comment: post-menopause  SpO2 95%   BMI 28.46 kg/m²      Patient Vitals for the past 24 hrs:   BP Temp Temp src Pulse Resp SpO2 Weight   12/26/21 0400 (!) 143/64 98 °F (36.7 °C) Axillary 55 18 95 % 185 lb 9.6 oz (84.2 kg)   12/26/21 0000 120/67 98.2 °F (36.8 °C) Oral 60 18 93 % --   12/25/21 2230 116/75 98.7 °F (37.1 °C) Oral 64 18 92 % --   12/25/21 1714 130/75 98.4 °F (36.9 °C) Oral 62 18 95 % --   12/25/21 1145 (!) 99/56 98.2 °F (36.8 °C) Axillary 64 18 93 % --   12/25/21 0820 114/70 99.1 °F (37.3 °C) Oral 73 18 91 % --     Patient Vitals for the past 96 hrs (Last 3 readings):   Weight   12/26/21 0400 185 lb 9.6 oz (84.2 kg)   12/24/21 0415 179 lb 11.2 oz (81.5 kg)   12/23/21 0500 172 lb 11.2 oz (78.3 kg)           Intake/Output Summary (Last 24 hours) at 12/26/2021 0816  Last data filed at 12/25/2021 0856  Gross per 24 hour   Intake 120 ml   Output --   Net 120 ml         Physical Exam:   Vitals as above  General appearance: alert, appears stated age and cooperative    Head: Normocephalic, without obvious abnormality, atraumatic    Lungs: clear to auscultation bilaterally    Heart: irregular rate and rhythm, S1, S2 normal, no murmur    Abdomen: soft, non-tender; bowel sounds normal; no masses, no organomegaly    Extremities: extremities normal, atraumatic, no cyanosis, no edema      Labs:  Lab Results   Component Value Date    WBC 10.4 12/26/2021    HGB 12.3 12/26/2021    HCT 36.5 12/26/2021     12/26/2021    CHOL 188 06/13/2019    TRIG 181 (H) 06/13/2019    HDL 35 (L) 06/13/2019     (H) 12/22/2021    AST 66 (H) 12/22/2021     (H) 12/26/2021    K 3.9 12/26/2021     (H) 12/26/2021    CREATININE 1.1 12/26/2021    BUN 23 (H) 12/26/2021    CO2 26 12/26/2021    INR 1.49 (H) 11/27/2019    LABA1C 9.4 11/30/2019    LABMICR YES 12/22/2021     Lab Results   Component Value Date    TROPONINI 0.01 12/22/2021       Recent Imaging Results are Reviewed:  XR CHEST PORTABLE    Result Date: 12/22/2021  EXAMINATION: ONE XRAY VIEW OF THE CHEST 12/22/2021 3:53 am COMPARISON: Chest x-ray dated 11/27/2019 HISTORY: ORDERING SYSTEM PROVIDED HISTORY: Dyspnea probable l aspiration TECHNOLOGIST PROVIDED HISTORY: Reason for exam:->Dyspnea probable l aspiration Reason for Exam: Dyspnea probable l aspiration Relevant Medical/Surgical History: previous CHF, hypertension and hyperlipidemia FINDINGS: Clear lungs. No pleural effusion or pneumothorax. Cardiomediastinal silhouette is unremarkable. Visualized osseous structures are unremarkable. Clear lungs. Lab Results   Component Value Date    GLUCOSE 219 12/26/2021     Lab Results   Component Value Date    POCGLU 166 12/25/2021     BP (!) 143/64   Pulse 55   Temp 98 °F (36.7 °C) (Axillary)   Resp 18   Ht 5' 7.72\" (1.72 m) Comment: Measured w/ tape  Wt 185 lb 9.6 oz (84.2 kg)   LMP  (LMP Unknown) Comment: post-menopause  SpO2 95%   BMI 28.46 kg/m²     Assessment and Plan:  Principal Problem:    Hypernatremia -Established problem. Improving. Na 149  Plan: cont free water per renal  Active Problems:    HTN (hypertension), benign -Established problem. 143/64  Plan: cont fluids. Watch closely    High cholesterol -Established problem. Stable. Plan: cont meds    Atrial fibrillation ([address]  -Established problem. Stable. Still in fib  Plan: cont to monitor on tele    MARIAH (acute kidney injury) ([address] -Established problem. Stable. Creat 1.1  Plan: Continue present orders/plan. Aspiration pneumonia ([address] -Established problem. Stable. CRP up. procal 2.45 on admit  Plan: cont empiric abx as it appears to be bacterial infxn. Repeat cxr 12/26    Acute respiratory failure with hypoxia ([address] -Established problem. Stable.   Now off o2  Plan: Continue present orders/plan.        (Please note that portions of this note were completed with a voice recognition program.  Efforts were made to edit the dictations but occasionally words are mis-transcribed.)        Rosa Sandy MD  12/26/2021

## 2021-12-26 NOTE — PLAN OF CARE
Problem: Skin Integrity:  Goal: Will show no infection signs and symptoms  Description: Will show no infection signs and symptoms  Outcome: Ongoing  Goal: Absence of new skin breakdown  Description: Absence of new skin breakdown  Outcome: Ongoing     Problem: Pain:  Goal: Pain level will decrease  Description: Pain level will decrease  Outcome: Ongoing  Goal: Control of acute pain  Description: Control of acute pain  Outcome: Ongoing  Goal: Control of chronic pain  Description: Control of chronic pain  Outcome: Ongoing     Problem: Fluid Volume - Imbalance:  Goal: Absence of imbalanced fluid volume signs and symptoms  Description: Absence of imbalanced fluid volume signs and symptoms  Outcome: Ongoing     Problem: Fluid Volume:  Goal: Signs and symptoms of dehydration will decrease  Description: Signs and symptoms of dehydration will decrease  Outcome: Ongoing  Goal: Ability to achieve a balanced intake and output will improve  Description: Ability to achieve a balanced intake and output will improve  Outcome: Ongoing  Goal: Diagnostic test results will improve  Description: Diagnostic test results will improve  Outcome: Ongoing     Problem: Physical Regulation:  Goal: Complications related to the disease process, condition or treatment will be avoided or minimized  Description: Complications related to the disease process, condition or treatment will be avoided or minimized  Outcome: Ongoing  Goal: Ability to maintain vital signs within normal range will improve  Description: Ability to maintain vital signs within normal range will improve  Outcome: Ongoing

## 2021-12-26 NOTE — PROGRESS NOTES
[facility] Note    Patient Active Problem List   Diagnosis    [facility]    Acute ischemic [facility]    HTN (hypertension), benign    High cholesterol    Chronic congestive heart failure ([facility]    Remote history of stroke    Dyslipidemia    Essential tremor    Hyperglycemic hyperosmolar nonketotic coma ([facility]    Atrial fibrillation (HCC)    Hypernatremia    MARIAH (acute kidney injury) ([facility]    Aspiration pneumonia ([facility]    Acute respiratory failure with hypoxia ([facility]       Past Medical History:   has a past medical history of CHF (congestive heart failure) ([facility] Hyperlipidemia, and Hypertension. Past Social History:   reports that she has quit smoking. She has never used smokeless tobacco. She reports that she does not drink alcohol and does not use drugs.     Subjective:  Seen resting in bed Awake but not able to verbalize effectively still  Review of Systems unable to obtain      Objective:      /84   Pulse 56   Temp 98.6 °F (37 °C) (Oral)   Resp 18   Ht 5' 7.72\" (1.72 m) Comment: Measured w/ tape  Wt 185 lb 9.6 oz (84.2 kg)   LMP  (LMP Unknown) Comment: post-menopause  SpO2 95%   BMI 28.46 kg/m²     Wt Readings from Last 3 Encounters:   12/26/21 185 lb 9.6 oz (84.2 kg)   11/30/19 208 lb 5.4 oz (94.5 kg)   06/14/19 205 lb 12.8 oz (93.4 kg)       BP Readings from Last 3 Encounters:   12/26/21 119/84   11/30/19 99/66   06/14/19 (!) 117/56     Chest- clear  Heart-regular  Abd-soft  Ext- no edema    Labs  Hemoglobin   Date Value Ref Range Status   12/26/2021 12.3 12.0 - 16.0 g/dL Final     Hematocrit   Date Value Ref Range Status   12/26/2021 36.5 36.0 - 48.0 % Final     WBC   Date Value Ref Range Status   12/26/2021 10.4 4.0 - 11.0 K/uL Final     Platelets   Date Value Ref Range Status   12/26/2021 149 135 - 450 K/uL Final     Lab Results   Component Value Date    CREATININE 1.1 12/26/2021    BUN 23 (H) 12/26/2021  (H) 12/26/2021    K 3.9 12/26/2021     (H) 12/26/2021    CO2 26 12/26/2021       Assessment/Plan:  1. Severe hypernatremia probably due to dehydration and decreased  access to free water. Free water deficit 2.1L. Discontinued Torsemide. .   Changed  D5W to D5 + 0.2% Saline + KCL 20 mEq  Change rate 50 to 100 ml/hour. Continue for 24 hrs     Replete phos     F/up recheck labs in am now     Repeat sodium with a goal sodium in the next 24 hours of around [id number]    D/W nurse     Needs Na <145 for d/c       2. MARIAH. Creatinine from 2019 was 0.9. Probably from prerenal  Azotemia. Improving. 3.  Metabolic alkalosis. 4.  Decreased mental status in the setting of hypernatremia. Better. 5.  Diabetes mellitus. Management as per Medicine. 6.  Hypoxia as per Medicine. 7.  Hypotension-agree with NSS bolus. Can bolus with NSS PRN. 8.  Hypophosphatemia-replace  9. DNR comfort care    High complexity. Multiple medical problems. Discussed with patient 's treatment team.  Nurse   Thank you for allowing me to participate in this patient's care. Please do not hesitate to contact me for any questions/concerns. We will follow along with you.      Dwain Serrato MD  [facility] [address]   Phone: [phone] or Via [employer]  Fax: [phone]

## 2021-12-26 NOTE — PROGRESS NOTES
Speech Language Pathology  Dysphagia Treatment Note    Name:  Bill Bryant  :   1935  Medical Diagnosis:  Dehydration [E86.0]  Somnolence [R40.0]  Hypernatremia [E87.0]  Acute kidney injury ([facility] [N17.9]  Aspiration into airway, initial encounter [T17.510N]  Treatment Diagnosis: Oropharyngeal Dysphagia  Pain level: Pt did not report pain    Diet level prior to treatment: Dysphagia I Pureed with thin liquids, no straws, Meds crushed in puree   Tolerance of Current Diet Level: RN called speech office and asked for pt to be re-evaluated due to coughing with liquids during lunch meal 21. Per RN, pt has had less coughing with PO today, but still intermittent. Assessment of Texture Tolerance:  -Impressions: RN in room feeding pt lunch meal upon arrival.  Pt positioned upright in bed on RA. Pt non-verbal throughout, but responded \"yes\" and \"no\" with head nods. Pt seen with lunch meal items (I.e. pureed with thin liquids). Pt demonstrated oral holding and reduced A-P propulsion with puree and thin liquids. Pt intermittently benefited from verbal cues to \"swallow. \"  Delays up to >30 seconds for swallow initiation this date. Given small sip of thin liquid \"rinse,\" pt was able to initiate swallow with no overt clinical s/s of aspiration/penetration. However, when taking successive sips prior to swallowing puree, pt did demonstrate coughing x1. No overt clinical s/s of aspiration/penetration noted with small sips of thin liquid or puree textures provided one at a time; however, pt is at increased risk if small bites/sips and ensuring pt has cleared mouth of bolus prior to taking another bite is not followed. Recommend continuation of current diet with strict use of aspiration precautions.      Diet and Treatment Recommendations 2021:  Continue current diet     Compensatory strategies:Upright as possible with all PO intake , No straws , Small bites/sips , Eat/feed slowly, Remain upright 30-45 min , Total Feed     Dysphagia Goals:   1.) Pt will functionally tolerate recommended diet with no overt clinical s/s of aspiration (ongoing 12/26/2021)   2.) Pt will functionally tolerate ongoing assessment of swallow function with diet to be determined as indicated (ongoing 12/26/2021)   3.) Pt will advance to least restrictive diet as indicated (ongoing 12/26/2021)     Plan:  Continued daily Dysphagia treatment with goals per plan of care. Patient/Family Education:Education given to the Pt and nurse, who verbalized understanding    Discharge Recommendations:  Pt will benefit from continued skilled Speech Therapy for Dysphagia services, prior to returning home. Timed Code Treatment: 0 minutes    Total Treatment Time: 20 minutes     If patient discharges prior to next session this note will serve as a discharge summary. Signature:    Sylvia MARTINEZ  CCC-SLP S.P. [id number]  Speech-Language Pathologist

## 2021-12-27 ENCOUNTER — APPOINTMENT (OUTPATIENT)
Dept: GENERAL RADIOLOGY | Age: 86
DRG: 871 | End: 2021-12-27
Payer: COMMERCIAL

## 2021-12-27 LAB
ALBUMIN SERPL-MCNC: 2.3 G/DL (ref 3.4–5)
ANION GAP SERPL CALCULATED.3IONS-SCNC: 8 MMOL/L (ref 3–16)
BASOPHILS ABSOLUTE: 0 K/UL (ref 0–0.2)
BASOPHILS RELATIVE PERCENT: 0.3 %
BUN BLDV-MCNC: 16 MG/DL (ref 7–20)
CALCIUM SERPL-MCNC: 8 MG/DL (ref 8.3–10.6)
CHLORIDE BLD-SCNC: 109 MMOL/L (ref 99–110)
CO2: 25 MMOL/L (ref 21–32)
CREAT SERPL-MCNC: 1.1 MG/DL (ref 0.6–1.2)
EOSINOPHILS ABSOLUTE: 0.1 K/UL (ref 0–0.6)
EOSINOPHILS RELATIVE PERCENT: 1.3 %
GFR AFRICAN AMERICAN: 57
GFR NON-AFRICAN AMERICAN: 47
GLUCOSE BLD-MCNC: 167 MG/DL (ref 70–99)
GLUCOSE BLD-MCNC: 170 MG/DL (ref 70–99)
GLUCOSE BLD-MCNC: 185 MG/DL (ref 70–99)
GLUCOSE BLD-MCNC: 186 MG/DL (ref 70–99)
GLUCOSE BLD-MCNC: 190 MG/DL (ref 70–99)
HCT VFR BLD CALC: 35.4 % (ref 36–48)
HEMOGLOBIN: 11.9 G/DL (ref 12–16)
LYMPHOCYTES ABSOLUTE: 1.5 K/UL (ref 1–5.1)
LYMPHOCYTES RELATIVE PERCENT: 17.4 %
MAGNESIUM: 2.5 MG/DL (ref 1.8–2.4)
MCH RBC QN AUTO: 31.8 PG (ref 26–34)
MCHC RBC AUTO-ENTMCNC: 33.5 G/DL (ref 31–36)
MCV RBC AUTO: 95 FL (ref 80–100)
MONOCYTES ABSOLUTE: 0.4 K/UL (ref 0–1.3)
MONOCYTES RELATIVE PERCENT: 4.3 %
NEUTROPHILS ABSOLUTE: 6.4 K/UL (ref 1.7–7.7)
NEUTROPHILS RELATIVE PERCENT: 76.7 %
PDW BLD-RTO: 12.2 % (ref 12.4–15.4)
PERFORMED ON: ABNORMAL
PHOSPHORUS: 2 MG/DL (ref 2.5–4.9)
PLATELET # BLD: 152 K/UL (ref 135–450)
PMV BLD AUTO: 9 FL (ref 5–10.5)
POTASSIUM SERPL-SCNC: 3.5 MMOL/L (ref 3.5–5.1)
PROCALCITONIN: 0.3 NG/ML (ref 0–0.15)
RBC # BLD: 3.73 M/UL (ref 4–5.2)
SODIUM BLD-SCNC: 142 MMOL/L (ref 136–145)
WBC # BLD: 8.4 K/UL (ref 4–11)

## 2021-12-27 PROCEDURE — 36415 COLL VENOUS BLD VENIPUNCTURE: CPT

## 2021-12-27 PROCEDURE — 1200000000 HC SEMI PRIVATE

## 2021-12-27 PROCEDURE — 6360000002 HC RX W HCPCS: Performed by: INTERNAL MEDICINE

## 2021-12-27 PROCEDURE — 92526 ORAL FUNCTION THERAPY: CPT

## 2021-12-27 PROCEDURE — 83735 ASSAY OF MAGNESIUM: CPT

## 2021-12-27 PROCEDURE — 6370000000 HC RX 637 (ALT 250 FOR IP): Performed by: INTERNAL MEDICINE

## 2021-12-27 PROCEDURE — 85025 COMPLETE CBC W/AUTO DIFF WBC: CPT

## 2021-12-27 PROCEDURE — 2580000003 HC RX 258: Performed by: INTERNAL MEDICINE

## 2021-12-27 PROCEDURE — 84145 PROCALCITONIN (PCT): CPT

## 2021-12-27 PROCEDURE — 80069 RENAL FUNCTION PANEL: CPT

## 2021-12-27 PROCEDURE — 71046 X-RAY EXAM CHEST 2 VIEWS: CPT

## 2021-12-27 PROCEDURE — 2500000003 HC RX 250 WO HCPCS: Performed by: INTERNAL MEDICINE

## 2021-12-27 RX ADMIN — APIXABAN 2.5 MG: 5 TABLET, FILM COATED ORAL at 10:15

## 2021-12-27 RX ADMIN — INSULIN LISPRO 2 UNITS: 100 INJECTION, SOLUTION INTRAVENOUS; SUBCUTANEOUS at 09:21

## 2021-12-27 RX ADMIN — CARBOXYMETHYLCELLULOSE SODIUM 1 DROP: 10 GEL OPHTHALMIC at 10:16

## 2021-12-27 RX ADMIN — APIXABAN 2.5 MG: 5 TABLET, FILM COATED ORAL at 21:42

## 2021-12-27 RX ADMIN — INSULIN LISPRO 1 UNITS: 100 INJECTION, SOLUTION INTRAVENOUS; SUBCUTANEOUS at 21:47

## 2021-12-27 RX ADMIN — POTASSIUM PHOSPHATE, MONOBASIC POTASSIUM PHOSPHATE, DIBASIC 20 MMOL: 224; 236 INJECTION, SOLUTION, CONCENTRATE INTRAVENOUS at 16:41

## 2021-12-27 RX ADMIN — CARBOXYMETHYLCELLULOSE SODIUM 1 DROP: 10 GEL OPHTHALMIC at 21:43

## 2021-12-27 RX ADMIN — PIPERACILLIN AND TAZOBACTAM 3375 MG: 3; .375 INJECTION, POWDER, LYOPHILIZED, FOR SOLUTION INTRAVENOUS at 21:36

## 2021-12-27 RX ADMIN — METOPROLOL TARTRATE 12.5 MG: 25 TABLET, FILM COATED ORAL at 10:15

## 2021-12-27 RX ADMIN — INSULIN LISPRO 2 UNITS: 100 INJECTION, SOLUTION INTRAVENOUS; SUBCUTANEOUS at 16:42

## 2021-12-27 RX ADMIN — INSULIN LISPRO 2 UNITS: 100 INJECTION, SOLUTION INTRAVENOUS; SUBCUTANEOUS at 11:44

## 2021-12-27 RX ADMIN — PIPERACILLIN AND TAZOBACTAM 3375 MG: 3; .375 INJECTION, POWDER, LYOPHILIZED, FOR SOLUTION INTRAVENOUS at 04:14

## 2021-12-27 RX ADMIN — POTASSIUM CHLORIDE: 2 INJECTION, SOLUTION, CONCENTRATE INTRAVENOUS at 06:25

## 2021-12-27 RX ADMIN — POTASSIUM CHLORIDE: 2 INJECTION, SOLUTION, CONCENTRATE INTRAVENOUS at 18:03

## 2021-12-27 RX ADMIN — LEVETIRACETAM 750 MG: 500 SOLUTION ORAL at 10:15

## 2021-12-27 RX ADMIN — PIPERACILLIN AND TAZOBACTAM 3375 MG: 3; .375 INJECTION, POWDER, LYOPHILIZED, FOR SOLUTION INTRAVENOUS at 11:44

## 2021-12-27 RX ADMIN — FAMOTIDINE 20 MG: 20 TABLET ORAL at 10:15

## 2021-12-27 ASSESSMENT — PAIN SCALES - PAIN ASSESSMENT IN ADVANCED DEMENTIA (PAINAD)
FACIALEXPRESSION: 0
BODYLANGUAGE: 0
NEGVOCALIZATION: 0
FACIALEXPRESSION: 0
NEGVOCALIZATION: 0
NEGVOCALIZATION: 0
BODYLANGUAGE: 0
BREATHING: 0
CONSOLABILITY: 0
BREATHING: 0
BODYLANGUAGE: 0
NEGVOCALIZATION: 0
NEGVOCALIZATION: 0
CONSOLABILITY: 0
BREATHING: 0
CONSOLABILITY: 0
BODYLANGUAGE: 0
BREATHING: 0
FACIALEXPRESSION: 0
CONSOLABILITY: 0
TOTALSCORE: 0
NEGVOCALIZATION: 0
FACIALEXPRESSION: 0
CONSOLABILITY: 0
BODYLANGUAGE: 0
CONSOLABILITY: 0
NEGVOCALIZATION: 0
BREATHING: 0
FACIALEXPRESSION: 0
TOTALSCORE: 0
NEGVOCALIZATION: 0
BODYLANGUAGE: 0
BREATHING: 0
FACIALEXPRESSION: 0
CONSOLABILITY: 0
BODYLANGUAGE: 0
TOTALSCORE: 0
FACIALEXPRESSION: 0
BREATHING: 0
TOTALSCORE: 0
BODYLANGUAGE: 0
BREATHING: 0
TOTALSCORE: 0
CONSOLABILITY: 0
NEGVOCALIZATION: 0
BREATHING: 0
BODYLANGUAGE: 0
TOTALSCORE: 0
TOTALSCORE: 0
FACIALEXPRESSION: 0
FACIALEXPRESSION: 0
CONSOLABILITY: 0

## 2021-12-27 NOTE — PROGRESS NOTES
Arrived to place midline in patient with Iris RN at bedside, pre procedure and allergies reviewed, no issues accessing basilic vein, pt tolerated well, blood return and flushed well. Pt left in stable condition and bed braked and in lowest position. Pt call light within reach. Handoff to RN.

## 2021-12-27 NOTE — PROGRESS NOTES
[facility] Note        Hypernatremia-severe-improving to baseline.  -Sodium is 142 and potassium of 3.5. Elham Rast Volume status stable. Hypertension stable. Electrolyte imbalance improved to baseline. Medications reviewed. Stable from renal for discharge. Patient Active Problem List   Diagnosis    [facility]    [facility]    HTN (hypertension), benign    High cholesterol    [facility] failure [facility]    Remote history of stroke    Dyslipidemia    Essential tremor    Hyperglycemic hyperosmolar nonketotic coma ([facility]    Atrial fibrillation (HCC)    Hypernatremia    MARIAH (acute kidney injury) ([facility]    Asp[facility] pneumonia [facility]    Acute respiratory failure with hypoxia ([facility]       Past Medical History:   has a past medical history of CHF (congestive heart failure) ([facility] Hyperlipidemia, and Hypertension. Past Social History:   reports that she has quit smoking. She has never used smokeless tobacco. She reports that she does not drink alcohol and does not use drugs.     Subjective:  Seen resting in bed Awake but not able to verbalize effectively still  Review of Systems unable to obtain      Objective:      /61   Pulse 55   Temp 98.6 °F (37 °C) (Temporal)   Resp 18   Ht 5' 7.72\" (1.72 m) Comment: Measured w/ tape  Wt 170 lb 9.6 oz (77.4 kg) Comment: 2 pillows, sheet, hob flat  LMP  (LMP Unknown) Comment: post-menopause  SpO2 96%   BMI 26.16 kg/m²     Wt Readings from Last 3 Encounters:   12/27/21 170 lb 9.6 oz (77.4 kg)   11/30/19 208 lb 5.4 oz (94.5 kg)   06/14/19 205 lb 12.8 oz (93.4 kg)       BP Readings from Last 3 Encounters:   12/27/21 110/61   11/30/19 99/66   06/14/19 (!) 117/56     Chest- clear  Heart-regular  Abd-soft  Ext- no edema    Labs  Hemoglobin   Date Value Ref Range Status   12/27/2021 11.9 (L) 12.0 - 16.0 g/dL Final     Hematocrit   Date Value Ref Range Status   12/27/2021 35.4 (L) 36.0 - 48.0 % Final     WBC   Date Value Ref Range Status   12/27/2021 8.4 4.0 - 11.0 K/uL Final     Platelets   Date Value Ref Range Status   12/27/2021 152 135 - 450 K/uL Final     Lab Results   Component Value Date    CREATININE 1.1 12/27/2021    BUN 16 12/27/2021     12/27/2021    K 3.5 12/27/2021     12/27/2021    CO2 25 12/27/2021       Assessment/Plan:        Deirdre Ornelas MD  [facility] [address] [place] [address]   Phone: [phone] or Via [employer]  Fax: [phone]

## 2021-12-27 NOTE — PLAN OF CARE
Problem: Skin Integrity:  Goal: Will show no infection signs and symptoms  Description: Will show no infection signs and symptoms  Outcome: Ongoing  Goal: Absence of new skin breakdown  Description: Absence of new skin breakdown  Outcome: Ongoing     Problem: Pain:  Goal: Pain level will decrease  Description: Pain level will decrease  Outcome: Ongoing     Problem: Fluid Volume - Imbalance:  Goal: Absence of imbalanced fluid volume signs and symptoms  Description: Absence of imbalanced fluid volume signs and symptoms  Outcome: Ongoing     Problem: Fluid Volume:  Goal: Signs and symptoms of dehydration will decrease  Description: Signs and symptoms of dehydration will decrease  Outcome: Ongoing  Goal: Ability to achieve a balanced intake and output will improve  Description: Ability to achieve a balanced intake and output will improve  Outcome: Ongoing     Problem: Physical Regulation:  Goal: Complications related to the disease process, condition or treatment will be avoided or minimized  Description: Complications related to the disease process, condition or treatment will be avoided or minimized  Outcome: Ongoing  Goal: Ability to maintain vital signs within normal range will improve  Description: Ability to maintain vital signs within normal range will improve  Outcome: Ongoing

## 2021-12-27 NOTE — PROGRESS NOTES
Progress Note - Dr. Porsche Owens - Internal Medicine  PCP: Zina Durand [address] [facility]. [address] / [place] [address] [phone]    Hospital Day: 5  Code Status: DNR-CC  Current Diet: ADULT DIET; Dysphagia - Pureed; Low Potassium (Less than 3000 mg/day); Low Phosphorus (Less than 1000 mg); No Drinking Straws        CC: follow up on medical issues    Subjective:   Deyvi Steele is a 82 y.o. female. Pt seen and examined  Chart reviewed since last visit, labs and imaging below        Pt does not offer hx  Na improving  142 today. (170 -> 160 -> 150 --> 154 -->149 --> 142)  BP is stable; now in 100-110s    She does not offer interval hx nor ROS    I have reviewed the patient's medical and social history in detail and updated the computerized patient record. To recap: She  has a past medical history of CHF (congestive heart failure) ([facility] Hyperlipidemia, and Hypertension. . She  has a past surgical history that includes hip surgery. . She  reports that she has quit smoking. She has never used smokeless tobacco. She reports that she does not drink alcohol and does not use drugs. .        Active Hospital Problems    Diagnosis Date Noted    Hypernatremia [E87.0] 12/22/2021    MARIAH (acute kidney injury) ([facility] [address] [N17.9] 12/22/2021    Asp[facility] pneumonia ([facility] [J69.0] 12/22/2021    Acute respiratory failure with hypoxia (HCC) [J96.01] 12/22/2021    Atrial fibrillation ([facility] [address] [I48.91] 11/28/2019    HTN (hypertension), benign [I10] 06/11/2019    High cholesterol [E78.00] 06/11/2019       Current Facility-Administered Medications: lidocaine PF 1 % injection 5 mL, 5 mL, IntraDERmal, Once  sodium chloride flush 0.9 % injection 5-40 mL, 5-40 mL, IntraVENous, 2 times per day  sodium chloride flush 0.9 % injection 5-40 mL, 5-40 mL, IntraVENous, PRN  0.9 % sodium chloride infusion, 25 mL, IntraVENous, PRN  piperacillin-tazobactam (ZOSYN) 3,375 mg in dextrose 5 % 50 mL IVPB extended infusion (mini-bag), 3,375 mg, IntraVENous, Q8H  potassium chloride 20 mEq in dextrose 5 % and 0.2 % NaCl 1,000 mL infusion, , IntraVENous, Continuous  lidocaine PF 1 % injection 5 mL, 5 mL, IntraDERmal, Once  sodium chloride flush 0.9 % injection 5-40 mL, 5-40 mL, IntraVENous, 2 times per day  sodium chloride flush 0.9 % injection 5-40 mL, 5-40 mL, IntraVENous, PRN  0.9 % sodium chloride infusion, 25 mL, IntraVENous, PRN  famotidine (PEPCID) tablet 20 mg, 20 mg, Oral, Daily  digoxin (LANOXIN) tablet 125 mcg, 125 mcg, Oral, Daily  apixaban (ELIQUIS) tablet 2.5 mg, 2.5 mg, Oral, BID  guaiFENesin-dextromethorphan (ROBITUSSIN DM) 100-10 MG/5ML syrup 10 mL, 10 mL, Oral, Q4H PRN  carboxymethylcellulose PF (THERATEARS) 1 % ophthalmic gel 1 drop, 1 drop, Both Eyes, BID  ipratropium-albuterol (DUONEB) nebulizer solution 3 mL, 1 vial, Inhalation, Q6H PRN  levETIRAcetam (KEPPRA) 100 MG/ML solution 750 mg, 750 mg, Oral, Daily  metoprolol tartrate (LOPRESSOR) tablet 12.5 mg, 12.5 mg, Oral, BID  vitamin D (CHOLECALCIFEROL) tablet 1,000 Units, 1,000 Units, Oral, Daily  sodium chloride flush 0.9 % injection 5-40 mL, 5-40 mL, IntraVENous, 2 times per day  sodium chloride flush 0.9 % injection 5-40 mL, 5-40 mL, IntraVENous, PRN  0.9 % sodium chloride infusion, 25 mL, IntraVENous, PRN  ondansetron (ZOFRAN-ODT) disintegrating tablet 4 mg, 4 mg, Oral, Q8H PRN **OR** ondansetron (ZOFRAN) injection 4 mg, 4 mg, IntraVENous, Q6H PRN  acetaminophen (TYLENOL) tablet 650 mg, 650 mg, Oral, Q6H PRN **OR** acetaminophen (TYLENOL) suppository 650 mg, 650 mg, Rectal, Q6H PRN  hydrALAZINE (APRESOLINE) injection 10 mg, 10 mg, IntraVENous, Q6H PRN  0.9 % sodium chloride bolus, 500 mL, IntraVENous, PRN  potassium chloride (KLOR-CON M) extended release tablet 40 mEq, 40 mEq, Oral, PRN **OR** potassium bicarb-citric acid (EFFER-K) effervescent tablet 40 mEq, 40 mEq, Oral, PRN **OR** potassium chloride 10 mEq/100 mL IVPB (Peripheral Line), 10 mEq, IntraVENous, PRN  insulin lispro (1 Unit Dial) 0-12 Units, 0-12 Units, SubCUTAneous, TID WC  insulin lispro (1 Unit Dial) 0-6 Units, 0-6 Units, SubCUTAneous, Nightly  glucose (GLUTOSE) 40 % oral gel 15 g, 15 g, Oral, PRN  dextrose 50 % IV solution, 12.5 g, IntraVENous, PRN  glucagon (rDNA) injection 1 mg, 1 mg, IntraMUSCular, PRN  dextrose 5 % solution, 100 mL/hr, IntraVENous, PRN         Objective:  BP (!) 106/58   Pulse 51   Temp 98 °F (36.7 °C) (Oral)   Resp 18   Ht 5' 7.72\" (1.72 m) Comment: Measured w/ tape  Wt 170 lb 9.6 oz (77.4 kg) Comment: 2 pillows, sheet, hob flat  LMP  (LMP Unknown) Comment: post-menopause  SpO2 94%   BMI 26.16 kg/m²      Patient Vitals for the past 24 hrs:   BP Temp Temp src Pulse Resp SpO2 Weight   12/27/21 0802 -- -- -- 51 -- -- --   12/27/21 0428 -- -- -- -- -- -- 170 lb 9.6 oz (77.4 kg)   12/27/21 0400 (!) 106/58 98 °F (36.7 °C) Oral 52 18 94 % --   12/27/21 0037 -- -- -- -- -- 96 % --   12/27/21 0030 99/67 99 °F (37.2 °C) Oral 52 18 -- --   12/26/21 2115 102/66 98.7 °F (37.1 °C) Oral 76 18 95 % --   12/26/21 1215 (!) 106/57 98.2 °F (36.8 °C) Axillary 54 18 100 % --   12/26/21 0856 119/84 98.6 °F (37 °C) Oral 56 18 95 % --     Patient Vitals for the past 96 hrs (Last 3 readings):   Weight   12/27/21 0428 170 lb 9.6 oz (77.4 kg)   12/26/21 0400 185 lb 9.6 oz (84.2 kg)   12/24/21 0415 179 lb 11.2 oz (81.5 kg)         No intake or output data in the 24 hours ending 12/27/21 0820      Physical Exam:   Vitals as above  General appearance: alert, appears stated age and cooperative    Head: Normocephalic, without obvious abnormality, atraumatic    Lungs: clear to auscultation bilaterally    Heart: irregular rate and rhythm, S1, S2 normal, no murmur    Abdomen: soft, non-tender; bowel sounds normal; no masses, no organomegaly    Extremities: extremities normal, atraumatic, no cyanosis, no edema      Labs:  Lab Results   Component Value Date    WBC 8.4 12/27/2021    HGB 11.9 (L) 12/27/2021    HCT 35.4 (L) 12/27/2021  12/27/2021    CHOL 188 06/13/2019    TRIG 181 (H) 06/13/2019    HDL 35 (L) 06/13/2019     (H) 12/22/2021    AST 66 (H) 12/22/2021     12/27/2021    K 3.5 12/27/2021     12/27/2021    CREATININE 1.1 12/27/2021    BUN 16 12/27/2021    CO2 25 12/27/2021    INR 1.49 (H) 11/27/2019    LABA1C 9.4 11/30/2019    LABMICR YES 12/22/2021     Lab Results   Component Value Date    TROPONINI 0.01 12/22/2021       Recent Imaging Results are Reviewed:  XR CHEST PORTABLE    Result Date: 12/22/2021  EXAMINATION: ONE XRAY VIEW OF THE CHEST 12/22/2021 3:53 am COMPARISON: Chest x-ray dated 11/27/2019 HISTORY: ORDERING SYSTEM PROVIDED HISTORY: Dyspnea probable l aspiration TECHNOLOGIST PROVIDED HISTORY: Reason for exam:->Dyspnea probable l aspiration Reason for Exam: Dyspnea probable l aspiration Relevant Medical/Surgical History: previous CHF, hypertension and hyperlipidemia FINDINGS: Clear lungs. No pleural effusion or pneumothorax. Cardiomediastinal silhouette is unremarkable. Visualized osseous structures are unremarkable. Clear lungs. Lab Results   Component Value Date    GLUCOSE 186 12/27/2021     Lab Results   Component Value Date    POCGLU 167 12/27/2021     BP (!) 106/58   Pulse 51   Temp 98 °F (36.7 °C) (Oral)   Resp 18   Ht 5' 7.72\" (1.72 m) Comment: Measured w/ tape  Wt 170 lb 9.6 oz (77.4 kg) Comment: 2 pillows, sheet, hob flat  LMP  (LMP Unknown) Comment: post-menopause  SpO2 94%   BMI 26.16 kg/m²     Assessment and Plan:  Principal Problem:    Hypernatremia -Established problem. Improving. Na 142  Plan: cont free water per renal  Active Problems:    HTN (hypertension), benign -Established problem. 106/58  Plan: cont fluids. Watch closely    High cholesterol -Established problem. Stable. Plan: cont meds    Atrial fibrillation ([address]  -Established problem. Stable. Still in fib  Plan: cont to monitor on tele    MARIAH (acute kidney injury) ([address] -Established problem. Stable. Creat 1.1  Plan: Continue present orders/plan. Aspiration pneumonia ([address] -Established problem. Stable. CRP up. procal 2.45 on admit  Plan: cont empiric abx as it appears to be bacterial infxn. Repeat cxr to be ordered today    Acute respiratory failure with hypoxia ([address] -Established problem. Stable. Now off o2  Plan: Continue present orders/plan.        Disp - If Na remains stable, consider return to SNF on 12/28    (Please note that portions of this note were completed with a voice recognition program.  Efforts were made to edit the dictations but occasionally words are mis-transcribed.)        Steffanie De La Paz MD  12/27/2021

## 2021-12-27 NOTE — CONSULTS
PALLIATIVE MEDICINE CONSULTATION     Patient name:Madelin Berry   ZAB:[id number]    :1935  Room/Bed:[facility]3327/3327-   LOS: 5 days         Date of consult:2021    Consult Information  Palliative Medicine Consult performed by: ILANA Augustin CNP      Inpatient consult to Palliative Care  Consult performed by: ILANA Augustin CNP  Consult ordered by: Tang Borden MD  Reason for consult: [address]        Number of admissions past 12 months: 0      ASSESSMENT/RECOMMENDATIONS     82 y.o. female with Aphasia and hypoxia s/p CVA      Symptom Management:  1. Aphasia- s/p stroke pt non-verbal and dependent for all care  2. Hypoxia- concern high for aspiration PNA pt on 2L currently  3. Goals of Care- pt is [facility] at baseline, no HCPOA called and left message for Paloma Lira granddaughter who is listed as first contact to discuss long term plan    Patient/Family Goals of Care :    no HCPOA called and left message for Paloma Lira granddaughter who is listed as first contact to discuss long term plan    Disposition/Discharge Plan:   pending    Advance Directives:  · Surrogate Decision Maker: No HCPOA   · Code status:  DNR-CC    Case discussed with: patient, floor RN,   Thank you for allowing us to participate in the care of this patient. HISTORY     CC: Hypoxia  HPI: The patient is a 82 y.o. female been short of breath with low sats with a dry cough the nursing home staff thought that she may have aspirated because she had choked earlier and then became short of breath she has altered mental status but she has had a previous stroke with some confusion    Palliative Medicine SymptomScreening/ROS:  Review of Systems -   History obtained from chart review and unobtainable from patient due to language barrier     Patient unable to complete full ROS due to current cognitive status. Information that is obtained from nursing and chart.      Pain:    Home med list and hospital medications reviewed in chart as of 12/27/2021     EXAM     Vitals:    12/27/21 1147   BP: 110/61   Pulse: 55   Resp: 18   Temp: 98.6 °F (37 °C)   SpO2: 96%       Physical Examination:   General appearance - chronically ill appearing  Mental status - non-verbal  Neck - supple, no significant adenopathy  Chest - no tachypnea, retractions or cyanosis  Abdomen - soft, nontender, nondistended, no masses or organomegaly  Musculoskeletal - osteoarthritic changes noted in both hands  Skin - normal coloration and turgor, no rashes, no suspicious skin lesions noted        Current labs in the epic chart reviewed as of 12/27/2021   Review of previous notes, admits, labs, radiology and testing relevant to this consult done in this chart today 12/27/2021      Total time: 70 minutes  >50% of time spent counseling patient at bedside or POA/family member if applicable , reviewing information and discussing care, coordinating with care team  Signed By: Electronically signed by ILANA Samson CNP on 12/27/2021 at 2:12 PM  Palliative Medicine   [phone]    December 27, 2021

## 2021-12-27 NOTE — PROGRESS NOTES
Speech Language Pathology  Dysphagia Treatment Note    Name:  Yessica Wheat  :   1935  Medical Diagnosis:  Dehydration [E86.0]  Somnolence [R40.0]  Hypernatremia [E87.0]  Acute kidney injury ([facility] [address] [N17.9]  Aspiration into airway, initial encounter [T18.378V]  Treatment Diagnosis: Oropharyngeal Dysphagia  Pain level: No signs of pain     Diet level prior to treatment:   Dysphagia I Pureed with thin liquids, no straws, Meds crushed in puree   Tolerance of Current Diet Level:   No documented nor reported difficulty since last SLP visit. Per RN, consumed entire breakfast meal this morning. Assessment of Texture Tolerance:  Pt in bed on room air. Non-verbal with one response to yes/no question via head nod. Limited responses to commands. Accepted pureed solids and thin liquids with total feeding assistance. She tolerated all consistencies without overt s/s of aspiration. She demonstrated mild to moderately prolonged oral phase transit and mildly delayed laryngeal elevation. Suspect this is pt's baseline diet. She presents at risk for aspiration due to cognitive status and feeding/positioning limitations therefore strict precautions should be implemented. Diet and Treatment Recommendations 2021:  Continue current diet: Dysphagia I Pureed with thin liquids, no straws, Meds crushed in puree       Compensatory strategies:Upright as possible with all PO intake , No straws , Small bites/sips , Eat/feed slowly, Remain upright 30-45 min , Total Feed     Dysphagia Goals:   1.) Pt will functionally tolerate recommended diet with no overt clinical s/s of aspiration (ongoing 2021)   2.) Pt will functionally tolerate ongoing assessment of swallow function with diet to be determined as indicated (ongoing 2021)   3.) Pt will advance to least restrictive diet as indicated (ongoing 2021)     Plan:    Continued daily Dysphagia treatment with goals per plan of care.     Patient/Family Education:  Education given to the Jahaira Gallegos, who verbalized understanding. Pt unable to demonstrate comprehension. Discharge Recommendations:    Pt will benefit from continued skilled Speech Therapy for Dysphagia services, prior to returning home. Timed Code Treatment:   0 minutes    Total Treatment Time:   15 minutes     If patient discharges prior to next session this note will serve as a discharge summary. Signature:  Luiz Lopez M.A.  CCC-SLP TIGRE [id number]  Speech-Language Pathologist   12/27/2021 9:43 AM

## 2021-12-28 ENCOUNTER — APPOINTMENT (OUTPATIENT)
Dept: CT IMAGING | Age: 86
DRG: 871 | End: 2021-12-28
Payer: COMMERCIAL

## 2021-12-28 PROBLEM — E83.39 HYPOPHOSPHATEMIA: Status: ACTIVE | Noted: 2021-12-28

## 2021-12-28 LAB
ALBUMIN SERPL-MCNC: 2.5 G/DL (ref 3.4–5)
ANION GAP SERPL CALCULATED.3IONS-SCNC: 10 MMOL/L (ref 3–16)
BASOPHILS ABSOLUTE: 0 K/UL (ref 0–0.2)
BASOPHILS RELATIVE PERCENT: 0.6 %
BUN BLDV-MCNC: 13 MG/DL (ref 7–20)
CALCIUM SERPL-MCNC: 8.1 MG/DL (ref 8.3–10.6)
CHLORIDE BLD-SCNC: 110 MMOL/L (ref 99–110)
CO2: 23 MMOL/L (ref 21–32)
CREAT SERPL-MCNC: 1 MG/DL (ref 0.6–1.2)
EOSINOPHILS ABSOLUTE: 0.1 K/UL (ref 0–0.6)
EOSINOPHILS RELATIVE PERCENT: 1.2 %
GFR AFRICAN AMERICAN: >60
GFR NON-AFRICAN AMERICAN: 53
GLUCOSE BLD-MCNC: 142 MG/DL (ref 70–99)
GLUCOSE BLD-MCNC: 143 MG/DL (ref 70–99)
GLUCOSE BLD-MCNC: 148 MG/DL (ref 70–99)
GLUCOSE BLD-MCNC: 169 MG/DL (ref 70–99)
GLUCOSE BLD-MCNC: 189 MG/DL (ref 70–99)
HCT VFR BLD CALC: 34 % (ref 36–48)
HEMOGLOBIN: 11.3 G/DL (ref 12–16)
LYMPHOCYTES ABSOLUTE: 1.5 K/UL (ref 1–5.1)
LYMPHOCYTES RELATIVE PERCENT: 20 %
MAGNESIUM: 2.3 MG/DL (ref 1.8–2.4)
MCH RBC QN AUTO: 31.3 PG (ref 26–34)
MCHC RBC AUTO-ENTMCNC: 33.2 G/DL (ref 31–36)
MCV RBC AUTO: 94.5 FL (ref 80–100)
MONOCYTES ABSOLUTE: 0.5 K/UL (ref 0–1.3)
MONOCYTES RELATIVE PERCENT: 5.9 %
NEUTROPHILS ABSOLUTE: 5.6 K/UL (ref 1.7–7.7)
NEUTROPHILS RELATIVE PERCENT: 72.3 %
PDW BLD-RTO: 12.2 % (ref 12.4–15.4)
PERFORMED ON: ABNORMAL
PHOSPHORUS: 1.9 MG/DL (ref 2.5–4.9)
PLATELET # BLD: 167 K/UL (ref 135–450)
PMV BLD AUTO: 8.7 FL (ref 5–10.5)
POTASSIUM SERPL-SCNC: 3.6 MMOL/L (ref 3.5–5.1)
RBC # BLD: 3.6 M/UL (ref 4–5.2)
SODIUM BLD-SCNC: 143 MMOL/L (ref 136–145)
WBC # BLD: 7.7 K/UL (ref 4–11)

## 2021-12-28 PROCEDURE — 6370000000 HC RX 637 (ALT 250 FOR IP): Performed by: INTERNAL MEDICINE

## 2021-12-28 PROCEDURE — 6360000002 HC RX W HCPCS: Performed by: INTERNAL MEDICINE

## 2021-12-28 PROCEDURE — 2580000003 HC RX 258: Performed by: INTERNAL MEDICINE

## 2021-12-28 PROCEDURE — 2500000003 HC RX 250 WO HCPCS: Performed by: INTERNAL MEDICINE

## 2021-12-28 PROCEDURE — 80069 RENAL FUNCTION PANEL: CPT

## 2021-12-28 PROCEDURE — 71250 CT THORAX DX C-: CPT

## 2021-12-28 PROCEDURE — 83735 ASSAY OF MAGNESIUM: CPT

## 2021-12-28 PROCEDURE — 1200000000 HC SEMI PRIVATE

## 2021-12-28 PROCEDURE — 36415 COLL VENOUS BLD VENIPUNCTURE: CPT

## 2021-12-28 PROCEDURE — 85025 COMPLETE CBC W/AUTO DIFF WBC: CPT

## 2021-12-28 RX ADMIN — APIXABAN 2.5 MG: 5 TABLET, FILM COATED ORAL at 09:23

## 2021-12-28 RX ADMIN — PIPERACILLIN AND TAZOBACTAM 3375 MG: 3; .375 INJECTION, POWDER, LYOPHILIZED, FOR SOLUTION INTRAVENOUS at 21:12

## 2021-12-28 RX ADMIN — FAMOTIDINE 20 MG: 20 TABLET ORAL at 09:23

## 2021-12-28 RX ADMIN — CARBOXYMETHYLCELLULOSE SODIUM 1 DROP: 10 GEL OPHTHALMIC at 21:15

## 2021-12-28 RX ADMIN — INSULIN LISPRO 2 UNITS: 100 INJECTION, SOLUTION INTRAVENOUS; SUBCUTANEOUS at 08:45

## 2021-12-28 RX ADMIN — POTASSIUM PHOSPHATE, MONOBASIC POTASSIUM PHOSPHATE, DIBASIC 20 MMOL: 224; 236 INJECTION, SOLUTION, CONCENTRATE INTRAVENOUS at 13:35

## 2021-12-28 RX ADMIN — Medication 1000 UNITS: at 09:23

## 2021-12-28 RX ADMIN — APIXABAN 2.5 MG: 5 TABLET, FILM COATED ORAL at 21:15

## 2021-12-28 RX ADMIN — CARBOXYMETHYLCELLULOSE SODIUM 1 DROP: 10 GEL OPHTHALMIC at 09:25

## 2021-12-28 RX ADMIN — POTASSIUM CHLORIDE: 2 INJECTION, SOLUTION, CONCENTRATE INTRAVENOUS at 18:02

## 2021-12-28 RX ADMIN — PIPERACILLIN AND TAZOBACTAM 3375 MG: 3; .375 INJECTION, POWDER, LYOPHILIZED, FOR SOLUTION INTRAVENOUS at 05:00

## 2021-12-28 RX ADMIN — LEVETIRACETAM 750 MG: 500 SOLUTION ORAL at 09:24

## 2021-12-28 RX ADMIN — INSULIN LISPRO 2 UNITS: 100 INJECTION, SOLUTION INTRAVENOUS; SUBCUTANEOUS at 11:39

## 2021-12-28 RX ADMIN — INSULIN LISPRO 2 UNITS: 100 INJECTION, SOLUTION INTRAVENOUS; SUBCUTANEOUS at 18:01

## 2021-12-28 RX ADMIN — INSULIN LISPRO 1 UNITS: 100 INJECTION, SOLUTION INTRAVENOUS; SUBCUTANEOUS at 21:18

## 2021-12-28 RX ADMIN — POTASSIUM CHLORIDE: 2 INJECTION, SOLUTION, CONCENTRATE INTRAVENOUS at 05:14

## 2021-12-28 ASSESSMENT — PAIN SCALES - PAIN ASSESSMENT IN ADVANCED DEMENTIA (PAINAD)
FACIALEXPRESSION: 0
TOTALSCORE: 0
BODYLANGUAGE: 0
BODYLANGUAGE: 0
TOTALSCORE: 0
FACIALEXPRESSION: 0
CONSOLABILITY: 0
BODYLANGUAGE: 0
NEGVOCALIZATION: 0
FACIALEXPRESSION: 0
TOTALSCORE: 0
BREATHING: 0
FACIALEXPRESSION: 0
BODYLANGUAGE: 0
FACIALEXPRESSION: 0
CONSOLABILITY: 0
BODYLANGUAGE: 0
TOTALSCORE: 0
TOTALSCORE: 0
FACIALEXPRESSION: 0
NEGVOCALIZATION: 0
TOTALSCORE: 0
NEGVOCALIZATION: 0
FACIALEXPRESSION: 0
BREATHING: 0
CONSOLABILITY: 0
NEGVOCALIZATION: 0
BREATHING: 0
BODYLANGUAGE: 0
BREATHING: 0
TOTALSCORE: 0
TOTALSCORE: 0
BODYLANGUAGE: 0
BREATHING: 0
CONSOLABILITY: 0
NEGVOCALIZATION: 0
BODYLANGUAGE: 0
CONSOLABILITY: 0
CONSOLABILITY: 0
FACIALEXPRESSION: 0
FACIALEXPRESSION: 0
BREATHING: 0
TOTALSCORE: 0
CONSOLABILITY: 0
FACIALEXPRESSION: 0
BODYLANGUAGE: 0
NEGVOCALIZATION: 0
NEGVOCALIZATION: 0
CONSOLABILITY: 0
CONSOLABILITY: 0
NEGVOCALIZATION: 0
NEGVOCALIZATION: 0
FACIALEXPRESSION: 0
NEGVOCALIZATION: 0
CONSOLABILITY: 0
CONSOLABILITY: 0
NEGVOCALIZATION: 0
TOTALSCORE: 0
TOTALSCORE: 0
BODYLANGUAGE: 0
BODYLANGUAGE: 0
BREATHING: 0

## 2021-12-28 NOTE — PROGRESS NOTES
Comprehensive Nutrition Assessment    Type and Reason for Visit: GREER     Nutrition Recommendations/Plan:   Nepro at dinner for wound healing  Monitor PO intake    Nutrition Assessment:  Pt admitted to hospital for dehydration. She is on a Dypsphagia pureed diet with 4 CHO choices per meal, low K<3000mg, low phos <1000mg. Po intake is % of meals. Pt with increased nutritional needs d/t stage 2 pressure injury on left ischium. Will start Nepro at dinner to increase protein to help with wound healing. Continue to monitor po intake throughout hosptial stay. Malnutrition Assessment:  Malnutrition Status: At risk for malnutrition (Comment)    Context:  Acute Illness     Findings of the 6 clinical characteristics of malnutrition:  Energy Intake:  No significant decrease in energy intake  Weight Loss:  No significant weight loss     Body Fat Loss:  No significant body fat loss     Muscle Mass Loss:  No significant muscle mass loss    Fluid Accumulation:  No significant fluid accumulation     Strength:  Not Performed    Estimated Daily Nutrient Needs:  Energy (kcal):  2597-9939 (20-25kcal/85kg); Weight Used for Energy Requirements:  Current     Protein (g):  82-95 (1.3-1.5g/63kg); Weight Used for Protein Requirements:           Fluid (ml/day):   ; Method Used for Fluid Requirements:  1 ml/kcal      Nutrition Related Findings:  Glucose 169, Phos. 1.9, LBM 12/24      Wounds:  Stage II       Current Nutrition Therapies:    ADULT DIET; Dysphagia - Pureed; 4 carb choices (60 gm/meal); Low Potassium (Less than 3000 mg/day); Low Phosphorus (Less than 1000 mg); No Drinking Straws  ADULT ORAL NUTRITION SUPPLEMENT; Dinner; Renal Oral Supplement    Anthropometric Measures:  · Height: 5' 7.72\" (172 cm)  · Current Body Weight: 188 lb (85.3 kg)   · Ideal Body Weight: 139 lbs; % Ideal Body Weight 135.3 %   · BMI: 28.8  · BMI Categories: Overweight (BMI 25.0-29. 9)       Nutrition Diagnosis:   · Increased nutrient needs related to increase demand for energy/nutrients as evidenced by wounds    Nutrition Interventions:   Food and/or Nutrient Delivery:  Continue Current Diet,Start Oral Nutrition Supplement  Nutrition Education/Counseling:  Education not indicated   Coordination of Nutrition Care:  Continue to monitor while inpatient    Goals:  PO intake 75% or greater of meals and supplements       Nutrition Monitoring and Evaluation:   Behavioral-Environmental Outcomes:  None Identified   Food/Nutrient Intake Outcomes:  Food and Nutrient Intake,Supplement Intake  Physical Signs/Symptoms Outcomes:  Skin,Biochemical Data     Discharge Planning:    No discharge needs at this time     Electronically signed by Jamie Ernst RD, LD on 12/28/21 at 11:59 AM EST    Contact: [phone]

## 2021-12-28 NOTE — PROGRESS NOTES
Primary RN concerned for patient's mucous filled stools. Per infection prevention RN, patient does not qualify for stool sample testing of C. Diff. Patient started zosyn on 12/25 and loose bowel movements with mucous started on night shift of 12/26.

## 2021-12-28 NOTE — PROGRESS NOTES
Progress Note - Dr. Sanjiv Ferguson - Internal Medicine  PCP: Kenji Connelly [address] [facility]. [address] / [place] [address] [phone]    Hospital Day: 6  Code Status: DNR-CC  Current Diet: ADULT DIET; Dysphagia - Pureed; 4 carb choices (60 gm/meal); Low Potassium (Less than 3000 mg/day); Low Phosphorus (Less than 1000 mg); No Drinking Straws        CC: follow up on medical issues    Subjective:   Fidelia Salinas is a 82 y.o. female. Pt seen and examined  Chart reviewed since last visit, labs and imaging below        Pt does not offer hx  Na improving  143 today. (170 -> 160 -> 150 --> 154 -->149 --> 142 --> 143)  BP is stable; now in 140s    She does not offer interval hx nor ROS    CXR 12.27 reviewed, shows worsening infilrates  Despite being on zosyn  Afebrile, WBC normal    Phos noted to be low  Pt's fluids/electolytes being managed by renal    I have reviewed the patient's medical and social history in detail and updated the computerized patient record. To recap: She  has a past medical history of CHF (congestive heart failure) ([facility] Hyperlipidemia, and Hypertension. . She  has a past surgical history that includes hip surgery. . She  reports that she has quit smoking. She has never used smokeless tobacco. She reports that she does not drink alcohol and does not use drugs. .        Active Hospital Problems    Diagnosis Date Noted    Hypophosphatemia [E83.39] 12/28/2021    Hypernatremia [E87.0] 12/22/2021    MARIAH (acute kidney injury) ([facility] [N17.9] 12/22/2021    Aspiration pneumonia ([facility] [J69.0] 12/22/2021    Acute respiratory failure with hypoxia (HCC) [J96.01] 12/22/2021    Atrial fibrillation ([address] [I48.91] 11/28/2019    HTN (hypertension), benign [I10] 06/11/2019    High cholesterol [E78.00] 06/11/2019       Current Facility-Administered Medications: lidocaine PF 1 % injection 5 mL, 5 mL, IntraDERmal, Once  sodium chloride flush 0.9 % injection 5-40 mL, 5-40 mL, IntraVENous, 2 times per day  sodium chloride flush 0.9 % injection 5-40 mL, 5-40 mL, IntraVENous, PRN  0.9 % sodium chloride infusion, 25 mL, IntraVENous, PRN  piperacillin-tazobactam (ZOSYN) 3,375 mg in dextrose 5 % 50 mL IVPB extended infusion (mini-bag), 3,375 mg, IntraVENous, Q8H  potassium chloride 20 mEq in dextrose 5 % and 0.2 % NaCl 1,000 mL infusion, , IntraVENous, Continuous  sodium chloride flush 0.9 % injection 5-40 mL, 5-40 mL, IntraVENous, 2 times per day  sodium chloride flush 0.9 % injection 5-40 mL, 5-40 mL, IntraVENous, PRN  0.9 % sodium chloride infusion, 25 mL, IntraVENous, PRN  famotidine (PEPCID) tablet 20 mg, 20 mg, Oral, Daily  digoxin (LANOXIN) tablet 125 mcg, 125 mcg, Oral, Daily  apixaban (ELIQUIS) tablet 2.5 mg, 2.5 mg, Oral, BID  guaiFENesin-dextromethorphan (ROBITUSSIN DM) 100-10 MG/5ML syrup 10 mL, 10 mL, Oral, Q4H PRN  carboxymethylcellulose PF (THERATEARS) 1 % ophthalmic gel 1 drop, 1 drop, Both Eyes, BID  ipratropium-albuterol (DUONEB) nebulizer solution 3 mL, 1 vial, Inhalation, Q6H PRN  levETIRAcetam (KEPPRA) 100 MG/ML solution 750 mg, 750 mg, Oral, Daily  metoprolol tartrate (LOPRESSOR) tablet 12.5 mg, 12.5 mg, Oral, BID  vitamin D (CHOLECALCIFEROL) tablet 1,000 Units, 1,000 Units, Oral, Daily  sodium chloride flush 0.9 % injection 5-40 mL, 5-40 mL, IntraVENous, 2 times per day  sodium chloride flush 0.9 % injection 5-40 mL, 5-40 mL, IntraVENous, PRN  0.9 % sodium chloride infusion, 25 mL, IntraVENous, PRN  ondansetron (ZOFRAN-ODT) disintegrating tablet 4 mg, 4 mg, Oral, Q8H PRN **OR** ondansetron (ZOFRAN) injection 4 mg, 4 mg, IntraVENous, Q6H PRN  acetaminophen (TYLENOL) tablet 650 mg, 650 mg, Oral, Q6H PRN **OR** acetaminophen (TYLENOL) suppository 650 mg, 650 mg, Rectal, Q6H PRN  hydrALAZINE (APRESOLINE) injection 10 mg, 10 mg, IntraVENous, Q6H PRN  0.9 % sodium chloride bolus, 500 mL, IntraVENous, PRN  potassium chloride (KLOR-CON M) extended release tablet 40 mEq, 40 mEq, Oral, PRN **OR** potassium bicarb-citric acid (EFFER-K) effervescent tablet 40 mEq, 40 mEq, Oral, PRN **OR** potassium chloride 10 mEq/100 mL IVPB (Peripheral Line), 10 mEq, IntraVENous, PRN  insulin lispro (1 Unit Dial) 0-12 Units, 0-12 Units, SubCUTAneous, TID WC  insulin lispro (1 Unit Dial) 0-6 Units, 0-6 Units, SubCUTAneous, Nightly  glucose (GLUTOSE) 40 % oral gel 15 g, 15 g, Oral, PRN  dextrose 50 % IV solution, 12.5 g, IntraVENous, PRN  glucagon (rDNA) injection 1 mg, 1 mg, IntraMUSCular, PRN  dextrose 5 % solution, 100 mL/hr, IntraVENous, PRN         Objective:  BP (!) 145/70   Pulse 52   Temp 98 °F (36.7 °C) (Axillary)   Resp 15   Ht 5' 7.72\" (1.72 m) Comment: Measured w/ tape  Wt 188 lb 1.6 oz (85.3 kg) Comment: 2 pillows, sheet, hob flat  LMP  (LMP Unknown) Comment: post-menopause  SpO2 96%   BMI 28.84 kg/m²      Patient Vitals for the past 24 hrs:   BP Temp Temp src Pulse Resp SpO2 Weight   12/28/21 0801 -- -- -- 52 -- -- --   12/28/21 0501 -- -- -- -- -- -- 188 lb 1.6 oz (85.3 kg)   12/28/21 0432 (!) 145/70 98 °F (36.7 °C) Axillary 57 15 96 % --   12/28/21 0046 115/67 98.2 °F (36.8 °C) Axillary 54 16 94 % --   12/28/21 0041 -- -- -- 54 -- -- --   12/27/21 2143 -- -- -- 53 -- -- --   12/27/21 2114 (!) 94/54 98.5 °F (36.9 °C) Oral 60 17 94 % --   12/27/21 1637 111/65 98.5 °F (36.9 °C) Axillary 52 18 96 % --   12/27/21 1609 -- -- -- 50 -- -- --   12/27/21 1147 110/61 98.6 °F (37 °C) Temporal 55 18 96 % --   12/27/21 0920 106/70 97.9 °F (36.6 °C) Oral 55 16 94 % --     Patient Vitals for the past 96 hrs (Last 3 readings):   Weight   12/28/21 0501 188 lb 1.6 oz (85.3 kg)   12/27/21 0428 170 lb 9.6 oz (77.4 kg)   12/26/21 0400 185 lb 9.6 oz (84.2 kg)           Intake/Output Summary (Last 24 hours) at 12/28/2021 3450  Last data filed at 12/27/2021 1741  Gross per 24 hour   Intake 600 ml   Output --   Net 600 ml         Physical Exam:   Vitals as above  General appearance: alert, appears stated age and cooperative    Head: Normocephalic, without obvious abnormality, atraumatic    Lungs: clear to auscultation bilaterally    Heart: irregular rate and rhythm, S1, S2 normal, no murmur    Abdomen: soft, non-tender; bowel sounds normal; no masses, no organomegaly    Extremities: extremities normal, atraumatic, no cyanosis, no edema      Labs:  Lab Results   Component Value Date    WBC 7.7 12/28/2021    HGB 11.3 (L) 12/28/2021    HCT 34.0 (L) 12/28/2021     12/28/2021    CHOL 188 06/13/2019    TRIG 181 (H) 06/13/2019    HDL 35 (L) 06/13/2019     (H) 12/22/2021    AST 66 (H) 12/22/2021     12/28/2021    K 3.6 12/28/2021     12/28/2021    CREATININE 1.0 12/28/2021    BUN 13 12/28/2021    CO2 23 12/28/2021    INR 1.49 (H) 11/27/2019    LABA1C 9.4 11/30/2019    LABMICR YES 12/22/2021     Lab Results   Component Value Date    TROPONINI 0.01 12/22/2021       Recent Imaging Results are Reviewed:  XR CHEST PORTABLE    Result Date: 12/22/2021  EXAMINATION: ONE XRAY VIEW OF THE CHEST 12/22/2021 3:53 am COMPARISON: Chest x-ray dated 11/27/2019 HISTORY: ORDERING SYSTEM PROVIDED HISTORY: Dyspnea probable l aspiration TECHNOLOGIST PROVIDED HISTORY: Reason for exam:->Dyspnea probable l aspiration Reason for Exam: Dyspnea probable l aspiration Relevant Medical/Surgical History: previous CHF, hypertension and hyperlipidemia FINDINGS: Clear lungs. No pleural effusion or pneumothorax. Cardiomediastinal silhouette is unremarkable. Visualized osseous structures are unremarkable. Clear lungs.        Lab Results   Component Value Date    GLUCOSE 169 12/28/2021     Lab Results   Component Value Date    POCGLU 189 12/28/2021     BP (!) 145/70   Pulse 52   Temp 98 °F (36.7 °C) (Axillary)   Resp 15   Ht 5' 7.72\" (1.72 m) Comment: Measured w/ tape  Wt 188 lb 1.6 oz (85.3 kg) Comment: 2 pillows, sheet, hob flat  LMP  (LMP Unknown) Comment: post-menopause  SpO2 96%   BMI 28.84 kg/m²     Assessment and Plan:  Principal Problem: Hypernatremia -Established problem. Improving. Na 143  Plan: cont free water per renal  Active Problems:    HTN (hypertension), benign -Established problem. 145/70  Plan: cont fluids. Watch closely    High cholesterol -Established problem. Stable. Plan: cont meds    Atrial fibrillation ([address]  -Established problem. Stable. Still in fib  Plan: cont to monitor on tele    MARIAH (acute kidney injury) ([address] -Established problem. Stable. Creat 1.0  Plan: Continue present orders/plan. Aspiration pneumonia ([address] -Established problem. Stable. CRP up. procal 2.45 on admit, now 0.30  Plan: cont empiric abx as it appears to be bacterial infxn. Repeat ct chest without constrast to be ordered today    Acute respiratory failure with hypoxia ([facility] -Established problem. Stable. Now off o2  Plan: Continue present orders/plan. Hypophosphatemia- sent message to Dr Della Kaur asking how to best replace this    Disp - consider return to SNF on 12/29, but would like to reimage chest first.  Also, palliative care consult In progress.   Pt may benefit from palliative/hospice as outpt    (Please note that portions of this note were completed with a voice recognition program.  Efforts were made to edit the dictations but occasionally words are mis-transcribed.)        Donta Maldonado MD  12/28/2021

## 2021-12-28 NOTE — PROGRESS NOTES
Noted pt having increased episodes of congested cough. Pt suctioned with estiven HOB elevated 45 degrees.

## 2021-12-28 NOTE — PLAN OF CARE
Nutrition Problem #1: Increased nutrient needs  Intervention: Food and/or Nutrient Delivery: Continue Current Diet,Start Oral Nutrition Supplement  Nutritional Goals: PO intake 75% or greater of meals and supplements

## 2021-12-28 NOTE — PROGRESS NOTES
PALLIATIVE MEDICINE PROGRESS NOTE     Patient name:Madelin Cunningham    ABC:[id number] :1935  Room/Bed:[address] [place], [place] [address]327-    LOS: 6 days        ASSESSMENT/RECOMMENDATIONS     82 y.o. female with Aphasia and hypoxia s/p CVA        Symptom Management:  1. Aphasia- s/p stroke pt non-verbal and dependent for all care  2. Hypoxia- concern high for aspiration PNA pt on 2L currently, CXR worsened today  3. Goals of Care- pt is [facility] at baseline, talked to Jazlyn Reyez granddaughter who is listed as first contact to discuss long term plan, we discussed Hospice care given recurrent aspiration PNA. She is on her way in to visit an hopes to chat today.      Patient/Family Goals of Care :    no HCPOA called and left message for Jazlyn Reyez granddaughter who is listed as first contact to discuss long term plan    Talked to pts 4 grandchildren. All agree for Hospice care back at [facility]. They would like [facility]osp[facility] of [address] [place] [address] they see them in the building all the time.      Disposition/Discharge Plan:   pending     Advance Directives:  · Surrogate Decision Maker: No HCPOA   · Code status:  DNR-CC     Case discussed with: patient, floor RN,   Thank you for allowing us to participate in the care of this patient. SUBJECTIVE     Chief Complaint: Hypoxia    Last 24 hours:   Pt more talkative today, CXR worse this am.     ROS:  Review of Systems -   History obtained from chart review and unobtainable from patient due to language barrier      Patient unable to complete full ROS due to current cognitive status. Information that is obtained from nursing and chart. OBJECTIVE   /66   Pulse 50   Temp 98.5 °F (36.9 °C) (Axillary)   Resp 15   Ht 5' 7.72\" (1.72 m)   Wt 188 lb 1.6 oz (85.3 kg) Comment: 2 pillows, sheet, hob flat  LMP  (LMP Unknown) Comment: post-menopause  SpO2 96%   BMI 28.84 kg/m²   I/O last 3 completed shifts:   In: 600 [P.O.:600]  Out: -   I/O this shift:  In: 6775.9 [P.O.:180; I.V.:5976.6; IV Piggyback:619.3]  Out: -       Physical Examination:   General appearance - chronically ill appearing  Mental status - non-verbal  Neck - supple, no significant adenopathy  Chest - no tachypnea, retractions or cyanosis  Abdomen - soft, nontender, nondistended, no masses or organomegaly  Musculoskeletal - osteoarthritic changes noted in both hands  Skin - normal coloration and turgor, no rashes, no suspicious skin lesions noted         Total time: 35 minutes  >50% of time spent counseling patient at bedside or POA/family member if applicable , reviewing information and discussing care, coordinating with care team       Signed By: Electronically signed by ILANA Barry CNP on 12/28/2021 at 1:12 PM   Palliative Medicine   [phone]    December 28, 2021

## 2021-12-28 NOTE — PROGRESS NOTES
[facility] Note        Hypernatremia-severe-improving to baseline.  -Sodium is 142 and potassium of 3.5. Lawernce Roughen Volume status stable. Hypertension stable. Electrolyte imbalance improved to baseline. Medications reviewed. Potassium levels have improved with IV. Replace potassium phosphate 20 mmol IV. .    Magnesium levels have improved with correction. Reduce the IV fluids to 50 mL/h. Renal improving. And stable  Multiple electrolyte imbalance improving. High complexity                Patient Active Problem List   Diagnosis    Hemorrhagic stroke ([facility]    Acute ischemic stroke ([facility]    HTN (hypertension), benign    High cholesterol    Chronic congestive heart failure (HCC)    Remote history of stroke    Dyslipidemia    Essential tremor    Hyperglycemic hyperosmolar nonketotic coma ([facility]    Atrial fibrillation (HCC)    Hypernatremia    MARIAH (acute kidney injury) ([facility]    Aspiration pneumonia ([facility]    [facility]    Hypophosphatemia       Past Medical History:   has a past medical history of CHF (congestive heart failure) ([facility] Hyperlipidemia, and Hypertension. Past Social History:   reports that she has quit smoking. She has never used smokeless tobacco. She reports that she does not drink alcohol and does not use drugs.     Subjective:  Seen resting in bed Awake but not able to verbalize effectively still  Review of Systems unable to obtain      Objective:      BP (!) 109/53   Pulse 54   Temp 97.8 °F (36.6 °C) (Axillary)   Resp 16   Ht 5' 7.72\" (1.72 m) Comment: Measured w/ tape  Wt 188 lb 1.6 oz (85.3 kg) Comment: 2 pillows, sheet, hob flat  LMP  (LMP Unknown) Comment: post-menopause  SpO2 94%   BMI 28.84 kg/m²     Wt Readings from Last 3 Encounters:   12/28/21 188 lb 1.6 oz (85.3 kg)   11/30/19 208 lb 5.4 oz (94.5 kg)   06/14/19 205 lb 12.8 oz (93.4 kg)       BP Readings from Last 3 Encounters:   12/28/21 (!) 109/53   11/30/19 99/66   06/14/19 (!) 117/56     Chest- clear  Heart-regular  Abd-soft  Ext- no edema    Labs  Hemoglobin   Date Value Ref Range Status   12/28/2021 11.3 (L) 12.0 - 16.0 g/dL Final     Hematocrit   Date Value Ref Range Status   12/28/2021 34.0 (L) 36.0 - 48.0 % Final     WBC   Date Value Ref Range Status   12/28/2021 7.7 4.0 - 11.0 K/uL Final     Platelets   Date Value Ref Range Status   12/28/2021 167 135 - 450 K/uL Final     Lab Results   Component Value Date    CREATININE 1.0 12/28/2021    BUN 13 12/28/2021     12/28/2021    K 3.6 12/28/2021     12/28/2021    CO2 23 12/28/2021       Assessment/Plan:        Jass Marks MD  [facility] [address] [place] [address] Road   Phone: [phone] or Via [employer]  Fax: [phone]

## 2021-12-29 VITALS
HEART RATE: 48 BPM | DIASTOLIC BLOOD PRESSURE: 78 MMHG | HEIGHT: 68 IN | BODY MASS INDEX: 28.67 KG/M2 | OXYGEN SATURATION: 94 % | SYSTOLIC BLOOD PRESSURE: 102 MMHG | WEIGHT: 189.2 LBS | RESPIRATION RATE: 14 BRPM | TEMPERATURE: 97.8 F

## 2021-12-29 LAB
GLUCOSE BLD-MCNC: 112 MG/DL (ref 70–99)
GLUCOSE BLD-MCNC: 124 MG/DL (ref 70–99)
GLUCOSE BLD-MCNC: 145 MG/DL (ref 70–99)
PERFORMED ON: ABNORMAL
SARS-COV-2, NAAT: NOT DETECTED

## 2021-12-29 PROCEDURE — 92526 ORAL FUNCTION THERAPY: CPT

## 2021-12-29 PROCEDURE — 87635 SARS-COV-2 COVID-19 AMP PRB: CPT

## 2021-12-29 PROCEDURE — 6370000000 HC RX 637 (ALT 250 FOR IP): Performed by: INTERNAL MEDICINE

## 2021-12-29 PROCEDURE — 2580000003 HC RX 258: Performed by: INTERNAL MEDICINE

## 2021-12-29 PROCEDURE — 6360000002 HC RX W HCPCS: Performed by: INTERNAL MEDICINE

## 2021-12-29 RX ORDER — AMOXICILLIN AND CLAVULANATE POTASSIUM 875; 125 MG/1; MG/1
1 TABLET, FILM COATED ORAL 2 TIMES DAILY
Qty: 14 TABLET | Refills: 0
Start: 2021-12-29 | End: 2022-01-05

## 2021-12-29 RX ADMIN — INSULIN LISPRO 2 UNITS: 100 INJECTION, SOLUTION INTRAVENOUS; SUBCUTANEOUS at 08:43

## 2021-12-29 RX ADMIN — LEVETIRACETAM 750 MG: 500 SOLUTION ORAL at 08:43

## 2021-12-29 RX ADMIN — APIXABAN 2.5 MG: 5 TABLET, FILM COATED ORAL at 08:43

## 2021-12-29 RX ADMIN — PIPERACILLIN AND TAZOBACTAM 3375 MG: 3; .375 INJECTION, POWDER, LYOPHILIZED, FOR SOLUTION INTRAVENOUS at 13:45

## 2021-12-29 RX ADMIN — Medication 1000 UNITS: at 08:43

## 2021-12-29 RX ADMIN — PIPERACILLIN AND TAZOBACTAM 3375 MG: 3; .375 INJECTION, POWDER, LYOPHILIZED, FOR SOLUTION INTRAVENOUS at 05:33

## 2021-12-29 RX ADMIN — CARBOXYMETHYLCELLULOSE SODIUM 1 DROP: 10 GEL OPHTHALMIC at 10:01

## 2021-12-29 RX ADMIN — FAMOTIDINE 20 MG: 20 TABLET ORAL at 08:43

## 2021-12-29 ASSESSMENT — PAIN SCALES - PAIN ASSESSMENT IN ADVANCED DEMENTIA (PAINAD)
FACIALEXPRESSION: 0
TOTALSCORE: 0
BREATHING: 0
FACIALEXPRESSION: 0
BREATHING: 0
FACIALEXPRESSION: 0
FACIALEXPRESSION: 0
CONSOLABILITY: 0
NEGVOCALIZATION: 0
NEGVOCALIZATION: 0
BODYLANGUAGE: 0
TOTALSCORE: 0
CONSOLABILITY: 0
FACIALEXPRESSION: 0
TOTALSCORE: 0
BODYLANGUAGE: 0
BODYLANGUAGE: 0
NEGVOCALIZATION: 0
TOTALSCORE: 0
CONSOLABILITY: 0
FACIALEXPRESSION: 0
BREATHING: 0
BODYLANGUAGE: 0
CONSOLABILITY: 0
NEGVOCALIZATION: 0
BODYLANGUAGE: 0
TOTALSCORE: 0
CONSOLABILITY: 0
NEGVOCALIZATION: 0
NEGVOCALIZATION: 0
CONSOLABILITY: 0
BREATHING: 0
TOTALSCORE: 0
BREATHING: 0
BREATHING: 0
BODYLANGUAGE: 0

## 2021-12-29 ASSESSMENT — PAIN SCALES - GENERAL: PAINLEVEL_OUTOF10: 0

## 2021-12-29 NOTE — PROGRESS NOTES
Physical/Occupational Therapy  Discharge Summary  Gal Duarte      Orders received for PT/OT evaluations. Patient previously seen this admission on 12/23. Determined to be at baseline function as patient is total assist for all ADLs and requires mechanical lift for transfers. No further acute therapy needs identified. Patient appropriate to return to current level of care upon discharge. Will discharge therapy order.  Thanks, Tez Rich, DPT [id number]

## 2021-12-29 NOTE — CARE COORDINATION
Patient has wound consult regarding leg wounds. Spoke to nurse Brain Jha who has seen patient. Patient does not have any leg wounds. 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 Appropriate treatment has been initiated.  VIJAYA Gongora, RN  [facility]

## 2021-12-29 NOTE — CARE COORDINATION
CM  received  A call from Dulce Kim with [facility] hospice stating that a hospice  Nurse will be here to meet with patient and granddaughter at around 1.45 pm.  Other transport has been scheduled with with ETA of 5.30 pm  via [employer]. Constance at [facility] was informed of patient's discharge and ETA.

## 2021-12-29 NOTE — DISCHARGE SUMMARY
[facility] -- Physician Discharge Summary     Sera Guillaume  1935  MRN: [id number]    Admit Date: 12/22/2021  Discharge Date: No discharge date for patient encounter. Attending MD: See Shaw MD  Discharging MD: See Shaw MD  PCP: Lori Luna [address] [facility] / [place] [address] [phone]    Admission Diagnosis: Dehydration [E86.0]  Somnolence [R40.0]  Hypernatremia [E87.0]  Acute kidney injury ([facility] [N17.9]  Aspiration into airway, initial encounter [R78.898E]  DISCHARGE DIAGNOSIS: same    Full Hospital Problem List:  MK/Joe Mcdonough [address] Problems    Diagnosis Date Noted    Hypophosphatemia [E83.39] 12/28/2021    Hypernatremia [E87.0] 12/22/2021    MARIAH (acute kidney injury) ([facility] [N17.9] 12/22/2021    Aspiration pneumonia ([facility] [J69.0] 12/22/2021    Acute respiratory failure with hypoxia (HCC) [J96.01] 12/22/2021    Atrial fibrillation ([facility] [I48.91] 11/28/2019    HTN (hypertension), benign [I10] 06/11/2019    High cholesterol [E78.00] 06/11/2019           Hospital Course:  82 y.o. female been short of breath with low sats with a dry cough the nursing home staff thought that she may have aspirated because she had choked earlier and then became short of breath she has altered mental status but she has had a previous stroke with some confusion    She is placed on empiric iv abx, this is changed to po augmentin on d/c    She has multiple electrolyte derangements, including severe hypernatremia. This is resolved with fluids per renal    Per palliative care consult  Patient/Family Goals of Care :    no HCPOA called and left message for Nomi Cesar granddaughter who is listed as first contact to discuss long term plan     Talked to pts 4 grandchildren. All agree for Hospice care back at [facility]. They would like [facility] [address] they see them in the building all the time.        Plan is to return to SNF and family can enroll with [facility] [place]    Consults made during Hospitalization:  IP CONSULT TO INTERNAL MEDICINE  IP CONSULT TO NEPHROLOGY  IP CONSULT TO NEPHROLOGY  IP CONSULT TO NEPHROLOGY  IP CONSULT TO CARDIOLOGY  IP CONSULT TO PALLIATIVE CARE  IP CONSULT TO HOSPICE    Treatment team at time of Discharge: Treatment Team: Attending Provider: Bonnie Bullock MD; Consulting Physician: Bonnie Bullock MD; Utilization Reviewer: Tobias Thorne RN; Consulting Physician: Roger Medina, RN; Registered Nurse: Roger Medina RN; Consulting Physician: Kwasi Mustafa MD; : John Hankins RN; Registered Nurse: Kartik Davidson RN    Imaging Results:  XR CHEST (2 VW)    Result Date: 12/27/2021  EXAMINATION: TWO XRAY VIEWS OF THE CHEST 12/27/2021 1:50 pm COMPARISON: Chest x-ray dated 12/26/2021 HISTORY: ORDERING SYSTEM PROVIDED HISTORY: cough TECHNOLOGIST PROVIDED HISTORY: Reason for exam:->cough FINDINGS: Increased appearance of bilateral lower lobe infiltrates. No pleural effusion or pneumothorax. Cardiac silhouette is unremarkable. Degenerative disease of the visualized osseous structures. Worsening bilateral lower lobe infiltrates. XR CHEST (2 VW)    Result Date: 12/26/2021  EXAMINATION: TWO XRAY VIEWS OF THE CHEST 12/26/2021 11:07 am COMPARISON: None. HISTORY: ORDERING SYSTEM PROVIDED HISTORY: cough TECHNOLOGIST PROVIDED HISTORY: Reason for exam:->cough FINDINGS: Normal cardiac size. Bilateral lower lobe infiltrates noted, better visualized on the lateral view. No pneumothorax. Chronic appearing compression fracture of a midthoracic vertebral body. Kyphosis and osteopenia noted. Bilateral lower lobe infiltrates better visualized on the lateral view.      CT CHEST WO CONTRAST    Result Date: 12/28/2021  EXAMINATION: CT OF THE CHEST WITHOUT CONTRAST 12/28/2021 10:14 am TECHNIQUE: CT of the chest was performed without the administration of intravenous contrast. Multiplanar reformatted images are provided for review. Dose modulation, iterative reconstruction, and/or weight based adjustment of the mA/kV was utilized to reduce the radiation dose to as low as reasonably achievable. COMPARISON: Recent chest x-ray HISTORY: ORDERING SYSTEM PROVIDED HISTORY: f/u pna TECHNOLOGIST PROVIDED HISTORY: Reason for exam:->f/u pna Reason for Exam: f/u pna FINDINGS: Mediastinum: Small hypodense nodule seen in the thyroid, measuring 8 mm in size or less. No specific imaging follow-up recommended based on size. Moderate coronary artery calcification is seen. Trace pericardial fluid is seen. Small hiatal hernia seen. Fluid is seen in the distal esophagus, compatible with reflux. Lungs/pleura: Scattered areas of bronchial wall thickening are seen. Scattered Tree-in-bud nodularity seen in the right middle lobe and right lower lobe. On the left, patchy ground-glass and tree-in-bud nodularity seen left upper and to a greater extent the left lower lobe. Small left-sided pleural effusion is seen Upper Abdomen: Right adrenal gland is normal.  Left adrenal gland is normal. Gallstones are seen. Soft Tissues/Bones: Spurring is seen in the spine and shoulder joints     Multifocal airspace disease, greatest in the left lower lobe, compatible with pneumonia. Small left-sided pleural effusion is seen. Scattered areas of bronchial wall thickening are seen. Small hiatal hernia seen. Fluid is seen in the distal esophagus, compatible with reflux. RECOMMENDATIONS: Unavailable     XR CHEST PORTABLE    Result Date: 12/22/2021  EXAMINATION: ONE XRAY VIEW OF THE CHEST 12/22/2021 3:53 am COMPARISON: Chest x-ray dated 11/27/2019 HISTORY: ORDERING SYSTEM PROVIDED HISTORY: Dyspnea probable l aspiration TECHNOLOGIST PROVIDED HISTORY: Reason for exam:->Dyspnea probable l aspiration Reason for Exam: Dyspnea probable l aspiration Relevant Medical/Surgical History: previous CHF, hypertension and hyperlipidemia FINDINGS: Clear lungs.  No pleural effusion or pneumothorax. Cardiomediastinal silhouette is unremarkable. Visualized osseous structures are unremarkable. Clear lungs. Discharge Exam: (2-7 system for EPF/Detailed, ?8 for Comprehensive)  /78   Pulse (!) 48   Temp 97.8 °F (36.6 °C) (Axillary)   Resp 14   Ht 5' 7.72\" (1.72 m)   Wt 189 lb 3.2 oz (85.8 kg) Comment: 2 pillows, sheet, hob flat  LMP  (LMP Unknown) Comment: post-menopause  SpO2 94%   BMI 29.01 kg/m²   Constitutional: vitals as above: alert, appears stated age and cooperative    Psychiatric: normal insight and judgment, oriented to person, place, time, and general circumstances    Head: Normocephalic, without obvious abnormality, atraumatic    Eyes:lids and lashes normal, conjunctivae and sclerae normal and pupils equal, round, reactive to light and accomodation    EMNT: external ears normal, nares midline    Neck: no carotid bruit, supple, symmetrical, trachea midline and thyroid not enlarged, symmetric, no tenderness/mass/nodules     Respiratory: crackles bilaterally with normal respiratory effort    Cardiovascular: normal rate, regular rhythm, normal S1 and S2 and no murmurs    Gastrointestinal: soft, non-tender, non-distended, normal bowel sounds, no masses or organomegaly    Extremities: no clubbing, no edema    Skin:No rashes or nodules noted. Neurologic:negative             Disposition: [facility]    Condition: stable    Discharge Medications:  [unfilled]     Medication List      START taking these medications     amoxicillin-clavulanate 875-125 MG per tablet; Commonly known as: Augmentin; Take 1 tablet by mouth 2 times daily for 7 days     CHANGE how you take these medications     famotidine 20 MG tablet; Commonly known as: PEPCID; Take 1 tablet by   mouth 2 times daily; What changed: when to take this   hypromellose 0.4 % Soln ophthalmic solution; What changed: Another   medication with the same name was removed.  Continue taking this   medication, and follow the directions you see here.  levETIRAcetam 100 MG/ML solution; Commonly known as: KEPPRA; Take 5 mLs   by mouth 2 times daily; What changed: how much to take, when to take this     CONTINUE taking these medications     acetaminophen 325 MG tablet; Commonly known as: TYLENOL   aspirin 81 MG tablet   Eliquis 2.5 MG Tabs tablet; Generic drug: apixaban   guaiFENesin-dextromethorphan 100-10 MG/5ML syrup; Commonly known as:   ROBITUSSIN DM   insulin glargine 100 UNIT/ML injection pen; Commonly known as:   LANTUS;BASAGLAR; Inject 15 Units into the skin nightly   * insulin lispro (1 Unit Dial) 100 UNIT/ML Sopn; Inject 0-12 Units into   the skin 3 times daily (before meals)   * insulin lispro (1 Unit Dial) 100 UNIT/ML Sopn; Inject 5 Units into the   skin 3 times daily (with meals)   * ipratropium-albuterol 0.5-2.5 (3) MG/3ML Soln nebulizer solution;   Commonly known as: DUONEB; Inhale 3 mLs into the lungs every 6 hours as   needed for Shortness of Breath   * ipratropium-albuterol 0.5-2.5 (3) MG/3ML Soln nebulizer solution;   Commonly known as: DUONEB   magnesium hydroxide 400 MG/5ML suspension; Commonly known as: MILK OF   MAGNESIA   potassium chloride 20 MEQ extended release tablet; Commonly known as:   KLOR-CON M   vitamin D 25 MCG (1000 UT) Tabs tablet; Commonly known as:   CHOLECALCIFEROL  * This list has 4 medication(s) that are the same as other medications   prescribed for you. Read the directions carefully, and ask your doctor or   other care provider to review them with you. STOP taking these medications     digoxin 125 MCG tablet; Commonly known as: LANOXIN   metoprolol tartrate 25 MG tablet; Commonly known as: LOPRESSOR   torsemide 100 MG tablet; Commonly known as: DEMADEX         Allergies:  No Known Allergies    Follow up Instructions: Follow-up with PCP: Jaylen Adam in 2 wk .       Total time spent on day of discharge including face-to-face visit, examination, documentation, counseling, preparation of discharge plans and followup, and discharge medicine reconciliation and presciptions is 33 minutes    (Please note that portions of this note were completed with a voice recognition program.  Efforts were made to edit the dictations but occasionally words are mis-transcribed.)      Signed:  See Shaw MD  12/29/2021

## 2021-12-29 NOTE — CARE COORDINATION
CM received a call from Bri Alexander with [facility] hospice  stating that they are following the patient . The plan is for patient to go back to [facility] under hospice care. She was calling the granddaughter and will call back with updates.

## 2021-12-29 NOTE — CARE COORDINATION
Discharge Plan:     Patient discharged to:  Discharging to Facility/ Agency   · Name: Oscar Kahn  · [facility] [place] [place] [address]  · Phone: [phone]  · Fax:      [phone]    SW/DC Planner faxed, [address]   Narcotic Prescriptions faxed were: No  RN: Phil Quezada call report       Medical Transport with:  [address] [place] [address] [phone]   time:  5.30 pm  Family advised of discharge?: Granddaughter  HENS Submitted?:    [address] [place] [address] hospice  will follow at the facility. All discharge needs met per case management.

## 2021-12-29 NOTE — DISCHARGE INSTR - COC
Assessment:  Last Vital Signs: /78   Pulse (!) 48   Temp 97.8 °F (36.6 °C) (Axillary)   Resp 14   Ht 5' 7.72\" (1.72 m)   Wt 189 lb 3.2 oz (85.8 kg) Comment: 2 pillows, sheet, hob flat  LMP  (LMP Unknown) Comment: post-menopause  SpO2 94%   BMI 29.01 kg/m²     Last documented pain score (0-10 scale): Pain Level: 0  Last Weight:   Wt Readings from Last 1 Encounters:   12/29/21 189 lb 3.2 oz (85.8 kg)     Mental Status:  alert and nonverbal unable to assess orientation    IV Access:  - None    Nursing Mobility/ADLs:  Walking   Dependent  Transfer  Dependent  Bathing  Dependent  Dressing  Dependent  Toileting  Dependent  Feeding  Dependent  Med Admin  Dependent  Med Delivery  Crushed in applesauce    Wound Care Documentation and Therapy:  Wound Ischium Left (Active)   Wound Etiology Pressure Stage  2 12/29/21 0817   Dressing Status Clean;Dry; Intact 12/29/21 0817   Wound Cleansed Wound cleanser 12/29/21 0817   Dressing/Treatment Zinc paste 12/29/21 0817   Wound Assessment Pink/red 12/29/21 0817   Drainage Amount None 12/29/21 0817   Odor None 12/29/21 0817   Kitty-wound Assessment Fragile 12/29/21 0817   Wound Thickness Description not for Pressure Injury Partial thickness 12/29/21 0817   Number of days:         Elimination:  Continence: Bowel: Yes  Bladder: Yes  Urinary Catheter: None   Colostomy/Ileostomy/Ileal Conduit: No       Date of Last BM: 12/28/21    Intake/Output Summary (Last 24 hours) at 12/29/2021 0828  Last data filed at 12/28/2021 1243  Gross per 24 hour   Intake 6775.91 ml   Output --   Net 6775.91 ml     I/O last 3 completed shifts: In: 6775.9 [P.O.:180; I.V.:5976.6; IV Piggyback:619.3]  Out: -     Safety Concerns: At Risk for Falls and Aspiration Risk    Impairments/Disabilities:      Speech and nonverbal     Nutrition Therapy:  Current Nutrition Therapy:   Dysphagia 1 puree Carb Control. Oral supplementation low calorie high fiber  Routes of Feeding: Oral  Liquids:  Thin Liquids  Daily Fluid Restriction: no  Last Modified Barium Swallow with Video (Video Swallowing Test): not done    Treatments at the Time of Hospital Discharge:   Respiratory Treatments: na  Oxygen Therapy:  is not on home oxygen therapy. Ventilator:    - No ventilator support    Rehab Therapies: na  Weight Bearing Status/Restrictions: No weight bearing restirctions  Other Medical Equipment (for information only, NOT a DME order):  hospital bed  Other Treatments: na    Patient's personal belongings (please select all that are sent with patient):  None    RN SIGNATURE:  Electronically signed by Gina Blas RN on 12/29/21 at 3:59 PM EST      CASE MANAGEMENT/SOCIAL WORK SECTION    Inpatient Status Date:  12/22/21    Readmission Risk Assessment Score:  Readmission Risk              Risk of Unplanned Readmission:  24           Discharging to Facility/ Agency   Name: Discharging to Facility/ Agency   Name: [facility]  Address:  [address] [place], [place] [facility] [address]  Phone: [phone]  Fax:      [phone] [facility] [address] hospice  will follow at the facility. / signature: Electronically signed by Lucien Flores RN on 12/29/21 at 9:47 AM EST    PHYSICIAN SECTION    Prognosis: Guarded    Condition at Discharge: Stable    Rehab Potential (if transferring to Rehab): Good    Recommended Labs or Other Treatments After Discharge: ***    Physician Certification: I certify the above information and transfer of Dolores Vergara  is necessary for the continuing treatment of the diagnosis listed and that she requires [facility] for greater 30 days.      Update Admission H&P: No change in H&P    PHYSICIAN SIGNATURE:  Electronically signed by Rajendra Martins MD on 12/29/21 at 8:28 AM EST

## 2021-12-29 NOTE — PROGRESS NOTES
[facility] Note        Renal functions improved to baseline.  -Creatinine is 1.0. Electrolytes improved to baseline. Magnesium levels improved. Phosphorus levels-patient received replacement. Patient is stable from renal for discharge              Patient Active Problem List   Diagnosis    Hemorrhagic stroke ([facility]    [facility]    HTN (hypertension), benign    High cholesterol    Chronic congestive heart failure (HCC)    Remote history of stroke    Dyslipidemia    Essential tremor    Hyperglycemic hyperosmolar nonketotic coma ([facility]    Atrial fibrillation (HCC)    Hypernatremia    MARIAH (acute kidney injury) ([facility]    [facility]    Acute respiratory failure with hypoxia [facility]    Hypophosphatemia       Past Medical History:   has a past medical history of CHF (congestive heart failure) ([facility] Hyperlipidemia, and Hypertension. Past Social History:   reports that she has quit smoking. She has never used smokeless tobacco. She reports that she does not drink alcohol and does not use drugs.     Subjective:  Seen resting in bed Awake but not able to verbalize effectively still  Review of Systems unable to obtain      Objective:      /78   Pulse (!) 48   Temp 97.8 °F (36.6 °C) (Axillary)   Resp 14   Ht 5' 7.72\" (1.72 m)   Wt 189 lb 3.2 oz (85.8 kg) Comment: 2 pillows, sheet, hob flat  LMP  (LMP Unknown) Comment: post-menopause  SpO2 94%   BMI 29.01 kg/m²     Wt Readings from Last 3 Encounters:   12/29/21 189 lb 3.2 oz (85.8 kg)   11/30/19 208 lb 5.4 oz (94.5 kg)   06/14/19 205 lb 12.8 oz (93.4 kg)       BP Readings from Last 3 Encounters:   12/29/21 102/78   11/30/19 99/66   06/14/19 (!) 117/56     Chest- clear  Heart-regular  Abd-soft  Ext- no edema    Labs  Hemoglobin   Date Value Ref Range Status   12/28/2021 11.3 (L) 12.0 - 16.0 g/dL Final     Hematocrit   Date Value Ref Range Status 12/28/2021 34.0 (L) 36.0 - 48.0 % Final     WBC   Date Value Ref Range Status   12/28/2021 7.7 4.0 - 11.0 K/uL Final     Platelets   Date Value Ref Range Status   12/28/2021 167 135 - 450 K/uL Final     Lab Results   Component Value Date    CREATININE 1.0 12/28/2021    BUN 13 12/28/2021     12/28/2021    K 3.6 12/28/2021     12/28/2021    CO2 23 12/28/2021               Tiff Mayen MD  [facility] [address] [place] [address]   Phone: [phone] or [address] [employer]  Fax: [phone]

## 2021-12-29 NOTE — PROGRESS NOTES
Speech Language Pathology  Dysphagia Treatment Note    Name:  Deyvi Steele  :   1935  Medical Diagnosis:  Dehydration [E86.0]  Somnolence [R40.0]  Hypernatremia [E87.0]  Acute kidney injury ([facility] [N17.9]  Aspiration into airway, initial encounter [T13.716X]  Treatment Diagnosis: Oropharyngeal Dysphagia  Pain level: No signs of pain     Diet level prior to treatment: Dysphagia I Pureed with thin liquids, no straws, Meds crushed in puree   Tolerance of Current Diet Level: Per chart review and RN report, no noted difficulty with current diet with proper precautions including small bites/sips and upright positioning. Assessment of Texture Tolerance:  Pt HOB raised upon SLP arrival, currently on RA. Thin liquids via cup and puree solids were presented to ensure texture tolerance. Clinical symptoms of oral holding, delayed AP transit, suspected premature bolus loss to pharynx and delayed swallow initiation noted across consistencies. Pt able to achieve effective clearance of puree solids given extended time. Pt overall tolerating of sips of thin liquids via cup with no overt clinical s/s of aspiration/penetration. Recommend continuation of current diet with strict implementation of aspiration precautions and compensatory strategies to maximize safety.      Diet and Treatment Recommendations 2021:  Continue current diet: Dysphagia I Pureed with thin liquids, no straws, Meds crushed in puree       Compensatory strategies: Upright as possible with all PO intake , No straws , Small bites/sips , Eat/feed slowly, Remain upright 30-45 min , Total Feed     Dysphagia Goals:   1.) Pt will functionally tolerate recommended diet with no overt clinical s/s of aspiration (ongoing 2021)   2.) Pt will functionally tolerate ongoing assessment of swallow function with diet to be determined as indicated (ongoing 2021)   3.) Pt will advance to least restrictive diet as indicated (ongoing 2021)     Plan: Continued daily Dysphagia treatment with goals per plan of care. Patient/Family Education: Education given to the Pt and nurse, who verbalized understanding. Pt unable to demonstrate comprehension. Discharge Recommendations:  Pt will benefit from continued skilled Speech Therapy for Dysphagia services, prior to returning home. Timed Code Treatment: 0 minutes    Total Treatment Time: 12 minutes     If patient discharges prior to next session this note will serve as a discharge summary.      Signature:  Fabiola Espinoza., [address] [facility] [address] [facility] [address] [address]  Speech-Language Pathologist  12/29/2021 10:02 AM

## 2021-12-30 NOTE — PROGRESS NOTES
Physician Progress Note      MILLA Hawthorne  [facility] #:                  [id number]  :                       1935  ADMIT DATE:       2021 3:31 AM  DISCH DATE:        2021 5:36 PM  RESPONDING  PROVIDER #:        Gely Calix MD          QUERY TEXT:    Pt admitted with. Pt noted to have abnormal labs. If possible, please document   in the progress notes and discharge summary if you are evaluating and /or   treating any of the following: The medical record reflects the following:  Risk Factors: Aspiration PNA  Clinical Indicators: AMS, WBC- 15.7, LA- 2.8, 100.3 in ED. CRP- 210.1 (). CXR- - Worsening bilateral lower lobe infiltrates. MARIAH  Treatment: CX, CT Chest, BC, Zosyn, Palliative Care, O2  Options provided:  -- Sepsis, present on admission  -- Sepsis, not present on admission  -- Aspiration pneumonia without Sepsis  -- Other - I will add my own diagnosis  -- Disagree - Not applicable / Not valid  -- Disagree - Clinically unable to determine / Unknown  -- Refer to Clinical Documentation Reviewer    PROVIDER RESPONSE TEXT:    This patient has sepsis which was present on admission.     Query created by: Claudia Carolina on 2021 2:25 PM      Electronically signed by:  Gely Calix MD 2021 9:06 AM

## 2025-02-11 NOTE — DISCHARGE SUMMARY
Hospital Medicine Discharge Summary    Patient ID: Heriberto Herr   Gender: female  : 1935   Age: 82 y.o. MRN: [id number]  Code Status: Full Code    Patient's PCP: No primary care provider on file. Admit Date: 2018     Discharge Date:   18    Admitting Physician: Pamela Beck MD     Discharge Physician: Clary Ramirez MD     Discharge Diagnoses: Active Hospital Problems    Diagnosis Date Noted    Hemorrhagic stroke [facility]) [I61.9] 2018       The patient was seen and examined on day of discharge and this discharge summary is in conjunction with any daily progress note from day of discharge. Hospital Course:     HPI  Patient is a 83 yo female with a past medical history of Afib (on warfarin), CHF with preserved EF and HTN. She was brought in by EMS after family went to check up on her at her assisted living facility. On arrival her family noticed she had soiled herself and was confused and had a right-sided facial droop. Last conversation her granddaughter had with her was the night before where she endorsed she was feeling lightheaded and malaise. Granddaughter seemed to remember that on Thursday she had a fall but refused to go to the hosptial and denied any symptoms at the time. Family spent time with her on Friday and didn't notice any symptoms.      ED Course at [place]: Physical exam showed right sided weakness, Right sided facial droop and confusion. CT scan showed Hemorrhage within the left caudate and putamen, likely subacute. represent hemorrhagic transformation of subacute infarcts or primary intraparenchymal hemorrhage. More over she was afebrile. Her INR was elevated at 1.5, she was given Fresh Frozen Plasma and vitamin K for reversal.  stroke team was contacted and transfer to [facility] was initiated for Neurosurgery consult. Recs by [facility] were to keep blood pressures under 410 systolic.      Hemorrhagic stroke  On initial presentation in ICU patient was lethargic pain, SOB, abdominal pain. Disposition:  ARU    Physical Exam Performed:     /80   Pulse 67   Temp 98.4 °F (36.9 °C) (Axillary)   Resp 16   Ht 5' 4.96\" (1.65 m)   Wt 249 lb 1.9 oz (113 kg)   SpO2 94%   BMI 41.51 kg/m²       Physical Exam   Constitutional: She is oriented to person, place, and time. No distress. Eyes: Conjunctivae and EOM are normal.   Neck: Normal range of motion. Neck supple. Cardiovascular: Irregular rhythm. Normal rate and normal heart sounds.    Pulmonary/Chest: Effort normal and breath sounds normal.   Abdominal: Soft. She exhibits no distension. There is no tenderness. Musculoskeletal: She exhibits no edema or deformity. Decreased strength and motor function in RUE and RLE   Neurological: She is alert and oriented to person, place, and time. Skin: Skin is warm and dry. She is not diaphoretic. Labs: For convenience and continuity at follow-up the following most recent labs are provided:      CBC:    Lab Results   Component Value Date    WBC 8.5 09/26/2018    HGB 14.2 09/26/2018    HCT 43.0 09/26/2018     09/26/2018       Renal:    Lab Results   Component Value Date     09/26/2018    K 4.0 09/26/2018    K 5.0 09/18/2018     09/26/2018    CO2 22 09/26/2018    BUN 12 09/26/2018    CREATININE <0.5 09/26/2018    CALCIUM 9.2 09/26/2018    PHOS 2.7 09/26/2018         Significant Diagnostic Studies    Radiology:   CT HEAD WO CONTRAST   Final Result      1. Evolving hemorrhagic infarct in the left basal ganglia. Unchanged 2 mm rightward midline shift. XR CHEST PORTABLE   Final Result      No acute pulmonary pathology                  FL MODIFIED BARIUM SWALLOW W VIDEO   Final Result   1. Tongue pumping   2. Intermittent penetration. 3. Otherwise within functional limits      VL DUP CAROTID BILATERAL   Final Result      MRI BRAIN W WO CONTRAST   Final Result      1.  Left basal ganglia infarct with 2 areas of acute hemorrhagic transformation, similar in appearance compared to prior CT. CT Head WO Contrast   Final Result   Addendum 1 of 1   CR   Patient: MILLA WADSWORTH  Time Out: 02:09   Exam(s): CT HEAD Without Contrast        ADDENDUM - Added by Kendrick Perez M.D. on 9/19/2018 2:09 AM (-04:00)   Comparison from outside institution on nonenhanced head CT from 09/18/18    1650 hour and CTA from 1708 hour now available after second request.       Amount of hemorrhage in left caudate and basal ganglia and associated    mass effect and midline shift are not substantially changed. Surrounding vasogenic edema is slightly increased. ----------------------------------------------------------------------       EXAM:     CT Head Without Intravenous Contrast       CLINICAL HISTORY:      Reason for exam: re-assess hemorrhage. Has a \"code stroke\" or \"stroke    alert\" been called?->No.       TECHNIQUE:     Axial computed tomography images of the head/brain without intravenous    contrast.  CTDI is 67.82 mGy and DLP is 1279.2 mGy-cm. All CT scans at    this facility use dose modulation, iterative reconstruction, and/or    weight based dosing when appropriate to reduce radiation dose to as low    as reasonably achievable. Coronal and sagittal reconstructions are    performed       COMPARISON:     No relevant prior studies available. FINDINGS:     Brain:  2 foci of adjacent small intraparenchymal hemorrhage centered    in the region of left caudate and basal ganglia, surrounded by moderate    amount of vasogenic edema causing mass effect, effacement of left lateral    ventricle and 2 mm midline shift to the right. Small hypodensities of    left frontal lobe may suggest subacute versus chronic infarct. Ventricles:  Unremarkable. No ventriculomegaly. Bones/joints:  Unremarkable. No acute fracture. Soft tissues:  Unremarkable. Sinuses:  Unremarkable as visualized. No acute sinusitis.      Mastoid air cells:  Unremarkable as Patient received A&Ox4, ambulatory with steady gait at the present. Patient complains of abd pain and nausea/vomiting for 2 months. Hx of ulcerative colitis. Patient has an EGD scheduled in March but symptoms have not resolved since last visit. Denies fevers, diarrhea, chest pain, SOB, dizziness. Side rails up, call light in reach, safety maintained, comfort measures provided and MD evaluation in progress.